# Patient Record
Sex: MALE | Race: WHITE | NOT HISPANIC OR LATINO | Employment: OTHER | URBAN - METROPOLITAN AREA
[De-identification: names, ages, dates, MRNs, and addresses within clinical notes are randomized per-mention and may not be internally consistent; named-entity substitution may affect disease eponyms.]

---

## 2017-02-10 ENCOUNTER — ALLSCRIPTS OFFICE VISIT (OUTPATIENT)
Dept: OTHER | Facility: OTHER | Age: 72
End: 2017-02-10

## 2017-02-10 PROCEDURE — G0416 PROSTATE BIOPSY, ANY MTHD: HCPCS | Performed by: UROLOGY

## 2017-02-10 PROCEDURE — 88344 IMHCHEM/IMCYTCHM EA MLT ANTB: CPT | Performed by: UROLOGY

## 2017-02-13 ENCOUNTER — LAB REQUISITION (OUTPATIENT)
Dept: LAB | Facility: HOSPITAL | Age: 72
End: 2017-02-13
Payer: MEDICARE

## 2017-02-13 DIAGNOSIS — C61 MALIGNANT NEOPLASM OF PROSTATE (HCC): ICD-10-CM

## 2017-02-28 ENCOUNTER — ALLSCRIPTS OFFICE VISIT (OUTPATIENT)
Dept: OTHER | Facility: OTHER | Age: 72
End: 2017-02-28

## 2017-05-28 DIAGNOSIS — C61 MALIGNANT NEOPLASM OF PROSTATE (HCC): ICD-10-CM

## 2017-05-31 ENCOUNTER — ALLSCRIPTS OFFICE VISIT (OUTPATIENT)
Dept: OTHER | Facility: OTHER | Age: 72
End: 2017-05-31

## 2017-08-31 DIAGNOSIS — C61 MALIGNANT NEOPLASM OF PROSTATE (HCC): ICD-10-CM

## 2017-09-05 ENCOUNTER — ALLSCRIPTS OFFICE VISIT (OUTPATIENT)
Dept: OTHER | Facility: OTHER | Age: 72
End: 2017-09-05

## 2017-11-27 DIAGNOSIS — C61 MALIGNANT NEOPLASM OF PROSTATE (HCC): ICD-10-CM

## 2018-01-03 ENCOUNTER — TRANSCRIBE ORDERS (OUTPATIENT)
Dept: ADMINISTRATIVE | Facility: HOSPITAL | Age: 73
End: 2018-01-03

## 2018-01-03 ENCOUNTER — ALLSCRIPTS OFFICE VISIT (OUTPATIENT)
Dept: OTHER | Facility: OTHER | Age: 73
End: 2018-01-03

## 2018-01-03 DIAGNOSIS — C61 MALIGNANT NEOPLASM OF PROSTATE (HCC): ICD-10-CM

## 2018-01-03 DIAGNOSIS — C61 MALIGNANT NEOPLASM OF PROSTATE (HCC): Primary | ICD-10-CM

## 2018-01-04 NOTE — PROGRESS NOTES
Assessment   1  Prostate cancer (185) (C61)    Plan   Prostate cancer    · (1) PSA, DIAGNOSTIC (FOLLOW-UP); Status:Active; Requested for:03Apr2018; Perform:[facility]; FVC:[id number]; For:Prostate cancer; Ordered     By:Nate Cobb   · MRI PROSTATE MULTIPARAMETRIC WO W CONTRAST; Status:Need Information -    Financial Authorization; Requested OYV:[id number]    Perform:[facility]; Order Comments: Lynetta Skiff  GBE:[id number]; Last Updated By:India Chinchilla; 1/3/2018 9:43:55 AM;1 Ordered; For:Prostate cancer; Ordered By:Nate Cobb   · XR ORBITS 4+ VIEW; Status:Active; Requested KFD:[id number]; 1   Perform:[facility]; ACE:[id number]; For:Prostate cancer; Ordered     By:Brant Cobb    · Follow-up visit in 3 months Evaluation and Treatment  Follow-up  Status: Complete     Done: [id number]  Ordered; For: Prostate cancer;  Ordered By: Barbra Chen  Performed:   Due: [id number]; Last Updated By: Lilian Rodríguez; 1/3/2018 9:26:48 AM     1 Amended By: Barbra Chen; Jan 03 2018 9:50 AM EST      Discussion/Summary   Discussion Summary:    [place] 6 prostate cancer on active surveillance  Reviewed with the patient that his PSA is elevated at 10 96  Had a discussion with the patient that since he had a negative prostate biopsy 6 months ago and has previously had a PSA of 11, we will continue to trend his PSAs  He will follow up in 3 months with a PSA prior to visit  Multiparametric MRI will be performed as well to rule out any areas of high suspicion  If further progression in PSA is noted, prostate biopsies will be repeated earlier than anticipated  These are otherwise to be performed in February 2019  Patient's Capacity to Self-Care: Patient is able to Self-Care        Chief Complaint   Chief Complaint Free Text Note Form: Patient presents for Prostate Cancer - AS , elevated PSA   10 55 (12/26/2017)            History of Present Illness   HPI: Bethany Knight is a 67 year old male patient with small volume [place] 6 prostate cancer on active surveillance presenting for 3 month follow up  His initial prostate biopsy was performed in 2011 by an outside urologist  He then had a repeat prostate biopsy in 2012, 2013, 2015 and most recently 2/2017 all of which were negative for malignancy  His PSA has been as high as 11 in the past  PSA more recently was 10 96 and again 10 55 today  He may have had an upper respiratory tract infection 3 weeks ago  most recent PSA is 10 96 on 8/30/17, previously 8 2 5/26/17, 6 19 2/6/17, 11 18 9/26/16  He denies any recent colds/sicknesses  Patient makes sure he does not ride his bicycle 2 weeks prior to blood work  feels like he has a fair stream, empties his bladder after urination, and has nocturia 0-1x night  He denies any dysuria, gross hematuria, bone pain, or weight loss  He does not take any medication for his prostate or bladder  Review of Systems   Complete-Male Urology:      Constitutional: No fever or chills, feels well, no tiredness, no recent weight gain or weight loss  Respiratory: No complaints of shortness of breath, no wheezing, no cough, no SOB on exertion, no orthopnea or PND  Cardiovascular: No complaints of slow heart rate, no fast heart rate, no chest pain, no palpitations, no leg claudication, no lower extremity  Gastrointestinal: No complaints of abdominal pain, no constipation, no nausea or vomiting, no diarrhea or bloody stools  Genitourinary: Empty sensation-- and-- stream quality fair, but-- as noted in HPI,-- no dysuria,-- no urinary hesitancy,-- no hematuria,-- no incontinence-- and-- no feelings of urinary urgency--       The patient presents with complaints of no nocturia (Occasional )  Musculoskeletal: No complaints of arthralgia, no myalgias, no joint swelling or stiffness, no limb pain or swelling        Integumentary: No complaints of skin rash or skin lesions, no itching, no skin wound, no dry skin  Hematologic/Lymphatic: No complaints of swollen glands, no swollen glands in the neck, does not bleed easily, no easy bruising  Neurological: No compliants of headache, no confusion, no convulsions, no numbness or tingling, no dizziness or fainting, no limb weakness, no difficulty walking  Active Problems   1  Organic impotence (607 84) (N52 9)  2  Prostate cancer (185) (C61)    Past Medical History   Active Problems And Past Medical History Reviewed: The active problems and past medical history were reviewed and updated today  Surgical History   1  History of Needle Biopsy Of Prostate  Surgical History Reviewed: The surgical history was reviewed and updated today  Family History   Mother   1  Family history of hypertension (V17 49) (Z82 49)  Brother   2  Family history of malignant neoplasm of prostate (V16 42) (Z80 45)  Family History Reviewed: The family history was reviewed and updated today  Social History    · Being A Social Drinker   · Never A Smoker  Social History Reviewed: The social history was reviewed and updated today  Current Meds   1  No Reported Medications  Requested for: [id number] Recorded  Medication List Reviewed: The medication list was reviewed and updated today  Allergies   1  No Known Drug Allergies    Vitals   Vital Signs    Recorded: [id number] 09:10AM   Heart Rate 70   Systolic 223   Diastolic 76   Height 5 ft 4 in   Weight 153 lb 6 oz   BMI Calculated 26 33   BSA Calculated 1 75     Physical Exam        Constitutional      General appearance: No acute distress, well appearing and well nourished  Pulmonary      Respiratory effort: No increased work of breathing or signs of respiratory distress  Auscultation of lungs: Clear to auscultation  Cardiovascular      Auscultation of heart: Normal rate and rhythm, normal S1 and S2, without murmurs         Examination of extremities for edema and/or varicosities: Normal        Genitourinary      Anus and perineum: Normal        Scrotum contents: Normal size, no masses  Epididymis: Normal, no masses  Testes: Normal testes, no masses  Urethral meatus: Normal, no lesions  Penis: Normal, no lesions  -- 50g, smooth, symmetric, no nodules  Anus, perineum, and rectum: Normal        Musculoskeletal      Gait and station: Normal        Skin      Skin and subcutaneous tissue: Normal without rashes or lesions  Neurologic      Cranial nerves: Cranial nerves 2-12 intact  Additional Exam:  no focal neurologic deficits  Mood and affect appropriate        Signatures    Electronically signed by : IKE Hernandez ; Donny  3 2018  9:29AM EST                       (Author)     Electronically signed by : IKE Hernandez ; Dnony  3 2018  9:52AM EST                       (Author)

## 2018-01-12 VITALS
WEIGHT: 140.5 LBS | BODY MASS INDEX: 23.99 KG/M2 | HEART RATE: 68 BPM | DIASTOLIC BLOOD PRESSURE: 68 MMHG | SYSTOLIC BLOOD PRESSURE: 140 MMHG | HEIGHT: 64 IN

## 2018-01-13 VITALS
DIASTOLIC BLOOD PRESSURE: 70 MMHG | BODY MASS INDEX: 25.34 KG/M2 | SYSTOLIC BLOOD PRESSURE: 120 MMHG | HEIGHT: 64 IN | WEIGHT: 148.44 LBS | HEART RATE: 72 BPM

## 2018-01-14 VITALS
HEIGHT: 64 IN | HEART RATE: 60 BPM | BODY MASS INDEX: 24.24 KG/M2 | SYSTOLIC BLOOD PRESSURE: 140 MMHG | WEIGHT: 142 LBS | DIASTOLIC BLOOD PRESSURE: 70 MMHG

## 2018-01-15 VITALS
SYSTOLIC BLOOD PRESSURE: 112 MMHG | DIASTOLIC BLOOD PRESSURE: 60 MMHG | WEIGHT: 144 LBS | HEART RATE: 60 BPM | BODY MASS INDEX: 24.59 KG/M2 | HEIGHT: 64 IN

## 2018-01-23 VITALS
HEIGHT: 64 IN | WEIGHT: 153.38 LBS | SYSTOLIC BLOOD PRESSURE: 124 MMHG | HEART RATE: 70 BPM | BODY MASS INDEX: 26.18 KG/M2 | DIASTOLIC BLOOD PRESSURE: 76 MMHG

## 2018-03-12 DIAGNOSIS — C61 PROSTATE CANCER (HCC): Primary | ICD-10-CM

## 2018-03-27 ENCOUNTER — APPOINTMENT (OUTPATIENT)
Dept: RADIOLOGY | Facility: MEDICAL CENTER | Age: 73
End: 2018-03-27
Payer: MEDICARE

## 2018-03-27 DIAGNOSIS — S05.40XA FOREIGN BODY BEHIND THE EYE, UNSPECIFIED LATERALITY: ICD-10-CM

## 2018-03-27 DIAGNOSIS — C61 PROSTATE CANCER (HCC): Primary | ICD-10-CM

## 2018-03-27 DIAGNOSIS — C61 MALIGNANT NEOPLASM OF PROSTATE (HCC): ICD-10-CM

## 2018-03-27 PROCEDURE — 70030 X-RAY EYE FOR FOREIGN BODY: CPT

## 2018-04-03 DIAGNOSIS — C61 MALIGNANT NEOPLASM OF PROSTATE (HCC): ICD-10-CM

## 2018-04-10 DIAGNOSIS — C61 PROSTATE CANCER (HCC): Primary | ICD-10-CM

## 2018-04-11 ENCOUNTER — HOSPITAL ENCOUNTER (OUTPATIENT)
Dept: MRI IMAGING | Facility: HOSPITAL | Age: 73
Discharge: HOME | End: 2018-04-11
Attending: UROLOGY
Payer: MEDICARE

## 2018-04-11 DIAGNOSIS — C61 MALIGNANT NEOPLASM OF PROSTATE (HCC): ICD-10-CM

## 2018-04-11 PROCEDURE — 72197 MRI PELVIS W/O & W/DYE: CPT

## 2018-04-11 PROCEDURE — 76377 3D RENDER W/INTRP POSTPROCES: CPT

## 2018-04-11 PROCEDURE — A9577 INJ MULTIHANCE: HCPCS | Performed by: UROLOGY

## 2018-04-11 RX ADMIN — GADOBENATE DIMEGLUMINE 12 ML: 529 INJECTION, SOLUTION INTRAVENOUS at 10:49

## 2018-04-12 ENCOUNTER — TELEPHONE (OUTPATIENT)
Dept: UROLOGY | Facility: HOSPITAL | Age: 73
End: 2018-04-12

## 2018-04-12 ENCOUNTER — TELEPHONE (OUTPATIENT)
Dept: UROLOGY | Facility: AMBULATORY SURGERY CENTER | Age: 73
End: 2018-04-12

## 2018-04-12 NOTE — TELEPHONE ENCOUNTER
[facility]'s Lucius left message stating Dr Julianna Hall is waiting for Dr Josiephine Kocher to call him back regarding an MRI on patient    [phone]

## 2018-04-13 NOTE — PROGRESS NOTES
4/18/2018    [address]  1945  [id number]    Discussion and Plan    MRI findings are reviewed with the patient at length which demonstrates a PE reds 5 lesion in the left anterior aspect of the prostate  Given his established diagnosis of prostate cancer and variably though rising PSA trend, I have recommended that repeat biopsies be performed at this time  Risks including bleeding and infection discussed  Script for Cipro sent to pharmacy  1  Organic impotence    2  Prostate cancer (HCC)  - ciprofloxacin (CIPRO) 500 mg tablet; Take 1 tablet (500 mg total) by mouth 2 (two) times a day for 3 days  Dispense: 6 tablet; Refill: 0  - Biopsy prostate; Future        Assessment      Patient Active Problem List   Diagnosis    Organic impotence    Prostate cancer ([address] )       History of Present Illness    Kami Pemberton is a 68 y o  male seen today in regards to a history of small volume [place] 6 prostate cancer on active surveillance presenting for 3 month follow up  His initial prostate biopsy was performed in 2011 by an outside urologist  He then had a repeat prostate biopsy in 2012, 2013, 2015 and most recently 2/2017 all of which were negative for malignancy  His PSA has been as high as 11 in the past  PSA more recently was 10 96 and again 10 55 today  He may have had an upper respiratory tract infection several weeks ago  Current PSA is 13 84  MRI results are reviewed with patient today          Urinary Symptom Assessment      Past Medical History  History reviewed  No pertinent past medical history  Past Social History  Past Surgical History:   Procedure Laterality Date    PROSTATE BIOPSY      1/26/2015, 2/10/2017       Past Family History  Family History   Problem Relation Age of Onset    Hypertension Mother     Prostate cancer Brother        Past Social history  Social History     Social History    Marital status:       Spouse name: N/A    Number of children: N/A    Years of education: N/A Occupational History    Not on file  Social History Main Topics    Smoking status: Never Smoker    Smokeless tobacco: Never Used    Alcohol use Yes      Comment: social    Drug use: Unknown    Sexual activity: Not on file     Other Topics Concern    Not on file     Social History Narrative    No narrative on file       Current Medications  Current Outpatient Prescriptions   Medication Sig Dispense Refill    ciprofloxacin (CIPRO) 500 mg tablet Take 1 tablet (500 mg total) by mouth 2 (two) times a day for 3 days 6 tablet 0     No current facility-administered medications for this visit  Allergies  No Known Allergies    Past Medical History, Social History, Family History, medications and allergies were reviewed  Review of Systems  Review of Systems   Constitutional: Negative  HENT: Negative  Eyes: Negative  Respiratory: Negative  Cardiovascular: Negative  Gastrointestinal: Negative  Endocrine: Negative  Genitourinary: Negative for decreased urine volume, difficulty urinating, frequency, hematuria and urgency  Musculoskeletal: Negative  Skin: Negative  Neurological: Negative  Hematological: Negative  Psychiatric/Behavioral: Negative  Vitals  Vitals:    04/18/18 1136   BP: 104/80   Pulse: (!) 44   Weight: 66 7 kg (147 lb)   Height: 5' 4" (1 626 m)         Physical Exam    Physical Exam   Constitutional: He is oriented to person, place, and time  He appears well-developed and well-nourished  HENT:   Head: Normocephalic and atraumatic  Eyes: Pupils are equal, round, and reactive to light  Neck: Normal range of motion  Cardiovascular: Normal rate, regular rhythm and normal heart sounds  Pulmonary/Chest: Effort normal and breath sounds normal  No accessory muscle usage  No respiratory distress  Abdominal: Soft  Normal appearance and bowel sounds are normal  There is no tenderness     Genitourinary: Rectum normal, prostate normal and penis normal  No penile tenderness  Genitourinary Comments: Prostate greater than 40 g  No distinct nodules  Musculoskeletal: Normal range of motion  Neurological: He is alert and oriented to person, place, and time  Skin: Skin is warm, dry and intact  Psychiatric: He has a normal mood and affect  His speech is normal  Cognition and memory are normal    Nursing note and vitals reviewed  Results    No results found for: PSA  No results found for: GLUCOSE, CALCIUM, NA, K, CO2, CL, BUN, CREATININE  No results found for: WBC, HGB, HCT, MCV, PLT    No results found for this or any previous visit (from the past 1 hour(s)) ]    MULTIPARAMETRIC MRI OF THE PROSTATE WITH AND WITHOUT CONTRAST      INDICATION: [place] 6 prostate cancer on active surveillance, elevated PSA      COMPARISON: None     PSA LEVEL: 10 55 ng/ml  ( 12/26/2017 )  PRIOR BIOPSY DATE: 2/10/2017  BIOPSY RESULTS: Benign      TECHNIQUE: The following pulse sequences were obtained on a 1 5 T scanner:  T1w axial; T2w axial, sagittal and coronal; DWI axial and ADC map; T1w water, fat, in-phase and opposed-phase axials of entire pelvis and dynamic 3D T1w axial before and during   IV contrast injection      CONTRAST: MultiHance) 12 ml      TECHNICAL LIMITATIONS: None      FINDINGS:     PROSTATE GLAND:  -VOLUME: 51 7 ml   -PERIPHERAL ZONE: There is a focal T2 hypointense lesion in the mid to basilar left anterior and lateral peripheral zone on series 9, images 14 through 16 measuring 1 5 x 0 4 x 0 8 cm  There is restricted diffusion and hyperenhancement  -TRANSITION ZONE:  Mild BPH  -CENTRAL ZONE: Normal   -ANTERIOR FIBROMUSCULAR STROMA:  Normal   -"CAPSULE": There is a small focus of T2 hypointensity extending from the lesion into the adjacent fat on series 9, image 14 suspicious for extracapsular extension      SEMINAL VESICLES: Normal      PELVIC CAVITY:  -LYMPH NODES: No lymphadenopathy  -BLADDER: Unremarkable    -ADDITIONAL FINDINGS: No other significant findings      OSSEOUS STRUCTURES:   Normal marrow signal  No evidence of bone metastases        IMPRESSION:     1   1 5 cm focus in the left mid to basilar anterior and lateral peripheral zone, highly suspicious for prostate carcinoma, with focus suspicious for extracapsular extension      2   Mild BPH      PI-RADS v2 Assessment Category: PI-RADS 5: Very high (clinically significant cancer is highly likely to be present)      I personally discussed this study with MYRTLE REED on 4/12/2018 at 1:35 PM

## 2018-04-18 ENCOUNTER — OFFICE VISIT (OUTPATIENT)
Dept: UROLOGY | Facility: CLINIC | Age: 73
End: 2018-04-18
Payer: MEDICARE

## 2018-04-18 VITALS
WEIGHT: 147 LBS | HEART RATE: 44 BPM | SYSTOLIC BLOOD PRESSURE: 104 MMHG | BODY MASS INDEX: 25.1 KG/M2 | HEIGHT: 64 IN | DIASTOLIC BLOOD PRESSURE: 80 MMHG

## 2018-04-18 DIAGNOSIS — N52.9 ORGANIC IMPOTENCE: Primary | ICD-10-CM

## 2018-04-18 DIAGNOSIS — C61 PROSTATE CANCER (HCC): ICD-10-CM

## 2018-04-18 PROCEDURE — 99214 OFFICE O/P EST MOD 30 MIN: CPT | Performed by: UROLOGY

## 2018-04-18 RX ORDER — CIPROFLOXACIN 500 MG/1
500 TABLET, FILM COATED ORAL 2 TIMES DAILY
Qty: 6 TABLET | Refills: 0 | Status: SHIPPED | OUTPATIENT
Start: 2018-04-18 | End: 2018-04-21

## 2018-04-23 NOTE — PROGRESS NOTES
Biopsy prostate     Date/Time 4/23/2018 7:22 AM     Performed by  Ian Guillermo by MYRTLE Kaplan       Consent: Verbal consent obtained  Written consent obtained  Risks and benefits: risks, benefits and alternatives were discussed  Consent given by: patient  Patient understanding: patient states understanding of the procedure being performed  Patient consent: the patient's understanding of the procedure matches consent given  Procedure consent: procedure consent matches procedure scheduled  Relevant documents: relevant documents present and verified  Patient identity confirmed: verbally with patient      Local anesthesia used: yes     Anesthesia   Local anesthesia used: yes  Local Anesthetic: lidocaine 1% without epinephrine     Sedation   Patient sedated: no      Specimen: yes         Ko Ballard is a 68 y o  male seen today in regards to a history of small volume [place] 6 prostate cancer on active surveillance presenting for 3 month follow up  His initial prostate biopsy was performed in 2011 by an outside urologist  He then had a repeat prostate biopsy in 2012, 2013, 2015 and most recently 2/2017 all of which were negative for malignancy  His PSA has been as high as 11 in the past  PSA more recently was 10 96 and again 10 55 today  He may have had an upper respiratory tract infection several weeks ago  Current PSA is 13 84  MRI results demonstrate a left anterior PRADs 5 lesion  PROCEDURE- US GUIDED PROSTATE BIOPSY    After identification and obtaining informed consent the patient was placed in the left lateral decubitus position in the ultrasound room  He was prepped in the usual fashion  10 cc of 1% viscous lidocaine was administered per rectum  The 7 5 MHz transrectal probe was then introduced  A periprosthetic nerve block was provided by instilling 10 cc of 1% lidocaine via spinal needle into the periprosthetic space bilaterally  Serial images of the prostate were then obtained   Once completed biopsies were retrieved 4 from each peripheral zone and 2 from each central zone for a total of 12 cores  Particular focus was placed on the left anterior peripheral zone in correlation with his recent MRI result  Patient tolerated this well and will follow up in one to 2 weeks for pathology results  PERTINENT FINDINGS AND PLAN        Prostate volume 57 76 cc  Patient will follow up 1-2 weeks for pathology results

## 2018-04-24 ENCOUNTER — PROCEDURE VISIT (OUTPATIENT)
Dept: UROLOGY | Facility: AMBULATORY SURGERY CENTER | Age: 73
End: 2018-04-24
Payer: MEDICARE

## 2018-04-24 VITALS
SYSTOLIC BLOOD PRESSURE: 146 MMHG | BODY MASS INDEX: 25.4 KG/M2 | HEIGHT: 64 IN | HEART RATE: 60 BPM | WEIGHT: 148.8 LBS | DIASTOLIC BLOOD PRESSURE: 60 MMHG

## 2018-04-24 DIAGNOSIS — C61 PROSTATE CANCER (HCC): Primary | ICD-10-CM

## 2018-04-24 PROCEDURE — 55700 PR BIOPSY OF PROSTATE,NEEDLE/PUNCH: CPT | Performed by: UROLOGY

## 2018-04-24 PROCEDURE — 88344 IMHCHEM/IMCYTCHM EA MLT ANTB: CPT | Performed by: PATHOLOGY

## 2018-04-24 PROCEDURE — G0416 PROSTATE BIOPSY, ANY MTHD: HCPCS | Performed by: PATHOLOGY

## 2018-04-24 PROCEDURE — 76942 ECHO GUIDE FOR BIOPSY: CPT | Performed by: UROLOGY

## 2018-04-24 PROCEDURE — 76872 US TRANSRECTAL: CPT | Performed by: UROLOGY

## 2018-05-21 NOTE — PROGRESS NOTES
5/22/2018    Shanique Trujillo  1945  [id number]    Discussion and Plan    Pathology results discussed at length  For the time being he will continue on present active surveillance protocol with PSAs at 3 month intervals  If PSA progression is further noted, he may require super saturation biopsies under general anesthesia and or in conjunction with fusion MRI overlay if possible  Impressions are reviewed  He will follow up in 3 months with PSA prior to visit  All questions answered at this time  1  Prostate CA (HCC)  - PSA Total, Diagnostic; Future  - sildenafil (VIAGRA) 50 MG tablet; Take 2 tablets (100 mg total) by mouth daily as needed for erectile dysfunction  Dispense: 6 tablet; Refill: 12      Assessment      Patient Active Problem List   Diagnosis    Organic impotence    Prostate cancer ([facility] [address] )       History of Present Illness    Little Lemus is a 68 y o  male seen today in regards to a history of small volume [place] 6 prostate cancer on active surveillance presenting for 3 month follow up  His initial prostate biopsy was performed in 2011 by an outside urologist  He then had a repeat prostate biopsy in 2012, 2013, 2015 and most recently 2/2017 all of which were negative for malignancy  His PSA has been as high as 11 in the past  PSA more recently was 10 96 and again 10 55 today  He may have had an upper respiratory tract infection several weeks ago   Current PSA is 13 84   MRI results demonstrate a left anterior PRADs 5 lesion  He recently underwent transrectal biopsies of the prostate in particular focusing in on this region  Pathology fortunately shows no evidence of adenocarcinoma    Findings are reviewed with patient        Urinary Symptom Assessment        Past Medical History  Past Medical History:   Diagnosis Date    ED (erectile dysfunction)     Prostate cancer [facility])        Past Social History  Past Surgical History:   Procedure Laterality Date    PROSTATE BIOPSY      1/26/2015, 2/10/2017    PROSTATE BIOPSY         Past Family History  Family History   Problem Relation Age of Onset    Hypertension Mother     Prostate cancer Brother        Past Social history  Social History     Social History    Marital status:      Spouse name: N/A    Number of children: N/A    Years of education: N/A     Occupational History    Not on file  Social History Main Topics    Smoking status: Never Smoker    Smokeless tobacco: Never Used    Alcohol use Yes      Comment: social    Drug use: Unknown    Sexual activity: Not on file     Other Topics Concern    Not on file     Social History Narrative    No narrative on file       Current Medications  Current Outpatient Prescriptions   Medication Sig Dispense Refill    sildenafil (VIAGRA) 50 MG tablet Take 2 tablets (100 mg total) by mouth daily as needed for erectile dysfunction 6 tablet 12     No current facility-administered medications for this visit  Allergies  No Known Allergies    Past Medical History, Social History, Family History, medications and allergies were reviewed  Review of Systems  Review of Systems   Constitutional: Negative  HENT: Negative  Eyes: Negative  Respiratory: Negative  Cardiovascular: Negative  Gastrointestinal: Negative  Endocrine: Negative  Genitourinary: Negative for decreased urine volume, difficulty urinating, hematuria and urgency  Musculoskeletal: Negative  Skin: Negative  Neurological: Negative  Hematological: Negative  Psychiatric/Behavioral: Negative  Vitals  Vitals:    05/22/18 1111   BP: 118/84   BP Location: Right arm   Patient Position: Sitting   Cuff Size: Adult   Pulse: 56   Weight: 65 8 kg (145 lb)   Height: 5' 4" (1 626 m)         Physical Exam    Physical Exam   Constitutional: He is oriented to person, place, and time  He appears well-developed and well-nourished  HENT:   Head: Normocephalic and atraumatic     Eyes: Pupils are equal, round, and reactive to light  Neck: Normal range of motion  Cardiovascular: Normal rate, regular rhythm and normal heart sounds  Pulmonary/Chest: Effort normal and breath sounds normal  No accessory muscle usage  No respiratory distress  Abdominal: Soft  Normal appearance and bowel sounds are normal  There is no tenderness  Musculoskeletal: Normal range of motion  Neurological: He is alert and oriented to person, place, and time  Skin: Skin is warm, dry and intact  Psychiatric: He has a normal mood and affect  His speech is normal  Cognition and memory are normal    Nursing note and vitals reviewed  Results    Below listed labs, pathology results, and radiology images were personally reviewed:    No results found for: PSA  No results found for: GLUCOSE, CALCIUM, NA, K, CO2, CL, BUN, CREATININE  No results found for: WBC, HGB, HCT, MCV, PLT    No results found for this or any previous visit (from the past 1 hour(s)) ]    Case Report   Surgical Pathology Report                         Case: [id number]                                    Authorizing Provider: Cesar England MD             Collected:           04/24/2018 1032               Ordering Location:     [facility]        Received:            04/24/2018 1032                                      [facility] [place]                                                             Pathologist:           Tri Martinez MD                                                         Specimens:   A) - Prostate, rpz                                                                                   B) - Prostate, rcz                                                                                   C) - Prostate, israel                                                                                   D) - Prostate, lcz                                                                         Final Diagnosis   A    Prostate, RPZ, needle biopsy:  -  Benign prostate tissue, negative for malignancy      B  Prostate, RCZ, needle biopsy:  -  Benign prostate tissue, negative for malignancy  -  Multiplex immunohistochemical stain performed with appropriate control highlights intact basal layer cells staining for p63 and high molecular weight keratin with no significant luminal racemase expression, supporting the diagnosis      C  Prostate, MADI, needle biopsy:  -  Benign prostate tissue, negative for malignancy  -  Multiplex immunohistochemical stain performed with appropriate control highlights intact basal layer cells staining for p63 and high molecular weight keratin with no significant luminal racemase expression, supporting the diagnosis      D    Prostate, LCZ, needle biopsy:  -  Benign prostate tissue, negative for malignancy       Electronically signed by Georgina Ventura MD on 4/27/2018 at  8:02 AM

## 2018-05-22 ENCOUNTER — OFFICE VISIT (OUTPATIENT)
Dept: UROLOGY | Facility: AMBULATORY SURGERY CENTER | Age: 73
End: 2018-05-22
Payer: MEDICARE

## 2018-05-22 VITALS
BODY MASS INDEX: 24.75 KG/M2 | DIASTOLIC BLOOD PRESSURE: 84 MMHG | HEIGHT: 64 IN | WEIGHT: 145 LBS | HEART RATE: 56 BPM | SYSTOLIC BLOOD PRESSURE: 118 MMHG

## 2018-05-22 DIAGNOSIS — C61 PROSTATE CA (HCC): Primary | ICD-10-CM

## 2018-05-22 PROCEDURE — 99215 OFFICE O/P EST HI 40 MIN: CPT | Performed by: UROLOGY

## 2018-05-22 RX ORDER — SILDENAFIL 50 MG/1
100 TABLET, FILM COATED ORAL DAILY PRN
Qty: 6 TABLET | Refills: 12 | Status: SHIPPED | OUTPATIENT
Start: 2018-05-22 | End: 2019-01-03 | Stop reason: SDUPTHER

## 2018-05-25 ENCOUNTER — TELEPHONE (OUTPATIENT)
Dept: UROLOGY | Facility: AMBULATORY SURGERY CENTER | Age: 73
End: 2018-05-25

## 2018-05-25 NOTE — TELEPHONE ENCOUNTER
This patients Poncho Bennett is covered by the insurance does not require and authorization called and spoke with the pharmacy and left the patient a voicemail informing him    Thanks  Florecita Pittman

## 2018-08-24 NOTE — PROGRESS NOTES
8/27/2018    Ailin Pages  1945  [id number]    Discussion and Plan    Patient continues to do well on active surveillance protocol for prostate cancer with relatively stable PSA trend  He will return in 3 months with PSA prior to visit  Continue Viagra as needed  All questions answered at this time  1  Prostate cancer ([facility] [address] )  - PSA Total, Diagnostic; Future    2  Organic impotence    Assessment      Patient Active Problem List   Diagnosis    Organic impotence    Prostate cancer ([facility] [address] )       History of Present Illness    Arias Wallace is a 68 y o  male seen today in regards to a history of small volume [place] 6 prostate cancer on active surveillance presenting for 3 month follow up  His initial prostate biopsy was performed in 2011 by an outside urologist  He then had a repeat prostate biopsy in 2012, 2013, 2015 and most recently 2/2017 all of which were negative for malignancy  His PSA has been as high as 11 in the past  PSA more recently was 10 96 and again 10 55 today  He may have had an upper respiratory tract infection several weeks ago   Current PSA is 13 84   MRI results demonstrate a left anterior PRADs 5 lesion  He recently underwent transrectal biopsies of the prostate in particular focusing in on this region  Pathology fortunately showed no evidence of adenocarcinoma  in follow-up today his PSA has since returned at 10 87  He denies any constitutional complaints  Recently remarried  Urinary Symptom Assessment        Past Medical History  Past Medical History:   Diagnosis Date    ED (erectile dysfunction)     Prostate cancer [facility])        Past Social History  Past Surgical History:   Procedure Laterality Date    PROSTATE BIOPSY      1/26/2015, 2/10/2017    PROSTATE BIOPSY         Past Family History  Family History   Problem Relation Age of Onset    Hypertension Mother     Prostate cancer Brother        Past Social history  Social History     Social History    Marital status:   Spouse name: N/A    Number of children: N/A    Years of education: N/A     Occupational History    Not on file  Social History Main Topics    Smoking status: Never Smoker    Smokeless tobacco: Never Used    Alcohol use Yes      Comment: social    Drug use: No    Sexual activity: Not on file     Other Topics Concern    Not on file     Social History Narrative    No narrative on file       Current Medications  Current Outpatient Prescriptions   Medication Sig Dispense Refill    ibuprofen (MOTRIN) 100 mg/5 mL suspension Take 200 mg by mouth as needed for mild pain      sildenafil (VIAGRA) 50 MG tablet Take 2 tablets (100 mg total) by mouth daily as needed for erectile dysfunction 6 tablet 12     No current facility-administered medications for this visit  Allergies  No Known Allergies    Past Medical History, Social History, Family History, medications and allergies were reviewed  Review of Systems  Review of Systems   Constitutional: Negative  HENT: Negative  Eyes: Negative  Respiratory: Negative  Cardiovascular: Negative  Gastrointestinal: Negative  Endocrine: Negative  Genitourinary: Negative for decreased urine volume, difficulty urinating, frequency, hematuria and urgency  Musculoskeletal: Negative  Skin: Negative  Neurological: Negative  Hematological: Negative  Psychiatric/Behavioral: Negative  Vitals  Vitals:    08/27/18 0928   BP: 122/70   Pulse: 58   Weight: 63 9 kg (140 lb 12 8 oz)   Height: 5' 4" (1 626 m)         Physical Exam    Physical Exam   Constitutional: He is oriented to person, place, and time  He appears well-developed and well-nourished  HENT:   Head: Normocephalic and atraumatic  Eyes: Pupils are equal, round, and reactive to light  Neck: Normal range of motion  Cardiovascular: Normal rate, regular rhythm and normal heart sounds  Pulmonary/Chest: Effort normal and breath sounds normal  No accessory muscle usage   No respiratory distress  Abdominal: Soft  Normal appearance and bowel sounds are normal  There is no tenderness  Genitourinary: Rectum normal, prostate normal and penis normal  No penile tenderness  Genitourinary Comments: Prostate greater than 40 g with no nodules  Musculoskeletal: Normal range of motion  Neurological: He is alert and oriented to person, place, and time  Skin: Skin is warm, dry and intact  Psychiatric: He has a normal mood and affect  His speech is normal  Cognition and memory are normal    Nursing note and vitals reviewed        Results    Below listed labs, pathology results, and radiology images were personally reviewed:    No results found for: PSA  No results found for: GLUCOSE, CALCIUM, NA, K, CO2, CL, BUN, CREATININE  No results found for: WBC, HGB, HCT, MCV, PLT    No results found for this or any previous visit (from the past 1 hour(s)) ]

## 2018-08-27 ENCOUNTER — OFFICE VISIT (OUTPATIENT)
Dept: UROLOGY | Facility: AMBULATORY SURGERY CENTER | Age: 73
End: 2018-08-27
Payer: MEDICARE

## 2018-08-27 VITALS
BODY MASS INDEX: 24.04 KG/M2 | HEART RATE: 58 BPM | HEIGHT: 64 IN | WEIGHT: 140.8 LBS | SYSTOLIC BLOOD PRESSURE: 122 MMHG | DIASTOLIC BLOOD PRESSURE: 70 MMHG

## 2018-08-27 DIAGNOSIS — C61 PROSTATE CANCER (HCC): Primary | ICD-10-CM

## 2018-08-27 DIAGNOSIS — N52.9 ORGANIC IMPOTENCE: ICD-10-CM

## 2018-08-27 PROCEDURE — 99214 OFFICE O/P EST MOD 30 MIN: CPT | Performed by: UROLOGY

## 2018-11-26 ENCOUNTER — TELEPHONE (OUTPATIENT)
Dept: UROLOGY | Facility: MEDICAL CENTER | Age: 73
End: 2018-11-26

## 2018-11-26 DIAGNOSIS — C61 PROSTATE CANCER (HCC): Primary | ICD-10-CM

## 2018-12-31 NOTE — PROGRESS NOTES
1/3/2019    Griselda Michelle  1945  [id number]    Discussion and Plan    Patient continues to do well with gradual improvement in PSA trend  He therefore will continue active surveillance protocol with repeat PSA in 3 months  All questions answered at this time  1  Pro[facility] ([facility] )  - PSA Total, Diagnostic; Future    2  Organic impotence    3  Prostate CA ([facility])  - sildenafil (VIAGRA) 50 MG tablet; Take 2 tablets (100 mg total) by mouth daily as needed for erectile dysfunction  Dispense: 6 tablet; Refill: 12    Assessment      Patient Active Problem List   Diagnosis    Organic impotence    Prostate cancer ([facility] [address] )       History of Present Illness    Sindi Gibbons is a 68 y o  male seen today in regards to a history of small volume [place] 6 prostate cancer on active surveillance presenting for 3 month follow up  His initial prostate biopsy was performed in 2011 by an outside urologist  He then had a repeat prostate biopsy in 2012, 2013, 2015 and most recently 2/2017 all of which were negative for malignancy  His PSA has been as high as 11 in the past  PSA more recently was 10 96 and again 10 55 today  He may have had an upper respiratory tract infection several weeks ago  Hilary Florez PSA is 13 84   MRI results demonstrate a left anterior PRADs 5 lesion   He recently underwent transrectal biopsies of the prostate in particular focusing in on this region   Pathology fortunately showed no evidence of adenocarcinoma  In follow-up today his PSA has since returned at 9 48  He denies any constitutional complaints  Recently remarried      Urinary Symptom Assessment        Past Medical History  Past Medical History:   Diagnosis Date    ED (erectile dysfunction)     Prostate cancer [facility])        Past Social History  Past Surgical History:   Procedure Laterality Date    PROSTATE BIOPSY      1/26/2015, 2/10/2017    PROSTATE BIOPSY         Past Family History  Family History   Problem Relation Age of Onset    Hypertension Mother     Prostate cancer Brother        Past Social history  Social History     Social History    Marital status:      Spouse name: N/A    Number of children: N/A    Years of education: N/A     Occupational History    Not on file  Social History Main Topics    Smoking status: Never Smoker    Smokeless tobacco: Never Used    Alcohol use Yes      Comment: social    Drug use: No    Sexual activity: Not on file     Other Topics Concern    Not on file     Social History Narrative    No narrative on file       Current Medications  Current Outpatient Prescriptions   Medication Sig Dispense Refill    ibuprofen (MOTRIN) 100 mg/5 mL suspension Take 200 mg by mouth as needed for mild pain      sildenafil (VIAGRA) 50 MG tablet Take 2 tablets (100 mg total) by mouth daily as needed for erectile dysfunction 6 tablet 12     No current facility-administered medications for this visit  Allergies  No Known Allergies    Past Medical History, Social History, Family History, medications and allergies were reviewed  Review of Systems  Review of Systems   Constitutional: Negative  HENT: Negative  Eyes: Negative  Respiratory: Negative  Cardiovascular: Negative  Gastrointestinal: Negative  Endocrine: Negative  Genitourinary: Negative for decreased urine volume, difficulty urinating, hematuria and urgency  Musculoskeletal: Negative  Skin: Negative  Neurological: Negative  Hematological: Negative  Psychiatric/Behavioral: Negative  Vitals  Vitals:    01/03/19 1326   BP: 118/76   BP Location: Left arm   Patient Position: Sitting   Cuff Size: Adult   Pulse: 68   Weight: 61 2 kg (135 lb)   Height: 5' 4" (1 626 m)         Physical Exam    Physical Exam   Constitutional: He is oriented to person, place, and time  He appears well-developed and well-nourished  HENT:   Head: Normocephalic and atraumatic  Eyes: Pupils are equal, round, and reactive to light     Neck: Normal range of motion  Cardiovascular: Normal rate, regular rhythm and normal heart sounds  Pulmonary/Chest: Effort normal and breath sounds normal  No accessory muscle usage  No respiratory distress  Abdominal: Soft  Normal appearance and bowel sounds are normal  There is no tenderness  Musculoskeletal: Normal range of motion  Neurological: He is alert and oriented to person, place, and time  Skin: Skin is warm, dry and intact  Psychiatric: He has a normal mood and affect  His speech is normal  Cognition and memory are normal    Nursing note and vitals reviewed        Results    Below listed labs, pathology results, and radiology images were personally reviewed:    No results found for: PSA  No results found for: GLUCOSE, CALCIUM, NA, K, CO2, CL, BUN, CREATININE  No results found for: WBC, HGB, HCT, MCV, PLT    No results found for this or any previous visit (from the past 1 hour(s)) ]

## 2019-01-03 ENCOUNTER — OFFICE VISIT (OUTPATIENT)
Dept: UROLOGY | Facility: AMBULATORY SURGERY CENTER | Age: 74
End: 2019-01-03
Payer: MEDICARE

## 2019-01-03 VITALS
SYSTOLIC BLOOD PRESSURE: 118 MMHG | WEIGHT: 135 LBS | DIASTOLIC BLOOD PRESSURE: 76 MMHG | HEART RATE: 68 BPM | BODY MASS INDEX: 23.05 KG/M2 | HEIGHT: 64 IN

## 2019-01-03 DIAGNOSIS — C61 PROSTATE CA (HCC): ICD-10-CM

## 2019-01-03 DIAGNOSIS — N52.9 ORGANIC IMPOTENCE: ICD-10-CM

## 2019-01-03 DIAGNOSIS — C61 PROSTATE CANCER (HCC): Primary | ICD-10-CM

## 2019-01-03 PROCEDURE — 99214 OFFICE O/P EST MOD 30 MIN: CPT | Performed by: UROLOGY

## 2019-01-03 RX ORDER — SILDENAFIL 50 MG/1
100 TABLET, FILM COATED ORAL DAILY PRN
Qty: 6 TABLET | Refills: 12 | Status: SHIPPED | OUTPATIENT
Start: 2019-01-03 | End: 2019-11-19 | Stop reason: SDUPTHER

## 2019-05-20 ENCOUNTER — OFFICE VISIT (OUTPATIENT)
Dept: UROLOGY | Facility: AMBULATORY SURGERY CENTER | Age: 74
End: 2019-05-20
Payer: MEDICARE

## 2019-05-20 VITALS
HEIGHT: 64 IN | BODY MASS INDEX: 24.75 KG/M2 | DIASTOLIC BLOOD PRESSURE: 68 MMHG | HEART RATE: 59 BPM | SYSTOLIC BLOOD PRESSURE: 116 MMHG | WEIGHT: 145 LBS

## 2019-05-20 DIAGNOSIS — C61 PROSTATE CANCER (HCC): Primary | ICD-10-CM

## 2019-05-20 DIAGNOSIS — N52.9 ORGANIC IMPOTENCE: ICD-10-CM

## 2019-05-20 PROCEDURE — 99214 OFFICE O/P EST MOD 30 MIN: CPT | Performed by: NURSE PRACTITIONER

## 2019-11-19 ENCOUNTER — OFFICE VISIT (OUTPATIENT)
Dept: UROLOGY | Facility: AMBULATORY SURGERY CENTER | Age: 74
End: 2019-11-19
Payer: MEDICARE

## 2019-11-19 VITALS
BODY MASS INDEX: 25.68 KG/M2 | WEIGHT: 150.4 LBS | SYSTOLIC BLOOD PRESSURE: 110 MMHG | DIASTOLIC BLOOD PRESSURE: 86 MMHG | HEART RATE: 55 BPM | HEIGHT: 64 IN

## 2019-11-19 DIAGNOSIS — C61 PROSTATE CANCER (HCC): Primary | ICD-10-CM

## 2019-11-19 DIAGNOSIS — C61 PROSTATE CA (HCC): ICD-10-CM

## 2019-11-19 PROCEDURE — 99213 OFFICE O/P EST LOW 20 MIN: CPT | Performed by: NURSE PRACTITIONER

## 2019-11-19 RX ORDER — SILDENAFIL 50 MG/1
100 TABLET, FILM COATED ORAL DAILY PRN
Qty: 6 TABLET | Refills: 12 | Status: SHIPPED | OUTPATIENT
Start: 2019-11-19 | End: 2020-05-20 | Stop reason: SDUPTHER

## 2019-11-19 NOTE — PROGRESS NOTES
11/19/2019      Chief Complaint   Patient presents with    Prostate Cancer     Assessment and Plan    76 y o  male managed by Dr Garima Lopez    1  Prostate cancer (on active surveillance)  · Status post negative biopsy 04/2018  · Most recent PSA performed 11/14/2019 is 10 30  · Repeat PSA in 3 months  · LEILA reveals prostate of approximately 45 g smooth, nontender  · Recommend MRI prostate if his next PSA remains elevated greater than 10 30  · Return to the office in 6 months    2  Erectile dysfunction  · Continue with sildenafil- prescription provided      History of Present Illness  Sidra Jackson is a 76 y o  male here for follow up evaluation of  [place] 6 prostate cancer on active surveillance and erectile dysfunction  His most recent PSA performed 11/14/2019 is 10 30  PSA trend as below  Patient is status post negative prostate biopsy 04/2018, 2017 2015, 2013 and 2012  His PSA max has been as high as 13 84  Patient with a history of erectile dysfunction and has been taking sildenafil for erectile dysfunction    PSA trend as follow:   05/14/2019 resulted 9 240   12/27/2018-resulted 9 480   08/22/2018-resulted 10 870    Review of Systems   Constitutional: Negative for chills and fever  Respiratory: Negative for cough and shortness of breath  Cardiovascular: Negative for chest pain  Gastrointestinal: Negative for abdominal distention, abdominal pain, blood in stool, nausea and vomiting  Genitourinary: Negative for difficulty urinating, dysuria, enuresis, flank pain, frequency, hematuria and urgency  Erectile dysfunction   Skin: Negative for rash       Past Medical History  Past Medical History:   Diagnosis Date    ED (erectile dysfunction)     Prostate cancer [facility])        Past Social History  Past Surgical History:   Procedure Laterality Date    PROSTATE BIOPSY      1/26/2015, 2/10/2017    PROSTATE BIOPSY       Social History     Tobacco Use   Smoking Status Former Smoker   Smokeless Tobacco Never Used   Tobacco Comment    tried it once        Past Family History  Family History   Problem Relation Age of Onset    Hypertension Mother     Prostate cancer Brother        Past Social history  Social History     Socioeconomic History    Marital status:       Spouse name: Not on file    Number of children: Not on file    Years of education: Not on file    Highest education level: Not on file   Occupational History    Not on file   Social Needs    Financial resource strain: Not on file    Food insecurity:     Worry: Not on file     Inability: Not on file    Transportation needs:     Medical: Not on file     Non-medical: Not on file   Tobacco Use    Smoking status: Former Smoker    Smokeless tobacco: Never Used    Tobacco comment: tried it once    Substance and Sexual Activity    Alcohol use: Yes     Comment: social    Drug use: No    Sexual activity: Not on file   Lifestyle    Physical activity:     Days per week: Not on file     Minutes per session: Not on file    Stress: Not on file   Relationships    Social connections:     Talks on phone: Not on file     Gets together: Not on file     Attends Alevism service: Not on file     Active member of club or organization: Not on file     Attends meetings of clubs or organizations: Not on file     Relationship status: Not on file    Intimate partner violence:     Fear of current or ex partner: Not on file     Emotionally abused: Not on file     Physically abused: Not on file     Forced sexual activity: Not on file   Other Topics Concern    Not on file   Social History Narrative    Not on file       Current Medications  Current Outpatient Medications   Medication Sig Dispense Refill    ibuprofen (MOTRIN) 100 mg/5 mL suspension Take 200 mg by mouth as needed for mild pain      sildenafil (VIAGRA) 50 MG tablet Take 2 tablets (100 mg total) by mouth daily as needed for erectile dysfunction 6 tablet 12     No current facility-administered medications for this visit  Allergies  No Known Allergies      The following portions of the patient's history were reviewed and updated as appropriate: allergies, current medications, past medical history, past social history, past surgical history and problem list       Vitals  Vitals:    11/19/19 1042   BP: 110/86   BP Location: Left arm   Patient Position: Sitting   Cuff Size: Adult   Pulse: 55   Weight: 68 2 kg (150 lb 6 4 oz)   Height: 5' 4" (1 626 m)           Physical Exam  Physical Exam   Constitutional: He appears well-developed and well-nourished  No distress  Cardiovascular: Normal rate  Pulmonary/Chest: Effort normal and breath sounds normal    Abdominal: Soft  Genitourinary:   Genitourinary Comments: Prostate approximately 45 g smooth, nontender  No nodules noted   Musculoskeletal: Normal range of motion  Neurological: He is alert  Skin: Skin is warm  Vitals reviewed  Results  No results found for this or any previous visit (from the past 1 hour(s))  ]  No results found for: PSA  No results found for: GLUCOSE, CALCIUM, NA, K, CO2, CL, BUN, CREATININE  No results found for: WBC, HGB, HCT, MCV, PLT    Orders  Orders Placed This Encounter   Procedures    PSA Total, Diagnostic     Standing Status:   Future     Standing Expiration Date:   11/19/2020       JAE Fam

## 2020-05-15 ENCOUNTER — APPOINTMENT (OUTPATIENT)
Dept: LAB | Facility: CLINIC | Age: 75
End: 2020-05-15
Payer: MEDICARE

## 2020-05-15 DIAGNOSIS — C61 PROSTATE CANCER (HCC): ICD-10-CM

## 2020-05-15 LAB — PSA SERPL-MCNC: 8.8 NG/ML (ref 0–4)

## 2020-05-15 PROCEDURE — 84153 ASSAY OF PSA TOTAL: CPT

## 2020-05-20 ENCOUNTER — TELEMEDICINE (OUTPATIENT)
Dept: UROLOGY | Facility: CLINIC | Age: 75
End: 2020-05-20
Payer: MEDICARE

## 2020-05-20 DIAGNOSIS — N52.9 ORGANIC IMPOTENCE: Primary | ICD-10-CM

## 2020-05-20 DIAGNOSIS — C61 PROSTATE CANCER (HCC): ICD-10-CM

## 2020-05-20 DIAGNOSIS — Z76.89 ENCOUNTER FOR SUPPORT AND COORDINATION OF TRANSITION OF CARE: ICD-10-CM

## 2020-05-20 DIAGNOSIS — N40.1 BENIGN LOCALIZED PROSTATIC HYPERPLASIA WITH LOWER URINARY TRACT SYMPTOMS (LUTS): ICD-10-CM

## 2020-05-20 DIAGNOSIS — C61 PROSTATE CA (HCC): ICD-10-CM

## 2020-05-20 PROCEDURE — 99214 OFFICE O/P EST MOD 30 MIN: CPT | Performed by: UROLOGY

## 2020-05-20 RX ORDER — SILDENAFIL 50 MG/1
100 TABLET, FILM COATED ORAL DAILY PRN
Qty: 6 TABLET | Refills: 12 | Status: SHIPPED | OUTPATIENT
Start: 2020-05-20 | End: 2021-09-22

## 2020-09-11 ENCOUNTER — APPOINTMENT (OUTPATIENT)
Dept: LAB | Facility: CLINIC | Age: 75
End: 2020-09-11
Payer: MEDICARE

## 2020-09-11 ENCOUNTER — TRANSCRIBE ORDERS (OUTPATIENT)
Dept: LAB | Facility: CLINIC | Age: 75
End: 2020-09-11

## 2020-09-11 DIAGNOSIS — Z00.00 ROUTINE GENERAL MEDICAL EXAMINATION AT A HEALTH CARE FACILITY: Primary | ICD-10-CM

## 2020-09-11 DIAGNOSIS — Z00.00 ROUTINE GENERAL MEDICAL EXAMINATION AT A HEALTH CARE FACILITY: ICD-10-CM

## 2020-09-11 LAB
ALBUMIN SERPL BCP-MCNC: 3.6 G/DL (ref 3.5–5)
ALP SERPL-CCNC: 88 U/L (ref 46–116)
ALT SERPL W P-5'-P-CCNC: 20 U/L (ref 12–78)
ANION GAP SERPL CALCULATED.3IONS-SCNC: 6 MMOL/L (ref 4–13)
AST SERPL W P-5'-P-CCNC: 17 U/L (ref 5–45)
BACTERIA UR QL AUTO: ABNORMAL /HPF
BASOPHILS # BLD AUTO: 0.13 THOUSANDS/ΜL (ref 0–0.1)
BASOPHILS NFR BLD AUTO: 1 % (ref 0–1)
BILIRUB SERPL-MCNC: 0.84 MG/DL (ref 0.2–1)
BILIRUB UR QL STRIP: NEGATIVE
BUN SERPL-MCNC: 34 MG/DL (ref 5–25)
CALCIUM SERPL-MCNC: 8.8 MG/DL (ref 8.3–10.1)
CHLORIDE SERPL-SCNC: 109 MMOL/L (ref 100–108)
CHOLEST SERPL-MCNC: 172 MG/DL (ref 50–200)
CLARITY UR: CLEAR
CO2 SERPL-SCNC: 27 MMOL/L (ref 21–32)
COLOR UR: YELLOW
CREAT SERPL-MCNC: 1.78 MG/DL (ref 0.6–1.3)
EOSINOPHIL # BLD AUTO: 0.46 THOUSAND/ΜL (ref 0–0.61)
EOSINOPHIL NFR BLD AUTO: 5 % (ref 0–6)
ERYTHROCYTE [DISTWIDTH] IN BLOOD BY AUTOMATED COUNT: 12.9 % (ref 11.6–15.1)
ERYTHROCYTE [SEDIMENTATION RATE] IN BLOOD: 26 MM/HOUR (ref 0–19)
GFR SERPL CREATININE-BSD FRML MDRD: 37 ML/MIN/1.73SQ M
GLUCOSE P FAST SERPL-MCNC: 82 MG/DL (ref 65–99)
GLUCOSE UR STRIP-MCNC: NEGATIVE MG/DL
HCT VFR BLD AUTO: 42.2 % (ref 36.5–49.3)
HDLC SERPL-MCNC: 62 MG/DL
HGB BLD-MCNC: 13.1 G/DL (ref 12–17)
HGB UR QL STRIP.AUTO: NEGATIVE
HYALINE CASTS #/AREA URNS LPF: ABNORMAL /LPF
IMM GRANULOCYTES # BLD AUTO: 0.04 THOUSAND/UL (ref 0–0.2)
IMM GRANULOCYTES NFR BLD AUTO: 0 % (ref 0–2)
KETONES UR STRIP-MCNC: NEGATIVE MG/DL
LDLC SERPL CALC-MCNC: 100 MG/DL (ref 0–100)
LEUKOCYTE ESTERASE UR QL STRIP: NEGATIVE
LYMPHOCYTES # BLD AUTO: 2.55 THOUSANDS/ΜL (ref 0.6–4.47)
LYMPHOCYTES NFR BLD AUTO: 28 % (ref 14–44)
MCH RBC QN AUTO: 29.3 PG (ref 26.8–34.3)
MCHC RBC AUTO-ENTMCNC: 31 G/DL (ref 31.4–37.4)
MCV RBC AUTO: 94 FL (ref 82–98)
MONOCYTES # BLD AUTO: 0.91 THOUSAND/ΜL (ref 0.17–1.22)
MONOCYTES NFR BLD AUTO: 10 % (ref 4–12)
NEUTROPHILS # BLD AUTO: 5.03 THOUSANDS/ΜL (ref 1.85–7.62)
NEUTS SEG NFR BLD AUTO: 56 % (ref 43–75)
NITRITE UR QL STRIP: NEGATIVE
NON-SQ EPI CELLS URNS QL MICRO: ABNORMAL /HPF
NONHDLC SERPL-MCNC: 110 MG/DL
NRBC BLD AUTO-RTO: 0 /100 WBCS
PH UR STRIP.AUTO: 6 [PH]
PLATELET # BLD AUTO: 218 THOUSANDS/UL (ref 149–390)
PMV BLD AUTO: 9.9 FL (ref 8.9–12.7)
POTASSIUM SERPL-SCNC: 4 MMOL/L (ref 3.5–5.3)
PROT SERPL-MCNC: 7.5 G/DL (ref 6.4–8.2)
PROT UR STRIP-MCNC: NEGATIVE MG/DL
RBC # BLD AUTO: 4.47 MILLION/UL (ref 3.88–5.62)
RBC #/AREA URNS AUTO: ABNORMAL /HPF
SODIUM SERPL-SCNC: 142 MMOL/L (ref 136–145)
SP GR UR STRIP.AUTO: 1.02 (ref 1–1.03)
TRIGL SERPL-MCNC: 49 MG/DL
TSH SERPL DL<=0.05 MIU/L-ACNC: 1.47 UIU/ML (ref 0.36–3.74)
UROBILINOGEN UR QL STRIP.AUTO: 0.2 E.U./DL
WBC # BLD AUTO: 9.12 THOUSAND/UL (ref 4.31–10.16)
WBC #/AREA URNS AUTO: ABNORMAL /HPF

## 2020-09-11 PROCEDURE — 80061 LIPID PANEL: CPT

## 2020-09-11 PROCEDURE — 80053 COMPREHEN METABOLIC PANEL: CPT

## 2020-09-11 PROCEDURE — 84443 ASSAY THYROID STIM HORMONE: CPT

## 2020-09-11 PROCEDURE — 85652 RBC SED RATE AUTOMATED: CPT

## 2020-09-11 PROCEDURE — 85025 COMPLETE CBC W/AUTO DIFF WBC: CPT

## 2020-09-11 PROCEDURE — 81001 URINALYSIS AUTO W/SCOPE: CPT | Performed by: INTERNAL MEDICINE

## 2020-09-11 PROCEDURE — 36415 COLL VENOUS BLD VENIPUNCTURE: CPT

## 2020-10-15 ENCOUNTER — LAB (OUTPATIENT)
Dept: LAB | Facility: CLINIC | Age: 75
End: 2020-10-15
Payer: MEDICARE

## 2020-10-15 ENCOUNTER — TRANSCRIBE ORDERS (OUTPATIENT)
Dept: LAB | Facility: CLINIC | Age: 75
End: 2020-10-15

## 2020-10-15 DIAGNOSIS — N18.9 CHRONIC RENAL FAILURE SYNDROME: Primary | ICD-10-CM

## 2020-10-15 DIAGNOSIS — N18.9 CHRONIC RENAL FAILURE SYNDROME: ICD-10-CM

## 2020-10-15 LAB
ANION GAP SERPL CALCULATED.3IONS-SCNC: 6 MMOL/L (ref 4–13)
BUN SERPL-MCNC: 29 MG/DL (ref 5–25)
CALCIUM SERPL-MCNC: 8.8 MG/DL (ref 8.3–10.1)
CHLORIDE SERPL-SCNC: 107 MMOL/L (ref 100–108)
CO2 SERPL-SCNC: 26 MMOL/L (ref 21–32)
CREAT SERPL-MCNC: 1.8 MG/DL (ref 0.6–1.3)
GFR SERPL CREATININE-BSD FRML MDRD: 36 ML/MIN/1.73SQ M
GLUCOSE P FAST SERPL-MCNC: 90 MG/DL (ref 65–99)
POTASSIUM SERPL-SCNC: 4.3 MMOL/L (ref 3.5–5.3)
SODIUM SERPL-SCNC: 139 MMOL/L (ref 136–145)

## 2020-10-15 PROCEDURE — 80048 BASIC METABOLIC PNL TOTAL CA: CPT

## 2020-10-15 PROCEDURE — 36415 COLL VENOUS BLD VENIPUNCTURE: CPT

## 2020-11-02 PROBLEM — N18.32 STAGE 3B CHRONIC KIDNEY DISEASE (HCC): Status: ACTIVE | Noted: 2020-11-02

## 2020-11-03 ENCOUNTER — CONSULT (OUTPATIENT)
Dept: NEPHROLOGY | Facility: CLINIC | Age: 75
End: 2020-11-03
Payer: MEDICARE

## 2020-11-03 VITALS — BODY MASS INDEX: 25.1 KG/M2 | TEMPERATURE: 96.8 F | HEIGHT: 64 IN | WEIGHT: 147 LBS

## 2020-11-03 DIAGNOSIS — E78.5 DYSLIPIDEMIA: ICD-10-CM

## 2020-11-03 DIAGNOSIS — N18.32 STAGE 3B CHRONIC KIDNEY DISEASE (HCC): Primary | ICD-10-CM

## 2020-11-03 LAB
SL AMB  POCT GLUCOSE, UA: NORMAL
SL AMB LEUKOCYTE ESTERASE,UA: NORMAL
SL AMB POCT BILIRUBIN,UA: NORMAL
SL AMB POCT BLOOD,UA: NORMAL
SL AMB POCT CLARITY,UA: CLEAR
SL AMB POCT COLOR,UA: YELLOW
SL AMB POCT KETONES,UA: NORMAL
SL AMB POCT NITRITE,UA: NORMAL
SL AMB POCT PH,UA: 6
SL AMB POCT SPECIFIC GRAVITY,UA: 1.01
SL AMB POCT URINE PROTEIN: NORMAL
SL AMB POCT UROBILINOGEN: NORMAL

## 2020-11-03 PROCEDURE — 99204 OFFICE O/P NEW MOD 45 MIN: CPT | Performed by: INTERNAL MEDICINE

## 2020-11-03 PROCEDURE — 81002 URINALYSIS NONAUTO W/O SCOPE: CPT | Performed by: INTERNAL MEDICINE

## 2020-11-04 ENCOUNTER — OFFICE VISIT (OUTPATIENT)
Dept: GASTROENTEROLOGY | Facility: CLINIC | Age: 75
End: 2020-11-04
Payer: MEDICARE

## 2020-11-04 VITALS
WEIGHT: 147.25 LBS | DIASTOLIC BLOOD PRESSURE: 72 MMHG | BODY MASS INDEX: 25.28 KG/M2 | TEMPERATURE: 97.6 F | HEART RATE: 66 BPM | SYSTOLIC BLOOD PRESSURE: 130 MMHG

## 2020-11-04 DIAGNOSIS — Z86.010 HISTORY OF COLON POLYPS: ICD-10-CM

## 2020-11-04 DIAGNOSIS — K21.00 GASTROESOPHAGEAL REFLUX DISEASE WITH ESOPHAGITIS WITHOUT HEMORRHAGE: Primary | ICD-10-CM

## 2020-11-04 PROCEDURE — 99203 OFFICE O/P NEW LOW 30 MIN: CPT | Performed by: PHYSICIAN ASSISTANT

## 2020-11-05 ENCOUNTER — TELEPHONE (OUTPATIENT)
Dept: NEPHROLOGY | Facility: CLINIC | Age: 75
End: 2020-11-05

## 2020-11-10 ENCOUNTER — DOCUMENTATION (OUTPATIENT)
Dept: NEPHROLOGY | Facility: CLINIC | Age: 75
End: 2020-11-10

## 2020-11-10 ENCOUNTER — TELEPHONE (OUTPATIENT)
Dept: UROLOGY | Facility: MEDICAL CENTER | Age: 75
End: 2020-11-10

## 2020-11-13 ENCOUNTER — HOSPITAL ENCOUNTER (OUTPATIENT)
Dept: RADIOLOGY | Facility: HOSPITAL | Age: 75
Discharge: HOME | End: 2020-11-13
Attending: INTERNAL MEDICINE
Payer: MEDICARE

## 2020-11-13 ENCOUNTER — TELEPHONE (OUTPATIENT)
Dept: NEPHROLOGY | Facility: CLINIC | Age: 75
End: 2020-11-13

## 2020-11-13 DIAGNOSIS — N18.32 STAGE 3B CHRONIC KIDNEY DISEASE (HCC): ICD-10-CM

## 2020-11-13 PROCEDURE — 93975 VASCULAR STUDY: CPT | Performed by: SURGERY

## 2020-11-13 PROCEDURE — 93975 VASCULAR STUDY: CPT

## 2020-11-23 ENCOUNTER — ANESTHESIA EVENT (OUTPATIENT)
Dept: GASTROENTEROLOGY | Facility: HOSPITAL | Age: 75
End: 2020-11-23

## 2020-11-23 RX ORDER — SODIUM CHLORIDE, SODIUM LACTATE, POTASSIUM CHLORIDE, CALCIUM CHLORIDE 600; 310; 30; 20 MG/100ML; MG/100ML; MG/100ML; MG/100ML
125 INJECTION, SOLUTION INTRAVENOUS CONTINUOUS
Status: CANCELLED | OUTPATIENT
Start: 2020-11-23

## 2020-11-24 ENCOUNTER — HOSPITAL ENCOUNTER (OUTPATIENT)
Dept: GASTROENTEROLOGY | Facility: HOSPITAL | Age: 75
Setting detail: OUTPATIENT SURGERY
Discharge: HOME | End: 2020-11-24
Attending: INTERNAL MEDICINE | Admitting: INTERNAL MEDICINE
Payer: MEDICARE

## 2020-11-24 ENCOUNTER — ANESTHESIA (OUTPATIENT)
Dept: GASTROENTEROLOGY | Facility: HOSPITAL | Age: 75
End: 2020-11-24

## 2020-11-24 VITALS — HEART RATE: 67 BPM

## 2020-11-24 VITALS
HEIGHT: 63 IN | RESPIRATION RATE: 16 BRPM | DIASTOLIC BLOOD PRESSURE: 77 MMHG | HEART RATE: 56 BPM | BODY MASS INDEX: 24.92 KG/M2 | WEIGHT: 140.65 LBS | OXYGEN SATURATION: 99 % | TEMPERATURE: 97.9 F | SYSTOLIC BLOOD PRESSURE: 138 MMHG

## 2020-11-24 DIAGNOSIS — K21.00 GASTROESOPHAGEAL REFLUX DISEASE WITH ESOPHAGITIS WITHOUT HEMORRHAGE: ICD-10-CM

## 2020-11-24 DIAGNOSIS — Z86.010 HISTORY OF COLON POLYPS: ICD-10-CM

## 2020-11-24 PROCEDURE — 88305 TISSUE EXAM BY PATHOLOGIST: CPT | Performed by: PATHOLOGY

## 2020-11-24 PROCEDURE — 43239 EGD BIOPSY SINGLE/MULTIPLE: CPT | Performed by: INTERNAL MEDICINE

## 2020-11-24 PROCEDURE — 45378 DIAGNOSTIC COLONOSCOPY: CPT | Performed by: INTERNAL MEDICINE

## 2020-11-24 RX ORDER — SODIUM CHLORIDE, SODIUM LACTATE, POTASSIUM CHLORIDE, CALCIUM CHLORIDE 600; 310; 30; 20 MG/100ML; MG/100ML; MG/100ML; MG/100ML
INJECTION, SOLUTION INTRAVENOUS CONTINUOUS PRN
Status: DISCONTINUED | OUTPATIENT
Start: 2020-11-24 | End: 2020-11-24

## 2020-11-24 RX ORDER — PANTOPRAZOLE SODIUM 40 MG/1
40 TABLET, DELAYED RELEASE ORAL 2 TIMES DAILY
Qty: 60 TABLET | Refills: 2 | Status: SHIPPED | OUTPATIENT
Start: 2020-11-24 | End: 2021-03-17

## 2020-11-24 RX ORDER — PROPOFOL 10 MG/ML
INJECTION, EMULSION INTRAVENOUS AS NEEDED
Status: DISCONTINUED | OUTPATIENT
Start: 2020-11-24 | End: 2020-11-24

## 2020-11-24 RX ORDER — LIDOCAINE HYDROCHLORIDE 10 MG/ML
INJECTION, SOLUTION EPIDURAL; INFILTRATION; INTRACAUDAL; PERINEURAL AS NEEDED
Status: DISCONTINUED | OUTPATIENT
Start: 2020-11-24 | End: 2020-11-24

## 2020-11-24 RX ADMIN — PROPOFOL 20 MG: 10 INJECTION, EMULSION INTRAVENOUS at 12:49

## 2020-11-24 RX ADMIN — PROPOFOL 120 MG: 10 INJECTION, EMULSION INTRAVENOUS at 12:46

## 2020-11-24 RX ADMIN — PROPOFOL 40 MG: 10 INJECTION, EMULSION INTRAVENOUS at 12:47

## 2020-11-24 RX ADMIN — PROPOFOL 50 MG: 10 INJECTION, EMULSION INTRAVENOUS at 12:52

## 2020-11-24 RX ADMIN — PROPOFOL 50 MG: 10 INJECTION, EMULSION INTRAVENOUS at 12:56

## 2020-11-24 RX ADMIN — SODIUM CHLORIDE, SODIUM LACTATE, POTASSIUM CHLORIDE, AND CALCIUM CHLORIDE: .6; .31; .03; .02 INJECTION, SOLUTION INTRAVENOUS at 12:30

## 2020-11-24 RX ADMIN — LIDOCAINE HYDROCHLORIDE 100 MG: 10 INJECTION, SOLUTION EPIDURAL; INFILTRATION; INTRACAUDAL; PERINEURAL at 12:47

## 2020-11-24 RX ADMIN — PROPOFOL 20 MG: 10 INJECTION, EMULSION INTRAVENOUS at 12:48

## 2020-12-01 ENCOUNTER — HOSPITAL ENCOUNTER (OUTPATIENT)
Dept: RADIOLOGY | Facility: HOSPITAL | Age: 75
Discharge: HOME | End: 2020-12-01
Attending: UROLOGY
Payer: MEDICARE

## 2020-12-01 DIAGNOSIS — C61 PROSTATE CANCER (HCC): ICD-10-CM

## 2020-12-01 PROCEDURE — A9585 GADOBUTROL INJECTION: HCPCS | Performed by: UROLOGY

## 2020-12-01 PROCEDURE — 76377 3D RENDER W/INTRP POSTPROCES: CPT

## 2020-12-01 PROCEDURE — 72197 MRI PELVIS W/O & W/DYE: CPT

## 2020-12-01 RX ADMIN — GADOBUTROL 6 ML: 604.72 INJECTION INTRAVENOUS at 09:11

## 2020-12-11 ENCOUNTER — LAB (OUTPATIENT)
Dept: LAB | Facility: HOSPITAL | Age: 75
End: 2020-12-11
Attending: UROLOGY
Payer: MEDICARE

## 2020-12-11 DIAGNOSIS — C61 PROSTATE CANCER (HCC): ICD-10-CM

## 2020-12-11 LAB
ANION GAP SERPL CALCULATED.3IONS-SCNC: 9 MMOL/L (ref 4–13)
BUN SERPL-MCNC: 31 MG/DL (ref 5–25)
CALCIUM SERPL-MCNC: 8.7 MG/DL (ref 8.3–10.1)
CHLORIDE SERPL-SCNC: 106 MMOL/L (ref 100–108)
CO2 SERPL-SCNC: 28 MMOL/L (ref 21–32)
CREAT SERPL-MCNC: 1.92 MG/DL (ref 0.6–1.3)
GFR SERPL CREATININE-BSD FRML MDRD: 33 ML/MIN/1.73SQ M
GLUCOSE P FAST SERPL-MCNC: 107 MG/DL (ref 65–99)
POTASSIUM SERPL-SCNC: 4.6 MMOL/L (ref 3.5–5.3)
PSA SERPL-MCNC: 10.5 NG/ML (ref 0–4)
SODIUM SERPL-SCNC: 143 MMOL/L (ref 136–145)

## 2020-12-11 PROCEDURE — 84153 ASSAY OF PSA TOTAL: CPT

## 2020-12-11 PROCEDURE — 80048 BASIC METABOLIC PNL TOTAL CA: CPT

## 2020-12-11 PROCEDURE — 36415 COLL VENOUS BLD VENIPUNCTURE: CPT

## 2020-12-17 ENCOUNTER — TELEPHONE (OUTPATIENT)
Dept: UROLOGY | Facility: CLINIC | Age: 75
End: 2020-12-17

## 2020-12-17 ENCOUNTER — OFFICE VISIT (OUTPATIENT)
Dept: UROLOGY | Facility: CLINIC | Age: 75
End: 2020-12-17
Payer: MEDICARE

## 2020-12-17 DIAGNOSIS — Z71.2 PERSON CONSULTING FOR EXPLANATION OF EXAMINATION OR TEST FINDING: ICD-10-CM

## 2020-12-17 DIAGNOSIS — C61 PROSTATE CANCER (HCC): Primary | ICD-10-CM

## 2020-12-17 PROCEDURE — 99214 OFFICE O/P EST MOD 30 MIN: CPT | Performed by: UROLOGY

## 2020-12-24 ENCOUNTER — TELEPHONE (OUTPATIENT)
Dept: RADIATION ONCOLOGY | Facility: CLINIC | Age: 75
End: 2020-12-24

## 2020-12-24 ENCOUNTER — RADIATION ONCOLOGY CONSULT (OUTPATIENT)
Dept: RADIATION ONCOLOGY | Facility: CLINIC | Age: 75
End: 2020-12-24
Attending: RADIOLOGY
Payer: MEDICARE

## 2020-12-24 VITALS
WEIGHT: 150 LBS | HEART RATE: 60 BPM | SYSTOLIC BLOOD PRESSURE: 120 MMHG | DIASTOLIC BLOOD PRESSURE: 68 MMHG | TEMPERATURE: 97.5 F | BODY MASS INDEX: 26.58 KG/M2 | RESPIRATION RATE: 16 BRPM | HEIGHT: 63 IN

## 2020-12-24 DIAGNOSIS — C61 PROSTATE CANCER (HCC): Primary | ICD-10-CM

## 2020-12-24 DIAGNOSIS — C61 PROSTATE CANCER (HCC): ICD-10-CM

## 2020-12-24 PROCEDURE — 99211 OFF/OP EST MAY X REQ PHY/QHP: CPT | Performed by: RADIOLOGY

## 2020-12-29 DIAGNOSIS — C61 PROSTATE CANCER (HCC): Primary | ICD-10-CM

## 2020-12-29 RX ORDER — CIPROFLOXACIN 500 MG/1
500 TABLET, FILM COATED ORAL 2 TIMES DAILY
Qty: 2 TABLET | Refills: 0 | Status: SHIPPED | OUTPATIENT
Start: 2020-12-29 | End: 2020-12-30

## 2020-12-30 ENCOUNTER — TELEPHONE (OUTPATIENT)
Dept: UROLOGY | Facility: CLINIC | Age: 75
End: 2020-12-30

## 2021-01-26 ENCOUNTER — IMMUNIZATIONS (OUTPATIENT)
Dept: FAMILY MEDICINE CLINIC | Facility: HOSPITAL | Age: 76
End: 2021-01-26

## 2021-01-26 DIAGNOSIS — Z23 ENCOUNTER FOR IMMUNIZATION: Primary | ICD-10-CM

## 2021-01-26 PROCEDURE — 0011A SARS-COV-2 / COVID-19 MRNA VACCINE (MODERNA) 100 MCG: CPT

## 2021-01-26 PROCEDURE — 91301 SARS-COV-2 / COVID-19 MRNA VACCINE (MODERNA) 100 MCG: CPT

## 2021-02-22 ENCOUNTER — IMMUNIZATIONS (OUTPATIENT)
Dept: FAMILY MEDICINE CLINIC | Facility: HOSPITAL | Age: 76
End: 2021-02-22

## 2021-02-22 DIAGNOSIS — Z23 ENCOUNTER FOR IMMUNIZATION: Primary | ICD-10-CM

## 2021-02-22 PROCEDURE — 91301 SARS-COV-2 / COVID-19 MRNA VACCINE (MODERNA) 100 MCG: CPT

## 2021-02-22 PROCEDURE — 0012A SARS-COV-2 / COVID-19 MRNA VACCINE (MODERNA) 100 MCG: CPT

## 2021-03-01 ENCOUNTER — TELEPHONE (OUTPATIENT)
Dept: NEPHROLOGY | Facility: CLINIC | Age: 76
End: 2021-03-01

## 2021-03-01 NOTE — TELEPHONE ENCOUNTER
----- Message from Abdi Rascon MD sent at 3/1/2021  4:10 PM EST -----   Patient needs lab work prior to his appointment which is this week  Please see prior orders

## 2021-03-01 NOTE — PROGRESS NOTES
RENAL FOLLOW UP NOTE: td    ASSESSMENT AND PLAN:  78-year-old male with a history of prostate CA who we are seeing for CKD stage 3:    1  CKD stage 3B  · Etiology: Arteriolar nephrosclerosis,? Hypertensive nephrosclerosis;  no evidence of primary glomerular process with a bland urinalysis without proteinuria or hematuria; no evidence of obstructive uropathy   · Baseline creatinine: 1 8  · Current creatinine:  1 60 at baseline  · Urine protein creatinine ratio:  0 09 g at goal  ·  UA:  No proteinuria and no hematuria or pyuria  ·  PVR with bladder scan:  ·  renal artery duplex: negative  ·  renal ultrasound demonstrated cysts which are benign and simple no further evaluation required per the [facility] radiologist Dr Lena Farah  Recommendations:  · Treat hypertension-please see below  · Treat dyslipidemia-please see below  · Maintain proteinuria less than 1 g or as low as possible  · Avoid nephrotoxic agents such as NSAIDs, and proton pump inhibitors if possible; patient counseled as such    2   Volume:  Euvolemic    3  Hypertension:       Current blood pressure averages:   Blood pressures 11/2020:  -a  m :  142/81, no orthostatic changes  -p  m :  140/76, no orthostatic changes  Heart rate:  50-70 range    · Goal blood pressure: less than 130/80 given CKD    Recommendations:  · Push nonmedical regimen including weight loss, isotonic exercise and a low sodium diet  Patient has been counseled the such  · MedicationChanges today:    · No changes today pending home readings  We will also evaluate his home blood pressure machine  · Should the blood pressure be elevated I would recommend amlodipine  4   Electrolytes:   All acceptable    5  Mineral bone disorder:  Of chronic kidney disease:  · Calcium/magnesium/phosphorus:  · All acceptable  · PTH intact:  52 6 which is normal  · Vitamin-D:  41 4 at goal    6    Dyslipidemia:  · Goal LDL: less than 100 given CKD  · Current lipid profile:  /HDL 57/triglycerides 58  Recommendations:    Low-cholesterol/low-fat diet / weight loss as appropriate and isotonic exercise    Medication changes today:  I would recommend a statin at this time to get to goal:  I discuss this with the patient:  The patient will really work hard in his diet and recheck in 4 months  If it still persistently above goal consideration for statin at that time  7   Anemia:   Current hemoglobin:  Normal at 13 4    8  Other problems:  · Prostate CA followed by Urology with negative biopsies of the last several years  Last biopsy was 03/02/2021  · Avascular necrosis of the left knee               PATIENT INSTRUCTIONS:    Patient Instructions     1  Medication changes today:   No medication changes today pending your home blood pressure readings  2  Please take 1 week a blood pressure readings  at this time     AS FOLLOWS  MORNING AND EVENING, SITTING AND STANDING as follows:  · TAKE THE MORNING READINGS BEFORE ANY MEDICATIONS AND WHEN YOU ARE RELAXED FOR SEVERAL MINUTES  · TAKE THE EVENING READINGS:  BETWEEN 7-10 P M ; PRIOR TO ANY MEDICATIONS; AT LEAST IN OUR  FROM DINNER; AND CERTAINLY AFTER RELAXING FOR A FEW MINUTES  · PLEASE INCLUDE HEART RATE WITH YOUR BLOOD PRESSURE READINGS  · When taking standing readings, keep your arm supported at heart level and not dangling  · Make sure you are sitting with your back supported and feet on the ground and do not cross your legs or feet  · Make sure you have not taken any coffee or caffeine products or exercised or smoke cigarettes at least 30 minutes before taking your blood pressure  Then please bring in both your blood pressure readings as well as your blood pressure machine to see 1 of my advanced practitioner's in the next few weeks  Depending upon the readings and how accurate your blood pressure machine is, we may start a blood pressure medication  HOME BLOOD PRESSURE GOAL ON AVERAGE LESS THAN 130/80    3  Follow-up in 4  months   Please bring in 1 week a blood pressure readings morning evening, sitting and standing is outlined above     Please go for fasting lab work 1-2 weeks prior to your appointment      5  General instructions:   AVOID SALT BUT NOT ADDING AN READING LABELS TO MAKE SURE THERE IS LOW-SALT IN THE FOOD THAT YOU ARE EATING  o Goal is less than 2 g of sodium intake or less than 5 g of sodium chloride intake per day  · PLEASE FOLLOW A LOW-CHOLESTEROL DIET PLEASE SEE BELOW     Avoid nonsteroidal anti-inflammatory drugs such as Naprosyn, ibuprofen, Aleve, Advil, Celebrex, Meloxicam (Mobic) etc   You can use Tylenol as needed if you do not have any liver condition to be concerned about     Avoid medications such as Sudafed or decongestants and antihistamines that contained the D component which is the decongestant  You can take antihistamines without the decongestant or D component   Try to avoid medications such as pantoprazole or  Protonix/Nexium or Esomeprazole)/Prilosec or omeprazole/Prevacid or lansoprazole/AcipHex or Rabeprazole  If you are able to, use Pepcid as this is safer for your kidneys   Try to exercise at least 30 minutes 3 days a week to begin with with an ultimate goal of 5 days a week for at least 30 minutes       Please do not drink more than 2 glasses of alcohol/wine on a daily basis as this may contribute to your high blood pressure  DASH Eating Plan   WHAT YOU NEED TO KNOW:   The DASH (Dietary Approaches to Stop Hypertension) Eating Plan is designed to help prevent or lower high blood pressure  It can also help to lower LDL (bad) cholesterol and decrease your risk of heart disease  The plan is low in sodium, sugar, unhealthy fats, and total fat  It is high in potassium, calcium, magnesium, and fiber  These nutrients are added when you eat more fruits, vegetables, and whole grains  DISCHARGE INSTRUCTIONS:   Your sodium limit each day:   Your dietitian will tell you how much sodium is safe for you to have each day  People with high blood pressure should have no more than 1,500 to 2,300 mg of sodium in a day  A teaspoon (tsp) of salt has 2,300 mg of sodium  This may seem like a difficult goal, but small changes to the foods you eat can make a big difference  Your healthcare provider or dietitian can help you create a meal plan that follows your sodium limit  How to limit sodium:   · Read food labels  Food labels can help you choose foods that are low in sodium  The amount of sodium is listed in milligrams (mg)  The % Daily Value (DV) column tells you how much of your daily needs are met by 1 serving of the food for each nutrient listed  Choose foods that have less than 5% of the DV of sodium  These foods are considered low in sodium  Foods that have 20% or more of the DV of sodium are considered high in sodium  Avoid foods that have more than 300 mg of sodium in each serving  Choose foods that say low-sodium, reduced-sodium, or no salt added on the food label  · Avoid salt  Do not salt food at the table, and add very little salt to foods during cooking  Use herbs and spices, such as onions, garlic, and salt-free seasonings to add flavor to foods  Try lemon or lime juice or vinegar to give foods a tart flavor  Use hot peppers or a small amount of hot pepper sauce to add a spicy flavor to foods  · Ask about salt substitutes  Ask your healthcare provider if you may use salt substitutes  Some salt substitutes have ingredients that can be harmful if you have certain health conditions  · Choose foods carefully at restaurants  Meals from restaurants, especially fast food restaurants, are often high in sodium  Some restaurants have nutrition information that tells you the amount of sodium in their foods  Ask to have your food prepared with less, or no salt  What you need to know about fats:   · Include healthy fats    Examples are unsaturated fats and omega-3 fatty acids  Unsaturated fats are found in soybean, canola, olive, or sunflower oil, and liquid and soft tub margarines  Omega-3 fatty acids are found in fatty fish, such as salmon, tuna, mackerel, and sardines  It is also found in flaxseed oil and ground flaxseed  · Avoid unhealthy fats  Do not eat unhealthy fats, such as saturated fats and trans fats  Saturated fats are found in foods that contain fat from animals  Examples are fatty meats, whole milk, butter, cream, and other dairy foods  It is also found in shortening, stick margarine, palm oil, and coconut oil  Trans fats are found in fried foods, crackers, chips, and baked goods made with margarine or shortening  Foods to include: With the DASH eating plan, you need to eat a certain number of servings from each food group  This will help you get enough of certain nutrients and limit others  The amount of servings you should eat depends on how many calories you need  Your dietitian can tell you how many calories you need  The number of servings listed next to the food groups below are for people who need about 2,000 calories each day  · Grains:  6 to 8 servings (3 of these servings should be whole-grain foods)    ? 1 slice of whole-grain bread     ? 1 ounce of dry cereal    ? ½ cup of cooked cereal, pasta, or brown rice    · Vegetables and fruits:  4 to 5 servings of fruits and 4 to 5 servings of vegetables    ? 1 medium fruit    ? ½ cup of frozen, canned (no added salt), or chopped fresh vegetables     ? ½ cup of fresh, frozen, dried, or canned fruit (canned in light syrup or fruit juice)    ? ½ cup of vegetable or fruit juice    · Dairy:  2 to 3 servings    ? 1 cup of nonfat (skim) or 1% milk    ? 1½ ounces of fat-free or low-fat cheese    ? 6 ounces of nonfat or low-fat yogurt    · Lean meat, poultry, and fish:  6 ounces or less    ? Poultry (chicken, turkey) with no skin    ?  Fish (especially fatty fish, such as salmon, fresh tuna, or mackerel)    ? Lean beef and pork (loin, round, extra lean hamburger)    ? Egg whites and egg substitutes    · Nuts, seeds, and legumes:  4 to 5 servings each week    ? ½ cup of cooked beans and peas    ? 1½ ounces of unsalted nuts    ? 2 tablespoons of peanut butter or seeds    · Sweets and added sugars:  5 or less each week    ? 1 tablespoon of sugar, jelly, or jam    ? ½ cup of sorbet or gelatin    ? 1 cup of lemonade    · Fats:  2 to 3 servings each week    ? 1 teaspoon of soft margarine or vegetable oil    ? 1 tablespoon of mayonnaise    ? 2 tablespoons of salad dressing    Foods to avoid:   · Grains:      ? Baked goods, such as doughnuts, pastries, cookies, and biscuits (high in fat and sugar)    ? Mixes for cornbread and biscuits, packaged foods, such as bread stuffing, rice and pasta mixes, macaroni and cheese, and instant cereals (high in sodium)    · Fruits and vegetables:      ? Regular, canned vegetables (high in sodium)    ? Sauerkraut, pickled vegetables, and other foods prepared in brine (high in sodium)    ? Fried vegetables or vegetables in butter or high-fat sauces    ? Fruit in cream or butter sauce (high in fat)    · Dairy:      ? Whole milk, 2% milk, and cream (high in fat)    ? Regular cheese and processed cheese (high in fat and sodium)    · Meats and protein foods:      ? Smoked or cured meat, such as corned beef, francis, ham, hot dogs, and sausage (high in fat and sodium)    ? Canned beans and canned meats or spreads, such as potted meats, sardines, anchovies, and imitation seafood (high in sodium)    ? Deli or lunch meats, such as bologna, ham, turkey, and roast beef (high in sodium)    ? High-fat meat (T-bone steak, regular hamburger, and ribs)    ? Whole eggs and egg yolks (high in fat)    · Other:      ? Seasonings made with salt, such as garlic salt, celery salt, onion salt, seasoned salt, meat tenderizers, and monosodium glutamate (MSG)    ?  Miso soup and canned or dried soup mixes (high in sodium)    ? Regular soy sauce, barbecue sauce, teriyaki sauce, steak sauce, Worcestershire sauce, and most flavored vinegars (high in sodium)    ? Regular condiments, such as mustard, ketchup, and salad dressings (high in sodium)    ? Gravy and sauces, such as Jc or cheese sauces (high in sodium and fat)    ? Drinks high in sugar, such as soda or fruit drinks    ? Snack foods, such as salted chips, popcorn, pretzels, pork rinds, salted crackers, and salted nuts    ? Frozen foods, such as dinners, entrees, vegetables with sauces, and breaded meats (high in sodium)    Other guidelines to follow:   · Maintain a healthy weight  Your risk for heart disease is higher if you are overweight  Your healthcare provider may suggest that you lose weight if you are overweight  You can lose weight by eating fewer calories and foods that have added sugars and fat  The DASH meal plan can help you do this  Decrease calories by eating smaller portions at each meal and fewer snacks  Ask your healthcare provider for more information about how to lose weight  · Exercise regularly  Regular exercise can help you reach or maintain a healthy weight  Regular exercise can also help decrease your blood pressure and improve your cholesterol levels  Get 30 minutes or more of moderate exercise each day of the week  To lose weight, get at least 60 minutes of exercise  Talk to your healthcare provider about the best exercise program for you  · Limit alcohol  Women should limit alcohol to 1 drink a day  Men should limit alcohol to 2 drinks a day  A drink of alcohol is 12 ounces of beer, 5 ounces of wine, or 1½ ounces of liquor  © Copyright [address] Information is for End User's use only and may not be sold, redistributed or otherwise used for commercial purposes   All illustrations and images included in CareNotes® are the copyrighted property of A D A M , Inc  or [employer]  The above information is an  only  It is not intended as medical advice for individual conditions or treatments  Talk to your doctor, nurse or pharmacist before following any medical regimen to see if it is safe and effective for you  Cholesterol and Your Health   AMBULATORY CARE:   Cholesterol  is a waxy, fat-like substance  Your body uses cholesterol to make hormones and new cells, and to protect nerves  Cholesterol is made by your body  It also comes from certain foods you eat, such as meat and dairy products  Your healthcare provider can help you set goals for your cholesterol levels  He or she can help you create a plan to meet your goals  Cholesterol level goals: Your cholesterol level goals depend on your risk for heart disease, your age, and your other health conditions  The following are general guidelines:  · Total cholesterol  includes low-density lipoprotein (LDL), high-density lipoprotein (HDL), and triglyceride levels  The total cholesterol level should be lower than 200 mg/dL and is best at about 150 mg/dL  · LDL cholesterol  is called bad cholesterol  because it forms plaque in your arteries  As plaque builds up, your arteries become narrow, and less blood flows through  When plaque decreases blood flow to your heart, you may have chest pain  If plaque completely blocks an artery that brings blood to your heart, you may have a heart attack  Plaque can break off and form blood clots  Blood clots may block arteries in your brain and cause a stroke  The level should be less than 130 mg/dL and is best at about 100 mg/dL  · HDL cholesterol  is called good cholesterol  because it helps remove LDL cholesterol from your arteries  It does this by attaching to LDL cholesterol and carrying it to your liver  Your liver breaks down LDL cholesterol so your body can get rid of it  High levels of HDL cholesterol can help prevent a heart attack and stroke   Low levels of HDL cholesterol can increase your risk for heart disease, heart attack, and stroke  The level should be 60 mg/dL or higher  · Triglycerides  are a type of fat that store energy from foods you eat  High levels of triglycerides also cause plaque buildup  This can increase your risk for a heart attack or stroke  If your triglyceride level is high, your LDL cholesterol level may also be high  The level should be less than 150 mg/dL  What increases your risk for high cholesterol:   · Smoking cigarettes    · Being overweight or obese, or not getting enough exercise    · Drinking large amounts of alcohol    · A medical condition such as hypertension (high blood pressure) or diabetes    · Certain genes passed from your parents to you    · Age older than 65 years    What you need to know about having your cholesterol levels checked: Adults 21to 39years of age should have their cholesterol levels checked every 4 to 6 years  Adults 45 years or older should have their cholesterol checked every 1 to 2 years  You may need your cholesterol checked more often, or at a younger age, if you have risk factors for heart disease  You may also need to have your cholesterol checked more often if you have other health conditions, such as diabetes  Blood tests are used to check cholesterol levels  Blood tests measure your levels of triglycerides, LDL cholesterol, and HDL cholesterol  How healthy fats affect your cholesterol levels:  Healthy fats, also called unsaturated fats, help lower LDL cholesterol and triglyceride levels  Healthy fats include the following:  · Monounsaturated fats  are found in foods such as olive oil, canola oil, avocado, nuts, and olives  · Polyunsaturated fats,  such as omega 3 fats, are found in fish, such as salmon, trout, and tuna  They can also be found in plant foods such as flaxseed, walnuts, and soybeans  How unhealthy fats affect your cholesterol levels:  Unhealthy fats increase LDL cholesterol and triglyceride levels   They are found in foods high in cholesterol, saturated fat, and trans fat:  · Cholesterol  is found in eggs, dairy, and meat  · Saturated fat  is found in butter, cheese, ice cream, whole milk, and coconut oil  Saturated fat is also found in meat, such as sausage, hot dogs, and bologna  · Trans fat  is found in liquid oils and is used in fried and baked foods  Foods that contain trans fats include chips, crackers, muffins, sweet rolls, microwave popcorn, and cookies  Treatment  for high cholesterol will also decrease your risk of heart disease, heart attack, and stroke  Treatment may include any of the following:  · Lifestyle changes  may include food, exercise, weight loss, and quitting smoking  You may also need to decrease the amount of alcohol you drink  Your healthcare provider will want you to start with lifestyle changes  Other treatment may be added if lifestyle changes are not enough  · Medicines  may be given to lower your LDL cholesterol, triglyceride levels, or total cholesterol level  You may need medicines to lower your cholesterol if any of the following is true:     ? You have a history of stroke, TIA, unstable angina, or a heart attack  ? Your LDL cholesterol level is 190 mg/dL or higher  ? You are age 36 to 76 years, have diabetes or heart disease risk factors, and your LDL cholesterol is 70 mg/dL or higher  · Supplements  include fish oil, red yeast rice, and garlic  Fish oil may help lower your triglyceride and LDL cholesterol levels  It may also increase your HDL cholesterol level  Red yeast rice may help decrease your total cholesterol level and LDL cholesterol level  Garlic may help lower your total cholesterol level  Do not take these supplements without talking to your healthcare provider  Food changes you can make to lower your cholesterol levels:  A dietitian can help you create a healthy eating plan   He or she can show you how to read food labels and choose foods low in saturated fat, trans fats, and cholesterol  · Decrease the total amount of fat you eat  Choose lean meats, fat-free or 1% fat milk, and low-fat dairy products, such as yogurt and cheese  Try to limit or avoid red meats  Limit or do not eat fried foods or baked goods, such as cookies  · Replace unhealthy fats with healthy fats  Cook foods in olive oil or canola oil  Choose soft margarines that are low in saturated fat and trans fat  Seeds, nuts, and avocados are other examples of healthy fats  · Eat foods with omega-3 fats  Examples include salmon, tuna, mackerel, walnuts, and flaxseed  Eat fish 2 times per week  Pregnant women should not eat fish that have high levels of mercury, such as shark, swordfish, and kemar mackerel  · Increase the amount of high-fiber foods you eat  High-fiber foods can help lower your LDL cholesterol  Aim to get between 20 and 30 grams of fiber each day  Fruits and vegetables are high in fiber  Eat at least 5 servings each day  Other high-fiber foods are whole-grain or whole-wheat breads, pastas, or cereals, and brown rice  Eat 3 ounces of whole-grain foods each day  Increase fiber slowly  You may have abdominal discomfort, bloating, and gas if you add fiber to your diet too quickly  · Eat healthy protein foods  Examples include low-fat dairy products, skinless chicken and turkey, fish, and nuts  · Limit foods and drinks that are high in sugar  Your dietitian or healthcare provider can help you create daily limits for high-sugar foods and drinks  The limit may be lower if you have diabetes or another health condition  Limits can also help you lose weight if needed  Lifestyle changes you can make to lower your cholesterol levels:   · Maintain a healthy weight  Ask your healthcare provider what a healthy weight is for you  Ask him or her to help you create a weight loss plan if needed   Weight loss can decrease your total cholesterol and triglyceride levels  Weight loss may also help keep your blood pressure at a healthy level  · Exercise regularly  Exercise can help lower your total cholesterol level and maintain a healthy weight  Exercise can also help increase your HDL cholesterol level  Work with your healthcare provider to create an exercise program that is right for you  Get at least 30 to 40 minutes of moderate exercise most days of the week  Examples of exercise include brisk walking, swimming, or biking  · Do not smoke  Nicotine and other chemicals in cigarettes and cigars can raise your cholesterol levels  Ask your healthcare provider for information if you currently smoke and need help to quit  E-cigarettes or smokeless tobacco still contain nicotine  Talk to your healthcare provider before you use these products  · Limit or do not drink alcohol  Alcohol can increase your triglyceride levels  Ask your healthcare provider before you drink alcohol  Ask how much is okay for you to drink in 1 day or 1 week  © Copyright [address] [place] [address] Drive Information is for End User's use only and may not be sold, redistributed or otherwise used for commercial purposes  All illustrations and images included in CareNotes® are the copyrighted property of A D A M , Inc  or [address] [place] [address]  The above information is an  only  It is not intended as medical advice for individual conditions or treatments  Talk to your doctor, nurse or pharmacist before following any medical regimen to see if it is safe and effective for you  Subjective: There has been no hospitalizations or acute illnesses since last visit  The patient overall is feeling well  No fevers, chills, or cough or colds  Good appetite and good energy  Some blood in the urine after his prostate biopsy, no burning and no foamy urine    No gastrointestinal symptoms  No cardiovascular symptoms including swelling of the legs  No headaches, dizziness or lightheadedness  Blood pressure medications:  · None    ROS:  See HPI, otherwise review of systems as completely reviewed with the patient are negative    Past Medical History:   Diagnosis Date    ED (erectile dysfunction)     Prostate cancer [facility])      Past Surgical History:   Procedure Laterality Date    COLONOSCOPY      HERNIA REPAIR Left     [place] BIOPSY OF PROSTATE,NEEDLE/PUNCH N/A 3/2/2021    Procedure: Transperineal MRI Fusion prostate biopsy and gold seed marker insertion;  Surgeon: Parmjit Ventura MD;  Location: [facility];  Service: Urology    PROSTATE BIOPSY      1/26/2015, 2/10/2017    PROSTATE BIOPSY       Family History   Problem Relation Age of Onset    Hypertension Mother     Prostate cancer Brother       reports that he has never smoked  He has never used smokeless tobacco  He reports current alcohol use of about 1 0 standard drinks of alcohol per week  He reports that he does not use drugs  I COMPLETELY REVIEWED THE PAST MEDICAL HISTORY/PAST SURGICAL HISTORY/SOCIAL HISTORY/FAMILY HISTORY/AND MEDICATIONS  AND UPDATED ALL    Objective:     Vitals:   BP sitting on left:  142/80 with a heart rate 60 and regular  BP standing on left:  152/76 with a heart rate of 60 and regular    Weight (last 2 days)     Date/Time   Weight    03/04/21 1540   68 9 (152)            Wt Readings from Last 3 Encounters:   03/04/21 68 9 kg (152 lb)   03/02/21 64 9 kg (143 lb)   12/24/20 68 kg (150 lb)       Body mass index is 26 93 kg/m²      Physical Exam: General:  No acute distress  Skin:  No acute rash  Eyes:  No scleral icterus, noninjected, no discharge from eyes  ENT:  Moist mucous membranes  Neck:  Supple, no jugular venous distention, trachea is midline, no lymphadenopathy and no thyromegaly  Back   No CVAT  Chest:  Clear to auscultation and percussion, good respiratory effort  CVS:  Regular rate and rhythm without a rub, or gallops or murmurs  Abdomen:  Soft and nontender with normal bowel sounds  Extremities:  No cyanosis and no edema, no arthritic changes, normal range of motion  Neuro:  Grossly intact  Psych:  Alert, oriented x3 and appropriate      Medications:    Current Outpatient Medications:     sildenafil (VIAGRA) 50 MG tablet, Take 2 tablets (100 mg total) by mouth daily as needed for erectile dysfunction, Disp: 6 tablet, Rfl: 12    pantoprazole (PROTONIX) 40 mg tablet, Take 1 tablet (40 mg total) by mouth 2 (two) times a day (Patient not taking: Reported on 3/4/2021), Disp: 60 tablet, Rfl: 2    Lab, Imaging and other studies: I have personally reviewed pertinent labs    Laboratory Results:  Results for orders placed or performed during the hospital encounter of 03/02/21   Tissue Exam   Result Value Ref Range    Case Report       Surgical Pathology Report                         Case: [id number]                                   Authorizing Provider:  Jori Alfonso MD            Collected:           03/02/2021 0858              Ordering Location:     [address] [place] [address]      Received:            03/02/2021 [place] [id number] [facility]                                                           Pathologist:           Vini Ramey MD                                                                  Specimens:   A) - Prostate, L lat base                                                                           B) - Prostate, L base                                                                               C) - Prostate, L lat med                                                                            D) - Prostate, L med                                                                                E) - Prostate, L lat apex                                                                            F) - Prostate, L apex                                                                               G) - Prostate, R lat base                                                                           H) - Prostate, R base                                                                               I) - Prostate, R lat med                                                                            J) - Prostate, R med                                                                                K) - Prostate, R lat apex                                                                           L) - Prostate, R apex                                                                               M) - Prostate, Target L lat med                                                            Final Diagnosis       A  Prostate, L lat base, Biopsy:  - Benign prostatic tissue  B  Prostate, L base, Biopsy:  - Benign prostatic tissue  C  Prostate, L lat med, Biopsy:  - Benign prostatic tissue  D  Prostate, L med, Biopsy:  - Benign prostatic tissue  E  Prostate, L lat apex, Biopsy:  - Benign prostatic tissue  F  Prostate, L apex, Biopsy:  - Benign prostatic tissue  G  Prostate, R lat base, Biopsy:  - Benign prostatic tissue  H  Prostate, R base, Biopsy:  - Prostatic adenocarcinoma, [place] score 3+3=6, Prognostic Grade Group 1, involving 1 of 1 core (5% involvement)  - Focal high-grade prostatic intraepithelial neoplasia (HGPIN)  - Perineural invasion is not identified  I  Prostate, R lat med, Biopsy:  - Benign prostatic tissue  J  Prostate, R med, Biopsy:  - Benign prostatic tissue  K  Prostate, R lat apex, Biopsy:  - No tissue present  L  Prostate, R apex, Biopsy:  - Benign prostatic tissue  M  Prostate, Target L lat med, Biopsy:  - Benign prostatic tissue  Microscopic Description       Multiplex immunohistochemical stain for , p63 and racemase (p504s) performed on multiple blocks (F1, H1, I1, J1, L1, M2, M3, M4) with appropriate controls supports the diagnosis        Note       Dr Kelly Ruby notified electronically ([employer]) by Dr Hillary Espinoza on 3/4/2021 at 11:30 am     Intradepartmental consultation in agreement  Additional Information       All reported additional testing was performed with appropriately reactive controls  These tests were developed and their performance characteristics determined by [facility] or appropriate performing facility, though some tests may be performed on tissues which have not been validated for performance characteristics (such as staining performed on alcohol exposed cell blocks and decalcified tissues)  Results should be interpreted with caution and in the context of the patients clinical condition  These tests may not be cleared or approved by the [place]  Food and Drug Administration, though the FDA has determined that such clearance or approval is not necessary  These tests are used for clinical purposes and they should not be regarded as investigational or for research  This laboratory has been approved by [place] [id number], designated as a high-complexity laboratory and is qualified to perform these tests  Interpretation performed at [facility], [address] [place] [address]      Gross Description          A  The specimen is received in formalin, labeled with the patient's name and hospital number, and is designated "Left lateral base"  The specimen consists of a single tan soft tissue core measuring 0 1 cm in diameter and 1 5 cm in length  The specimen is entirely submitted between sponges, 1 cassette  B  The specimen is received in formalin, labeled with the patient's name and hospital number, and is designated "Left base"  The specimen consists of a single tan soft tissue  fragment measuring 0 3 x 0 3 x 0 1 cm  The specimen is entirely submitted between sponges, 1 cassette  C  The specimen is received in formalin, labeled with the patient's name and hospital number, and is designated "Left lateral medial"    The specimen consists of a single tan soft tissue core measuring 0 1 cm in diameter and 1 6 cm in length  The specimen is entirely submitted between sponges, 1 cassette  D  The specimen is received in formalin, labeled with the patient's name and hospital number, and is designated "Left medial"  The specimen consists of a single tan soft tissue core measuring 0 1 cm in diameter and 1 1 cm in length  The specimen is entirely submitted between sponges, 1 cassette  E  The specimen is received in formalin, labeled with the patient's name and hospital number, and is designated "Left lateral apex"  The specimen consists of a single tan soft tissue core measuring 0 1 cm in diameter and 1 6 cm in length  The specimen is entirely submitted between sponges, 1 cassette  F  The specimen is received in formalin, labeled with the patient's name and hospital number, and is designated "Left apex"  The specimen consists of a single tan soft tissue core measuring 0 1 cm in diameter and 0 8 cm in length  The specimen is entirely submitted between sponges, 1 cassette  G  The specimen is received in formalin, labeled with the patient's name and hospital number, and is designated "Right lateral base"  The specimen consists of a single tan soft tissue core measuring 0 1 cm in diameter and 2 0 cm in length  The specimen is entirely submitted between sponges, 1 cassette  H  The specimen is received in formalin, labeled with the patient's name and hospital number, and is designated "Right base"  The specimen consists of a single tan soft tissue core measuring 0 1 cm in diameter and 1 5 cm in length  The specimen is entirely submitted between sponges, 1 cassette  I  The specimen is received in formalin, labeled with the patient's name and hospital number, and is designated "Right lateral medial"  The specimen consists of a single tan soft tissue core measuring 0 1 cm in diameter and 2 2 cm in length  The specimen is entirely submitted between sponges, 1 cassette  J  The specimen is received in formalin, labeled with the patient's name and hospital number, and is designated "Right medial"  The specimen consists of a single tan soft tissue core measuring 0 1 cm in diameter and 2 0 cm in length  The specimen is entirely submitted between sponges, 1 cassette  K  The specimen is received in formalin, labeled with the patient's name and hospital number, and is designated "Right lateral apex"  The specimen consists of no grossly identifiable soft tissue  The specimen is presented to a co-worker and is in mutual agreement that there is no tissue grossly identified  The formalin is drained into an embedding bag  Entirely submitted  One cassette  Pictures are taken  L  The specimen is received in formalin, labeled with the patient's name and hospital number, and is designated "Right apex"  The specimen consists of a single tan soft tissue core measuring 0 1 cm in diameter and 1 6 cm in length  The specimen is entirely submitted between sponges, 1 cassette  M  The specimen is received in formalin, labeled with the patient's name and hospital number, and is designated "Target left lateral medial"  The specimen consists of 4 tan soft tissue cores measuring 0 1 cm in diameter and ranging from 1 5- 3 3 cm in length  The specimen is entirely submitted between sponges, 4 cassettes  Note: The estimated total formalin fixation time based upon information provided by the submitting clinician and the standard processing schedule is 15 hours   Renee        Clinical Information PSA(12/11/2020) 10 5 ng/mL        Results from last 7 days   Lab Units 03/02/21  0754   WBC Thousand/uL 7 63   HEMOGLOBIN g/dL 13 4   HEMATOCRIT % 42 3   PLATELETS Thousands/uL 251   POTASSIUM mmol/L 4 1   CHLORIDE mmol/L 111*   CO2 mmol/L 29   BUN mg/dL 28*   CREATININE mg/dL 1 60*   CALCIUM mg/dL 9 3   MAGNESIUM mg/dL 2 3   PHOSPHORUS mg/dL 3 0         Radiology review:   chest X-ray    Ultrasound      Portions of the record may have been created with voice recognition software  Occasional wrong word or "sound a like" substitutions may have occurred due to the inherent limitations of voice recognition software  Read the chart carefully and recognize, using context, where substitutions have occurred

## 2021-03-02 ENCOUNTER — HOSPITAL ENCOUNTER (OUTPATIENT)
Facility: HOSPITAL | Age: 76
Setting detail: OUTPATIENT SURGERY
Discharge: HOME | End: 2021-03-02
Attending: UROLOGY | Admitting: UROLOGY
Payer: MEDICARE

## 2021-03-02 ENCOUNTER — LAB (OUTPATIENT)
Dept: LAB | Facility: HOSPITAL | Age: 76
End: 2021-03-02
Attending: INTERNAL MEDICINE
Payer: MEDICARE

## 2021-03-02 ENCOUNTER — ANESTHESIA (OUTPATIENT)
Dept: GASTROENTEROLOGY | Facility: HOSPITAL | Age: 76
End: 2021-03-02
Payer: MEDICARE

## 2021-03-02 ENCOUNTER — ANESTHESIA EVENT (OUTPATIENT)
Dept: GASTROENTEROLOGY | Facility: HOSPITAL | Age: 76
End: 2021-03-02
Payer: MEDICARE

## 2021-03-02 VITALS
WEIGHT: 143 LBS | RESPIRATION RATE: 18 BRPM | TEMPERATURE: 96.3 F | HEART RATE: 63 BPM | SYSTOLIC BLOOD PRESSURE: 122 MMHG | DIASTOLIC BLOOD PRESSURE: 73 MMHG | HEIGHT: 63 IN | BODY MASS INDEX: 25.34 KG/M2 | OXYGEN SATURATION: 95 %

## 2021-03-02 DIAGNOSIS — E78.5 DYSLIPIDEMIA: ICD-10-CM

## 2021-03-02 DIAGNOSIS — C61 MALIGNANT NEOPLASM OF PROSTATE (HCC): ICD-10-CM

## 2021-03-02 DIAGNOSIS — N18.32 STAGE 3B CHRONIC KIDNEY DISEASE (HCC): ICD-10-CM

## 2021-03-02 LAB
25(OH)D3 SERPL-MCNC: 41.4 NG/ML (ref 30–100)
ALBUMIN SERPL BCP-MCNC: 3.7 G/DL (ref 3.5–5)
ALP SERPL-CCNC: 98 U/L (ref 46–116)
ALT SERPL W P-5'-P-CCNC: 22 U/L (ref 12–78)
ANION GAP SERPL CALCULATED.3IONS-SCNC: 4 MMOL/L (ref 4–13)
AST SERPL W P-5'-P-CCNC: 16 U/L (ref 5–45)
BILIRUB SERPL-MCNC: 0.58 MG/DL (ref 0.2–1)
BUN SERPL-MCNC: 28 MG/DL (ref 5–25)
CALCIUM SERPL-MCNC: 9.3 MG/DL (ref 8.3–10.1)
CHLORIDE SERPL-SCNC: 111 MMOL/L (ref 100–108)
CHOLEST SERPL-MCNC: 183 MG/DL (ref 50–200)
CK SERPL-CCNC: 51 U/L (ref 39–308)
CO2 SERPL-SCNC: 29 MMOL/L (ref 21–32)
CREAT SERPL-MCNC: 1.6 MG/DL (ref 0.6–1.3)
CREAT UR-MCNC: 115 MG/DL
ERYTHROCYTE [DISTWIDTH] IN BLOOD BY AUTOMATED COUNT: 12.6 % (ref 11.6–15.1)
GFR SERPL CREATININE-BSD FRML MDRD: 41 ML/MIN/1.73SQ M
GLUCOSE P FAST SERPL-MCNC: 87 MG/DL (ref 65–99)
HCT VFR BLD AUTO: 42.3 % (ref 36.5–49.3)
HDLC SERPL-MCNC: 57 MG/DL
HGB BLD-MCNC: 13.4 G/DL (ref 12–17)
LDLC SERPL CALC-MCNC: 114 MG/DL (ref 0–100)
MAGNESIUM SERPL-MCNC: 2.3 MG/DL (ref 1.6–2.6)
MCH RBC QN AUTO: 29.3 PG (ref 26.8–34.3)
MCHC RBC AUTO-ENTMCNC: 31.7 G/DL (ref 31.4–37.4)
MCV RBC AUTO: 93 FL (ref 82–98)
PHOSPHATE SERPL-MCNC: 3 MG/DL (ref 2.3–4.1)
PLATELET # BLD AUTO: 251 THOUSANDS/UL (ref 149–390)
PMV BLD AUTO: 9.6 FL (ref 8.9–12.7)
POTASSIUM SERPL-SCNC: 4.1 MMOL/L (ref 3.5–5.3)
PROT SERPL-MCNC: 7.7 G/DL (ref 6.4–8.2)
PROT UR-MCNC: 10 MG/DL
PROT/CREAT UR: 0.09 MG/G{CREAT} (ref 0–0.1)
PTH-INTACT SERPL-MCNC: 52.6 PG/ML (ref 18.4–80.1)
RBC # BLD AUTO: 4.57 MILLION/UL (ref 3.88–5.62)
SODIUM SERPL-SCNC: 144 MMOL/L (ref 136–145)
TRIGL SERPL-MCNC: 58 MG/DL
WBC # BLD AUTO: 7.63 THOUSAND/UL (ref 4.31–10.16)

## 2021-03-02 PROCEDURE — G0416 PROSTATE BIOPSY, ANY MTHD: HCPCS | Performed by: PATHOLOGY

## 2021-03-02 PROCEDURE — 80053 COMPREHEN METABOLIC PANEL: CPT

## 2021-03-02 PROCEDURE — 55876 PLACE RT DEVICE/MARKER PROS: CPT | Performed by: UROLOGY

## 2021-03-02 PROCEDURE — 83735 ASSAY OF MAGNESIUM: CPT

## 2021-03-02 PROCEDURE — 85027 COMPLETE CBC AUTOMATED: CPT

## 2021-03-02 PROCEDURE — A4648 IMPLANTABLE TISSUE MARKER: HCPCS | Performed by: UROLOGY

## 2021-03-02 PROCEDURE — 82570 ASSAY OF URINE CREATININE: CPT

## 2021-03-02 PROCEDURE — 80061 LIPID PANEL: CPT

## 2021-03-02 PROCEDURE — 88344 IMHCHEM/IMCYTCHM EA MLT ANTB: CPT | Performed by: PATHOLOGY

## 2021-03-02 PROCEDURE — 82550 ASSAY OF CK (CPK): CPT

## 2021-03-02 PROCEDURE — 82306 VITAMIN D 25 HYDROXY: CPT

## 2021-03-02 PROCEDURE — 55700 PR BIOPSY OF PROSTATE,NEEDLE/PUNCH: CPT | Performed by: UROLOGY

## 2021-03-02 PROCEDURE — 84100 ASSAY OF PHOSPHORUS: CPT

## 2021-03-02 PROCEDURE — 36415 COLL VENOUS BLD VENIPUNCTURE: CPT

## 2021-03-02 PROCEDURE — 84156 ASSAY OF PROTEIN URINE: CPT

## 2021-03-02 PROCEDURE — 83970 ASSAY OF PARATHORMONE: CPT

## 2021-03-02 PROCEDURE — NC001 PR NO CHARGE: Performed by: UROLOGY

## 2021-03-02 DEVICE — STERILE PLACEMENT NEEDLES (17GA ETW X 20CM) WITH BONE WAX AND (1.2 X 3MM) SOFT TISSUE GOLD MARKER [3]
Type: IMPLANTABLE DEVICE | Status: FUNCTIONAL
Brand: FIDUCIAL MARKER KIT

## 2021-03-02 RX ORDER — PROPOFOL 10 MG/ML
INJECTION, EMULSION INTRAVENOUS CONTINUOUS PRN
Status: DISCONTINUED | OUTPATIENT
Start: 2021-03-02 | End: 2021-03-02

## 2021-03-02 RX ORDER — HYDROCODONE BITARTRATE AND ACETAMINOPHEN 5; 325 MG/1; MG/1
1 TABLET ORAL EVERY 6 HOURS PRN
Status: DISCONTINUED | OUTPATIENT
Start: 2021-03-02 | End: 2021-03-02 | Stop reason: HOSPADM

## 2021-03-02 RX ORDER — CEFAZOLIN SODIUM 2 G/50ML
2000 SOLUTION INTRAVENOUS ONCE
Status: DISCONTINUED | OUTPATIENT
Start: 2021-03-02 | End: 2021-03-02

## 2021-03-02 RX ORDER — SODIUM CHLORIDE 9 MG/ML
75 INJECTION, SOLUTION INTRAVENOUS CONTINUOUS
Status: CANCELLED | OUTPATIENT
Start: 2021-03-02

## 2021-03-02 RX ORDER — SODIUM CHLORIDE 9 MG/ML
INJECTION, SOLUTION INTRAVENOUS CONTINUOUS PRN
Status: DISCONTINUED | OUTPATIENT
Start: 2021-03-02 | End: 2021-03-02

## 2021-03-02 RX ORDER — CEFAZOLIN SODIUM 1 G/50ML
1000 SOLUTION INTRAVENOUS ONCE
Status: COMPLETED | OUTPATIENT
Start: 2021-03-02 | End: 2021-03-02

## 2021-03-02 RX ORDER — ONDANSETRON 2 MG/ML
INJECTION INTRAMUSCULAR; INTRAVENOUS AS NEEDED
Status: DISCONTINUED | OUTPATIENT
Start: 2021-03-02 | End: 2021-03-02

## 2021-03-02 RX ORDER — PROPOFOL 10 MG/ML
INJECTION, EMULSION INTRAVENOUS AS NEEDED
Status: DISCONTINUED | OUTPATIENT
Start: 2021-03-02 | End: 2021-03-02

## 2021-03-02 RX ORDER — FENTANYL CITRATE 50 UG/ML
INJECTION, SOLUTION INTRAMUSCULAR; INTRAVENOUS AS NEEDED
Status: DISCONTINUED | OUTPATIENT
Start: 2021-03-02 | End: 2021-03-02

## 2021-03-02 RX ADMIN — FENTANYL CITRATE 25 MCG: 50 INJECTION INTRAMUSCULAR; INTRAVENOUS at 08:56

## 2021-03-02 RX ADMIN — SODIUM CHLORIDE: 0.9 INJECTION, SOLUTION INTRAVENOUS at 08:55

## 2021-03-02 RX ADMIN — PROPOFOL 40 MG: 10 INJECTION, EMULSION INTRAVENOUS at 08:56

## 2021-03-02 RX ADMIN — PROPOFOL 40 MCG/KG/MIN: 10 INJECTION, EMULSION INTRAVENOUS at 08:56

## 2021-03-02 RX ADMIN — ONDANSETRON 4 MG: 2 INJECTION INTRAMUSCULAR; INTRAVENOUS at 09:20

## 2021-03-02 RX ADMIN — CEFAZOLIN SODIUM 1000 MG: 1 SOLUTION INTRAVENOUS at 08:45

## 2021-03-02 NOTE — ANESTHESIA POSTPROCEDURE EVALUATION
Post-Op Assessment Note    CV Status:  Stable  Pain Score: 0    Pain management: adequate     Mental Status:  Alert and awake   Hydration Status:  Euvolemic   PONV Controlled:  Controlled   Airway Patency:  Patent      Post Op Vitals Reviewed: Yes      Staff: CRNA, Anesthesiologist         No complications documented      /69 (03/02/21 0934)    Temp (!) 96 3 °F (35 7 °C) (03/02/21 0934)    Pulse 57 (03/02/21 0934)   Resp 18 (03/02/21 0934)    SpO2 95 % (03/02/21 0934)

## 2021-03-02 NOTE — DISCHARGE INSTRUCTIONS
Expect to see blood in the urine, semen and stool  Take it easy today, tomorrow you may resume all normal activities  Resume regular diet  You may shower  Call for fever greater than 101 5°, severe pain not relieved by over-the-counter pain medicines, or inability to urinate

## 2021-03-02 NOTE — OP NOTE
OPERATIVE REPORT  PATIENT NAME: Lynda Escobedo    :  1945  MRN: [id number]  Pt Location: [facility] ROOM 01    SURGERY DATE: 3/2/2021    Surgeon(s) and Role:     * Wolfgang Johnston MD - Primary    Preop Diagnosis:  Malignant neoplasm of prostate ([facility] [address] ) Claudeen Six    Post-Op Diagnosis Codes:     * Malignant neoplasm of prostate ([facility] [address] ) Claudeen Six    Procedure(s) (LRB):  Transperineal MRI Fusion prostate biopsy and gold seed marker insertion (N/A)    Specimen(s):  ID Type Source Tests Collected by Time Destination   1 : L lat base Tissue Prostate TISSUE EXAM Wolfgang Johnston MD 3/2/2021  8:58 AM    2 : L base Tissue Prostate TISSUE EXAM Wolfgang Johnston MD 3/2/2021  8:58 AM    3 : L lat med Tissue Prostate TISSUE EXAM Wolfgang Johnston MD 3/2/2021  8:58 AM    4 : L med Tissue Prostate TISSUE EXAM Wolfgang Johnston MD 3/2/2021  8:58 AM    5 : L lat apex Tissue Prostate TISSUE EXAM Wolfgang Johnston MD 3/2/2021  8:58 AM    6 : L apex Tissue Prostate TISSUE EXAM Wolfgang Johnston MD 3/2/2021  8:58 AM    7 : R lat base Tissue Prostate TISSUE EXAM Wolfgang Johnston MD 3/2/2021  8:58 AM    8 : R base Tissue Prostate TISSUE EXAM Wolfgang Johnston MD 3/2/2021  8:58 AM    9 : R lat med Tissue Prostate TISSUE Kimi Sanders MD 3/2/2021  8:58 AM    10 : R med Tissue Prostate TISSUE Kimi Sanders MD 3/2/2021  8:58 AM    11 : R lat apex Tissue Prostate TISSUE EXAM Wolfgang Johnston MD 3/2/2021  8:58 AM    12 : R apex Tissue Prostate TISSUE EXAM Wolfgang Johnston MD 3/2/2021  8:58 AM    13 : Target L lat med Tissue Prostate TISSUE EXAM Wolfgang Johnston MD 3/2/2021  8:58 AM        Estimated Blood Loss:   Minimal    Drains:  * No LDAs found *    Anesthesia Type:   Epidural w/ IV Sedation    Operative Indications:  Malignant neoplasm of prostate ([facility] [address] ) [C61]  Need for gold fiducial marker placement for radiation therapy    Operative Findings:  Pie rads 5 lesion with hypoechogenicity on the ultrasound image left peripheral zone extending from the base mid glans and possible extracapsular extension although this was not grossly seen  This lesion was biopsied extensively to include extracapsular biopsy  Standard sextant biopsy was carried out as well  Complications:   None    Procedure and Technique:  55-year-old man schedule for radiation therapy with need for gold fiducial marker placement along with pie rads 5 lesion left peripheral zone extending from the base the mid gland with extracapsular extension  This has grown since previous MRI  The procedure transrectal ultrasound, MR fusion biopsy and gold fiducial marker placement were explained he gives informed consent  Patient was brought to the operating room identified properly  He was placed in lateral decubitus position placed under IV sedation  A time-out was performed  The transrectal ultrasound probe was introduced and I performed volumetric sweep to merge the transrectal ultrasound view to the MR view  While this was being done, I anesthetized the prostate with 5 cc each side of the neurovascular bundles with 2% xylocaine  I then first performed target lesion biopsy with MR guidance  Biopsies were taken above and below, laterally and medially, and directly in the center along with a biopsy that went outside the capsule  Once these were collected, I perform sextant biopsy of the remainder of the prostate  Digital pressure was held on the prostate after I removed the probe     I was present for the entire procedure and A qualified resident physician was not available    Patient Disposition:  PACU  and hemodynamically stable    SIGNATURE: Emily Celestin MD  DATE: March 2, 2021  TIME: 4:32 PM

## 2021-03-02 NOTE — ANESTHESIA POSTPROCEDURE EVALUATION
Post-Op Assessment Note    CV Status:  Stable  Pain Score: 0    Pain management: adequate     Mental Status:  Alert   Hydration Status:  Stable   PONV Controlled:  None   Airway Patency:  Patent      Post Op Vitals Reviewed: Yes      Staff: Anesthesiologist         No complications documented      /69 (03/02/21 0934)    Temp (!) 96 3 °F (35 7 °C) (03/02/21 0934)    Pulse 57 (03/02/21 0934)   Resp 18 (03/02/21 0934)    SpO2 95 % (03/02/21 0934)

## 2021-03-02 NOTE — ANESTHESIA PREPROCEDURE EVALUATION
Procedure:  Transperineal MRI Fusion prostate biopsy and gold seed marker insertion (N/A Abdomen)    Relevant Problems   /RENAL   (+) Benign localized prostatic hyperplasia with lower urinary tract symptoms (LUTS)   (+) Prostate cancer (HCC)   (+) Stage 3b chronic kidney disease        Physical Exam    Airway    Mallampati score: II  TM Distance: >3 FB  Neck ROM: full     Dental   No notable dental hx     Cardiovascular  Cardiovascular exam normal    Pulmonary  Pulmonary exam normal     Other Findings        Anesthesia Plan  ASA Score- 2     Anesthesia Type- IV sedation with anesthesia with ASA Monitors  Additional Monitors:   Airway Plan:           Plan Factors-Exercise tolerance (METS): >4 METS  Patient is not a current smoker  Patient not instructed to abstain from smoking on day of procedure  Patient did not smoke on day of surgery  Induction- intravenous  Postoperative Plan-     Informed Consent- Anesthetic plan and risks discussed with patient  I personally reviewed this patient with the CRNA  Discussed and agreed on the Anesthesia Plan with the CRNA  Vee Mueller

## 2021-03-02 NOTE — H&P
H&P Exam - Urology   Sharla Park 1945 [id number]      Assessment/Plan     Assessment:  Prostate cancer, with progression seen on MRI, for radiation therapy-here for confirmatory biopsy to check for stage and grade migration as well as gold marker placement  No SpaceOAR  Plan:  As above  History of Present Illness   HPI:  The patient presents for MR fusion biopsy of the prostate for known prostate cancer to check for stage/ grade migration and a pie rads 5 lesion left peripheral zone extending from the base to mid gland with extracapsular extension  This has grown since previous MRI  Calculated prostate volume of 54 cc  He has been set up for radiation therapy but does not want SpaceOAR, just gold fiducial marker placement    The risks of bleeding, infection, urinary retention and need for additional procedures has been explained and informed consent given  PMH/PSH reviewed    Review of systems:    General: No fever  CVS: No chest pain or new SOB  Abdomen: No diarrhea or blood in stool  : No new voiding difficulties  Neuro: No syncope/weakness/loss of sensation/paresis  Ophthalmologic: No new visual changes  Ortho: No new back/joint pains  Social History- as per previous notes  Family History: non-contributory    Meds/Allergies   PTA meds:   Cannot display prior to admission medications because the patient has not been admitted in this contact  Objective   Vitals: There were no vitals taken for this visit  No intake/output data recorded  Physical Exam:    Awake, alert, in NAD  HEENT: Atraumatic, Normocephalic    Lungs: Normal Respiratory effort, no wheezes,stridor or rales  Abdomen: Nondistended  Extremiites: Normal ROM, no cyanosis/edema  Lab Results: I have personally reviewed pertinent reports      CBC: No results found for: WBC, HGB, HCT, MCV, PLT, ADJUSTEDWBC, MCH, MCHC, RDW, MPV, NRBC  CMP: No results found for: SODIUM, CL, CO2, ANIONGAP, BUN, CREATININE, GLUCOSE, CALCIUM, AST, ALT, ALKPHOS, PROT, BILITOT, EGFR  Urinalysis: No results found for: Kash Flack, SPECGRAV, PHUR, LEUKOCYTESUR, NITRITE, PROTEINUA, GLUCOSEU, KETONESU, BILIRUBINUR, BLOODU  Urine Culture: No results found for: URINECX  Imaging: I have personally reviewed pertinent reports

## 2021-03-04 ENCOUNTER — OFFICE VISIT (OUTPATIENT)
Dept: NEPHROLOGY | Facility: CLINIC | Age: 76
End: 2021-03-04
Payer: MEDICARE

## 2021-03-04 VITALS — HEIGHT: 63 IN | WEIGHT: 152 LBS | BODY MASS INDEX: 26.93 KG/M2

## 2021-03-04 DIAGNOSIS — N18.32 STAGE 3B CHRONIC KIDNEY DISEASE (HCC): Primary | ICD-10-CM

## 2021-03-04 DIAGNOSIS — E78.5 DYSLIPIDEMIA: ICD-10-CM

## 2021-03-04 DIAGNOSIS — I12.9 PARENCHYMAL RENAL HYPERTENSION, STAGE 1 THROUGH STAGE 4 OR UNSPECIFIED CHRONIC KIDNEY DISEASE: ICD-10-CM

## 2021-03-04 PROCEDURE — 99214 OFFICE O/P EST MOD 30 MIN: CPT | Performed by: INTERNAL MEDICINE

## 2021-03-04 NOTE — LETTER
March 4, 2021     Ayla Horner MD  [address] [facility]  [address]  [address] [place] [address] [facility]    Patient: Karon Dent   YOB: 1945   Date of Visit: 3/4/2021       Dear Dr Fabian Lewis: Thank you for referring Karon Dent to me for evaluation  Below are my notes for this consultation  If you have questions, please do not hesitate to call me  I look forward to following your patient along with you  Sincerely,        Te Gusman MD        CC: MD Herbert Mchugh MD Levie [place], MD  3/4/2021  4:27 PM  Sign when Signing Visit  RENAL FOLLOW UP NOTE: td    ASSESSMENT AND PLAN:  70-year-old male with a history of prostate CA who we are seeing for CKD stage 3:    1  CKD stage 3B  · Etiology: Arteriolar nephrosclerosis,? Hypertensive nephrosclerosis;  no evidence of primary glomerular process with a bland urinalysis without proteinuria or hematuria; no evidence of obstructive uropathy   · Baseline creatinine: 1 8  · Current creatinine:  1 60 at baseline  · Urine protein creatinine ratio:  0 09 g at goal  ·  UA:  No proteinuria and no hematuria or pyuria  ·  PVR with bladder scan:  ·  renal artery duplex: negative  ·  renal ultrasound demonstrated cysts which are benign and simple no further evaluation required per the [facility] radiologist Dr Dedra Dejesus  Recommendations:  · Treat hypertension-please see below  · Treat dyslipidemia-please see below  · Maintain proteinuria less than 1 g or as low as possible  · Avoid nephrotoxic agents such as NSAIDs, and proton pump inhibitors if possible; patient counseled as such    2   Volume:  Euvolemic    3  Hypertension:       Current blood pressure averages:   Blood pressures 11/2020:  -a  m :  142/81, no orthostatic changes  -p  m :  140/76, no orthostatic changes  Heart rate:  50-70 range    · Goal blood pressure: less than 130/80 given CKD    Recommendations:  · Push nonmedical regimen including weight loss, isotonic exercise and a low sodium diet  Patient has been counseled the such  · MedicationChanges today:    · No changes today pending home readings  We will also evaluate his home blood pressure machine  · Should the blood pressure be elevated I would recommend amlodipine  4   Electrolytes:   All acceptable    5  Mineral bone disorder:  Of chronic kidney disease:  · Calcium/magnesium/phosphorus:  · All acceptable  · PTH intact:  52 6 which is normal  · Vitamin-D:  41 4 at goal    6  Dyslipidemia:  · Goal LDL: less than 100 given CKD  · Current lipid profile:  /HDL 57/triglycerides 58  Recommendations:    Low-cholesterol/low-fat diet / weight loss as appropriate and isotonic exercise    Medication changes today:  I would recommend a statin at this time to get to goal:  I discuss this with the patient:  The patient will really work hard in his diet and recheck in 4 months  If it still persistently above goal consideration for statin at that time  7   Anemia:   Current hemoglobin:  Normal at 13 4    8  Other problems:  · Prostate CA followed by Urology with negative biopsies of the last several years  Last biopsy was 03/02/2021  · Avascular necrosis of the left knee               PATIENT INSTRUCTIONS:    Patient Instructions     1  Medication changes today:   No medication changes today pending your home blood pressure readings          2  Please take 1 week a blood pressure readings  at this time     AS FOLLOWS  MORNING AND EVENING, SITTING AND STANDING as follows:  · TAKE THE MORNING READINGS BEFORE ANY MEDICATIONS AND WHEN YOU ARE RELAXED FOR SEVERAL MINUTES  · TAKE THE EVENING READINGS:  BETWEEN 7-10 P M ; PRIOR TO ANY MEDICATIONS; AT LEAST IN OUR  FROM DINNER; AND CERTAINLY AFTER RELAXING FOR A FEW MINUTES  · PLEASE INCLUDE HEART RATE WITH YOUR BLOOD PRESSURE READINGS  · When taking standing readings, keep your arm supported at heart level and not dangling  · Make sure you are sitting with your back supported and feet on the ground and do not cross your legs or feet  · Make sure you have not taken any coffee or caffeine products or exercised or smoke cigarettes at least 30 minutes before taking your blood pressure  Then please bring in both your blood pressure readings as well as your blood pressure machine to see 1 of my advanced practitioner's in the next few weeks  Depending upon the readings and how accurate your blood pressure machine is, we may start a blood pressure medication  HOME BLOOD PRESSURE GOAL ON AVERAGE LESS THAN 130/80    3  Follow-up in 4  months   Please bring in 1 week a blood pressure readings morning evening, sitting and standing is outlined above     Please go for fasting lab work 1-2 weeks prior to your appointment      5  General instructions:   AVOID SALT BUT NOT ADDING AN READING LABELS TO MAKE SURE THERE IS LOW-SALT IN THE FOOD THAT YOU ARE EATING  o Goal is less than 2 g of sodium intake or less than 5 g of sodium chloride intake per day  · PLEASE FOLLOW A LOW-CHOLESTEROL DIET PLEASE SEE BELOW     Avoid nonsteroidal anti-inflammatory drugs such as Naprosyn, ibuprofen, Aleve, Advil, Celebrex, Meloxicam (Mobic) etc   You can use Tylenol as needed if you do not have any liver condition to be concerned about     Avoid medications such as Sudafed or decongestants and antihistamines that contained the D component which is the decongestant  You can take antihistamines without the decongestant or D component   Try to avoid medications such as pantoprazole or  Protonix/Nexium or Esomeprazole)/Prilosec or omeprazole/Prevacid or lansoprazole/AcipHex or Rabeprazole  If you are able to, use Pepcid as this is safer for your kidneys       Try to exercise at least 30 minutes 3 days a week to begin with with an ultimate goal of 5 days a week for at least 30 minutes       Please do not drink more than 2 glasses of alcohol/wine on a daily basis as this may contribute to your high blood pressure  DASH Eating Plan   WHAT YOU NEED TO KNOW:   The DASH (Dietary Approaches to Stop Hypertension) Eating Plan is designed to help prevent or lower high blood pressure  It can also help to lower LDL (bad) cholesterol and decrease your risk of heart disease  The plan is low in sodium, sugar, unhealthy fats, and total fat  It is high in potassium, calcium, magnesium, and fiber  These nutrients are added when you eat more fruits, vegetables, and whole grains  DISCHARGE INSTRUCTIONS:   Your sodium limit each day: Your dietitian will tell you how much sodium is safe for you to have each day  People with high blood pressure should have no more than 1,500 to 2,300 mg of sodium in a day  A teaspoon (tsp) of salt has 2,300 mg of sodium  This may seem like a difficult goal, but small changes to the foods you eat can make a big difference  Your healthcare provider or dietitian can help you create a meal plan that follows your sodium limit  How to limit sodium:   · Read food labels  Food labels can help you choose foods that are low in sodium  The amount of sodium is listed in milligrams (mg)  The % Daily Value (DV) column tells you how much of your daily needs are met by 1 serving of the food for each nutrient listed  Choose foods that have less than 5% of the DV of sodium  These foods are considered low in sodium  Foods that have 20% or more of the DV of sodium are considered high in sodium  Avoid foods that have more than 300 mg of sodium in each serving  Choose foods that say low-sodium, reduced-sodium, or no salt added on the food label  · Avoid salt  Do not salt food at the table, and add very little salt to foods during cooking  Use herbs and spices, such as onions, garlic, and salt-free seasonings to add flavor to foods  Try lemon or lime juice or vinegar to give foods a tart flavor   Use hot peppers or a small amount of hot pepper sauce to add a spicy flavor to foods      · Ask about salt substitutes  Ask your healthcare provider if you may use salt substitutes  Some salt substitutes have ingredients that can be harmful if you have certain health conditions  · Choose foods carefully at restaurants  Meals from restaurants, especially fast food restaurants, are often high in sodium  Some restaurants have nutrition information that tells you the amount of sodium in their foods  Ask to have your food prepared with less, or no salt  What you need to know about fats:   · Include healthy fats  Examples are unsaturated fats and omega-3 fatty acids  Unsaturated fats are found in soybean, canola, olive, or sunflower oil, and liquid and soft tub margarines  Omega-3 fatty acids are found in fatty fish, such as salmon, tuna, mackerel, and sardines  It is also found in flaxseed oil and ground flaxseed  · Avoid unhealthy fats  Do not eat unhealthy fats, such as saturated fats and trans fats  Saturated fats are found in foods that contain fat from animals  Examples are fatty meats, whole milk, butter, cream, and other dairy foods  It is also found in shortening, stick margarine, palm oil, and coconut oil  Trans fats are found in fried foods, crackers, chips, and baked goods made with margarine or shortening  Foods to include: With the DASH eating plan, you need to eat a certain number of servings from each food group  This will help you get enough of certain nutrients and limit others  The amount of servings you should eat depends on how many calories you need  Your dietitian can tell you how many calories you need  The number of servings listed next to the food groups below are for people who need about 2,000 calories each day  · Grains:  6 to 8 servings (3 of these servings should be whole-grain foods)    ?  1 slice of whole-grain bread     ? 1 ounce of dry cereal    ? ½ cup of cooked cereal, pasta, or brown rice    · Vegetables and fruits:  4 to 5 servings of fruits and 4 to 5 servings of vegetables    ? 1 medium fruit    ? ½ cup of frozen, canned (no added salt), or chopped fresh vegetables     ? ½ cup of fresh, frozen, dried, or canned fruit (canned in light syrup or fruit juice)    ? ½ cup of vegetable or fruit juice    · Dairy:  2 to 3 servings    ? 1 cup of nonfat (skim) or 1% milk    ? 1½ ounces of fat-free or low-fat cheese    ? 6 ounces of nonfat or low-fat yogurt    · Lean meat, poultry, and fish:  6 ounces or less    ? Poultry (chicken, turkey) with no skin    ? Fish (especially fatty fish, such as salmon, fresh tuna, or mackerel)    ? Lean beef and pork (loin, round, extra lean hamburger)    ? Egg whites and egg substitutes    · Nuts, seeds, and legumes:  4 to 5 servings each week    ? ½ cup of cooked beans and peas    ? 1½ ounces of unsalted nuts    ? 2 tablespoons of peanut butter or seeds    · Sweets and added sugars:  5 or less each week    ? 1 tablespoon of sugar, jelly, or jam    ? ½ cup of sorbet or gelatin    ? 1 cup of lemonade    · Fats:  2 to 3 servings each week    ? 1 teaspoon of soft margarine or vegetable oil    ? 1 tablespoon of mayonnaise    ? 2 tablespoons of salad dressing    Foods to avoid:   · Grains:      ? Baked goods, such as doughnuts, pastries, cookies, and biscuits (high in fat and sugar)    ? Mixes for cornbread and biscuits, packaged foods, such as bread stuffing, rice and pasta mixes, macaroni and cheese, and instant cereals (high in sodium)    · Fruits and vegetables:      ? Regular, canned vegetables (high in sodium)    ? Sauerkraut, pickled vegetables, and other foods prepared in brine (high in sodium)    ? Fried vegetables or vegetables in butter or high-fat sauces    ? Fruit in cream or butter sauce (high in fat)    · Dairy:      ? Whole milk, 2% milk, and cream (high in fat)    ?  Regular cheese and processed cheese (high in fat and sodium)    · Meats and protein foods:      ? Smoked or cured meat, such as corned beef, francis, ham, hot dogs, and sausage (high in fat and sodium)    ? Canned beans and canned meats or spreads, such as potted meats, sardines, anchovies, and imitation seafood (high in sodium)    ? Deli or lunch meats, such as bologna, ham, turkey, and roast beef (high in sodium)    ? High-fat meat (T-bone steak, regular hamburger, and ribs)    ? Whole eggs and egg yolks (high in fat)    · Other:      ? Seasonings made with salt, such as garlic salt, celery salt, onion salt, seasoned salt, meat tenderizers, and monosodium glutamate (MSG)    ? Miso soup and canned or dried soup mixes (high in sodium)    ? Regular soy sauce, barbecue sauce, teriyaki sauce, steak sauce, Worcestershire sauce, and most flavored vinegars (high in sodium)    ? Regular condiments, such as mustard, ketchup, and salad dressings (high in sodium)    ? Gravy and sauces, such as Jc or cheese sauces (high in sodium and fat)    ? Drinks high in sugar, such as soda or fruit drinks    ? Snack foods, such as salted chips, popcorn, pretzels, pork rinds, salted crackers, and salted nuts    ? Frozen foods, such as dinners, entrees, vegetables with sauces, and breaded meats (high in sodium)    Other guidelines to follow:   · Maintain a healthy weight  Your risk for heart disease is higher if you are overweight  Your healthcare provider may suggest that you lose weight if you are overweight  You can lose weight by eating fewer calories and foods that have added sugars and fat  The DASH meal plan can help you do this  Decrease calories by eating smaller portions at each meal and fewer snacks  Ask your healthcare provider for more information about how to lose weight  · Exercise regularly  Regular exercise can help you reach or maintain a healthy weight  Regular exercise can also help decrease your blood pressure and improve your cholesterol levels  Get 30 minutes or more of moderate exercise each day of the week   To lose weight, get at least 60 minutes of exercise  Talk to your healthcare provider about the best exercise program for you  · Limit alcohol  Women should limit alcohol to 1 drink a day  Men should limit alcohol to 2 drinks a day  A drink of alcohol is 12 ounces of beer, 5 ounces of wine, or 1½ ounces of liquor  © Copyright [address] Information is for End User's use only and may not be sold, redistributed or otherwise used for commercial purposes  All illustrations and images included in CareNotes® are the copyrighted property of A D A M , Inc  or [facility]   The above information is an  only  It is not intended as medical advice for individual conditions or treatments  Talk to your doctor, nurse or pharmacist before following any medical regimen to see if it is safe and effective for you  Cholesterol and Your Health   AMBULATORY CARE:   Cholesterol  is a waxy, fat-like substance  Your body uses cholesterol to make hormones and new cells, and to protect nerves  Cholesterol is made by your body  It also comes from certain foods you eat, such as meat and dairy products  Your healthcare provider can help you set goals for your cholesterol levels  He or she can help you create a plan to meet your goals  Cholesterol level goals: Your cholesterol level goals depend on your risk for heart disease, your age, and your other health conditions  The following are general guidelines:  · Total cholesterol  includes low-density lipoprotein (LDL), high-density lipoprotein (HDL), and triglyceride levels  The total cholesterol level should be lower than 200 mg/dL and is best at about 150 mg/dL  · LDL cholesterol  is called bad cholesterol  because it forms plaque in your arteries  As plaque builds up, your arteries become narrow, and less blood flows through  When plaque decreases blood flow to your heart, you may have chest pain   If plaque completely blocks an artery that brings blood to your heart, you may have a heart attack  Plaque can break off and form blood clots  Blood clots may block arteries in your brain and cause a stroke  The level should be less than 130 mg/dL and is best at about 100 mg/dL  · HDL cholesterol  is called good cholesterol  because it helps remove LDL cholesterol from your arteries  It does this by attaching to LDL cholesterol and carrying it to your liver  Your liver breaks down LDL cholesterol so your body can get rid of it  High levels of HDL cholesterol can help prevent a heart attack and stroke  Low levels of HDL cholesterol can increase your risk for heart disease, heart attack, and stroke  The level should be 60 mg/dL or higher  · Triglycerides  are a type of fat that store energy from foods you eat  High levels of triglycerides also cause plaque buildup  This can increase your risk for a heart attack or stroke  If your triglyceride level is high, your LDL cholesterol level may also be high  The level should be less than 150 mg/dL  What increases your risk for high cholesterol:   · Smoking cigarettes    · Being overweight or obese, or not getting enough exercise    · Drinking large amounts of alcohol    · A medical condition such as hypertension (high blood pressure) or diabetes    · Certain genes passed from your parents to you    · Age older than 65 years    What you need to know about having your cholesterol levels checked: Adults 21to 39years of age should have their cholesterol levels checked every 4 to 6 years  Adults 45 years or older should have their cholesterol checked every 1 to 2 years  You may need your cholesterol checked more often, or at a younger age, if you have risk factors for heart disease  You may also need to have your cholesterol checked more often if you have other health conditions, such as diabetes  Blood tests are used to check cholesterol levels  Blood tests measure your levels of triglycerides, LDL cholesterol, and HDL cholesterol    How healthy fats affect your cholesterol levels:  Healthy fats, also called unsaturated fats, help lower LDL cholesterol and triglyceride levels  Healthy fats include the following:  · Monounsaturated fats  are found in foods such as olive oil, canola oil, avocado, nuts, and olives  · Polyunsaturated fats,  such as omega 3 fats, are found in fish, such as salmon, trout, and tuna  They can also be found in plant foods such as flaxseed, walnuts, and soybeans  How unhealthy fats affect your cholesterol levels:  Unhealthy fats increase LDL cholesterol and triglyceride levels  They are found in foods high in cholesterol, saturated fat, and trans fat:  · Cholesterol  is found in eggs, dairy, and meat  · Saturated fat  is found in butter, cheese, ice cream, whole milk, and coconut oil  Saturated fat is also found in meat, such as sausage, hot dogs, and bologna  · Trans fat  is found in liquid oils and is used in fried and baked foods  Foods that contain trans fats include chips, crackers, muffins, sweet rolls, microwave popcorn, and cookies  Treatment  for high cholesterol will also decrease your risk of heart disease, heart attack, and stroke  Treatment may include any of the following:  · Lifestyle changes  may include food, exercise, weight loss, and quitting smoking  You may also need to decrease the amount of alcohol you drink  Your healthcare provider will want you to start with lifestyle changes  Other treatment may be added if lifestyle changes are not enough  · Medicines  may be given to lower your LDL cholesterol, triglyceride levels, or total cholesterol level  You may need medicines to lower your cholesterol if any of the following is true:     ? You have a history of stroke, TIA, unstable angina, or a heart attack  ? Your LDL cholesterol level is 190 mg/dL or higher      ? You are age 36 to 76 years, have diabetes or heart disease risk factors, and your LDL cholesterol is 70 mg/dL or higher  · Supplements  include fish oil, red yeast rice, and garlic  Fish oil may help lower your triglyceride and LDL cholesterol levels  It may also increase your HDL cholesterol level  Red yeast rice may help decrease your total cholesterol level and LDL cholesterol level  Garlic may help lower your total cholesterol level  Do not take these supplements without talking to your healthcare provider  Food changes you can make to lower your cholesterol levels:  A dietitian can help you create a healthy eating plan  He or she can show you how to read food labels and choose foods low in saturated fat, trans fats, and cholesterol  · Decrease the total amount of fat you eat  Choose lean meats, fat-free or 1% fat milk, and low-fat dairy products, such as yogurt and cheese  Try to limit or avoid red meats  Limit or do not eat fried foods or baked goods, such as cookies  · Replace unhealthy fats with healthy fats  Cook foods in olive oil or canola oil  Choose soft margarines that are low in saturated fat and trans fat  Seeds, nuts, and avocados are other examples of healthy fats  · Eat foods with omega-3 fats  Examples include salmon, tuna, mackerel, walnuts, and flaxseed  Eat fish 2 times per week  Pregnant women should not eat fish that have high levels of mercury, such as shark, swordfish, and kemar mackerel  · Increase the amount of high-fiber foods you eat  High-fiber foods can help lower your LDL cholesterol  Aim to get between 20 and 30 grams of fiber each day  Fruits and vegetables are high in fiber  Eat at least 5 servings each day  Other high-fiber foods are whole-grain or whole-wheat breads, pastas, or cereals, and brown rice  Eat 3 ounces of whole-grain foods each day  Increase fiber slowly  You may have abdominal discomfort, bloating, and gas if you add fiber to your diet too quickly  · Eat healthy protein foods    Examples include low-fat dairy products, skinless chicken and turkey, fish, and nuts  · Limit foods and drinks that are high in sugar  Your dietitian or healthcare provider can help you create daily limits for high-sugar foods and drinks  The limit may be lower if you have diabetes or another health condition  Limits can also help you lose weight if needed  Lifestyle changes you can make to lower your cholesterol levels:   · Maintain a healthy weight  Ask your healthcare provider what a healthy weight is for you  Ask him or her to help you create a weight loss plan if needed  Weight loss can decrease your total cholesterol and triglyceride levels  Weight loss may also help keep your blood pressure at a healthy level  · Exercise regularly  Exercise can help lower your total cholesterol level and maintain a healthy weight  Exercise can also help increase your HDL cholesterol level  Work with your healthcare provider to create an exercise program that is right for you  Get at least 30 to 40 minutes of moderate exercise most days of the week  Examples of exercise include brisk walking, swimming, or biking  · Do not smoke  Nicotine and other chemicals in cigarettes and cigars can raise your cholesterol levels  Ask your healthcare provider for information if you currently smoke and need help to quit  E-cigarettes or smokeless tobacco still contain nicotine  Talk to your healthcare provider before you use these products  · Limit or do not drink alcohol  Alcohol can increase your triglyceride levels  Ask your healthcare provider before you drink alcohol  Ask how much is okay for you to drink in 1 day or 1 week  © Copyright [address] Information is for End User's use only and may not be sold, redistributed or otherwise used for commercial purposes  All illustrations and images included in CareNotes® are the copyrighted property of [employer]  or [facility] [employer]   The above information is an  only   It is not intended as medical advice for individual conditions or treatments  Talk to your doctor, nurse or pharmacist before following any medical regimen to see if it is safe and effective for you  Subjective: There has been no hospitalizations or acute illnesses since last visit  The patient overall is feeling well  No fevers, chills, or cough or colds  Good appetite and good energy  Some blood in the urine after his prostate biopsy, no burning and no foamy urine  No gastrointestinal symptoms  No cardiovascular symptoms including swelling of the legs  No headaches, dizziness or lightheadedness  Blood pressure medications:  · None    ROS:  See HPI, otherwise review of systems as completely reviewed with the patient are negative    Past Medical History:   Diagnosis Date    ED (erectile dysfunction)     Prostate cancer [facility])      Past Surgical History:   Procedure Laterality Date    COLONOSCOPY      HERNIA REPAIR Left     FL BIOPSY OF PROSTATE,NEEDLE/PUNCH N/A 3/2/2021    Procedure: Transperineal MRI Fusion prostate biopsy and gold seed marker insertion;  Surgeon: Poli Thomson MD;  Location: [facility];  Service: Urology    PROSTATE BIOPSY      1/26/2015, 2/10/2017    PROSTATE BIOPSY       Family History   Problem Relation Age of Onset    Hypertension Mother     Prostate cancer Brother       reports that he has never smoked  He has never used smokeless tobacco  He reports current alcohol use of about 1 0 standard drinks of alcohol per week  He reports that he does not use drugs      I COMPLETELY REVIEWED THE PAST MEDICAL HISTORY/PAST SURGICAL HISTORY/SOCIAL HISTORY/FAMILY HISTORY/AND MEDICATIONS  AND UPDATED ALL    Objective:     Vitals:   BP sitting on left:  142/80 with a heart rate 60 and regular  BP standing on left:  152/76 with a heart rate of 60 and regular    Weight (last 2 days)     Date/Time   Weight    03/04/21 1540   68 9 (152)            Wt Readings from Last 3 Encounters:   03/04/21 68 9 kg (152 lb)   03/02/21 64 9 kg (143 lb)   12/24/20 68 kg (150 lb)       Body mass index is 26 93 kg/m²  Physical Exam: General:  No acute distress  Skin:  No acute rash  Eyes:  No scleral icterus, noninjected, no discharge from eyes  ENT:  Moist mucous membranes  Neck:  Supple, no jugular venous distention, trachea is midline, no lymphadenopathy and no thyromegaly  Back   No CVAT  Chest:  Clear to auscultation and percussion, good respiratory effort  CVS:  Regular rate and rhythm without a rub, or gallops or murmurs  Abdomen:  Soft and nontender with normal bowel sounds  Extremities:  No cyanosis and no edema, no arthritic changes, normal range of motion  Neuro:  Grossly intact  Psych:  Alert, oriented x3 and appropriate      Medications:    Current Outpatient Medications:     sildenafil (VIAGRA) 50 MG tablet, Take 2 tablets (100 mg total) by mouth daily as needed for erectile dysfunction, Disp: 6 tablet, Rfl: 12    pantoprazole (PROTONIX) 40 mg tablet, Take 1 tablet (40 mg total) by mouth 2 (two) times a day (Patient not taking: Reported on 3/4/2021), Disp: 60 tablet, Rfl: 2    Lab, Imaging and other studies: I have personally reviewed pertinent labs    Laboratory Results:  Results for orders placed or performed during the hospital encounter of 03/02/21   Tissue Exam   Result Value Ref Range    Case Report       Surgical Pathology Report                         Case: [id number]                                   Authorizing Provider:  Jyoti Baron MD            Collected:           03/02/2021 0858              Ordering Location:     [address] [place] [address]      Received:            03/02/2021 [address] [facility]                                                           Pathologist:           Annette Garcia MD                                                                  Specimens:   A) - Prostate, L lat base                                                                           B) - Prostate, L base                                                                               C) - Prostate, L lat med                                                                            D) - Prostate, L med                                                                                E) - Prostate, L lat apex                                                                            F) - Prostate, L apex                                                                               G) - Prostate, R lat base                                                                           H) - Prostate, R base                                                                               I) - Prostate, R lat med                                                                            J) - Prostate, R med                                                                                K) - Prostate, R lat apex                                                                           L) - Prostate, R apex                                                                               M) - Prostate, Target L lat med                                                            Final Diagnosis       A  Prostate, L lat base, Biopsy:  - Benign prostatic tissue  B  Prostate, L base, Biopsy:  - Benign prostatic tissue  C  Prostate, L lat med, Biopsy:  - Benign prostatic tissue  D  Prostate, L med, Biopsy:  - Benign prostatic tissue  E  Prostate, L lat apex, Biopsy:  - Benign prostatic tissue  F  Prostate, L apex, Biopsy:  - Benign prostatic tissue  G  Prostate, R lat base, Biopsy:  - Benign prostatic tissue  H  Prostate, R base, Biopsy:  - Prostatic adenocarcinoma, [place] score 3+3=6, Prognostic Grade Group 1, involving 1 of 1 core (5% involvement)  - Focal high-grade prostatic intraepithelial neoplasia (HGPIN)  - Perineural invasion is not identified      I  Prostate, R lat med, Biopsy:  - Benign prostatic tissue  J  Prostate, R med, Biopsy:  - Benign prostatic tissue  K  Prostate, R lat apex, Biopsy:  - No tissue present  L  Prostate, R apex, Biopsy:  - Benign prostatic tissue  M  Prostate, Target L lat med, Biopsy:  - Benign prostatic tissue  Microscopic Description       Multiplex immunohistochemical stain for , p63 and racemase (p504s) performed on multiple blocks (F1, H1, I1, J1, L1, M2, M3, M4) with appropriate controls supports the diagnosis  Note       Dr Lida Carroll notified electronically ([facility]) by Dr Lluvia Garnett on 3/4/2021 at 11:30 am     Intradepartmental consultation in agreement  Additional Information       All reported additional testing was performed with appropriately reactive controls  These tests were developed and their performance characteristics determined by [facility] or appropriate performing facility, though some tests may be performed on tissues which have not been validated for performance characteristics (such as staining performed on alcohol exposed cell blocks and decalcified tissues)  Results should be interpreted with caution and in the context of the patients clinical condition  These tests may not be cleared or approved by the [place]  Food and Drug Administration, though the FDA has determined that such clearance or approval is not necessary  These tests are used for clinical purposes and they should not be regarded as investigational or for research  This laboratory has been approved by [place] 88, designated as a high-complexity laboratory and is qualified to perform these tests  Interpretation performed at [facility], [address] [place] [address]      Gross Description          A  The specimen is received in formalin, labeled with the patient's name and hospital number, and is designated "Left lateral base"    The specimen consists of a single tan soft tissue core measuring 0 1 cm in diameter and 1 5 cm in length  The specimen is entirely submitted between sponges, 1 cassette  B  The specimen is received in formalin, labeled with the patient's name and hospital number, and is designated "Left base"  The specimen consists of a single tan soft tissue  fragment measuring 0 3 x 0 3 x 0 1 cm  The specimen is entirely submitted between sponges, 1 cassette  C  The specimen is received in formalin, labeled with the patient's name and hospital number, and is designated "Left lateral medial"  The specimen consists of a single tan soft tissue core measuring 0 1 cm in diameter and 1 6 cm in length  The specimen is entirely submitted between sponges, 1 cassette  D  The specimen is received in formalin, labeled with the patient's name and hospital number, and is designated "Left medial"  The specimen consists of a single tan soft tissue core measuring 0 1 cm in diameter and 1 1 cm in length  The specimen is entirely submitted between sponges, 1 cassette  E  The specimen is received in formalin, labeled with the patient's name and hospital number, and is designated "Left lateral apex"  The specimen consists of a single tan soft tissue core measuring 0 1 cm in diameter and 1 6 cm in length  The specimen is entirely submitted between sponges, 1 cassette  F  The specimen is received in formalin, labeled with the patient's name and hospital number, and is designated "Left apex"  The specimen consists of a single tan soft tissue core measuring 0 1 cm in diameter and 0 8 cm in length  The specimen is entirely submitted between sponges, 1 cassette  G  The specimen is received in formalin, labeled with the patient's name and hospital number, and is designated "Right lateral base"  The specimen consists of a single tan soft tissue core measuring 0 1 cm in diameter and 2 0 cm in length  The specimen is entirely submitted between sponges, 1 cassette  H   The specimen is received in formalin, labeled with the patient's name and hospital number, and is designated "Right base"  The specimen consists of a single tan soft tissue core measuring 0 1 cm in diameter and 1 5 cm in length  The specimen is entirely submitted between sponges, 1 cassette  I  The specimen is received in formalin, labeled with the patient's name and hospital number, and is designated "Right lateral medial"  The specimen consists of a single tan soft tissue core measuring 0 1 cm in diameter and 2 2 cm in length  The specimen is entirely submitted between sponges, 1 cassette  J  The specimen is received in formalin, labeled with the patient's name and hospital number, and is designated "Right medial"  The specimen consists of a single tan soft tissue core measuring 0 1 cm in diameter and 2 0 cm in length  The specimen is entirely submitted between sponges, 1 cassette  K  The specimen is received in formalin, labeled with the patient's name and hospital number, and is designated "Right lateral apex"  The specimen consists of no grossly identifiable soft tissue  The specimen is presented to a co-worker and is in mutual agreement that there is no tissue grossly identified  The formalin is drained into an embedding bag  Entirely submitted  One cassette  Pictures are taken  L  The specimen is received in formalin, labeled with the patient's name and hospital number, and is designated "Right apex"  The specimen consists of a single tan soft tissue core measuring 0 1 cm in diameter and 1 6 cm in length  The specimen is entirely submitted between sponges, 1 cassette  M  The specimen is received in formalin, labeled with the patient's name and hospital number, and is designated "Target left lateral medial"  The specimen consists of 4 tan soft tissue cores measuring 0 1 cm in diameter and ranging from 1 5- 3 3 cm in length    The specimen is entirely submitted between sponges, 4 cassettes  Note: The estimated total formalin fixation time based upon information provided by the submitting clinician and the standard processing schedule is 15 hours  Renee        Clinical Information PSA(12/11/2020) 10 5 ng/mL        Results from last 7 days   Lab Units 03/02/21  0754   WBC Thousand/uL 7 63   HEMOGLOBIN g/dL 13 4   HEMATOCRIT % 42 3   PLATELETS Thousands/uL 251   POTASSIUM mmol/L 4 1   CHLORIDE mmol/L 111*   CO2 mmol/L 29   BUN mg/dL 28*   CREATININE mg/dL 1 60*   CALCIUM mg/dL 9 3   MAGNESIUM mg/dL 2 3   PHOSPHORUS mg/dL 3 0         Radiology review:   chest X-ray    Ultrasound      Portions of the record may have been created with voice recognition software  Occasional wrong word or "sound a like" substitutions may have occurred due to the inherent limitations of voice recognition software  Read the chart carefully and recognize, using context, where substitutions have occurred

## 2021-03-04 NOTE — PATIENT INSTRUCTIONS
1  Medication changes today:   No medication changes today pending your home blood pressure readings  2  Please take 1 week a blood pressure readings  at this time     AS FOLLOWS  MORNING AND EVENING, SITTING AND STANDING as follows:  · TAKE THE MORNING READINGS BEFORE ANY MEDICATIONS AND WHEN YOU ARE RELAXED FOR SEVERAL MINUTES  · TAKE THE EVENING READINGS:  BETWEEN 7-10 P M ; PRIOR TO ANY MEDICATIONS; AT LEAST IN OUR  FROM DINNER; AND CERTAINLY AFTER RELAXING FOR A FEW MINUTES  · PLEASE INCLUDE HEART RATE WITH YOUR BLOOD PRESSURE READINGS  · When taking standing readings, keep your arm supported at heart level and not dangling  · Make sure you are sitting with your back supported and feet on the ground and do not cross your legs or feet  · Make sure you have not taken any coffee or caffeine products or exercised or smoke cigarettes at least 30 minutes before taking your blood pressure  Then please bring in both your blood pressure readings as well as your blood pressure machine to see 1 of my advanced practitioner's in the next few weeks  Depending upon the readings and how accurate your blood pressure machine is, we may start a blood pressure medication  HOME BLOOD PRESSURE GOAL ON AVERAGE LESS THAN 130/80    3  Follow-up in 4  months   Please bring in 1 week a blood pressure readings morning evening, sitting and standing is outlined above     Please go for fasting lab work 1-2 weeks prior to your appointment      5   General instructions:   AVOID SALT BUT NOT ADDING AN READING LABELS TO MAKE SURE THERE IS LOW-SALT IN THE FOOD THAT YOU ARE EATING  o Goal is less than 2 g of sodium intake or less than 5 g of sodium chloride intake per day  · PLEASE FOLLOW A LOW-CHOLESTEROL DIET PLEASE SEE BELOW     Avoid nonsteroidal anti-inflammatory drugs such as Naprosyn, ibuprofen, Aleve, Advil, Celebrex, Meloxicam (Mobic) etc   You can use Tylenol as needed if you do not have any liver condition to be concerned about     Avoid medications such as Sudafed or decongestants and antihistamines that contained the D component which is the decongestant  You can take antihistamines without the decongestant or D component   Try to avoid medications such as pantoprazole or  Protonix/Nexium or Esomeprazole)/Prilosec or omeprazole/Prevacid or lansoprazole/AcipHex or Rabeprazole  If you are able to, use Pepcid as this is safer for your kidneys   Try to exercise at least 30 minutes 3 days a week to begin with with an ultimate goal of 5 days a week for at least 30 minutes       Please do not drink more than 2 glasses of alcohol/wine on a daily basis as this may contribute to your high blood pressure  DASH Eating Plan   WHAT YOU NEED TO KNOW:   The DASH (Dietary Approaches to Stop Hypertension) Eating Plan is designed to help prevent or lower high blood pressure  It can also help to lower LDL (bad) cholesterol and decrease your risk of heart disease  The plan is low in sodium, sugar, unhealthy fats, and total fat  It is high in potassium, calcium, magnesium, and fiber  These nutrients are added when you eat more fruits, vegetables, and whole grains  DISCHARGE INSTRUCTIONS:   Your sodium limit each day: Your dietitian will tell you how much sodium is safe for you to have each day  People with high blood pressure should have no more than 1,500 to 2,300 mg of sodium in a day  A teaspoon (tsp) of salt has 2,300 mg of sodium  This may seem like a difficult goal, but small changes to the foods you eat can make a big difference  Your healthcare provider or dietitian can help you create a meal plan that follows your sodium limit  How to limit sodium:   · Read food labels  Food labels can help you choose foods that are low in sodium  The amount of sodium is listed in milligrams (mg)  The % Daily Value (DV) column tells you how much of your daily needs are met by 1 serving of the food for each nutrient listed  Choose foods that have less than 5% of the DV of sodium  These foods are considered low in sodium  Foods that have 20% or more of the DV of sodium are considered high in sodium  Avoid foods that have more than 300 mg of sodium in each serving  Choose foods that say low-sodium, reduced-sodium, or no salt added on the food label  · Avoid salt  Do not salt food at the table, and add very little salt to foods during cooking  Use herbs and spices, such as onions, garlic, and salt-free seasonings to add flavor to foods  Try lemon or lime juice or vinegar to give foods a tart flavor  Use hot peppers or a small amount of hot pepper sauce to add a spicy flavor to foods  · Ask about salt substitutes  Ask your healthcare provider if you may use salt substitutes  Some salt substitutes have ingredients that can be harmful if you have certain health conditions  · Choose foods carefully at restaurants  Meals from restaurants, especially fast food restaurants, are often high in sodium  Some restaurants have nutrition information that tells you the amount of sodium in their foods  Ask to have your food prepared with less, or no salt  What you need to know about fats:   · Include healthy fats  Examples are unsaturated fats and omega-3 fatty acids  Unsaturated fats are found in soybean, canola, olive, or sunflower oil, and liquid and soft tub margarines  Omega-3 fatty acids are found in fatty fish, such as salmon, tuna, mackerel, and sardines  It is also found in flaxseed oil and ground flaxseed  · Avoid unhealthy fats  Do not eat unhealthy fats, such as saturated fats and trans fats  Saturated fats are found in foods that contain fat from animals  Examples are fatty meats, whole milk, butter, cream, and other dairy foods  It is also found in shortening, stick margarine, palm oil, and coconut oil   Trans fats are found in fried foods, crackers, chips, and baked goods made with margarine or shortening  Foods to include: With the DASH eating plan, you need to eat a certain number of servings from each food group  This will help you get enough of certain nutrients and limit others  The amount of servings you should eat depends on how many calories you need  Your dietitian can tell you how many calories you need  The number of servings listed next to the food groups below are for people who need about 2,000 calories each day  · Grains:  6 to 8 servings (3 of these servings should be whole-grain foods)    ? 1 slice of whole-grain bread     ? 1 ounce of dry cereal    ? ½ cup of cooked cereal, pasta, or brown rice    · Vegetables and fruits:  4 to 5 servings of fruits and 4 to 5 servings of vegetables    ? 1 medium fruit    ? ½ cup of frozen, canned (no added salt), or chopped fresh vegetables     ? ½ cup of fresh, frozen, dried, or canned fruit (canned in light syrup or fruit juice)    ? ½ cup of vegetable or fruit juice    · Dairy:  2 to 3 servings    ? 1 cup of nonfat (skim) or 1% milk    ? 1½ ounces of fat-free or low-fat cheese    ? 6 ounces of nonfat or low-fat yogurt    · Lean meat, poultry, and fish:  6 ounces or less    ? Poultry (chicken, turkey) with no skin    ? Fish (especially fatty fish, such as salmon, fresh tuna, or mackerel)    ? Lean beef and pork (loin, round, extra lean hamburger)    ? Egg whites and egg substitutes    · Nuts, seeds, and legumes:  4 to 5 servings each week    ? ½ cup of cooked beans and peas    ? 1½ ounces of unsalted nuts    ? 2 tablespoons of peanut butter or seeds    · Sweets and added sugars:  5 or less each week    ? 1 tablespoon of sugar, jelly, or jam    ? ½ cup of sorbet or gelatin    ? 1 cup of lemonade    · Fats:  2 to 3 servings each week    ? 1 teaspoon of soft margarine or vegetable oil    ? 1 tablespoon of mayonnaise    ?  2 tablespoons of salad dressing    Foods to avoid:   · Grains:      ? Baked goods, such as doughnuts, pastries, cookies, and biscuits (high in fat and sugar)    ? Mixes for cornbread and biscuits, packaged foods, such as bread stuffing, rice and pasta mixes, macaroni and cheese, and instant cereals (high in sodium)    · Fruits and vegetables:      ? Regular, canned vegetables (high in sodium)    ? Sauerkraut, pickled vegetables, and other foods prepared in brine (high in sodium)    ? Fried vegetables or vegetables in butter or high-fat sauces    ? Fruit in cream or butter sauce (high in fat)    · Dairy:      ? Whole milk, 2% milk, and cream (high in fat)    ? Regular cheese and processed cheese (high in fat and sodium)    · Meats and protein foods:      ? Smoked or cured meat, such as corned beef, francis, ham, hot dogs, and sausage (high in fat and sodium)    ? Canned beans and canned meats or spreads, such as potted meats, sardines, anchovies, and imitation seafood (high in sodium)    ? Deli or lunch meats, such as bologna, ham, turkey, and roast beef (high in sodium)    ? High-fat meat (T-bone steak, regular hamburger, and ribs)    ? Whole eggs and egg yolks (high in fat)    · Other:      ? Seasonings made with salt, such as garlic salt, celery salt, onion salt, seasoned salt, meat tenderizers, and monosodium glutamate (MSG)    ? Miso soup and canned or dried soup mixes (high in sodium)    ? Regular soy sauce, barbecue sauce, teriyaki sauce, steak sauce, Worcestershire sauce, and most flavored vinegars (high in sodium)    ? Regular condiments, such as mustard, ketchup, and salad dressings (high in sodium)    ? Gravy and sauces, such as Jc or cheese sauces (high in sodium and fat)    ? Drinks high in sugar, such as soda or fruit drinks    ? Snack foods, such as salted chips, popcorn, pretzels, pork rinds, salted crackers, and salted nuts    ? Frozen foods, such as dinners, entrees, vegetables with sauces, and breaded meats (high in sodium)    Other guidelines to follow:   · Maintain a healthy weight    Your risk for heart disease is higher if you are overweight  Your healthcare provider may suggest that you lose weight if you are overweight  You can lose weight by eating fewer calories and foods that have added sugars and fat  The DASH meal plan can help you do this  Decrease calories by eating smaller portions at each meal and fewer snacks  Ask your healthcare provider for more information about how to lose weight  · Exercise regularly  Regular exercise can help you reach or maintain a healthy weight  Regular exercise can also help decrease your blood pressure and improve your cholesterol levels  Get 30 minutes or more of moderate exercise each day of the week  To lose weight, get at least 60 minutes of exercise  Talk to your healthcare provider about the best exercise program for you  · Limit alcohol  Women should limit alcohol to 1 drink a day  Men should limit alcohol to 2 drinks a day  A drink of alcohol is 12 ounces of beer, 5 ounces of wine, or 1½ ounces of liquor  © Copyright [address] [place] [address] Drive Information is for End User's use only and may not be sold, redistributed or otherwise used for commercial purposes  All illustrations and images included in CareNotes® are the copyrighted property of [employer]  or [address] [place] [address]  The above information is an  only  It is not intended as medical advice for individual conditions or treatments  Talk to your doctor, nurse or pharmacist before following any medical regimen to see if it is safe and effective for you  Cholesterol and Your Health   AMBULATORY CARE:   Cholesterol  is a waxy, fat-like substance  Your body uses cholesterol to make hormones and new cells, and to protect nerves  Cholesterol is made by your body  It also comes from certain foods you eat, such as meat and dairy products  Your healthcare provider can help you set goals for your cholesterol levels  He or she can help you create a plan to meet your goals    Cholesterol level goals: Your cholesterol level goals depend on your risk for heart disease, your age, and your other health conditions  The following are general guidelines:  · Total cholesterol  includes low-density lipoprotein (LDL), high-density lipoprotein (HDL), and triglyceride levels  The total cholesterol level should be lower than 200 mg/dL and is best at about 150 mg/dL  · LDL cholesterol  is called bad cholesterol  because it forms plaque in your arteries  As plaque builds up, your arteries become narrow, and less blood flows through  When plaque decreases blood flow to your heart, you may have chest pain  If plaque completely blocks an artery that brings blood to your heart, you may have a heart attack  Plaque can break off and form blood clots  Blood clots may block arteries in your brain and cause a stroke  The level should be less than 130 mg/dL and is best at about 100 mg/dL  · HDL cholesterol  is called good cholesterol  because it helps remove LDL cholesterol from your arteries  It does this by attaching to LDL cholesterol and carrying it to your liver  Your liver breaks down LDL cholesterol so your body can get rid of it  High levels of HDL cholesterol can help prevent a heart attack and stroke  Low levels of HDL cholesterol can increase your risk for heart disease, heart attack, and stroke  The level should be 60 mg/dL or higher  · Triglycerides  are a type of fat that store energy from foods you eat  High levels of triglycerides also cause plaque buildup  This can increase your risk for a heart attack or stroke  If your triglyceride level is high, your LDL cholesterol level may also be high  The level should be less than 150 mg/dL      What increases your risk for high cholesterol:   · Smoking cigarettes    · Being overweight or obese, or not getting enough exercise    · Drinking large amounts of alcohol    · A medical condition such as hypertension (high blood pressure) or diabetes    · Certain genes passed from your parents to you    · Age older than 72 years    What you need to know about having your cholesterol levels checked: Adults 21to 39years of age should have their cholesterol levels checked every 4 to 6 years  Adults 45 years or older should have their cholesterol checked every 1 to 2 years  You may need your cholesterol checked more often, or at a younger age, if you have risk factors for heart disease  You may also need to have your cholesterol checked more often if you have other health conditions, such as diabetes  Blood tests are used to check cholesterol levels  Blood tests measure your levels of triglycerides, LDL cholesterol, and HDL cholesterol  How healthy fats affect your cholesterol levels:  Healthy fats, also called unsaturated fats, help lower LDL cholesterol and triglyceride levels  Healthy fats include the following:  · Monounsaturated fats  are found in foods such as olive oil, canola oil, avocado, nuts, and olives  · Polyunsaturated fats,  such as omega 3 fats, are found in fish, such as salmon, trout, and tuna  They can also be found in plant foods such as flaxseed, walnuts, and soybeans  How unhealthy fats affect your cholesterol levels:  Unhealthy fats increase LDL cholesterol and triglyceride levels  They are found in foods high in cholesterol, saturated fat, and trans fat:  · Cholesterol  is found in eggs, dairy, and meat  · Saturated fat  is found in butter, cheese, ice cream, whole milk, and coconut oil  Saturated fat is also found in meat, such as sausage, hot dogs, and bologna  · Trans fat  is found in liquid oils and is used in fried and baked foods  Foods that contain trans fats include chips, crackers, muffins, sweet rolls, microwave popcorn, and cookies  Treatment  for high cholesterol will also decrease your risk of heart disease, heart attack, and stroke   Treatment may include any of the following:  · Lifestyle changes  may include food, exercise, weight loss, and quitting smoking  You may also need to decrease the amount of alcohol you drink  Your healthcare provider will want you to start with lifestyle changes  Other treatment may be added if lifestyle changes are not enough  · Medicines  may be given to lower your LDL cholesterol, triglyceride levels, or total cholesterol level  You may need medicines to lower your cholesterol if any of the following is true:     ? You have a history of stroke, TIA, unstable angina, or a heart attack  ? Your LDL cholesterol level is 190 mg/dL or higher  ? You are age 36 to 76 years, have diabetes or heart disease risk factors, and your LDL cholesterol is 70 mg/dL or higher  · Supplements  include fish oil, red yeast rice, and garlic  Fish oil may help lower your triglyceride and LDL cholesterol levels  It may also increase your HDL cholesterol level  Red yeast rice may help decrease your total cholesterol level and LDL cholesterol level  Garlic may help lower your total cholesterol level  Do not take these supplements without talking to your healthcare provider  Food changes you can make to lower your cholesterol levels:  A dietitian can help you create a healthy eating plan  He or she can show you how to read food labels and choose foods low in saturated fat, trans fats, and cholesterol  · Decrease the total amount of fat you eat  Choose lean meats, fat-free or 1% fat milk, and low-fat dairy products, such as yogurt and cheese  Try to limit or avoid red meats  Limit or do not eat fried foods or baked goods, such as cookies  · Replace unhealthy fats with healthy fats  Cook foods in olive oil or canola oil  Choose soft margarines that are low in saturated fat and trans fat  Seeds, nuts, and avocados are other examples of healthy fats  · Eat foods with omega-3 fats  Examples include salmon, tuna, mackerel, walnuts, and flaxseed  Eat fish 2 times per week   Pregnant women should not eat fish that have high levels of mercury, such as shark, swordfish, and kemar mackerel  · Increase the amount of high-fiber foods you eat  High-fiber foods can help lower your LDL cholesterol  Aim to get between 20 and 30 grams of fiber each day  Fruits and vegetables are high in fiber  Eat at least 5 servings each day  Other high-fiber foods are whole-grain or whole-wheat breads, pastas, or cereals, and brown rice  Eat 3 ounces of whole-grain foods each day  Increase fiber slowly  You may have abdominal discomfort, bloating, and gas if you add fiber to your diet too quickly  · Eat healthy protein foods  Examples include low-fat dairy products, skinless chicken and turkey, fish, and nuts  · Limit foods and drinks that are high in sugar  Your dietitian or healthcare provider can help you create daily limits for high-sugar foods and drinks  The limit may be lower if you have diabetes or another health condition  Limits can also help you lose weight if needed  Lifestyle changes you can make to lower your cholesterol levels:   · Maintain a healthy weight  Ask your healthcare provider what a healthy weight is for you  Ask him or her to help you create a weight loss plan if needed  Weight loss can decrease your total cholesterol and triglyceride levels  Weight loss may also help keep your blood pressure at a healthy level  · Exercise regularly  Exercise can help lower your total cholesterol level and maintain a healthy weight  Exercise can also help increase your HDL cholesterol level  Work with your healthcare provider to create an exercise program that is right for you  Get at least 30 to 40 minutes of moderate exercise most days of the week  Examples of exercise include brisk walking, swimming, or biking  · Do not smoke  Nicotine and other chemicals in cigarettes and cigars can raise your cholesterol levels   Ask your healthcare provider for information if you currently smoke and need help to quit  E-cigarettes or smokeless tobacco still contain nicotine  Talk to your healthcare provider before you use these products  · Limit or do not drink alcohol  Alcohol can increase your triglyceride levels  Ask your healthcare provider before you drink alcohol  Ask how much is okay for you to drink in 1 day or 1 week  © Copyright [address] Information is for End User's use only and may not be sold, redistributed or otherwise used for commercial purposes  All illustrations and images included in CareNotes® are the copyrighted property of [employer]  or [address] [place], [place] [address]ga[place]   The above information is an  only  It is not intended as medical advice for individual conditions or treatments  Talk to your doctor, nurse or pharmacist before following any medical regimen to see if it is safe and effective for you

## 2021-03-17 ENCOUNTER — OFFICE VISIT (OUTPATIENT)
Dept: UROLOGY | Facility: CLINIC | Age: 76
End: 2021-03-17
Payer: MEDICARE

## 2021-03-17 VITALS
WEIGHT: 147 LBS | HEIGHT: 63 IN | BODY MASS INDEX: 26.05 KG/M2 | SYSTOLIC BLOOD PRESSURE: 120 MMHG | DIASTOLIC BLOOD PRESSURE: 60 MMHG | HEART RATE: 65 BPM

## 2021-03-17 DIAGNOSIS — R35.1 NOCTURIA ASSOCIATED WITH BENIGN PROSTATIC HYPERPLASIA: ICD-10-CM

## 2021-03-17 DIAGNOSIS — C61 PROSTATE CANCER (HCC): Primary | ICD-10-CM

## 2021-03-17 DIAGNOSIS — N40.1 BENIGN LOCALIZED PROSTATIC HYPERPLASIA WITH LOWER URINARY TRACT SYMPTOMS (LUTS): ICD-10-CM

## 2021-03-17 DIAGNOSIS — N40.1 NOCTURIA ASSOCIATED WITH BENIGN PROSTATIC HYPERPLASIA: ICD-10-CM

## 2021-03-17 PROCEDURE — 99214 OFFICE O/P EST MOD 30 MIN: CPT | Performed by: UROLOGY

## 2021-03-17 RX ORDER — FINASTERIDE 5 MG/1
5 TABLET, FILM COATED ORAL DAILY
Qty: 90 TABLET | Refills: 3 | Status: SHIPPED | OUTPATIENT
Start: 2021-03-17 | End: 2022-03-29 | Stop reason: SDUPTHER

## 2021-03-17 NOTE — PATIENT INSTRUCTIONS
The concept of active surveillance, has increasingly emerged since 2010 as a viable option for men who decide not to undergo immediate radical treatment (e g  surgery or radiation therapy)  Similarly to the concept of observation, a lot of prostate cancer is unlikely to harm you or decrease your life expectancy  The concept of active surveillance is not no treatment, but rather to treat you when your cancer warrants treatment (some think of it as deferred treatment)  We now know that men with low risk prostate cancer 10-15 years after their diagnosis who go on active surveillance have remarkably low rates of their disease spreading or dying of prostate cancer  In fact, one study from [facility] in very favorable men followed on active surveillance found that <1% of men had meaningful progression of their disease 15 years later  This demonstrates that for these men there would be no difference in outcomes if they had immediate treatment or underwent active surveillance  The key to these successful numbers though is making sure you are monitored regularly, and that you undergo treatment if your disease becomes more aggressive  During active surveillance, prostate cancer is carefully monitored for signs of progression  A PSA blood test and digital rectal exam (LEILA) are usually administered once or twice a year along with a repeat biopsy of the prostate usually every 1-3 years  If there is evidence that the cancer is progressing treatment might be warranted  It is important to note that 50-66% of men during the 10 years after their diagnosis stay on active surveillance and dont warrant treatment  Therefore, the benefit of active surveillance in most men is the avoidance of treatment, and for the rest of the men a delay in having to experience the side effects of treatment      When to Choose Active Surveillance  Current estimates indicate that many more men are aggressively treated for prostate cancer than is necessary to save a life from the disease  Today, the man who is ideal for active surveillance has a low risk prostate cancer (low grade (grade 1 or Melany 6), PSA <10, and the cancer is confined to the prostate)  Additional factors that are often considered favorable are a low volume of cancer (small amount of cancer found on biopsy, for example)  Additionally, in some men with a low volume of [place] 3 + 4 equal 7 prostate cancer active surveillance can be a good management option as well  Active surveillance might also be a good choice for older men who are not good candidates for observation, but have a limited life expectancy  In addition, if a man is currently battling other serious disorders or diseases, such as heart disease, long-standing high blood pressure, or poorly controlled diabetes, his doctors might feel it is in his best interest to hold off on immediate therapy and avoid its potential complications  Individual Decisions  The right age for active surveillance is a difficult question, as clearly younger men will live longer with their cancers, and thus have a higher likelihood that their cancer could progress  However, in general, younger men appear to have less biologically aggressive cancers and may be able to stay on active surveillance longer  Younger men also have more to lose when it comes to quality of life as they often have better erectile and urinary function  Current national recommendations recommend active surveillance as the treatment of choice for most low risk men  The chance that the man will progress on active surveillance is low, around 30%  In that percentage of men that does progress while on active surveillance they do as well clinically as men who immediately went to treatment    This means that active surveillance is able to safe the majority of men from active treatments which have their own set of side effects including potential urinary incontinence, erectile dysfunction, and the other risks of major surgery including infection, bleeding, pain, damage to surrounding structures, need for additional procedures, and risk of anesthesia and positioning complications  Research is ongoing on how to best use active surveillance for intermediate risk prostate cancer

## 2021-03-17 NOTE — LETTER
March 17, 2021     Kaden Odell MD  [address] [facility]  [address]  [address] [facility]    Patient: Micah Covarrubias   YOB: 1945   Date of Visit: 3/17/2021       Dear Dr Allison Eason: Thank you for referring Micah Covarrubias to me for evaluation  Below are my notes for this consultation  If you have questions, please do not hesitate to call me  I look forward to following your patient along with you  Sincerely,        Pelon Lozano MD        CC: MD Fabiola Curry, NAVEED Lozano MD  3/17/2021  3:42 PM  Sign when Signing Visit       Problem List Items Addressed This Visit        Genitourinary    Prostate cancer [facility]) - Primary    Relevant Orders    PSA Total, Diagnostic    Benign localized prostatic hyperplasia with lower urinary tract symptoms (LUTS)    Relevant Medications    finasteride (PROSCAR) 5 mg tablet       Other    Nocturia associated with benign prostatic hyperplasia    Relevant Medications    finasteride (PROSCAR) 5 mg tablet            Discussion:    Wayne Landaverde is doing well, some hematuria post-op, MRI targeted biopsy shows only 1 core of [place] 3+3=6, less than 5% of the core  He remains a candidate for active surveillance, he wishes to forego radiation therapy at this time which is reasonable  He will see me in 6 months with a PSA prior    With regard  To his LUTS he is seen to have nocturia, he would like to get up less at night, I did speak with him about Uro lift, TURP, and a trial of a 5 alpha reductase inhibitor  We talked about the potential side effect of sexual dysfunction, this is only seen in roughly 9-10% of men, if he does not have a benefit with this medicine we can stop it, should his bother worsen in the future we can talk about potential workup for bladder outlet reduction surgery if necessary        I have carbon copied his radiation oncology physician on this note, again, at this time he does not wish to proceed to definitive therapy which is certainly reasonable given his parameters  Assessment and plan:       Please see problem oriented charting for the assessment plan of today's urological complaints      Mary Durham MD      Chief Complaint     Chief Complaint   Patient presents with    Prostate Cancer         History of Present Illness     Yolis Bahena is a 68 y o  Gentleman with a history of Melany 6 prostate cancer, his MRI is a category 5, this being true, however, his follow-up biopsy shows only 1 core of [place] 6, which represents low risk disease  I do feel comfortable with this information keeping him on active surveillance, should his PSA progress, we can refer him back to Radiation therapy for definitive management  I have also carbon copied our oncology nurse navigator as well as the radiation oncology team on today's note to keep them abreast of our plan  I have also started finasteride for his lower urinary tract symptoms  The following portions of the patient's history were reviewed and updated as appropriate: allergies, current medications, past family history, past medical history, past social history, past surgical history and problem list         Detailed Urologic History     - please refer to HPI    Review of Systems     Review of Systems   Constitutional: Negative  HENT: Negative  Eyes: Negative  Respiratory: Negative  Cardiovascular: Negative  Gastrointestinal: Negative  Endocrine: Negative  Genitourinary:        As per HPI   Musculoskeletal: Negative  Skin: Negative  Allergic/Immunologic: Negative  Neurological: Negative  Hematological: Negative  Psychiatric/Behavioral: Negative  Allergies     No Known Allergies    Physical Exam     Physical Exam  Vitals signs reviewed  Constitutional:       General: He is not in acute distress  Appearance: Normal appearance  He is not ill-appearing, toxic-appearing or diaphoretic     HENT: Head: Normocephalic and atraumatic  Eyes:      General: No scleral icterus  Right eye: No discharge  Left eye: No discharge  Cardiovascular:      Pulses: Normal pulses  Pulmonary:      Effort: Pulmonary effort is normal    Abdominal:      General: There is no distension  Palpations: There is no mass  Tenderness: There is no abdominal tenderness  Hernia: No hernia is present  Musculoskeletal:         General: No swelling or tenderness  Skin:     General: Skin is warm  Neurological:      General: No focal deficit present  Mental Status: He is alert and oriented to person, place, and time  Cranial Nerves: No cranial nerve deficit  Sensory: No sensory deficit  Motor: No weakness  Coordination: Coordination normal    Psychiatric:         Mood and Affect: Mood normal          Behavior: Behavior normal          Thought Content:  Thought content normal          Judgment: Judgment normal              Vital Signs  Vitals:    03/17/21 1518   BP: 120/60   Pulse: 65   Weight: 66 7 kg (147 lb)   Height: 5' 3" (1 6 m)         Current Medications       Current Outpatient Medications:     finasteride (PROSCAR) 5 mg tablet, Take 1 tablet (5 mg total) by mouth daily, Disp: 90 tablet, Rfl: 3    sildenafil (VIAGRA) 50 MG tablet, Take 2 tablets (100 mg total) by mouth daily as needed for erectile dysfunction, Disp: 6 tablet, Rfl: 12      Active Problems     Patient Active Problem List   Diagnosis    Organic impotence    Prostate [facility] ([facility] [address] )    Benign localized prostatic hyperplasia with lower urinary tract symptoms (LUTS)    Stage 3b chronic kidney disease    Dyslipidemia    Parenchymal renal hypertension    Nocturia associated with benign prostatic hyperplasia         Past Medical History     Past Medical History:   Diagnosis Date    ED (erectile dysfunction)     Prostate cancer [facility])          Surgical History     Past Surgical History:   Procedure Laterality Date    COLONOSCOPY      HERNIA REPAIR Left     [place] BIOPSY OF PROSTATE,NEEDLE/PUNCH N/A 3/2/2021    Procedure: Transperineal MRI Fusion prostate biopsy and gold seed marker insertion;  Surgeon: Antoine Cummings MD;  [facility];  Service: Urology    PROSTATE BIOPSY      1/26/2015, 2/10/2017    PROSTATE BIOPSY           Family History     Family History   Problem Relation Age of Onset    Hypertension Mother     Prostate cancer Brother          Social History     Social History     Social History     Tobacco Use   Smoking Status Never Smoker   Smokeless Tobacco Never Used   Tobacco Comment    tried it once          Pertinent Lab Values     Lab Results   Component Value Date    CREATININE 1 60 (H) 03/02/2021       Lab Results   Component Value Date    PSA 10 5 (H) 12/11/2020    PSA 8 8 (H) 05/15/2020             Pertinent Imaging      no new imaging for my review

## 2021-03-17 NOTE — PROGRESS NOTES
Problem List Items Addressed This Visit        Genitourinary    Prostate cancer ([facility] [address] ) - Primary    Relevant Orders    PSA Total, Diagnostic    Benign localized prostatic hyperplasia with lower urinary tract symptoms (LUTS)    Relevant Medications    finasteride (PROSCAR) 5 mg tablet       Other    Nocturia associated with benign prostatic hyperplasia    Relevant Medications    finasteride (PROSCAR) 5 mg tablet            Discussion:    Russ Adams is doing well, some hematuria post-op, MRI targeted biopsy shows only 1 core of [place] 3+3=6, less than 5% of the core  He remains a candidate for active surveillance, he wishes to forego radiation therapy at this time which is reasonable  He will see me in 6 months with a PSA prior    With regard  To his LUTS he is seen to have nocturia, he would like to get up less at night, I did speak with him about Uro lift, TURP, and a trial of a 5 alpha reductase inhibitor  We talked about the potential side effect of sexual dysfunction, this is only seen in roughly 9-10% of men, if he does not have a benefit with this medicine we can stop it, should his bother worsen in the future we can talk about potential workup for bladder outlet reduction surgery if necessary  I have carbon copied his radiation oncology physician on this note, again, at this time he does not wish to proceed to definitive therapy which is certainly reasonable given his parameters  Assessment and plan:       Please see problem oriented charting for the assessment plan of today's urological complaints      Juan Smart MD      Chief Complaint     Chief Complaint   Patient presents with    Prostate Cancer         History of Present Illness     Kayleigh Muniz is a 68 y o  Gentleman with a history of [place] 6 prostate cancer, his MRI is a category 5, this being true, however, his follow-up biopsy shows only 1 core of [place] 6, which represents low risk disease        I do feel comfortable with this information keeping him on active surveillance, should his PSA progress, we can refer him back to Radiation therapy for definitive management  I have also carbon copied our oncology nurse navigator as well as the radiation oncology team on today's note to keep them abreast of our plan  I have also started finasteride for his lower urinary tract symptoms  The following portions of the patient's history were reviewed and updated as appropriate: allergies, current medications, past family history, past medical history, past social history, past surgical history and problem list         Detailed Urologic History     - please refer to HPI    Review of Systems     Review of Systems   Constitutional: Negative  HENT: Negative  Eyes: Negative  Respiratory: Negative  Cardiovascular: Negative  Gastrointestinal: Negative  Endocrine: Negative  Genitourinary:        As per HPI   Musculoskeletal: Negative  Skin: Negative  Allergic/Immunologic: Negative  Neurological: Negative  Hematological: Negative  Psychiatric/Behavioral: Negative  Allergies     No Known Allergies    Physical Exam     Physical Exam  Vitals signs reviewed  Constitutional:       General: He is not in acute distress  Appearance: Normal appearance  He is not ill-appearing, toxic-appearing or diaphoretic  HENT:      Head: Normocephalic and atraumatic  Eyes:      General: No scleral icterus  Right eye: No discharge  Left eye: No discharge  Cardiovascular:      Pulses: Normal pulses  Pulmonary:      Effort: Pulmonary effort is normal    Abdominal:      General: There is no distension  Palpations: There is no mass  Tenderness: There is no abdominal tenderness  Hernia: No hernia is present  Musculoskeletal:         General: No swelling or tenderness  Skin:     General: Skin is warm  Neurological:      General: No focal deficit present        Mental Status: He is alert and oriented to person, place, and time  Cranial Nerves: No cranial nerve deficit  Sensory: No sensory deficit  Motor: No weakness  Coordination: Coordination normal    Psychiatric:         Mood and Affect: Mood normal          Behavior: Behavior normal          Thought Content:  Thought content normal          Judgment: Judgment normal              Vital Signs  Vitals:    03/17/21 1518   BP: 120/60   Pulse: 65   Weight: 66 7 kg (147 lb)   Height: 5' 3" (1 6 m)         Current Medications       Current Outpatient Medications:     finasteride (PROSCAR) 5 mg tablet, Take 1 tablet (5 mg total) by mouth daily, Disp: 90 tablet, Rfl: 3    sildenafil (VIAGRA) 50 MG tablet, Take 2 tablets (100 mg total) by mouth daily as needed for erectile dysfunction, Disp: 6 tablet, Rfl: 12      Active Problems     Patient Active Problem List   Diagnosis    Organic impotence    Prostate cancer ([facility] [address] )    Benign localized prostatic hyperplasia with lower urinary tract symptoms (LUTS)    Stage 3b chronic kidney disease    Dyslipidemia    Parenchymal renal hypertension    Nocturia associated with benign prostatic hyperplasia         Past Medical History     Past Medical History:   Diagnosis Date    ED (erectile dysfunction)     Prostate cancer [facility])          Surgical History     Past Surgical History:   Procedure Laterality Date    COLONOSCOPY      HERNIA REPAIR Left     [place] BIOPSY OF PROSTATE,NEEDLE/PUNCH N/A 3/2/2021    Procedure: Transperineal MRI Fusion prostate biopsy and gold seed marker insertion;  Surgeon: Dipika Ross MD;  Location: [facility];  Service: Urology    PROSTATE BIOPSY      1/26/2015, 2/10/2017    PROSTATE BIOPSY           Family History     Family History   Problem Relation Age of Onset    Hypertension Mother     Prostate cancer Brother          Social History     Social History     Social History     Tobacco Use   Smoking Status Never Smoker   Smokeless Tobacco Never Used Tobacco Comment    tried it once          Pertinent Lab Values     Lab Results   Component Value Date    CREATININE 1 60 (H) 03/02/2021       Lab Results   Component Value Date    PSA 10 5 (H) 12/11/2020    PSA 8 8 (H) 05/15/2020             Pertinent Imaging      no new imaging for my review

## 2021-04-06 ENCOUNTER — DOCUMENTATION (OUTPATIENT)
Dept: UROLOGY | Facility: AMBULATORY SURGERY CENTER | Age: 76
End: 2021-04-06

## 2021-04-06 NOTE — PROGRESS NOTES
Oncology Navigator Note: Multidisciplinary Urology Case Review 4/6/21  Physician Recommended Plan    Keon Diallo is a 68 y o  male    Diagnosis: Prostate Cancer, [place] 6 on active surveillance since 2009  Recent prostate biopsy showed 1 core Melany 6  PSA 10 5    Patient was discussed at the Multidisciplinary Urology Case review on 4/6/21  The group agreed with proceeding with active surveillance with follow up PSAs and prostate exams every 3-6 months      NCCN guidelines were available for review

## 2021-04-26 NOTE — PROGRESS NOTES
RENAL FOLLOW UP NOTE: td    ASSESSMENT AND PLAN:    66-year-old male with a history of prostate CA who we are seeing for CKD stage 3:     1  CKD stage 3B  · Etiology: Arteriolar nephrosclerosis,?  Hypertensive nephrosclerosis;  no evidence of primary glomerular process with a bland urinalysis without proteinuria or hematuria; no evidence of obstructive uropathy   · Baseline creatinine: 1 8  · Current creatinine:  1 60 at baseline  · Urine protein creatinine ratio:  0 09 g at goal  ·  UA:  No proteinuria and no hematuria or pyuria  ·  PVR with bladder scan:  ·  renal artery duplex: negative  ·  renal ultrasound demonstrated cysts which are benign and simple no further evaluation required per the [facility] radiologist Dr Lai Mercado  Recommendations:  · Treat hypertension-please see below  · Treat dyslipidemia-please see below  · Maintain proteinuria less than 1 g or as low as possible  · Avoid nephrotoxic agents such as NSAIDs, and proton pump inhibitors if possible; patient counseled as such     2   Volume:  Euvolemic     3  Hypertension:       Current blood pressure averages:   Blood pressures from mid March  -a  m :  149/75  -p m  T51/75  Heart rate:  50-60     · Goal blood pressure: less than 130/80 given CKD     Recommendations:  · Push nonmedical regimen including weight loss, isotonic exercise and a low sodium diet  Patient has been counseled the such  ? 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:  · Calcium/magnesium/phosphorus:  · All acceptable  · PTH intact:  52 6 which is normal  · Vitamin-D:  41 4 at goal     6  Dyslipidemia:  · Goal LDL: less than 100 given CKD  · Current lipid profile:  /HDL 57/triglycerides 58  Recommendations:  ·  Low-cholesterol/low-fat diet / weight loss as appropriate and isotonic exercise  ·  Medication changes today:  I would recommend a statin at this time to get to goal:  I discuss this with the patient:  The patient will really work hard in his diet and recheck in 3-4 months  If it still persistently above goal consideration for statin at that time      7  Anemia:  · Current hemoglobin:  Normal at 13 4     8  Other problems:  · Prostate CA followed by Urology with negative biopsies of the last several years  Last biopsy was 03/02/2021  · Avascular necrosis of the left knee            PATIENT INSTRUCTIONS:    Patient Instructions     1  Medication changes today:   No medication changes today pending your home blood pressure readings      2  Please take 1 week a blood pressure readings  at this time     AS FOLLOWS  MORNING AND EVENING, SITTING  as follows:  · TAKE THE MORNING READINGS BEFORE ANY MEDICATIONS AND WHEN YOU ARE RELAXED FOR SEVERAL MINUTES  · TAKE THE EVENING READINGS:  BETWEEN 7-10 P M ; PRIOR TO ANY MEDICATIONS; AT LEAST IN OUR  FROM DINNER; AND CERTAINLY AFTER RELAXING FOR A FEW MINUTES  · PLEASE INCLUDE HEART RATE WITH YOUR BLOOD PRESSURE READINGS  · When taking standing readings, keep your arm supported at heart level and not dangling  · Make sure you are sitting with your back supported and feet on the ground and do not cross your legs or feet  · Make sure you have not taken any coffee or caffeine products or exercised or smoke cigarettes at least 30 minutes before taking your blood pressure  Then please mail these readings into the office    3  Follow-up in 3-4 months   Please bring in 1 week a blood pressure readings morning evening, sitting and standing is outlined above   Please go for fasting lab work 1 month  prior to your appointment      4  General instructions:   AVOID SALT BUT NOT ADDING AN READING LABELS TO MAKE SURE THERE IS LOW-SALT IN THE FOOD THAT YOU ARE EATING  o Goal is less than 2 g of sodium intake or less than 5 g of sodium chloride intake per day     Avoid nonsteroidal anti-inflammatory drugs such as Naprosyn, ibuprofen, Aleve, Advil, Celebrex, Meloxicam (Mobic) etc   You can use Tylenol as needed if you do not have any liver condition to be concerned about     Avoid medications such as Sudafed or decongestants and antihistamines that contained the D component which is the decongestant  You can take antihistamines without the decongestant or D component   Try to avoid medications such as pantoprazole or  Protonix/Nexium or Esomeprazole)/Prilosec or omeprazole/Prevacid or lansoprazole/AcipHex or Rabeprazole  If you are able to, use Pepcid as this is safer for your kidneys   Try to exercise at least 30 minutes 3 days a week to begin with with an ultimate goal of 5 days a week for at least 30 minutes     Please do not drink more than 2 glasses of alcohol/wine on a daily basis as this may contribute to your high blood pressure  Cholesterol and Your Health   AMBULATORY CARE:   Cholesterol  is a waxy, fat-like substance  Your body uses cholesterol to make hormones and new cells, and to protect nerves  Cholesterol is made by your body  It also comes from certain foods you eat, such as meat and dairy products  Your healthcare provider can help you set goals for your cholesterol levels  He or she can help you create a plan to meet your goals  Cholesterol level goals: Your cholesterol level goals depend on your risk for heart disease, your age, and your other health conditions  The following are general guidelines:  · Total cholesterol  includes low-density lipoprotein (LDL), high-density lipoprotein (HDL), and triglyceride levels   The total cholesterol level should be lower than 200 mg/dL and is best at about 150 mg/dL     · LDL cholesterol  is called bad cholesterol  because it forms plaque in your arteries  As plaque builds up, your arteries become narrow, and less blood flows through  When plaque decreases blood flow to your heart, you may have chest pain  If plaque completely blocks an artery that brings blood to your heart, you may have a heart attack  Plaque can break off and form blood clots  Blood clots may block arteries in your brain and cause a stroke  The level should be less than 130 mg/dL and is best at about 100 mg/dL  · HDL cholesterol  is called good cholesterol  because it helps remove LDL cholesterol from your arteries  It does this by attaching to LDL cholesterol and carrying it to your liver  Your liver breaks down LDL cholesterol so your body can get rid of it  High levels of HDL cholesterol can help prevent a heart attack and stroke  Low levels of HDL cholesterol can increase your risk for heart disease, heart attack, and stroke  The level should be 60 mg/dL or higher  · Triglycerides  are a type of fat that store energy from foods you eat  High levels of triglycerides also cause plaque buildup  This can increase your risk for a heart attack or stroke  If your triglyceride level is high, your LDL cholesterol level may also be high  The level should be less than 150 mg/dL  What increases your risk for high cholesterol:   · Smoking cigarettes    · Being overweight or obese, or not getting enough exercise    · Drinking large amounts of alcohol    · A medical condition such as hypertension (high blood pressure) or diabetes    · Certain genes passed from your parents to you    · Age older than 65 years    What you need to know about having your cholesterol levels checked: Adults 21to 39years of age should have their cholesterol levels checked every 4 to 6 years  Adults 45 years or older should have their cholesterol checked every 1 to 2 years   You may need your cholesterol checked more often, or at a younger age, if you have risk factors for heart disease  You may also need to have your cholesterol checked more often if you have other health conditions, such as diabetes  Blood tests are used to check cholesterol levels  Blood tests measure your levels of triglycerides, LDL cholesterol, and HDL cholesterol  How healthy fats affect your cholesterol levels:  Healthy fats, also called unsaturated fats, help lower LDL cholesterol and triglyceride levels  Healthy fats include the following:  · Monounsaturated fats  are found in foods such as olive oil, canola oil, avocado, nuts, and olives  · Polyunsaturated fats,  such as omega 3 fats, are found in fish, such as salmon, trout, and tuna  They can also be found in plant foods such as flaxseed, walnuts, and soybeans  How unhealthy fats affect your cholesterol levels:  Unhealthy fats increase LDL cholesterol and triglyceride levels  They are found in foods high in cholesterol, saturated fat, and trans fat:  · Cholesterol  is found in eggs, dairy, and meat  · Saturated fat  is found in butter, cheese, ice cream, whole milk, and coconut oil  Saturated fat is also found in meat, such as sausage, hot dogs, and bologna  · Trans fat  is found in liquid oils and is used in fried and baked foods  Foods that contain trans fats include chips, crackers, muffins, sweet rolls, microwave popcorn, and cookies  Treatment  for high cholesterol will also decrease your risk of heart disease, heart attack, and stroke  Treatment may include any of the following:  · Lifestyle changes  may include food, exercise, weight loss, and quitting smoking  You may also need to decrease the amount of alcohol you drink  Your healthcare provider will want you to start with lifestyle changes  Other treatment may be added if lifestyle changes are not enough  · Medicines  may be given to lower your LDL cholesterol, triglyceride levels, or total cholesterol level   You may need medicines to lower your cholesterol if any of the following is true:     ? You have a history of stroke, TIA, unstable angina, or a heart attack  ? Your LDL cholesterol level is 190 mg/dL or higher  ? You are age 36 to 76 years, have diabetes or heart disease risk factors, and your LDL cholesterol is 70 mg/dL or higher  · Supplements  include fish oil, red yeast rice, and garlic  Fish oil may help lower your triglyceride and LDL cholesterol levels  It may also increase your HDL cholesterol level  Red yeast rice may help decrease your total cholesterol level and LDL cholesterol level  Garlic may help lower your total cholesterol level  Do not take these supplements without talking to your healthcare provider  Food changes you can make to lower your cholesterol levels:  A dietitian can help you create a healthy eating plan  He or she can show you how to read food labels and choose foods low in saturated fat, trans fats, and cholesterol  · Decrease the total amount of fat you eat  Choose lean meats, fat-free or 1% fat milk, and low-fat dairy products, such as yogurt and cheese  Try to limit or avoid red meats  Limit or do not eat fried foods or baked goods, such as cookies  · Replace unhealthy fats with healthy fats  Cook foods in olive oil or canola oil  Choose soft margarines that are low in saturated fat and trans fat  Seeds, nuts, and avocados are other examples of healthy fats  · Eat foods with omega-3 fats  Examples include salmon, tuna, mackerel, walnuts, and flaxseed  Eat fish 2 times per week  Pregnant women should not eat fish that have high levels of mercury, such as shark, swordfish, and kemar mackerel  · Increase the amount of high-fiber foods you eat  High-fiber foods can help lower your LDL cholesterol  Aim to get between 20 and 30 grams of fiber each day  Fruits and vegetables are high in fiber  Eat at least 5 servings each day   Other high-fiber foods are whole-grain or whole-wheat breads, pastas, or cereals, and brown rice  Eat 3 ounces of whole-grain foods each day  Increase fiber slowly  You may have abdominal discomfort, bloating, and gas if you add fiber to your diet too quickly  · Eat healthy protein foods  Examples include low-fat dairy products, skinless chicken and turkey, fish, and nuts  · Limit foods and drinks that are high in sugar  Your dietitian or healthcare provider can help you create daily limits for high-sugar foods and drinks  The limit may be lower if you have diabetes or another health condition  Limits can also help you lose weight if needed  Lifestyle changes you can make to lower your cholesterol levels:   · Maintain a healthy weight  Ask your healthcare provider what a healthy weight is for you  Ask him or her to help you create a weight loss plan if needed  Weight loss can decrease your total cholesterol and triglyceride levels  Weight loss may also help keep your blood pressure at a healthy level  · Exercise regularly  Exercise can help lower your total cholesterol level and maintain a healthy weight  Exercise can also help increase your HDL cholesterol level  Work with your healthcare provider to create an exercise program that is right for you  Get at least 30 to 40 minutes of moderate exercise most days of the week  Examples of exercise include brisk walking, swimming, or biking  · Do not smoke  Nicotine and other chemicals in cigarettes and cigars can raise your cholesterol levels  Ask your healthcare provider for information if you currently smoke and need help to quit  E-cigarettes or smokeless tobacco still contain nicotine  Talk to your healthcare provider before you use these products  · Limit or do not drink alcohol  Alcohol can increase your triglyceride levels  Ask your healthcare provider before you drink alcohol  Ask how much is okay for you to drink in 1 day or 1 week      © Copyright [employer] [facility] Information is for [employer] use only and may not be sold, redistributed or otherwise used for commercial purposes  All illustrations and images included in CareNotes® are the copyrighted property of [employer]  or [facility]   The above information is an  only  It is not intended as medical advice for individual conditions or treatments  Talk to your doctor, nurse or pharmacist before following any medical regimen to see if it is safe and effective for you  Subjective: There has been no hospitalizations or acute illnesses since last visit  The patient overall is feeling well  No fevers, chills, or cough or colds  Good appetite and good energy  No hematuria, dysuria, voiding symptoms or foamy urine  No gastrointestinal symptoms  No cardiovascular symptoms including swelling of the legs  No headaches, dizziness or lightheadedness  Blood pressure medications:  NONE AT THIS TIME      ROS:  See HPI, otherwise review of systems as completely reviewed with the patient are negative    Past Medical History:   Diagnosis Date    ED (erectile dysfunction)     Prostate cancer [facility]      Past Surgical History:   Procedure Laterality Date    COLONOSCOPY      HERNIA REPAIR Left     MS BIOPSY OF PROSTATE,NEEDLE/PUNCH N/A 3/2/2021    Procedure: Transperineal MRI Fusion prostate biopsy and gold seed marker insertion;  Surgeon: Carolina Rivera MD;  [facility]: [facility];  Service: Urology    PROSTATE BIOPSY      1/26/2015, 2/10/2017    PROSTATE BIOPSY       Family History   Problem Relation Age of Onset    Hypertension Mother     Prostate cancer Brother       reports that he has never smoked  He has never used smokeless tobacco  He reports current alcohol use of about 1 0 standard drinks of alcohol per week  He reports that he does not use drugs      I COMPLETELY REVIEWED THE PAST MEDICAL HISTORY/PAST SURGICAL HISTORY/SOCIAL HISTORY/FAMILY HISTORY/AND MEDICATIONS  AND UPDATED ALL    Objective:     Vitals:   BP sitting on left:  120/70 with a heart rate 56 and regular, same on right  BP standing on left:  26/80 with a heart rate of 60    Weight (last 2 days)     None        Wt Readings from Last 3 Encounters:   03/17/21 66 7 kg (147 lb)   03/04/21 68 9 kg (152 lb)   03/02/21 64 9 kg (143 lb)       Body mass index is 26 04 kg/m²  Physical Exam: General:  No acute distress  Skin:  No acute rash  Eyes:  No scleral icterus, noninjected, no discharge from eyes  ENT:  Moist mucous membranes  Neck:  Supple, no jugular venous distention, trachea is midline, no lymphadenopathy and no thyromegaly  Back   No CVAT  Chest:  Clear to auscultation and percussion, good respiratory effort  CVS:  Regular rate and rhythm without a rub, or gallops or murmurs  Abdomen:  Soft and nontender with normal bowel sounds  Extremities:  No cyanosis and no edema, no arthritic changes, normal range of motion  Neuro:  Grossly intact  Psych:  Alert, oriented x3 and appropriate      Medications:    Current Outpatient Medications:     finasteride (PROSCAR) 5 mg tablet, Take 1 tablet (5 mg total) by mouth daily, Disp: 90 tablet, Rfl: 3    pregabalin (LYRICA) 200 MG capsule, Take 200 mg by mouth daily, Disp: , Rfl:     sildenafil (VIAGRA) 50 MG tablet, Take 2 tablets (100 mg total) by mouth daily as needed for erectile dysfunction, Disp: 6 tablet, Rfl: 12    Lab, Imaging and other studies: I have personally reviewed pertinent labs    Laboratory Results:  Results for orders placed or performed during the hospital encounter of 03/02/21   Tissue Exam   Result Value Ref Range    Case Report       Surgical Pathology Report                         Case: [id number]                                   Authorizing Provider:  Lindsey Thomas MD            Collected:           03/02/2021 [id number]              Ordering Location:     [address] [place] [address]      Received:            03/02/2021 [id number] [facility]                                                           Pathologist:           Jacquelin Dias MD                                                                  Specimens:   A) - Prostate, L lat base                                                                           B) - Prostate, L base                                                                               C) - Prostate, L lat med                                                                            D) - Prostate, L med                                                                                E) - Prostate, L lat apex                                                                            F) - Prostate, L apex                                                                               G) - Prostate, R lat base                                                                           H) - Prostate, R base                                                                               I) - Prostate, R lat med                                                                            J) - Prostate, R med                                                                                K) - Prostate, R lat apex                                                                           L) - Prostate, R apex                                                                               M) - Prostate, Target L lat med                                                            Final Diagnosis       A  Prostate, L lat base, Biopsy:  - Benign prostatic tissue  B  Prostate, L base, Biopsy:  - Benign prostatic tissue  C  Prostate, L lat med, Biopsy:  - Benign prostatic tissue  D  Prostate, L med, Biopsy:  - Benign prostatic tissue  E  Prostate, L lat apex, Biopsy:  - Benign prostatic tissue  F  Prostate, L apex, Biopsy:  - Benign prostatic tissue  G  Prostate, R lat base, Biopsy:  - Benign prostatic tissue        H  Prostate, R base, Biopsy:  - Prostatic adenocarcinoma, Melany score 3+3=6, Prognostic Grade Group 1, involving 1 of 1 core (5% involvement)  - Focal high-grade prostatic intraepithelial neoplasia (HGPIN)  - Perineural invasion is not identified  I  Prostate, R lat med, Biopsy:  - Benign prostatic tissue  J  Prostate, R med, Biopsy:  - Benign prostatic tissue  K  Prostate, R lat apex, Biopsy:  - No tissue present  L  Prostate, R apex, Biopsy:  - Benign prostatic tissue  M  Prostate, Target L lat med, Biopsy:  - Benign prostatic tissue  Microscopic Description       Multiplex immunohistochemical stain for , p63 and racemase (p504s) performed on multiple blocks (F1, H1, I1, J1, L1, M2, M3, M4) with appropriate controls supports the diagnosis  Note       Dr Gian Landrum notified electronically ([employer]) by Dr Kendall Reilly on 3/4/2021 at 11:30 am     Intradepartmental consultation in agreement  Additional Information       All reported additional testing was performed with appropriately reactive controls  These tests were developed and their performance characteristics determined by [facility] or appropriate performing facility, though some tests may be performed on tissues which have not been validated for performance characteristics (such as staining performed on alcohol exposed cell blocks and decalcified tissues)  Results should be interpreted with caution and in the context of the patients clinical condition  These tests may not be cleared or approved by the [place]  Food and Drug Administration, though the FDA has determined that such clearance or approval is not necessary  These tests are used for clinical purposes and they should not be regarded as investigational or for research  This laboratory has been approved by CLIA 88, designated as a high-complexity laboratory and is qualified to perform these tests      Interpretation performed at [facility], [address] [facility] [address]      Gross Description          A  The specimen is received in formalin, labeled with the patient's name and hospital number, and is designated "Left lateral base"  The specimen consists of a single tan soft tissue core measuring 0 1 cm in diameter and 1 5 cm in length  The specimen is entirely submitted between sponges, 1 cassette  B  The specimen is received in formalin, labeled with the patient's name and hospital number, and is designated "Left base"  The specimen consists of a single tan soft tissue  fragment measuring 0 3 x 0 3 x 0 1 cm  The specimen is entirely submitted between sponges, 1 cassette  C  The specimen is received in formalin, labeled with the patient's name and hospital number, and is designated "Left lateral medial"  The specimen consists of a single tan soft tissue core measuring 0 1 cm in diameter and 1 6 cm in length  The specimen is entirely submitted between sponges, 1 cassette  D  The specimen is received in formalin, labeled with the patient's name and hospital number, and is designated "Left medial"  The specimen consists of a single tan soft tissue core measuring 0 1 cm in diameter and 1 1 cm in length  The specimen is entirely submitted between sponges, 1 cassette  E  The specimen is received in formalin, labeled with the patient's name and hospital number, and is designated "Left lateral apex"  The specimen consists of a single tan soft tissue core measuring 0 1 cm in diameter and 1 6 cm in length  The specimen is entirely submitted between sponges, 1 cassette  F  The specimen is received in formalin, labeled with the patient's name and hospital number, and is designated "Left apex"  The specimen consists of a single tan soft tissue core measuring 0 1 cm in diameter and 0 8 cm in length  The specimen is entirely submitted between sponges, 1 cassette         G  The specimen is received in formalin, labeled with the patient's name and hospital number, and is designated "Right lateral base"  The specimen consists of a single tan soft tissue core measuring 0 1 cm in diameter and 2 0 cm in length  The specimen is entirely submitted between sponges, 1 cassette  H  The specimen is received in formalin, labeled with the patient's name and hospital number, and is designated "Right base"  The specimen consists of a single tan soft tissue core measuring 0 1 cm in diameter and 1 5 cm in length  The specimen is entirely submitted between sponges, 1 cassette  I  The specimen is received in formalin, labeled with the patient's name and hospital number, and is designated "Right lateral medial"  The specimen consists of a single tan soft tissue core measuring 0 1 cm in diameter and 2 2 cm in length  The specimen is entirely submitted between sponges, 1 cassette  J  The specimen is received in formalin, labeled with the patient's name and hospital number, and is designated "Right medial"  The specimen consists of a single tan soft tissue core measuring 0 1 cm in diameter and 2 0 cm in length  The specimen is entirely submitted between sponges, 1 cassette  K  The specimen is received in formalin, labeled with the patient's name and hospital number, and is designated "Right lateral apex"  The specimen consists of no grossly identifiable soft tissue  The specimen is presented to a co-worker and is in mutual agreement that there is no tissue grossly identified  The formalin is drained into an embedding bag  Entirely submitted  One cassette  Pictures are taken  L  The specimen is received in formalin, labeled with the patient's name and hospital number, and is designated "Right apex"  The specimen consists of a single tan soft tissue core measuring 0 1 cm in diameter and 1 6 cm in length  The specimen is entirely submitted between sponges, 1 cassette  M   The specimen is received in formalin, labeled with the patient's name and hospital number, and is designated "Target left lateral medial"  The specimen consists of 4 tan soft tissue cores measuring 0 1 cm in diameter and ranging from 1 5- 3 3 cm in length  The specimen is entirely submitted between sponges, 4 cassettes  Note: The estimated total formalin fixation time based upon information provided by the submitting clinician and the standard processing schedule is 15 hours  Renee        Clinical Information PSA(12/11/2020) 10 5 ng/mL              Invalid input(s): ALBUMIN      Radiology review:   chest X-ray    Ultrasound      Portions of the record may have been created with voice recognition software  Occasional wrong word or "sound a like" substitutions may have occurred due to the inherent limitations of voice recognition software  Read the chart carefully and recognize, using context, where substitutions have occurred

## 2021-05-05 ENCOUNTER — OFFICE VISIT (OUTPATIENT)
Dept: NEPHROLOGY | Facility: CLINIC | Age: 76
End: 2021-05-05
Payer: MEDICARE

## 2021-05-05 VITALS — BODY MASS INDEX: 26.04 KG/M2 | HEIGHT: 63 IN

## 2021-05-05 DIAGNOSIS — I12.9 PARENCHYMAL RENAL HYPERTENSION, STAGE 1 THROUGH STAGE 4 OR UNSPECIFIED CHRONIC KIDNEY DISEASE: Primary | ICD-10-CM

## 2021-05-05 DIAGNOSIS — E78.5 DYSLIPIDEMIA: ICD-10-CM

## 2021-05-05 DIAGNOSIS — N18.32 STAGE 3B CHRONIC KIDNEY DISEASE (HCC): ICD-10-CM

## 2021-05-05 PROCEDURE — 99214 OFFICE O/P EST MOD 30 MIN: CPT | Performed by: INTERNAL MEDICINE

## 2021-05-05 RX ORDER — PREGABALIN 200 MG/1
200 CAPSULE ORAL DAILY
COMMUNITY
End: 2021-09-07

## 2021-05-05 NOTE — PATIENT INSTRUCTIONS
1  Medication changes today:   No medication changes today pending your home blood pressure readings      2  Please take 1 week a blood pressure readings  at this time     AS FOLLOWS  MORNING AND EVENING, SITTING  as follows:  · TAKE THE MORNING READINGS BEFORE ANY MEDICATIONS AND WHEN YOU ARE RELAXED FOR SEVERAL MINUTES  · TAKE THE EVENING READINGS:  BETWEEN 7-10 P M ; PRIOR TO ANY MEDICATIONS; AT LEAST IN OUR  FROM DINNER; AND CERTAINLY AFTER RELAXING FOR A FEW MINUTES  · PLEASE INCLUDE HEART RATE WITH YOUR BLOOD PRESSURE READINGS  · When taking standing readings, keep your arm supported at heart level and not dangling  · Make sure you are sitting with your back supported and feet on the ground and do not cross your legs or feet  · Make sure you have not taken any coffee or caffeine products or exercised or smoke cigarettes at least 30 minutes before taking your blood pressure  Then please mail these readings into the office    3  Follow-up in 3-4 months   Please bring in 1 week a blood pressure readings morning evening, sitting and standing is outlined above   Please go for fasting lab work 1 month  prior to your appointment      4  General instructions:   AVOID SALT BUT NOT ADDING AN READING LABELS TO MAKE SURE THERE IS LOW-SALT IN THE FOOD THAT YOU ARE EATING  o Goal is less than 2 g of sodium intake or less than 5 g of sodium chloride intake per day     Avoid nonsteroidal anti-inflammatory drugs such as Naprosyn, ibuprofen, Aleve, Advil, Celebrex, Meloxicam (Mobic) etc   You can use Tylenol as needed if you do not have any liver condition to be concerned about     Avoid medications such as Sudafed or decongestants and antihistamines that contained the D component which is the decongestant  You can take antihistamines without the decongestant or D component       Try to avoid medications such as pantoprazole or  Protonix/Nexium or Esomeprazole)/Prilosec or omeprazole/Prevacid or lansoprazole/AcipHex or Rabeprazole  If you are able to, use Pepcid as this is safer for your kidneys   Try to exercise at least 30 minutes 3 days a week to begin with with an ultimate goal of 5 days a week for at least 30 minutes     Please do not drink more than 2 glasses of alcohol/wine on a daily basis as this may contribute to your high blood pressure  Cholesterol and Your Health   AMBULATORY CARE:   Cholesterol  is a waxy, fat-like substance  Your body uses cholesterol to make hormones and new cells, and to protect nerves  Cholesterol is made by your body  It also comes from certain foods you eat, such as meat and dairy products  Your healthcare provider can help you set goals for your cholesterol levels  He or she can help you create a plan to meet your goals  Cholesterol level goals: Your cholesterol level goals depend on your risk for heart disease, your age, and your other health conditions  The following are general guidelines:  · Total cholesterol  includes low-density lipoprotein (LDL), high-density lipoprotein (HDL), and triglyceride levels  The total cholesterol level should be lower than 200 mg/dL and is best at about 150 mg/dL  · LDL cholesterol  is called bad cholesterol  because it forms plaque in your arteries  As plaque builds up, your arteries become narrow, and less blood flows through  When plaque decreases blood flow to your heart, you may have chest pain  If plaque completely blocks an artery that brings blood to your heart, you may have a heart attack  Plaque can break off and form blood clots  Blood clots may block arteries in your brain and cause a stroke  The level should be less than 130 mg/dL and is best at about 100 mg/dL  · HDL cholesterol  is called good cholesterol  because it helps remove LDL cholesterol from your arteries  It does this by attaching to LDL cholesterol and carrying it to your liver   Your liver breaks down LDL cholesterol so your body can get rid of it  High levels of HDL cholesterol can help prevent a heart attack and stroke  Low levels of HDL cholesterol can increase your risk for heart disease, heart attack, and stroke  The level should be 60 mg/dL or higher  · Triglycerides  are a type of fat that store energy from foods you eat  High levels of triglycerides also cause plaque buildup  This can increase your risk for a heart attack or stroke  If your triglyceride level is high, your LDL cholesterol level may also be high  The level should be less than 150 mg/dL  What increases your risk for high cholesterol:   · Smoking cigarettes    · Being overweight or obese, or not getting enough exercise    · Drinking large amounts of alcohol    · A medical condition such as hypertension (high blood pressure) or diabetes    · Certain genes passed from your parents to you    · Age older than 65 years    What you need to know about having your cholesterol levels checked: Adults 21to 39years of age should have their cholesterol levels checked every 4 to 6 years  Adults 45 years or older should have their cholesterol checked every 1 to 2 years  You may need your cholesterol checked more often, or at a younger age, if you have risk factors for heart disease  You may also need to have your cholesterol checked more often if you have other health conditions, such as diabetes  Blood tests are used to check cholesterol levels  Blood tests measure your levels of triglycerides, LDL cholesterol, and HDL cholesterol  How healthy fats affect your cholesterol levels:  Healthy fats, also called unsaturated fats, help lower LDL cholesterol and triglyceride levels  Healthy fats include the following:  · Monounsaturated fats  are found in foods such as olive oil, canola oil, avocado, nuts, and olives  · Polyunsaturated fats,  such as omega 3 fats, are found in fish, such as salmon, trout, and tuna   They can also be found in plant foods such as flaxseed, walnuts, and soybeans  How unhealthy fats affect your cholesterol levels:  Unhealthy fats increase LDL cholesterol and triglyceride levels  They are found in foods high in cholesterol, saturated fat, and trans fat:  · Cholesterol  is found in eggs, dairy, and meat  · Saturated fat  is found in butter, cheese, ice cream, whole milk, and coconut oil  Saturated fat is also found in meat, such as sausage, hot dogs, and bologna  · Trans fat  is found in liquid oils and is used in fried and baked foods  Foods that contain trans fats include chips, crackers, muffins, sweet rolls, microwave popcorn, and cookies  Treatment  for high cholesterol will also decrease your risk of heart disease, heart attack, and stroke  Treatment may include any of the following:  · Lifestyle changes  may include food, exercise, weight loss, and quitting smoking  You may also need to decrease the amount of alcohol you drink  Your healthcare provider will want you to start with lifestyle changes  Other treatment may be added if lifestyle changes are not enough  · Medicines  may be given to lower your LDL cholesterol, triglyceride levels, or total cholesterol level  You may need medicines to lower your cholesterol if any of the following is true:     ? You have a history of stroke, TIA, unstable angina, or a heart attack  ? Your LDL cholesterol level is 190 mg/dL or higher  ? You are age 36 to 76 years, have diabetes or heart disease risk factors, and your LDL cholesterol is 70 mg/dL or higher  · Supplements  include fish oil, red yeast rice, and garlic  Fish oil may help lower your triglyceride and LDL cholesterol levels  It may also increase your HDL cholesterol level  Red yeast rice may help decrease your total cholesterol level and LDL cholesterol level  Garlic may help lower your total cholesterol level  Do not take these supplements without talking to your healthcare provider      Food changes you can make to lower your cholesterol levels:  A dietitian can help you create a healthy eating plan  He or she can show you how to read food labels and choose foods low in saturated fat, trans fats, and cholesterol  · Decrease the total amount of fat you eat  Choose lean meats, fat-free or 1% fat milk, and low-fat dairy products, such as yogurt and cheese  Try to limit or avoid red meats  Limit or do not eat fried foods or baked goods, such as cookies  · Replace unhealthy fats with healthy fats  Cook foods in olive oil or canola oil  Choose soft margarines that are low in saturated fat and trans fat  Seeds, nuts, and avocados are other examples of healthy fats  · Eat foods with omega-3 fats  Examples include salmon, tuna, mackerel, walnuts, and flaxseed  Eat fish 2 times per week  Pregnant women should not eat fish that have high levels of mercury, such as shark, swordfish, and kemar mackerel  · Increase the amount of high-fiber foods you eat  High-fiber foods can help lower your LDL cholesterol  Aim to get between 20 and 30 grams of fiber each day  Fruits and vegetables are high in fiber  Eat at least 5 servings each day  Other high-fiber foods are whole-grain or whole-wheat breads, pastas, or cereals, and brown rice  Eat 3 ounces of whole-grain foods each day  Increase fiber slowly  You may have abdominal discomfort, bloating, and gas if you add fiber to your diet too quickly  · Eat healthy protein foods  Examples include low-fat dairy products, skinless chicken and turkey, fish, and nuts  · Limit foods and drinks that are high in sugar  Your dietitian or healthcare provider can help you create daily limits for high-sugar foods and drinks  The limit may be lower if you have diabetes or another health condition  Limits can also help you lose weight if needed  Lifestyle changes you can make to lower your cholesterol levels:   · Maintain a healthy weight    Ask your healthcare provider what a healthy weight is for you  Ask him or her to help you create a weight loss plan if needed  Weight loss can decrease your total cholesterol and triglyceride levels  Weight loss may also help keep your blood pressure at a healthy level  · Exercise regularly  Exercise can help lower your total cholesterol level and maintain a healthy weight  Exercise can also help increase your HDL cholesterol level  Work with your healthcare provider to create an exercise program that is right for you  Get at least 30 to 40 minutes of moderate exercise most days of the week  Examples of exercise include brisk walking, swimming, or biking  · Do not smoke  Nicotine and other chemicals in cigarettes and cigars can raise your cholesterol levels  Ask your healthcare provider for information if you currently smoke and need help to quit  E-cigarettes or smokeless tobacco still contain nicotine  Talk to your healthcare provider before you use these products  · Limit or do not drink alcohol  Alcohol can increase your triglyceride levels  Ask your healthcare provider before you drink alcohol  Ask how much is okay for you to drink in 1 day or 1 week  © Copyright [address] Information is for End User's use only and may not be sold, redistributed or otherwise used for commercial purposes  All illustrations and images included in CareNotes® are the copyrighted property of [employer]  or [address] [place] [address] Jory   The above information is an  only  It is not intended as medical advice for individual conditions or treatments  Talk to your doctor, nurse or pharmacist before following any medical regimen to see if it is safe and effective for you

## 2021-05-05 NOTE — LETTER
May 5, 2021     Mylene Banerjee MD  [address] [facility]  [address]  [address] [place]  [place]  [place] [address] [facility]    Patient: Dorina Topete   YOB: 1945   Date of Visit: 5/5/2021       Dear Dr Shakeel Rios: Thank you for referring Dorina Topete to me for evaluation  Below are my notes for this consultation  If you have questions, please do not hesitate to call me  I look forward to following your patient along with you  Sincerely,        Magdy Peguero MD        CC: MD Magdy Schaffer MD  5/5/2021 12:14 PM  Sign when Signing Visit  RENAL FOLLOW UP NOTE: td    ASSESSMENT AND PLAN:    77-year-old male with a history of prostate CA who we are seeing for CKD stage 3:     1  CKD stage 3B  · Etiology: Arteriolar nephrosclerosis,?  Hypertensive nephrosclerosis;  no evidence of primary glomerular process with a bland urinalysis without proteinuria or hematuria; no evidence of obstructive uropathy   · Baseline creatinine: 1 8  · Current creatinine:  1 60 at baseline  · Urine protein creatinine ratio:  0 09 g at goal  ·  UA:  No proteinuria and no hematuria or pyuria  ·  PVR with bladder scan:  ·  renal artery duplex: negative  ·  renal ultrasound demonstrated cysts which are benign and simple no further evaluation required per the [facility] radiologist Dr Carline Drew  Recommendations:  · Treat hypertension-please see below  · Treat dyslipidemia-please see below  · Maintain proteinuria less than 1 g or as low as possible  · Avoid nephrotoxic agents such as NSAIDs, and proton pump inhibitors if possible; patient counseled as such     2   Volume:  Euvolemic     3  Hypertension:       Current blood pressure averages:   Blood pressures from mid March  -a  m :  149/75  -p m  T51/75  Heart rate:  50-60     · Goal blood pressure: less than 130/80 given CKD     Recommendations:  · Push nonmedical regimen including weight loss, isotonic exercise and a low sodium diet    Patient has been counseled the such  ? 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:  · Calcium/magnesium/phosphorus:  · All acceptable  · PTH intact:  52 6 which is normal  · Vitamin-D:  41 4 at goal     6  Dyslipidemia:  · Goal LDL: less than 100 given CKD  · Current lipid profile:  /HDL 57/triglycerides 58  Recommendations:  ·  Low-cholesterol/low-fat diet / weight loss as appropriate and isotonic exercise  ·  Medication changes today:  I would recommend a statin at this time to get to goal:  I discuss this with the patient:  The patient will really work hard in his diet and recheck in 3-4 months  If it still persistently above goal consideration for statin at that time      7  Anemia:  · Current hemoglobin:  Normal at 13 4     8  Other problems:  · Prostate CA followed by Urology with negative biopsies of the last several years  Last biopsy was 03/02/2021  · Avascular necrosis of the left knee            PATIENT INSTRUCTIONS:    Patient Instructions   1  Medication changes today:   No medication changes today pending your home blood pressure readings      2   Please take 1 week a blood pressure readings  at this time     AS FOLLOWS  MORNING AND EVENING, SITTING  as follows:  · TAKE THE MORNING READINGS BEFORE ANY MEDICATIONS AND WHEN YOU ARE RELAXED FOR SEVERAL MINUTES  · TAKE THE EVENING READINGS:  BETWEEN 7-10 P M ; PRIOR TO ANY MEDICATIONS; AT LEAST IN OUR  FROM DINNER; AND CERTAINLY AFTER RELAXING FOR A FEW MINUTES  · PLEASE INCLUDE HEART RATE WITH YOUR BLOOD PRESSURE READINGS  · When taking standing readings, keep your arm supported at heart level and not dangling  · Make sure you are sitting with your back supported and feet on the ground and do not cross your legs or feet  · Make sure you have not taken any coffee or caffeine products or exercised or smoke cigarettes at least 30 minutes before taking your blood pressure  Then please mail these readings into the office    3  Follow-up in 3-4 months   Please bring in 1 week a blood pressure readings morning evening, sitting and standing is outlined above   Please go for fasting lab work 1 month  prior to your appointment      4  General instructions:   AVOID SALT BUT NOT ADDING AN READING LABELS TO MAKE SURE THERE IS LOW-SALT IN THE FOOD THAT YOU ARE EATING  o Goal is less than 2 g of sodium intake or less than 5 g of sodium chloride intake per day     Avoid nonsteroidal anti-inflammatory drugs such as Naprosyn, ibuprofen, Aleve, Advil, Celebrex, Meloxicam (Mobic) etc   You can use Tylenol as needed if you do not have any liver condition to be concerned about     Avoid medications such as Sudafed or decongestants and antihistamines that contained the D component which is the decongestant  You can take antihistamines without the decongestant or D component   Try to avoid medications such as pantoprazole or  Protonix/Nexium or Esomeprazole)/Prilosec or omeprazole/Prevacid or lansoprazole/AcipHex or Rabeprazole  If you are able to, use Pepcid as this is safer for your kidneys   Try to exercise at least 30 minutes 3 days a week to begin with with an ultimate goal of 5 days a week for at least 30 minutes     Please do not drink more than 2 glasses of alcohol/wine on a daily basis as this may contribute to your high blood pressure  Subjective: There has been no hospitalizations or acute illnesses since last visit  The patient overall is feeling well  No fevers, chills, or cough or colds    Good appetite and good energy  No hematuria, dysuria, voiding symptoms or foamy urine  No gastrointestinal symptoms  No cardiovascular symptoms including swelling of the legs  No headaches, dizziness or lightheadedness  Blood pressure medications:  NONE AT THIS TIME      ROS:  See HPI, otherwise review of systems as completely reviewed with the patient are negative    Past Medical History:   Diagnosis Date    ED (erectile dysfunction)     Prostate cancer [facility]      Past Surgical History:   Procedure Laterality Date    COLONOSCOPY      HERNIA REPAIR Left     KS BIOPSY OF PROSTATE,NEEDLE/PUNCH N/A 3/2/2021    Procedure: Transperineal MRI Fusion prostate biopsy and gold seed marker insertion;  Surgeon: Keshia Figueroa MD;  Location: [facility];  Service: Urology    PROSTATE BIOPSY      1/26/2015, 2/10/2017    PROSTATE BIOPSY       Family History   Problem Relation Age of Onset    Hypertension Mother     Prostate cancer Brother       reports that he has never smoked  He has never used smokeless tobacco  He reports current alcohol use of about 1 0 standard drinks of alcohol per week  He reports that he does not use drugs  I COMPLETELY REVIEWED THE PAST MEDICAL HISTORY/PAST SURGICAL HISTORY/SOCIAL HISTORY/FAMILY HISTORY/AND MEDICATIONS  AND UPDATED ALL    Objective:     Vitals:   BP sitting on left:  120/70 with a heart rate 56 and regular, same on right  BP standing on left:  26/80 with a heart rate of 60    Weight (last 2 days)     None        Wt Readings from Last 3 Encounters:   03/17/21 66 7 kg (147 lb)   03/04/21 68 9 kg (152 lb)   03/02/21 64 9 kg (143 lb)       Body mass index is 26 04 kg/m²      Physical Exam: General:  No acute distress  Skin:  No acute rash  Eyes:  No scleral icterus, noninjected, no discharge from eyes  ENT:  Moist mucous membranes  Neck:  Supple, no jugular venous distention, trachea is midline, no lymphadenopathy and no thyromegaly  Back   No CVAT  Chest:  Clear to auscultation and percussion, good respiratory effort  CVS:  Regular rate and rhythm without a rub, or gallops or murmurs  Abdomen:  Soft and nontender with normal bowel sounds  Extremities:  No cyanosis and no edema, no arthritic changes, normal range of motion  Neuro:  Grossly intact  Psych:  Alert, oriented x3 and appropriate      Medications:    Current Outpatient Medications:     finasteride (PROSCAR) 5 mg tablet, Take 1 tablet (5 mg total) by mouth daily, Disp: 90 tablet, Rfl: 3    pregabalin (LYRICA) 200 MG capsule, Take 200 mg by mouth daily, Disp: , Rfl:     sildenafil (VIAGRA) 50 MG tablet, Take 2 tablets (100 mg total) by mouth daily as needed for erectile dysfunction, Disp: 6 tablet, Rfl: 12    Lab, Imaging and other studies: I have personally reviewed pertinent labs    Laboratory Results:  Results for orders placed or performed during the hospital encounter of 03/02/21   Tissue Exam   Result Value Ref Range    Case Report       Surgical Pathology Report                         Case: [id number]                                   Authorizing Provider:  Arpan Donald MD            Collected:           03/02/2021 [id number]              Ordering Location:     [facility]      Received:            03/02/2021 [place] [address] Endoscopy                                                           Pathologist:           Andrez Chen MD                                                                  Specimens:   A) - Prostate, L lat base                                                                           B) - Prostate, L base                                                                               C) - Prostate, L lat med                                                                            D) - Prostate, L med                                                                                E) - Prostate, L lat apex                                                                            F) - Prostate, L apex G) - Prostate, R lat base                                                                           H) - Prostate, R base                                                                               I) - Prostate, R lat med                                                                            J) - Prostate, R med                                                                                K) - Prostate, R lat apex                                                                           L) - Prostate, R apex                                                                               M) - Prostate, Target L lat med                                                            Final Diagnosis       A  Prostate, L lat base, Biopsy:  - Benign prostatic tissue  B  Prostate, L base, Biopsy:  - Benign prostatic tissue  C  Prostate, L lat med, Biopsy:  - Benign prostatic tissue  D  Prostate, L med, Biopsy:  - Benign prostatic tissue  E  Prostate, L lat apex, Biopsy:  - Benign prostatic tissue  F  Prostate, L apex, Biopsy:  - Benign prostatic tissue  G  Prostate, R lat base, Biopsy:  - Benign prostatic tissue  H  Prostate, R base, Biopsy:  - Prostatic adenocarcinoma, Melany score 3+3=6, Prognostic Grade Group 1, involving 1 of 1 core (5% involvement)  - Focal high-grade prostatic intraepithelial neoplasia (HGPIN)  - Perineural invasion is not identified  I  Prostate, R lat med, Biopsy:  - Benign prostatic tissue  J  Prostate, R med, Biopsy:  - Benign prostatic tissue  K  Prostate, R lat apex, Biopsy:  - No tissue present  L  Prostate, R apex, Biopsy:  - Benign prostatic tissue  M  Prostate, Target L lat med, Biopsy:  - Benign prostatic tissue          Microscopic Description       Multiplex immunohistochemical stain for , p63 and racemase (p504s) performed on multiple blocks (F1, H1, I1, J1, L1, M2, M3, M4) with appropriate controls supports the diagnosis  Note       Dr Kim Allen notified electronically (Naomi Lob) by Dr Reji Baxter on 3/4/2021 at 11:30 am     Intradepartmental consultation in agreement  Additional Information       All reported additional testing was performed with appropriately reactive controls  These tests were developed and their performance characteristics determined by [facility] or appropriate performing facility, though some tests may be performed on tissues which have not been validated for performance characteristics (such as staining performed on alcohol exposed cell blocks and decalcified tissues)  Results should be interpreted with caution and in the context of the patients clinical condition  These tests may not be cleared or approved by the [place]  Food and Drug Administration, though the FDA has determined that such clearance or approval is not necessary  These tests are used for clinical purposes and they should not be regarded as investigational or for research  This laboratory has been approved by [place] 88, designated as a high-complexity laboratory and is qualified to perform these tests  Interpretation performed at [facility] [place], [address] [place] [address]      Gross Description          A  The specimen is received in formalin, labeled with the patient's name and hospital number, and is designated "Left lateral base"  The specimen consists of a single tan soft tissue core measuring 0 1 cm in diameter and 1 5 cm in length  The specimen is entirely submitted between sponges, 1 cassette  B  The specimen is received in formalin, labeled with the patient's name and hospital number, and is designated "Left base"  The specimen consists of a single tan soft tissue  fragment measuring 0 3 x 0 3 x 0 1 cm  The specimen is entirely submitted between sponges, 1 cassette  C   The specimen is received in formalin, labeled with the patient's name and hospital number, and is designated "Left lateral medial"  The specimen consists of a single tan soft tissue core measuring 0 1 cm in diameter and 1 6 cm in length  The specimen is entirely submitted between sponges, 1 cassette  D  The specimen is received in formalin, labeled with the patient's name and hospital number, and is designated "Left medial"  The specimen consists of a single tan soft tissue core measuring 0 1 cm in diameter and 1 1 cm in length  The specimen is entirely submitted between sponges, 1 cassette  E  The specimen is received in formalin, labeled with the patient's name and hospital number, and is designated "Left lateral apex"  The specimen consists of a single tan soft tissue core measuring 0 1 cm in diameter and 1 6 cm in length  The specimen is entirely submitted between sponges, 1 cassette  F  The specimen is received in formalin, labeled with the patient's name and hospital number, and is designated "Left apex"  The specimen consists of a single tan soft tissue core measuring 0 1 cm in diameter and 0 8 cm in length  The specimen is entirely submitted between sponges, 1 cassette  G  The specimen is received in formalin, labeled with the patient's name and hospital number, and is designated "Right lateral base"  The specimen consists of a single tan soft tissue core measuring 0 1 cm in diameter and 2 0 cm in length  The specimen is entirely submitted between sponges, 1 cassette  H  The specimen is received in formalin, labeled with the patient's name and hospital number, and is designated "Right base"  The specimen consists of a single tan soft tissue core measuring 0 1 cm in diameter and 1 5 cm in length  The specimen is entirely submitted between sponges, 1 cassette  I  The specimen is received in formalin, labeled with the patient's name and hospital number, and is designated "Right lateral medial"    The specimen consists of a single tan soft tissue core measuring 0 1 cm in diameter and 2 2 cm in length  The specimen is entirely submitted between sponges, 1 cassette  J  The specimen is received in formalin, labeled with the patient's name and hospital number, and is designated "Right medial"  The specimen consists of a single tan soft tissue core measuring 0 1 cm in diameter and 2 0 cm in length  The specimen is entirely submitted between sponges, 1 cassette  K  The specimen is received in formalin, labeled with the patient's name and hospital number, and is designated "Right lateral apex"  The specimen consists of no grossly identifiable soft tissue  The specimen is presented to a co-worker and is in mutual agreement that there is no tissue grossly identified  The formalin is drained into an embedding bag  Entirely submitted  One cassette  Pictures are taken  L  The specimen is received in formalin, labeled with the patient's name and hospital number, and is designated "Right apex"  The specimen consists of a single tan soft tissue core measuring 0 1 cm in diameter and 1 6 cm in length  The specimen is entirely submitted between sponges, 1 cassette  M  The specimen is received in formalin, labeled with the patient's name and hospital number, and is designated "Target left lateral medial"  The specimen consists of 4 tan soft tissue cores measuring 0 1 cm in diameter and ranging from 1 5- 3 3 cm in length  The specimen is entirely submitted between sponges, 4 cassettes  Note: The estimated total formalin fixation time based upon information provided by the submitting clinician and the standard processing schedule is 15 hours  Renee        Clinical Information PSA(12/11/2020) 10 5 ng/mL              Invalid input(s): ALBUMIN      Radiology review:   chest X-ray    Ultrasound      Portions of the record may have been created with voice recognition software    Occasional wrong word or "sound a like" substitutions may have occurred due to the inherent limitations of voice recognition software  Read the chart carefully and recognize, using context, where substitutions have occurred

## 2021-06-28 ENCOUNTER — TELEPHONE (OUTPATIENT)
Dept: UROLOGY | Facility: CLINIC | Age: 76
End: 2021-06-28

## 2021-07-20 ENCOUNTER — CONSULT (OUTPATIENT)
Dept: INFECTIOUS DISEASES | Facility: CLINIC | Age: 76
End: 2021-07-20

## 2021-07-20 VITALS
TEMPERATURE: 97.3 F | SYSTOLIC BLOOD PRESSURE: 135 MMHG | BODY MASS INDEX: 26.05 KG/M2 | DIASTOLIC BLOOD PRESSURE: 75 MMHG | HEIGHT: 63 IN | WEIGHT: 147.05 LBS

## 2021-07-20 DIAGNOSIS — Z71.84 TRAVEL ADVICE ENCOUNTER: Primary | ICD-10-CM

## 2021-07-20 PROCEDURE — 99411 PREVENTIVE COUNSELING GROUP: CPT | Performed by: INTERNAL MEDICINE

## 2021-07-20 RX ORDER — ATOVAQUONE AND PROGUANIL HYDROCHLORIDE 250; 100 MG/1; MG/1
1 TABLET, FILM COATED ORAL
Qty: 20 TABLET | Refills: 1 | Status: SHIPPED | OUTPATIENT
Start: 2021-09-23 | End: 2022-03-16

## 2021-07-20 NOTE — PROGRESS NOTES
Travel Clinic    Patient is traveling to countries or areas within countries where immunizations are required or recommended:   [place]    Patient states they will visit underdeveloped areas with poor sanitation Yes/No: Yes   Patient requires malaria prophylaxis Yes/No: Yes    No orders of the defined types were placed in this encounter    Had hep A x2, Typhoid oral within 7 years and Tdap 2021    Patient instructed how to take medications Yes/No: Yes  Patient warned about side effects Yes/No: Yes  Patient declined Yes/No: No

## 2021-08-26 ENCOUNTER — APPOINTMENT (OUTPATIENT)
Dept: LAB | Facility: CLINIC | Age: 76
End: 2021-08-26
Payer: MEDICARE

## 2021-08-26 DIAGNOSIS — N18.32 STAGE 3B CHRONIC KIDNEY DISEASE (HCC): ICD-10-CM

## 2021-08-26 DIAGNOSIS — I12.9 PARENCHYMAL RENAL HYPERTENSION, STAGE 1 THROUGH STAGE 4 OR UNSPECIFIED CHRONIC KIDNEY DISEASE: ICD-10-CM

## 2021-08-26 DIAGNOSIS — E78.5 DYSLIPIDEMIA: ICD-10-CM

## 2021-08-26 LAB
ALBUMIN SERPL BCP-MCNC: 3.2 G/DL (ref 3.5–5)
ALP SERPL-CCNC: 102 U/L (ref 46–116)
ALT SERPL W P-5'-P-CCNC: 20 U/L (ref 12–78)
ANION GAP SERPL CALCULATED.3IONS-SCNC: 3 MMOL/L (ref 4–13)
AST SERPL W P-5'-P-CCNC: 17 U/L (ref 5–45)
BILIRUB SERPL-MCNC: 0.32 MG/DL (ref 0.2–1)
BUN SERPL-MCNC: 31 MG/DL (ref 5–25)
CALCIUM ALBUM COR SERPL-MCNC: 9.3 MG/DL (ref 8.3–10.1)
CALCIUM SERPL-MCNC: 8.7 MG/DL (ref 8.3–10.1)
CHLORIDE SERPL-SCNC: 109 MMOL/L (ref 100–108)
CHOLEST SERPL-MCNC: 166 MG/DL (ref 50–200)
CO2 SERPL-SCNC: 29 MMOL/L (ref 21–32)
CREAT SERPL-MCNC: 1.72 MG/DL (ref 0.6–1.3)
CREAT UR-MCNC: 110 MG/DL
ERYTHROCYTE [DISTWIDTH] IN BLOOD BY AUTOMATED COUNT: 12.8 % (ref 11.6–15.1)
GFR SERPL CREATININE-BSD FRML MDRD: 38 ML/MIN/1.73SQ M
GLUCOSE P FAST SERPL-MCNC: 87 MG/DL (ref 65–99)
HCT VFR BLD AUTO: 39.6 % (ref 36.5–49.3)
HDLC SERPL-MCNC: 52 MG/DL
HGB BLD-MCNC: 12.7 G/DL (ref 12–17)
LDLC SERPL CALC-MCNC: 94 MG/DL (ref 0–100)
MAGNESIUM SERPL-MCNC: 2.5 MG/DL (ref 1.6–2.6)
MCH RBC QN AUTO: 30.2 PG (ref 26.8–34.3)
MCHC RBC AUTO-ENTMCNC: 32.1 G/DL (ref 31.4–37.4)
MCV RBC AUTO: 94 FL (ref 82–98)
PHOSPHATE SERPL-MCNC: 3.6 MG/DL (ref 2.3–4.1)
PLATELET # BLD AUTO: 239 THOUSANDS/UL (ref 149–390)
PMV BLD AUTO: 10.3 FL (ref 8.9–12.7)
POTASSIUM SERPL-SCNC: 4.5 MMOL/L (ref 3.5–5.3)
PROT SERPL-MCNC: 7.3 G/DL (ref 6.4–8.2)
PROT UR-MCNC: 12 MG/DL
PROT/CREAT UR: 0.11 MG/G{CREAT} (ref 0–0.1)
PTH-INTACT SERPL-MCNC: 62.2 PG/ML (ref 18.4–80.1)
RBC # BLD AUTO: 4.21 MILLION/UL (ref 3.88–5.62)
SODIUM SERPL-SCNC: 141 MMOL/L (ref 136–145)
TRIGL SERPL-MCNC: 102 MG/DL
WBC # BLD AUTO: 9 THOUSAND/UL (ref 4.31–10.16)

## 2021-08-26 PROCEDURE — 80061 LIPID PANEL: CPT

## 2021-08-26 PROCEDURE — 84156 ASSAY OF PROTEIN URINE: CPT

## 2021-08-26 PROCEDURE — 84100 ASSAY OF PHOSPHORUS: CPT

## 2021-08-26 PROCEDURE — 85027 COMPLETE CBC AUTOMATED: CPT

## 2021-08-26 PROCEDURE — 83970 ASSAY OF PARATHORMONE: CPT

## 2021-08-26 PROCEDURE — 36415 COLL VENOUS BLD VENIPUNCTURE: CPT

## 2021-08-26 PROCEDURE — 83735 ASSAY OF MAGNESIUM: CPT

## 2021-08-26 PROCEDURE — 80053 COMPREHEN METABOLIC PANEL: CPT

## 2021-08-26 PROCEDURE — 82570 ASSAY OF URINE CREATININE: CPT

## 2021-08-31 NOTE — PROGRESS NOTES
RENAL FOLLOW UP NOTE: td     ASSESSMENT AND PLAN:  27-year-old male with a history of prostate CA who we are seeing for CKD stage 3:     1   CKD stage 3B  · Etiology: Arteriolar nephrosclerosis,?  Hypertensive nephrosclerosis;  no evidence of primary glomerular process with a bland urinalysis without proteinuria or hematuria; no evidence of obstructive uropathy   · Baseline creatinine: 1 8  · Current creatinine:  1 72 at baseline  · Urine protein creatinine ratio:  0 11 g at goal  ·  UA:  No proteinuria and no hematuria or pyuria  ·  PVR with bladder scan:  ·  renal artery duplex: negative  ·  renal ultrasound demonstrated cysts which are benign and simple no further evaluation required per the [facility] radiologist Dr Magnolia Bowden  Recommendations:  · Treat hypertension-please see below  · Treat dyslipidemia-please see below  · Maintain proteinuria less than 1 g or as low as possible  · Avoid nephrotoxic agents such as NSAIDs, and proton pump inhibitors if possible; patient counseled as such     2   Volume:  Euvolemic     3   Hypertension:       Current blood pressure averages:   Blood pressures   -a  m :  128/74  -p m   123/70  Heart rate:  50-60 range     · Goal blood pressure: less than 130/80 given CKD     Recommendations:  · Push nonmedical regimen including weight loss, isotonic exercise and a low sodium diet   Patient has been counseled the such    ? 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:  · Calcium/magnesium/phosphorus:  · All acceptable  · PTH intact:  62 2 which is normal  · Vitamin-D:  41 4 at goal     6   Dyslipidemia:  · Goal LDL: less than 100 given CKD  · Current lipid profile:  LDL 94/HDL 52/triglycerides 166  Recommendations:  ·  Low-cholesterol/low-fat diet / weight loss as appropriate and isotonic exercise  ·  Medication changes today:  Doing well off statin therapy just monitor for now on a low-cholesterol low-fat diet     7   Anemia:  · Current hemoglobin:  Normal at 12 7     8   Other problems:  · Prostate CA followed by Urology with negative biopsies of the last several years   Last biopsy was 03/02/2021  · Avascular necrosis of the left knee        GI health maintenance:  11/04/2020:  By Dr Matthieu Munoz no polyps normal colonoscopy no further procedure require    PATIENT INSTRUCTIONS:    Patient Instructions   1  Medication changes today:   No medication changes today  2  Please go for non fasting  lab work AT [address] [place] [address]    3  In 3 months:   Please go for nonfasting lab work but in the morning   Please take 1 week a blood pressure readings as outlined below and mail those into the office       Please take 1 week a blood pressure readings  in 3 months     AS FOLLOWS  MORNING AND EVENING, SITTING as follows:  · TAKE THE MORNING READINGS BEFORE ANY MEDICATIONS AND WHEN YOU ARE RELAXED FOR SEVERAL MINUTES  · TAKE THE EVENING READINGS:  BETWEEN 7-10 P M ; PRIOR TO ANY MEDICATIONS; AT LEAST IN OUR  FROM DINNER; AND CERTAINLY AFTER RELAXING FOR A FEW MINUTES  · PLEASE INCLUDE HEART RATE WITH YOUR BLOOD PRESSURE READINGS  · When taking standing readings, keep your arm supported at heart level and not dangling  · Make sure you are sitting with your back supported and feet on the ground and do not cross your legs or feet  · Make sure you have not taken any coffee or caffeine products or exercised or smoke cigarettes at least 30 minutes before taking your blood pressure  Then please mail these readings into the office    4  Follow-up in 6  months   Please bring in 1 week a blood pressure readings morning evening, sitting and standing is outlined above   Please go for fasting lab work 1-2 weeks prior to your appointment      5   General instructions:   AVOID SALT BUT NOT ADDING AN READING LABELS TO MAKE SURE THERE IS LOW-SALT IN THE FOOD THAT YOU ARE EATING  o Goal is less than 2 g of sodium intake or less than 5 g of sodium chloride intake per day     Avoid nonsteroidal anti-inflammatory drugs such as Naprosyn, ibuprofen, Aleve, Advil, Celebrex, Meloxicam (Mobic) etc   You can use Tylenol as needed if you do not have any liver condition to be concerned about     Avoid medications such as Sudafed or decongestants and antihistamines that contained the D component which is the decongestant  You can take antihistamines without the decongestant or D component   Try to avoid medications such as pantoprazole or  Protonix/Nexium or Esomeprazole)/Prilosec or omeprazole/Prevacid or lansoprazole/AcipHex or Rabeprazole  If you are able to, use Pepcid as this is safer for your kidneys   Try to exercise at least 30 minutes 3 days a week to begin with with an ultimate goal of 5 days a week for at least 30 minutes     Please do not drink more than 2 glasses of alcohol/wine on a daily basis as this may contribute to your high blood pressure  Subjective: There has been no hospitalizations or acute illnesses since last visit  The patient overall is feeling well  No fevers, chills, or cough or colds    Good appetite and good energy  No hematuria, dysuria, voiding symptoms or foamy urine  No gastrointestinal symptoms  No cardiovascular symptoms including swelling of the legs  No headaches, dizziness or lightheadedness  Blood pressure medications:   None      ROS:  See HPI, otherwise review of systems as completely reviewed with the patient are negative    Past Medical History:   Diagnosis Date    ED (erectile dysfunction)     [facility]state [facility])      Past Surgical History:   Procedure Laterality Date    COLONOSCOPY      HERNIA REPAIR Left     UT BIOPSY OF PROSTATE,NEEDLE/PUNCH N/A 3/2/2021    Procedure: Transperineal MRI Fusion prostate biopsy and gold seed marker insertion;  Surgeon: Kory Nielson MD;  [facility];  Service: Urology    PROSTATE BIOPSY      1/26/2015, 2/10/2017    PROSTATE BIOPSY       Family History   Problem Relation Age of Onset    Hypertension Mother     Prostate cancer Brother       reports that he has never smoked  He has never used smokeless tobacco  He reports current alcohol use of about 1 0 standard drinks of alcohol per week  He reports that he does not use drugs  I COMPLETELY REVIEWED THE PAST MEDICAL HISTORY/PAST SURGICAL HISTORY/SOCIAL HISTORY/FAMILY HISTORY/AND MEDICATIONS  AND UPDATED ALL    Objective:     Vitals:   BP sitting on left:  120/80 with a heart rate 68 and slightly irregular  BP standing on left:  132/82 with a heart rate of 72 and regular    Weight (last 2 days)     Date/Time   Weight    09/07/21 1256   64 9 (143)            Wt Readings from Last 3 Encounters:   09/07/21 64 9 kg (143 lb)   07/20/21 66 7 kg (147 lb 0 8 oz)   03/17/21 66 7 kg (147 lb)       Body mass index is 25 33 kg/m²      Physical Exam: General:  No acute distress  Skin:  No acute rash  Eyes:  No scleral icterus, noninjected, no discharge from eyes  ENT:  Moist mucous membranes  Neck:  Supple, no jugular venous distention, trachea is midline, no lymphadenopathy and no thyromegaly  Back   No CVAT  Chest:  Clear to auscultation and percussion, good respiratory effort  CVS:  Regular rate and rhythm without a rub, or gallops or murmurs  Abdomen:  Soft and nontender with normal bowel sounds  Extremities:  No cyanosis and no edema, no arthritic changes, normal range of motion  Neuro:  Grossly intact  Psych:  Alert, oriented x3 and appropriate      Medications:    Current Outpatient Medications:     finasteride (PROSCAR) 5 mg tablet, Take 1 tablet (5 mg total) by mouth daily, Disp: 90 tablet, Rfl: 3    ibuprofen (MOTRIN) 200 mg tablet, Take by mouth as needed for mild pain, Disp: , Rfl:     sildenafil (VIAGRA) 50 MG tablet, Take 2 tablets (100 mg total) by mouth daily as needed for erectile dysfunction, Disp: 6 tablet, Rfl: 12    [START ON 9/23/2021] atovaquone-proguanil (MALARONE) 250-100 mg, Take 1 tablet by mouth daily with breakfast for 20 days Begin 1 day before entering malaria area and continue daily while in malaria area and for 1 week after leaving area (Patient not taking: Reported on 9/7/2021), Disp: 20 tablet, Rfl: 1    Lab, Imaging and other studies: I have personally reviewed pertinent labs    Laboratory Results:  Results for orders placed or performed in visit on 08/26/21   Comprehensive metabolic panel   Result Value Ref Range    Sodium 141 136 - 145 mmol/L    Potassium 4 5 3 5 - 5 3 mmol/L    Chloride 109 (H) 100 - 108 mmol/L    CO2 29 21 - 32 mmol/L    ANION GAP 3 (L) 4 - 13 mmol/L    BUN 31 (H) 5 - 25 mg/dL    Creatinine 1 72 (H) 0 60 - 1 30 mg/dL    Glucose, Fasting 87 65 - 99 mg/dL    Calcium 8 7 8 3 - 10 1 mg/dL    Corrected Calcium 9 3 8 3 - 10 1 mg/dL    AST 17 5 - 45 U/L    ALT 20 12 - 78 U/L    Alkaline Phosphatase 102 46 - 116 U/L    Total Protein 7 3 6 4 - 8 2 g/dL    Albumin 3 2 (L) 3 5 - 5 0 g/dL    Total Bilirubin 0 32 0 20 - 1 00 mg/dL    eGFR 38 ml/min/1 73sq m   CBC   Result Value Ref Range    WBC 9 00 4 31 - 10 16 Thousand/uL    RBC 4 21 3 88 - 5 62 Million/uL    Hemoglobin 12 7 12 0 - 17 0 g/dL    Hematocrit 39 6 36 5 - 49 3 %    MCV 94 82 - 98 fL    MCH 30 2 26 8 - 34 3 pg    MCHC 32 1 31 4 - 37 4 g/dL    RDW 12 8 11 6 - 15 1 %    Platelets 905 492 - 407 Thousands/uL    MPV 10 3 8 9 - 12 7 fL   Lipid Panel with Direct LDL reflex   Result Value Ref Range    Cholesterol 166 50 - 200 mg/dL    Triglycerides 102 <=150 mg/dL    HDL, Direct 52 >=40 mg/dL    LDL Calculated 94 0 - 100 mg/dL   Magnesium   Result Value Ref Range    Magnesium 2 5 1 6 - 2 6 mg/dL   Phosphorus   Result Value Ref Range    Phosphorus 3 6 2 3 - 4 1 mg/dL   Protein / creatinine ratio, urine   Result Value Ref Range    Creatinine, Ur 110 0 mg/dL    Protein Urine Random 12 mg/dL    Prot/Creat Ratio, Ur 0 11 (H) 0 00 - 0 10   PTH, intact   Result Value Ref Range    PTH 62 2 18 4 - 80 1 pg/mL             Invalid input(s): ALBUMIN      Radiology review:   chest X-ray    Ultrasound      Portions of the record may have been created with voice recognition software  Occasional wrong word or "sound a like" substitutions may have occurred due to the inherent limitations of voice recognition software  Read the chart carefully and recognize, using context, where substitutions have occurred

## 2021-09-07 ENCOUNTER — OFFICE VISIT (OUTPATIENT)
Dept: NEPHROLOGY | Facility: CLINIC | Age: 76
End: 2021-09-07
Payer: MEDICARE

## 2021-09-07 VITALS — WEIGHT: 143 LBS | BODY MASS INDEX: 25.34 KG/M2 | HEIGHT: 63 IN

## 2021-09-07 DIAGNOSIS — E78.5 DYSLIPIDEMIA: ICD-10-CM

## 2021-09-07 DIAGNOSIS — I12.9 PARENCHYMAL RENAL HYPERTENSION, STAGE 1 THROUGH STAGE 4 OR UNSPECIFIED CHRONIC KIDNEY DISEASE: Primary | ICD-10-CM

## 2021-09-07 DIAGNOSIS — N18.32 STAGE 3B CHRONIC KIDNEY DISEASE (HCC): ICD-10-CM

## 2021-09-07 PROCEDURE — 99214 OFFICE O/P EST MOD 30 MIN: CPT | Performed by: INTERNAL MEDICINE

## 2021-09-07 RX ORDER — IBUPROFEN 200 MG
TABLET ORAL AS NEEDED
COMMUNITY

## 2021-09-07 NOTE — LETTER
September 7, 2021     Lonnie Lacey MD  [address] [place] [address]    Patient: Nakia Rose   YOB: 1945   Date of Visit: 9/7/2021       Dear Dr Lashell Garza: Thank you for referring Nakia Rose to me for evaluation  Below are my notes for this consultation  If you have questions, please do not hesitate to call me  I look forward to following your patient along with you  Sincerely,        Brenna Nugent MD        CC: MD Fidelia Wing MD Catherine Booth, PA-C Matha Junker, MD  9/7/2021  1:28 PM  Sign when Signing Visit  RENAL FOLLOW UP NOTE: td     ASSESSMENT AND PLAN:  51-year-old male with a history of prostate CA who we are seeing for CKD stage 3:     1   CKD stage 3B  · Etiology: Arteriolar nephrosclerosis,?  Hypertensive nephrosclerosis;  no evidence of primary glomerular process with a bland urinalysis without proteinuria or hematuria; no evidence of obstructive uropathy   · Baseline creatinine: 1 8  · Current creatinine:  1 72 at baseline  · Urine protein creatinine ratio:  0 11 g at goal  ·  UA:  No proteinuria and no hematuria or pyuria  ·  PVR with bladder scan:  ·  renal artery duplex: negative  ·  renal ultrasound demonstrated cysts which are benign and simple no further evaluation required per the [facility] [place] [facility] radiologist Dr Steve Sawyer  Recommendations:  · Treat hypertension-please see below  · Treat dyslipidemia-please see below  · Maintain proteinuria less than 1 g or as low as possible  · Avoid nephrotoxic agents such as NSAIDs, and proton pump inhibitors if possible; patient counseled as such     2   Volume:  Euvolemic     3   Hypertension:       Current blood pressure averages:   Blood pressures   -a  m :  128/74  -p m   123/70  Heart rate:  50-60 range     · Goal blood pressure: less than 130/80 given CKD     Recommendations:  · Push nonmedical regimen including weight loss, isotonic exercise and a low sodium diet   Patient has been counseled the such   ? MedicationChanges today:    § No changes as patient is at goal     4   Electrolytes:  · All acceptable  · Low anion gap: Most likely spurious related to the lab; just repeat a basic metabolic profile nonfasting***     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:  · Calcium/magnesium/phosphorus:  · All acceptable  · PTH intact:  62 2 which is normal  · Vitamin-D:  41 4 at goal     6   Dyslipidemia:  · Goal LDL: less than 100 given CKD  · Current lipid profile:  LDL 94/HDL 52/triglycerides 166  Recommendations:  ·  Low-cholesterol/low-fat diet / weight loss as appropriate and isotonic exercise  ·  Medication changes today:  Doing well off statin therapy just monitor for now on a low-cholesterol low-fat diet     7   Anemia:  · Current hemoglobin:  Normal at 12 7     8   Other problems:  · Prostate CA followed by Urology with negative biopsies of the last several years   Last biopsy was 03/02/2021  · Avascular necrosis of the left knee        GI health maintenance:  11/04/2020:  By Dr Elio Mcclain no polyps normal colonoscopy no further procedure require    PATIENT INSTRUCTIONS:    Patient Instructions   1  Medication changes today:   No medication changes today  2  Please go for non fasting  lab work AT [address] [facility]    3  In 3 months:   Please go for nonfasting lab work but in the morning   Please take 1 week a blood pressure readings as outlined below and mail those into the office       Please take 1 week a blood pressure readings  in 3 months     AS FOLLOWS  MORNING AND EVENING, SITTING as follows:  · TAKE THE MORNING READINGS BEFORE ANY MEDICATIONS AND WHEN YOU ARE RELAXED FOR SEVERAL MINUTES  · TAKE THE EVENING READINGS:  BETWEEN 7-10 P M ; PRIOR TO ANY MEDICATIONS; AT LEAST IN OUR  FROM DINNER; AND CERTAINLY AFTER RELAXING FOR A FEW MINUTES  · PLEASE INCLUDE HEART RATE WITH YOUR BLOOD PRESSURE READINGS  · When taking standing readings, keep your arm supported at heart level and not dangling  · Make sure you are sitting with your back supported and feet on the ground and do not cross your legs or feet  · Make sure you have not taken any coffee or caffeine products or exercised or smoke cigarettes at least 30 minutes before taking your blood pressure  Then please mail these readings into the office    4  Follow-up in 6  months   Please bring in 1 week a blood pressure readings morning evening, sitting and standing is outlined above   Please go for fasting lab work 1-2 weeks prior to your appointment      5  General instructions:   AVOID SALT BUT NOT ADDING AN READING LABELS TO MAKE SURE THERE IS LOW-SALT IN THE FOOD THAT YOU ARE EATING  o Goal is less than 2 g of sodium intake or less than 5 g of sodium chloride intake per day     Avoid nonsteroidal anti-inflammatory drugs such as Naprosyn, ibuprofen, Aleve, Advil, Celebrex, Meloxicam (Mobic) etc   You can use Tylenol as needed if you do not have any liver condition to be concerned about     Avoid medications such as Sudafed or decongestants and antihistamines that contained the D component which is the decongestant  You can take antihistamines without the decongestant or D component   Try to avoid medications such as pantoprazole or  Protonix/Nexium or Esomeprazole)/Prilosec or omeprazole/Prevacid or lansoprazole/AcipHex or Rabeprazole  If you are able to, use Pepcid as this is safer for your kidneys   Try to exercise at least 30 minutes 3 days a week to begin with with an ultimate goal of 5 days a week for at least 30 minutes     Please do not drink more than 2 glasses of alcohol/wine on a daily basis as this may contribute to your high blood pressure  Subjective: There has been no hospitalizations or acute illnesses since last visit  The patient overall is feeling well  No fevers, chills, or cough or colds    Good appetite and good energy  No hematuria, dysuria, voiding symptoms or foamy urine  No gastrointestinal symptoms  No cardiovascular symptoms including swelling of the legs  No headaches, dizziness or lightheadedness  Blood pressure medications:   None      ROS:  See HPI, otherwise review of systems as completely reviewed with the patient are negative    Past Medical History:   Diagnosis Date    ED (erectile dysfunction)     Prostate cancer [facility])      Past Surgical History:   Procedure Laterality Date    COLONOSCOPY      HERNIA REPAIR Left     NE BIOPSY OF PROSTATE,NEEDLE/PUNCH N/A 3/2/2021    Procedure: Transperineal MRI Fusion prostate biopsy and gold seed marker insertion;  Surgeon: Filomena Arshad MD;  [facility];  Service: Urology    PROSTATE BIOPSY      1/26/2015, 2/10/2017    PROSTATE BIOPSY       Family History   Problem Relation Age of Onset    Hypertension Mother     Prostate cancer Brother       reports that he has never smoked  He has never used smokeless tobacco  He reports current alcohol use of about 1 0 standard drinks of alcohol per week  He reports that he does not use drugs  I COMPLETELY REVIEWED THE PAST MEDICAL HISTORY/PAST SURGICAL HISTORY/SOCIAL HISTORY/FAMILY HISTORY/AND MEDICATIONS  AND UPDATED ALL    Objective:     Vitals:   BP sitting on left:  120/80 with a heart rate 68 and slightly irregular  BP standing on left:  132/82 with a heart rate of 72 and regular    Weight (last 2 days)     Date/Time   Weight    09/07/21 1256   64 9 (143)            Wt Readings from Last 3 Encounters:   09/07/21 64 9 kg (143 lb)   07/20/21 66 7 kg (147 lb 0 8 oz)   03/17/21 66 7 kg (147 lb)       Body mass index is 25 33 kg/m²      Physical Exam: General:  No acute distress  Skin:  No acute rash  Eyes:  No scleral icterus, noninjected, no discharge from eyes  ENT:  Moist mucous membranes  Neck:  Supple, no jugular venous distention, trachea is midline, no lymphadenopathy and no thyromegaly  Back   No CVAT  Chest:  Clear to auscultation and percussion, good respiratory effort  CVS: Regular rate and rhythm without a rub, or gallops or murmurs  Abdomen:  Soft and nontender with normal bowel sounds  Extremities:  No cyanosis and no edema, no arthritic changes, normal range of motion  Neuro:  Grossly intact  Psych:  Alert, oriented x3 and appropriate      Medications:    Current Outpatient Medications:     finasteride (PROSCAR) 5 mg tablet, Take 1 tablet (5 mg total) by mouth daily, Disp: 90 tablet, Rfl: 3    ibuprofen (MOTRIN) 200 mg tablet, Take by mouth as needed for mild pain, Disp: , Rfl:     sildenafil (VIAGRA) 50 MG tablet, Take 2 tablets (100 mg total) by mouth daily as needed for erectile dysfunction, Disp: 6 tablet, Rfl: 12    [START ON 9/23/2021] atovaquone-proguanil (MALARONE) 250-100 mg, Take 1 tablet by mouth daily with breakfast for 20 days Begin 1 day before entering malaria area and continue daily while in malaria area and for 1 week after leaving area (Patient not taking: Reported on 9/7/2021), Disp: 20 tablet, Rfl: 1    Lab, Imaging and other studies: I have personally reviewed pertinent labs    Laboratory Results:  Results for orders placed or performed in visit on 08/26/21   Comprehensive metabolic panel   Result Value Ref Range    Sodium 141 136 - 145 mmol/L    Potassium 4 5 3 5 - 5 3 mmol/L    Chloride 109 (H) 100 - 108 mmol/L    CO2 29 21 - 32 mmol/L    ANION GAP 3 (L) 4 - 13 mmol/L    BUN 31 (H) 5 - 25 mg/dL    Creatinine 1 72 (H) 0 60 - 1 30 mg/dL    Glucose, Fasting 87 65 - 99 mg/dL    Calcium 8 7 8 3 - 10 1 mg/dL    Corrected Calcium 9 3 8 3 - 10 1 mg/dL    AST 17 5 - 45 U/L    ALT 20 12 - 78 U/L    Alkaline Phosphatase 102 46 - 116 U/L    Total Protein 7 3 6 4 - 8 2 g/dL    Albumin 3 2 (L) 3 5 - 5 0 g/dL    Total Bilirubin 0 32 0 20 - 1 00 mg/dL    eGFR 38 ml/min/1 73sq m   CBC   Result Value Ref Range    WBC 9 00 4 31 - 10 16 Thousand/uL    RBC 4 21 3 88 - 5 62 Million/uL    Hemoglobin 12 7 12 0 - 17 0 g/dL    Hematocrit 39 6 36 5 - 49 3 %    MCV 94 82 - 98 fL MCH 30 2 26 8 - 34 3 pg    MCHC 32 1 31 4 - 37 4 g/dL    RDW 12 8 11 6 - 15 1 %    Platelets 037 713 - 006 Thousands/uL    MPV 10 3 8 9 - 12 7 fL   Lipid Panel with Direct LDL reflex   Result Value Ref Range    Cholesterol 166 50 - 200 mg/dL    Triglycerides 102 <=150 mg/dL    HDL, Direct 52 >=40 mg/dL    LDL Calculated 94 0 - 100 mg/dL   Magnesium   Result Value Ref Range    Magnesium 2 5 1 6 - 2 6 mg/dL   Phosphorus   Result Value Ref Range    Phosphorus 3 6 2 3 - 4 1 mg/dL   Protein / creatinine ratio, urine   Result Value Ref Range    Creatinine, Ur 110 0 mg/dL    Protein Urine Random 12 mg/dL    Prot/Creat Ratio, Ur 0 11 (H) 0 00 - 0 10   PTH, intact   Result Value Ref Range    PTH 62 2 18 4 - 80 1 pg/mL             Invalid input(s): ALBUMIN      Radiology review:   chest X-ray    Ultrasound      Portions of the record may have been created with voice recognition software  Occasional wrong word or "sound a like" substitutions may have occurred due to the inherent limitations of voice recognition software  Read the chart carefully and recognize, using context, where substitutions have occurred

## 2021-09-07 NOTE — LETTER
September 7, 2021     Elijah Stephenson MD  [address] [place] [address]    Patient: Jennyfer Mccormick   YOB: 1945   Date of Visit: 9/7/2021       Dear Dr Tresa Villalpando: Thank you for referring Jennyfer Mccormick to me for evaluation  Below are my notes for this consultation  If you have questions, please do not hesitate to call me  I look forward to following your patient along with you  Sincerely,        Lee Ann Graves MD        CC: MD Arian Holloway MD Rona Sauer PAJONA Graves MD  9/7/2021  1:29 PM  Sign when Signing Visit  RENAL FOLLOW UP NOTE: td     ASSESSMENT AND PLAN:  66-year-old male with a history of prostate CA who we are seeing for CKD stage 3:     1   CKD stage 3B  · Etiology: Arteriolar nephrosclerosis,?  Hypertensive nephrosclerosis;  no evidence of primary glomerular process with a bland urinalysis without proteinuria or hematuria; no evidence of obstructive uropathy   · Baseline creatinine: 1 8  · Current creatinine:  1 72 at baseline  · Urine protein creatinine ratio:  0 11 g at goal  ·  UA:  No proteinuria and no hematuria or pyuria  ·  PVR with bladder scan:  ·  renal artery duplex: negative  ·  renal ultrasound demonstrated cysts which are benign and simple no further evaluation required per the [facility] radiologist Dr Russell Stage  Recommendations:  · Treat hypertension-please see below  · Treat dyslipidemia-please see below  · Maintain proteinuria less than 1 g or as low as possible  · Avoid nephrotoxic agents such as NSAIDs, and proton pump inhibitors if possible; patient counseled as such     2   Volume:  Euvolemic     3   Hypertension:       Current blood pressure averages:   Blood pressures   -a  m :  128/74  -p m   123/70  Heart rate:  50-60 range     · Goal blood pressure: less than 130/80 given CKD     Recommendations:  · Push nonmedical regimen including weight loss, isotonic exercise and a low sodium diet   Patient has been counseled the such   ? 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:  · Calcium/magnesium/phosphorus:  · All acceptable  · PTH intact:  62 2 which is normal  · Vitamin-D:  41 4 at goal     6   Dyslipidemia:  · Goal LDL: less than 100 given CKD  · Current lipid profile:  LDL 94/HDL 52/triglycerides 166  Recommendations:  ·  Low-cholesterol/low-fat diet / weight loss as appropriate and isotonic exercise  ·  Medication changes today:  Doing well off statin therapy just monitor for now on a low-cholesterol low-fat diet     7   Anemia:  · Current hemoglobin:  Normal at 12 7     8   Other problems:  · Prostate CA followed by Urology with negative biopsies of the last several years   Last biopsy was 03/02/2021  · Avascular necrosis of the left knee        GI health maintenance:  11/04/2020:  By Dr Alexey Little no polyps normal colonoscopy no further procedure require    PATIENT INSTRUCTIONS:    Patient Instructions   1  Medication changes today:   No medication changes today  2  Please go for non fasting  lab work AT [address] [place] [address]    3  In 3 months:   Please go for nonfasting lab work but in the morning   Please take 1 week a blood pressure readings as outlined below and mail those into the office       Please take 1 week a blood pressure readings  in 3 months     AS FOLLOWS  MORNING AND EVENING, SITTING as follows:  · TAKE THE MORNING READINGS BEFORE ANY MEDICATIONS AND WHEN YOU ARE RELAXED FOR SEVERAL MINUTES  · TAKE THE EVENING READINGS:  BETWEEN 7-10 P M ; PRIOR TO ANY MEDICATIONS; AT LEAST IN OUR  FROM DINNER; AND CERTAINLY AFTER RELAXING FOR A FEW MINUTES  · PLEASE INCLUDE HEART RATE WITH YOUR BLOOD PRESSURE READINGS  · When taking standing readings, keep your arm supported at heart level and not dangling  · Make sure you are sitting with your back supported and feet on the ground and do not cross your legs or feet  · Make sure you have not taken any coffee or caffeine products or exercised or smoke cigarettes at least 30 minutes before taking your blood pressure  Then please mail these readings into the office    4  Follow-up in 6  months   Please bring in 1 week a blood pressure readings morning evening, sitting and standing is outlined above   Please go for fasting lab work 1-2 weeks prior to your appointment      5  General instructions:   AVOID SALT BUT NOT ADDING AN READING LABELS TO MAKE SURE THERE IS LOW-SALT IN THE FOOD THAT YOU ARE EATING  o Goal is less than 2 g of sodium intake or less than 5 g of sodium chloride intake per day     Avoid nonsteroidal anti-inflammatory drugs such as Naprosyn, ibuprofen, Aleve, Advil, Celebrex, Meloxicam (Mobic) etc   You can use Tylenol as needed if you do not have any liver condition to be concerned about     Avoid medications such as Sudafed or decongestants and antihistamines that contained the D component which is the decongestant  You can take antihistamines without the decongestant or D component   Try to avoid medications such as pantoprazole or  Protonix/Nexium or Esomeprazole)/Prilosec or omeprazole/Prevacid or lansoprazole/AcipHex or Rabeprazole  If you are able to, use Pepcid as this is safer for your kidneys   Try to exercise at least 30 minutes 3 days a week to begin with with an ultimate goal of 5 days a week for at least 30 minutes     Please do not drink more than 2 glasses of alcohol/wine on a daily basis as this may contribute to your high blood pressure  Subjective: There has been no hospitalizations or acute illnesses since last visit  The patient overall is feeling well  No fevers, chills, or cough or colds    Good appetite and good energy  No hematuria, dysuria, voiding symptoms or foamy urine  No gastrointestinal symptoms  No cardiovascular symptoms including swelling of the legs  No headaches, dizziness or lightheadedness  Blood pressure medications:   None      ROS:  See HPI, otherwise review of systems as completely reviewed with the patient are negative    Past Medical History:   Diagnosis Date    ED (erectile dysfunction)     Prostate cancer [facility])      Past Surgical History:   Procedure Laterality Date    COLONOSCOPY      HERNIA REPAIR Left     [place] BIOPSY OF PROSTATE,NEEDLE/PUNCH N/A 3/2/2021    Procedure: Transperineal MRI Fusion prostate biopsy and gold seed marker insertion;  Surgeon: Noel Ba MD;  Location: [facility];  Service: Urology    PROSTATE BIOPSY      1/26/2015, 2/10/2017    PROSTATE BIOPSY       Family History   Problem Relation Age of Onset    Hypertension Mother     Prostate cancer Brother       reports that he has never smoked  He has never used smokeless tobacco  He reports current alcohol use of about 1 0 standard drinks of alcohol per week  He reports that he does not use drugs  I COMPLETELY REVIEWED THE PAST MEDICAL HISTORY/PAST SURGICAL HISTORY/SOCIAL HISTORY/FAMILY HISTORY/AND MEDICATIONS  AND UPDATED ALL    Objective:     Vitals:   BP sitting on left:  120/80 with a heart rate 68 and slightly irregular  BP standing on left:  132/82 with a heart rate of 72 and regular    Weight (last 2 days)     Date/Time   Weight    09/07/21 1256   64 9 (143)            Wt Readings from Last 3 Encounters:   09/07/21 64 9 kg (143 lb)   07/20/21 66 7 kg (147 lb 0 8 oz)   03/17/21 66 7 kg (147 lb)       Body mass index is 25 33 kg/m²      Physical Exam: General:  No acute distress  Skin:  No acute rash  Eyes:  No scleral icterus, noninjected, no discharge from eyes  ENT:  Moist mucous membranes  Neck:  Supple, no jugular venous distention, trachea is midline, no lymphadenopathy and no thyromegaly  Back   No CVAT  Chest:  Clear to auscultation and percussion, good respiratory effort  CVS: Regular rate and rhythm without a rub, or gallops or murmurs  Abdomen:  Soft and nontender with normal bowel sounds  Extremities:  No cyanosis and no edema, no arthritic changes, normal range of motion  Neuro:  Grossly intact  Psych:  Alert, oriented x3 and appropriate      Medications:    Current Outpatient Medications:     finasteride (PROSCAR) 5 mg tablet, Take 1 tablet (5 mg total) by mouth daily, Disp: 90 tablet, Rfl: 3    ibuprofen (MOTRIN) 200 mg tablet, Take by mouth as needed for mild pain, Disp: , Rfl:     sildenafil (VIAGRA) 50 MG tablet, Take 2 tablets (100 mg total) by mouth daily as needed for erectile dysfunction, Disp: 6 tablet, Rfl: 12    [START ON 9/23/2021] atovaquone-proguanil (MALARONE) 250-100 mg, Take 1 tablet by mouth daily with breakfast for 20 days Begin 1 day before entering malaria area and continue daily while in malaria area and for 1 week after leaving area (Patient not taking: Reported on 9/7/2021), Disp: 20 tablet, Rfl: 1    Lab, Imaging and other studies: I have personally reviewed pertinent labs    Laboratory Results:  Results for orders placed or performed in visit on 08/26/21   Comprehensive metabolic panel   Result Value Ref Range    Sodium 141 136 - 145 mmol/L    Potassium 4 5 3 5 - 5 3 mmol/L    Chloride 109 (H) 100 - 108 mmol/L    CO2 29 21 - 32 mmol/L    ANION GAP 3 (L) 4 - 13 mmol/L    BUN 31 (H) 5 - 25 mg/dL    Creatinine 1 72 (H) 0 60 - 1 30 mg/dL    Glucose, Fasting 87 65 - 99 mg/dL    Calcium 8 7 8 3 - 10 1 mg/dL    Corrected Calcium 9 3 8 3 - 10 1 mg/dL    AST 17 5 - 45 U/L    ALT 20 12 - 78 U/L    Alkaline Phosphatase 102 46 - 116 U/L    Total Protein 7 3 6 4 - 8 2 g/dL    Albumin 3 2 (L) 3 5 - 5 0 g/dL    Total Bilirubin 0 32 0 20 - 1 00 mg/dL    eGFR 38 ml/min/1 73sq m   CBC   Result Value Ref Range    WBC 9 00 4 31 - 10 16 Thousand/uL    RBC 4 21 3 88 - 5 62 Million/uL    Hemoglobin 12 7 12 0 - 17 0 g/dL    Hematocrit 39 6 36 5 - 49 3 %    MCV 94 82 - 98 fL MCH 30 2 26 8 - 34 3 pg    MCHC 32 1 31 4 - 37 4 g/dL    RDW 12 8 11 6 - 15 1 %    Platelets 225 349 - 319 Thousands/uL    MPV 10 3 8 9 - 12 7 fL   Lipid Panel with Direct LDL reflex   Result Value Ref Range    Cholesterol 166 50 - 200 mg/dL    Triglycerides 102 <=150 mg/dL    HDL, Direct 52 >=40 mg/dL    LDL Calculated 94 0 - 100 mg/dL   Magnesium   Result Value Ref Range    Magnesium 2 5 1 6 - 2 6 mg/dL   Phosphorus   Result Value Ref Range    Phosphorus 3 6 2 3 - 4 1 mg/dL   Protein / creatinine ratio, urine   Result Value Ref Range    Creatinine, Ur 110 0 mg/dL    Protein Urine Random 12 mg/dL    Prot/Creat Ratio, Ur 0 11 (H) 0 00 - 0 10   PTH, intact   Result Value Ref Range    PTH 62 2 18 4 - 80 1 pg/mL             Invalid input(s): ALBUMIN      Radiology review:   chest X-ray    Ultrasound      Portions of the record may have been created with voice recognition software  Occasional wrong word or "sound a like" substitutions may have occurred due to the inherent limitations of voice recognition software  Read the chart carefully and recognize, using context, where substitutions have occurred

## 2021-09-07 NOTE — PATIENT INSTRUCTIONS
1  Medication changes today:   No medication changes today  2  Please go for non fasting  lab work AT [facility]    3  In 3 months:   Please go for nonfasting lab work but in the morning   Please take 1 week a blood pressure readings as outlined below and mail those into the office       Please take 1 week a blood pressure readings  in 3 months     AS FOLLOWS  MORNING AND EVENING, SITTING as follows:  · TAKE THE MORNING READINGS BEFORE ANY MEDICATIONS AND WHEN YOU ARE RELAXED FOR SEVERAL MINUTES  · TAKE THE EVENING READINGS:  BETWEEN 7-10 P M ; PRIOR TO ANY MEDICATIONS; AT LEAST IN OUR  FROM DINNER; AND CERTAINLY AFTER RELAXING FOR A FEW MINUTES  · PLEASE INCLUDE HEART RATE WITH YOUR BLOOD PRESSURE READINGS  · When taking standing readings, keep your arm supported at heart level and not dangling  · Make sure you are sitting with your back supported and feet on the ground and do not cross your legs or feet  · Make sure you have not taken any coffee or caffeine products or exercised or smoke cigarettes at least 30 minutes before taking your blood pressure  Then please mail these readings into the office    4  Follow-up in 6  months   Please bring in 1 week a blood pressure readings morning evening, sitting and standing is outlined above   Please go for fasting lab work 1-2 weeks prior to your appointment      5  General instructions:   AVOID SALT BUT NOT ADDING AN READING LABELS TO MAKE SURE THERE IS LOW-SALT IN THE FOOD THAT YOU ARE EATING  o Goal is less than 2 g of sodium intake or less than 5 g of sodium chloride intake per day     Avoid nonsteroidal anti-inflammatory drugs such as Naprosyn, ibuprofen, Aleve, Advil, Celebrex, Meloxicam (Mobic) etc   You can use Tylenol as needed if you do not have any liver condition to be concerned about     Avoid medications such as Sudafed or decongestants and antihistamines that contained the D component which is the decongestant    You can take antihistamines without the decongestant or D component   Try to avoid medications such as pantoprazole or  Protonix/Nexium or Esomeprazole)/Prilosec or omeprazole/Prevacid or lansoprazole/AcipHex or Rabeprazole  If you are able to, use Pepcid as this is safer for your kidneys   Try to exercise at least 30 minutes 3 days a week to begin with with an ultimate goal of 5 days a week for at least 30 minutes     Please do not drink more than 2 glasses of alcohol/wine on a daily basis as this may contribute to your high blood pressure

## 2021-09-22 DIAGNOSIS — C61 PROSTATE CA (HCC): ICD-10-CM

## 2021-09-22 RX ORDER — SILDENAFIL 50 MG/1
TABLET, FILM COATED ORAL
Qty: 6 TABLET | Refills: 12 | Status: SHIPPED | OUTPATIENT
Start: 2021-09-22 | End: 2022-04-26 | Stop reason: SDUPTHER

## 2021-10-06 ENCOUNTER — APPOINTMENT (OUTPATIENT)
Dept: LAB | Facility: CLINIC | Age: 76
End: 2021-10-06
Payer: MEDICARE

## 2021-10-06 DIAGNOSIS — C61 PROSTATE CANCER (HCC): ICD-10-CM

## 2021-10-06 LAB — PSA SERPL-MCNC: 3.2 NG/ML (ref 0–4)

## 2021-10-06 PROCEDURE — 84153 ASSAY OF PSA TOTAL: CPT

## 2021-10-12 ENCOUNTER — OFFICE VISIT (OUTPATIENT)
Dept: UROLOGY | Facility: CLINIC | Age: 76
End: 2021-10-12
Payer: MEDICARE

## 2021-10-12 VITALS
HEIGHT: 63 IN | WEIGHT: 146 LBS | SYSTOLIC BLOOD PRESSURE: 130 MMHG | HEART RATE: 59 BPM | BODY MASS INDEX: 25.87 KG/M2 | DIASTOLIC BLOOD PRESSURE: 82 MMHG

## 2021-10-12 DIAGNOSIS — C61 PROSTATE CANCER (HCC): Primary | ICD-10-CM

## 2021-10-12 PROCEDURE — 99213 OFFICE O/P EST LOW 20 MIN: CPT | Performed by: PHYSICIAN ASSISTANT

## 2022-01-24 ENCOUNTER — TELEPHONE (OUTPATIENT)
Dept: NEPHROLOGY | Facility: CLINIC | Age: 77
End: 2022-01-24

## 2022-03-07 NOTE — PROGRESS NOTES
RENAL FOLLOW UP NOTE: td     ASSESSMENT AND PLAN:  66-year-old male with a history of prostate CA who we are seeing for CKD stage 3:     1   CKD stage 3B  · Etiology: Arteriolar nephrosclerosis,?  Hypertensive nephrosclerosis;  no evidence of primary glomerular process with a bland urinalysis without proteinuria or hematuria; no evidence of obstructive uropathy   · Baseline creatinine: 1 8  · Current creatinine:  1 83 essentially  at baseline  · Urine protein creatinine ratio:  0 10 g at goal  ·  UA:  No proteinuria and no hematuria or pyuria  ·  PVR with bladder scan:  ·  renal artery duplex: negative  ·  renal ultrasound demonstrated cysts which are benign and simple no further evaluation required per the [facility] radiologist Dr Santhosh Olmos  Recommendations:  · Treat hypertension-please see below  · Treat dyslipidemia-please see below  · Maintain proteinuria less than 1 g or as low as possible  · Avoid nephrotoxic agents such as NSAIDs, and proton pump inhibitors if possible; patient counseled as such     2   Volume:  Euvolemic     3   Hypertension:       Current blood pressure averages:   Blood pressures   -a m :  142/76  -p m:  142/76  Heart rate:  50-60 range     · Goal blood pressure: less than 130/80 given CKD     Recommendations:  · Push nonmedical regimen including weight loss, isotonic exercise and a low sodium diet   Patient has been counseled the such  ? MedicationChanges today:    § Patient has been traveling not on his usual diet he will check another week a blood pressure readings in a few weeks when he is back on his usual regimen    Re-evaluate at that time to consider small dose of amlodipine such as 2 5 mg daily     4   Electrolytes:  · All acceptable  · Low anion gap:  Persistent: I would check SPEP UPEP and light chain ratio at this time!     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:  · Calcium/magnesium/phosphorus:  · All acceptable  · PTH intact:  66 6  which is normal  · Vitamin-D:  41 4 at goal from prior value     6   Dyslipidemia:  · Goal LDL: less than 100 given CKD  · Current lipid profile:  LDL  115/HDL 59/triglycerides 45  Recommendations:  ·  Low-cholesterol/low-fat diet / weight loss as appropriate and isotonic exercise  ·  Medication changes today:  Above goal so I would recommend statin therapy:  He was traveling he would prefer to re-evaluate next visit he will consider statin if persistent      7   Anemia:  · Current hemoglobin:  Normal at  13 9     8   Other problems:  · Prostate CA followed by Urology with negative biopsies of the last several years   Last biopsy was 03/02/2021  · Avascular necrosis of the left knee        GI health maintenance:  11/04/2020:  By Dr Cesar Hogue no polyps normal colonoscopy no further procedure require       PATIENT INSTRUCTIONS:    Patient Instructions     1  Medication changes today:   No medication changes today pending home blood pressure readings    2  Please go for non fasting  lab work AT [facility]      3  Please take 1 week a blood pressure readings  AT THE END OF APRIL/EARLY MAY     AS FOLLOWS  MORNING AND EVENING, SITTING AND STANDING as follows:  · TAKE THE MORNING READINGS BEFORE ANY MEDICATIONS AND WHEN YOU ARE RELAXED FOR SEVERAL MINUTES  · TAKE THE EVENING READINGS:  BETWEEN 7-10 P M ; PRIOR TO ANY MEDICATIONS; AT LEAST IN OUR  FROM DINNER; AND CERTAINLY AFTER RELAXING FOR A FEW MINUTES  · PLEASE INCLUDE HEART RATE WITH YOUR BLOOD PRESSURE READINGS  · When taking standing readings, keep your arm supported at heart level and not dangling  · Make sure you are sitting with your back supported and feet on the ground and do not cross your legs or feet  · Make sure you have not taken any coffee or caffeine products or exercised or smoke cigarettes at least 30 minutes before taking your blood pressure  Then please mail these readings into the office    4  In 3 months:   Please go for nonfasting lab work but in the morning   Please take 1 week a blood pressure readings as outlined above and mail those into the office       5  Follow-up in 6  months   Please bring in 1 week a blood pressure readings morning evening, sitting and standing is outlined above   Please go for fasting lab work 1-2 weeks prior to your appointment      6  General instructions:   AVOID SALT BUT NOT ADDING AN READING LABELS TO MAKE SURE THERE IS LOW-SALT IN THE FOOD THAT YOU ARE EATING  o Goal is less than 2 g of sodium intake or less than 5 g of sodium chloride intake per day     Avoid nonsteroidal anti-inflammatory drugs such as Naprosyn, ibuprofen, Aleve, Advil, Celebrex, Meloxicam (Mobic) etc   You can use Tylenol as needed if you do not have any liver condition to be concerned about     Avoid medications such as Sudafed or decongestants and antihistamines that contained the D component which is the decongestant  You can take antihistamines without the decongestant or D component   Try to avoid medications such as pantoprazole or  Protonix/Nexium or Esomeprazole)/Prilosec or omeprazole/Prevacid or lansoprazole/AcipHex or Rabeprazole  If you are able to, use Pepcid as this is safer for your kidneys   Try to exercise at least 30 minutes 3 days a week to begin with with an ultimate goal of 5 days a week for at least 30 minutes     Please do not drink more than 2 glasses of alcohol/wine on a daily basis as this may contribute to your high blood pressure  Cholesterol and Your Health   AMBULATORY CARE:   Cholesterol  is a waxy, fat-like substance  Your body uses cholesterol to make hormones and new cells, and to protect nerves  Cholesterol is made by your body  It also comes from certain foods you eat, such as meat and dairy products  Your healthcare provider can help you set goals for your cholesterol levels  He or she can help you create a plan to meet your goals  Cholesterol level goals:   Your cholesterol level goals depend on your risk for heart disease, your age, and your other health conditions  The following are general guidelines:  · Total cholesterol  includes low-density lipoprotein (LDL), high-density lipoprotein (HDL), and triglyceride levels  The total cholesterol level should be lower than 200 mg/dL and is best at about 150 mg/dL  · LDL cholesterol  is called bad cholesterol  because it forms plaque in your arteries  As plaque builds up, your arteries become narrow, and less blood flows through  When plaque decreases blood flow to your heart, you may have chest pain  If plaque completely blocks an artery that brings blood to your heart, you may have a heart attack  Plaque can break off and form blood clots  Blood clots may block arteries in your brain and cause a stroke  The level should be less than 130 mg/dL and is best at about 100 mg/dL  · HDL cholesterol  is called good cholesterol  because it helps remove LDL cholesterol from your arteries  It does this by attaching to LDL cholesterol and carrying it to your liver  Your liver breaks down LDL cholesterol so your body can get rid of it  High levels of HDL cholesterol can help prevent a heart attack and stroke  Low levels of HDL cholesterol can increase your risk for heart disease, heart attack, and stroke  The level should be 60 mg/dL or higher  · Triglycerides  are a type of fat that store energy from foods you eat  High levels of triglycerides also cause plaque buildup  This can increase your risk for a heart attack or stroke  If your triglyceride level is high, your LDL cholesterol level may also be high  The level should be less than 150 mg/dL      Any of the following can increase your risk for high cholesterol:   · Smoking cigarettes    · Being overweight or obese, or not getting enough exercise    · Drinking large amounts of alcohol    · A medical condition such as hypertension (high blood pressure) or diabetes    · Certain genes passed from your parents to you    · Age older than 72 years    What you need to know about having your cholesterol levels checked: Adults 21to 39years of age should have their cholesterol levels checked every 4 to 6 years  Adults 45 years or older should have their cholesterol checked every 1 to 2 years  You may need your cholesterol checked more often, or at a younger age, if you have risk factors for heart disease  You may also need to have your cholesterol checked more often if you have other health conditions, such as diabetes  Blood tests are used to check cholesterol levels  Blood tests measure your levels of triglycerides, LDL cholesterol, and HDL cholesterol  How healthy fats affect your cholesterol levels:  Healthy fats, also called unsaturated fats, help lower LDL cholesterol and triglyceride levels  Healthy fats include the following:  · Monounsaturated fats  are found in foods such as olive oil, canola oil, avocado, nuts, and olives  · Polyunsaturated fats,  such as omega 3 fats, are found in fish, such as salmon, trout, and tuna  They can also be found in plant foods such as flaxseed, walnuts, and soybeans  How unhealthy fats affect your cholesterol levels:  Unhealthy fats increase LDL cholesterol and triglyceride levels  They are found in foods high in cholesterol, saturated fat, and trans fat:  · Cholesterol  is found in eggs, dairy, and meat  · Saturated fat  is found in butter, cheese, ice cream, whole milk, and coconut oil  Saturated fat is also found in meat, such as sausage, hot dogs, and bologna  · Trans fat  is found in liquid oils and is used in fried and baked foods  Foods that contain trans fats include chips, crackers, muffins, sweet rolls, microwave popcorn, and cookies  Treatment  for high cholesterol will also decrease your risk of heart disease, heart attack, and stroke   Treatment may include any of the following:  · Lifestyle changes  may include food, exercise, weight loss, and quitting smoking  You may also need to decrease the amount of alcohol you drink  Your healthcare provider will want you to start with lifestyle changes  Other treatment may be added if lifestyle changes are not enough  Your healthcare provider may recommend you work with a team to manage hyperlipidemia  The team may include medical experts such as a dietitian, an exercise or physical therapist, and a behavior therapist  Your family members may be included in helping you create lifestyle changes  · Medicines  may be given to lower your LDL cholesterol, triglyceride levels, or total cholesterol level  You may need medicines to lower your cholesterol if any of the following is true:    ? You have a history of stroke, TIA, unstable angina, or a heart attack  ? Your LDL cholesterol level is 190 mg/dL or higher  ? You are age 36 to 76 years, have diabetes or heart disease risk factors, and your LDL cholesterol is 70 mg/dL or higher  · Supplements  include fish oil, red yeast rice, and garlic  Fish oil may help lower your triglyceride and LDL cholesterol levels  It may also increase your HDL cholesterol level  Red yeast rice may help decrease your total cholesterol level and LDL cholesterol level  Garlic may help lower your total cholesterol level  Do not take any supplements without talking to your healthcare provider  Food changes you can make to lower your cholesterol levels:  A dietitian can help you create a healthy eating plan  He or she can show you how to read food labels and choose foods low in saturated fat, trans fats, and cholesterol  · Decrease the total amount of fat you eat  Choose lean meats, fat-free or 1% fat milk, and low-fat dairy products, such as yogurt and cheese  Try to limit or avoid red meats  Limit or do not eat fried foods or baked goods, such as cookies  · Replace unhealthy fats with healthy fats  Cook foods in olive oil or canola oil   Choose soft margarines that are low in saturated fat and trans fat  Seeds, nuts, and avocados are other examples of healthy fats  · Eat foods with omega-3 fats  Examples include salmon, tuna, mackerel, walnuts, and flaxseed  Eat fish 2 times per week  Pregnant women should not eat fish that have high levels of mercury, such as shark, swordfish, and kemar mackerel  · Increase the amount of high-fiber foods you eat  High-fiber foods can help lower your LDL cholesterol  Aim to get between 20 and 30 grams of fiber each day  Fruits and vegetables are high in fiber  Eat at least 5 servings each day  Other high-fiber foods are whole-grain or whole-wheat breads, pastas, or cereals, and brown rice  Eat 3 ounces of whole-grain foods each day  Increase fiber slowly  You may have abdominal discomfort, bloating, and gas if you add fiber to your diet too quickly  · Eat healthy protein foods  Examples include low-fat dairy products, skinless chicken and turkey, fish, and nuts  · Limit foods and drinks that are high in sugar  Your dietitian or healthcare provider can help you create daily limits for high-sugar foods and drinks  The limit may be lower if you have diabetes or another health condition  Limits can also help you lose weight if needed  Lifestyle changes you can make to lower your cholesterol levels:   · Maintain a healthy weight  Ask your healthcare provider what a healthy weight is for you  Ask him or her to help you create a weight loss plan if needed  Weight loss can decrease your total cholesterol and triglyceride levels  Weight loss may also help keep your blood pressure at a healthy level  · Be physically active throughout the day  Physical activity, such as exercise, can help lower your total cholesterol level and maintain a healthy weight  Physical activity can also help increase your HDL cholesterol level  Work with your healthcare provider to create an program that is right for you   Get at least 30 to 40 minutes of moderate physical activity most days of the week  Examples of exercise include brisk walking, swimming, or biking  Also include strength training at least 2 times each week  Your healthcare providers can help you create a physical activity plan  · Do not smoke  Nicotine and other chemicals in cigarettes and cigars can raise your cholesterol levels  Ask your healthcare provider for information if you currently smoke and need help to quit  E-cigarettes or smokeless tobacco still contain nicotine  Talk to your healthcare provider before you use these products  · Limit or do not drink alcohol  Alcohol can increase your triglyceride levels  Ask your healthcare provider before you drink alcohol  Ask how much is okay for you to drink in 24 hours or 1 week  Follow up with your doctor as directed:  Write down your questions so you remember to ask them during your visits  © [employer] 2022 Information is for End User's use only and may not be sold, redistributed or otherwise used for commercial purposes  All illustrations and images included in CareNotes® are the copyrighted property of [employer] Inc  or [facility] [facility]  The above information is an  only  It is not intended as medical advice for individual conditions or treatments  Talk to your doctor, nurse or pharmacist before following any medical regimen to see if it is safe and effective for you  Subjective: There has been no hospitalizations or acute illnesses since last visit  The patient overall is feeling well  No fevers, chills, or cough or colds    Good appetite and good energy  No hematuria, dysuria, voiding symptoms or foamy urine  No gastrointestinal symptoms  No cardiovascular symptoms including swelling of the legs  No headaches, dizziness or lightheadedness  Blood pressure medications:   None at this time      ROS:  See HPI, otherwise review of systems as completely reviewed with the patient are negative    Past Medical History:   Diagnosis Date    ED (erectile dysfunction)     Prostate cancer [facility])      Past Surgical History:   Procedure Laterality Date    COLONOSCOPY      HERNIA REPAIR Left     [place] BIOPSY OF PROSTATE,NEEDLE/PUNCH N/A 3/2/2021    Procedure: Transperineal MRI Fusion prostate biopsy and gold seed marker insertion;  Surgeon: Carolina Rivera MD;  Location: [facility];  Service: Urology    PROSTATE BIOPSY      1/26/2015, 2/10/2017    PROSTATE BIOPSY       Family History   Problem Relation Age of Onset    Hypertension Mother     Prostate cancer Brother       reports that he has never smoked  He has never used smokeless tobacco  He reports current alcohol use of about 1 0 standard drink of alcohol per week  He reports that he does not use drugs  I COMPLETELY REVIEWED THE PAST MEDICAL HISTORY/PAST SURGICAL HISTORY/SOCIAL HISTORY/FAMILY HISTORY/AND MEDICATIONS  AND UPDATED ALL    Objective:     Vitals:   BP sitting on left:  132/84 with a heart rate 68 and regular  BP standing on right:  144/76 with a heart rate of 68 and regular    Weight (last 2 days)     Date/Time Weight    03/16/22 0754 66 7 (147)        Wt Readings from Last 3 Encounters:   03/16/22 66 7 kg (147 lb)   10/12/21 66 2 kg (146 lb)   09/07/21 64 9 kg (143 lb)       Body mass index is 26 04 kg/m²      Physical Exam: General:  No acute distress  Skin:  No acute rash  Eyes:  No scleral icterus, noninjected, no discharge from eyes  ENT:  Moist mucous membranes  Neck:  Supple, no jugular venous distention, trachea is midline, no lymphadenopathy and no thyromegaly  Back   No CVAT  Chest:  Clear to auscultation and percussion, good respiratory effort  CVS:  Regular rate and rhythm without a rub, or gallops or murmurs  Abdomen:  Soft and nontender with normal bowel sounds  Extremities:  No cyanosis and no edema, no arthritic changes, normal range of motion  Neuro:  Grossly intact  Psych:  Alert, oriented x3 and appropriate      Medications:    Current Outpatient Medications:     finasteride (PROSCAR) 5 mg tablet, Take 1 tablet (5 mg total) by mouth daily, Disp: 90 tablet, Rfl: 3    ibuprofen (MOTRIN) 200 mg tablet, Take by mouth as needed for mild pain, Disp: , Rfl:     Multiple Vitamins-Minerals (CENTRUM SILVER 50+MEN PO), Take by mouth, Disp: , Rfl:     sildenafil (VIAGRA) 50 MG tablet, take 2 tablets by mouth once daily if needed for ERECTILE DYSFUNCTION, Disp: 6 tablet, Rfl: 12    Lab, Imaging and other studies: I have personally reviewed pertinent labs    Laboratory Results:  Results for orders placed or performed in visit on 03/14/22   Comprehensive metabolic panel   Result Value Ref Range    Sodium 142 136 - 145 mmol/L    Potassium 4 7 3 5 - 5 3 mmol/L    Chloride 112 (H) 100 - 108 mmol/L    CO2 28 21 - 32 mmol/L    ANION GAP 2 (L) 4 - 13 mmol/L    BUN 29 (H) 5 - 25 mg/dL    Creatinine 1 83 (H) 0 60 - 1 30 mg/dL    Glucose, Fasting 102 (H) 65 - 99 mg/dL    Calcium 9 1 8 3 - 10 1 mg/dL    AST 20 5 - 45 U/L    ALT 20 12 - 78 U/L    Alkaline Phosphatase 88 46 - 116 U/L    Total Protein 7 3 6 4 - 8 2 g/dL    Albumin 3 6 3 5 - 5 0 g/dL    Total Bilirubin 0 81 0 20 - 1 00 mg/dL    eGFR 34 ml/min/1 73sq m   CK   Result Value Ref Range    Total CK 45 39 - 308 U/L   CBC   Result Value Ref Range    WBC 6 61 4 31 - 10 16 Thousand/uL    RBC 4 71 3 88 - 5 62 Million/uL    Hemoglobin 13 9 12 0 - 17 0 g/dL    Hematocrit 42 4 36 5 - 49 3 %    MCV 90 82 - 98 fL    MCH 29 5 26 8 - 34 3 pg    MCHC 32 8 31 4 - 37 4 g/dL    RDW 12 9 11 6 - 15 1 %    Platelets 302 467 - 802 Thousands/uL    MPV 9 6 8 9 - 12 7 fL   Lipid Panel with Direct LDL reflex   Result Value Ref Range    Cholesterol 183 See Comment mg/dL    Triglycerides 45 See Comment mg/dL    HDL, Direct 59 >=40 mg/dL    LDL Calculated 115 (H) 0 - 100 mg/dL   Magnesium   Result Value Ref Range    Magnesium 2 4 1 6 - 2 6 mg/dL   Phosphorus   Result Value Ref Range    Phosphorus 3 2 2 3 - 4 1 mg/dL   Protein / creatinine ratio, urine   Result Value Ref Range    Creatinine, Ur 95 9 mg/dL    Protein Urine Random 10 mg/dL    Prot/Creat Ratio, Ur 0 10 0 00 - 0 10   PTH, intact   Result Value Ref Range    PTH 66 6 18 4 - 80 1 pg/mL       Results from last 7 days   Lab Units 03/14/22  0850   WBC Thousand/uL 6 61   HEMOGLOBIN g/dL 13 9   HEMATOCRIT % 42 4   PLATELETS Thousands/uL 254   POTASSIUM mmol/L 4 7   CHLORIDE mmol/L 112*   CO2 mmol/L 28   BUN mg/dL 29*   CREATININE mg/dL 1 83*   CALCIUM mg/dL 9 1   MAGNESIUM mg/dL 2 4   PHOSPHORUS mg/dL 3 2         Radiology review:   chest X-ray    Ultrasound      Portions of the record may have been created with voice recognition software  Occasional wrong word or "sound a like" substitutions may have occurred due to the inherent limitations of voice recognition software  Read the chart carefully and recognize, using context, where substitutions have occurred

## 2022-03-11 ENCOUNTER — TELEPHONE (OUTPATIENT)
Dept: NEPHROLOGY | Facility: CLINIC | Age: 77
End: 2022-03-11

## 2022-03-11 NOTE — TELEPHONE ENCOUNTER
Spoke with patient reminding him to go for lab work and bring BP readings for his appt on 3/16  He expressed understanding and will go Monday for his lab work

## 2022-03-14 ENCOUNTER — APPOINTMENT (OUTPATIENT)
Dept: LAB | Facility: CLINIC | Age: 77
End: 2022-03-14
Payer: MEDICARE

## 2022-03-14 DIAGNOSIS — I12.9 PARENCHYMAL RENAL HYPERTENSION, STAGE 1 THROUGH STAGE 4 OR UNSPECIFIED CHRONIC KIDNEY DISEASE: ICD-10-CM

## 2022-03-14 DIAGNOSIS — E78.5 DYSLIPIDEMIA: ICD-10-CM

## 2022-03-14 DIAGNOSIS — N18.32 STAGE 3B CHRONIC KIDNEY DISEASE (HCC): ICD-10-CM

## 2022-03-14 LAB
ALBUMIN SERPL BCP-MCNC: 3.6 G/DL (ref 3.5–5)
ALP SERPL-CCNC: 88 U/L (ref 46–116)
ALT SERPL W P-5'-P-CCNC: 20 U/L (ref 12–78)
ANION GAP SERPL CALCULATED.3IONS-SCNC: 2 MMOL/L (ref 4–13)
AST SERPL W P-5'-P-CCNC: 20 U/L (ref 5–45)
BILIRUB SERPL-MCNC: 0.81 MG/DL (ref 0.2–1)
BUN SERPL-MCNC: 29 MG/DL (ref 5–25)
CALCIUM SERPL-MCNC: 9.1 MG/DL (ref 8.3–10.1)
CHLORIDE SERPL-SCNC: 112 MMOL/L (ref 100–108)
CHOLEST SERPL-MCNC: 183 MG/DL
CK SERPL-CCNC: 45 U/L (ref 39–308)
CO2 SERPL-SCNC: 28 MMOL/L (ref 21–32)
CREAT SERPL-MCNC: 1.83 MG/DL (ref 0.6–1.3)
CREAT UR-MCNC: 95.9 MG/DL
ERYTHROCYTE [DISTWIDTH] IN BLOOD BY AUTOMATED COUNT: 12.9 % (ref 11.6–15.1)
GFR SERPL CREATININE-BSD FRML MDRD: 34 ML/MIN/1.73SQ M
GLUCOSE P FAST SERPL-MCNC: 102 MG/DL (ref 65–99)
HCT VFR BLD AUTO: 42.4 % (ref 36.5–49.3)
HDLC SERPL-MCNC: 59 MG/DL
HGB BLD-MCNC: 13.9 G/DL (ref 12–17)
LDLC SERPL CALC-MCNC: 115 MG/DL (ref 0–100)
MAGNESIUM SERPL-MCNC: 2.4 MG/DL (ref 1.6–2.6)
MCH RBC QN AUTO: 29.5 PG (ref 26.8–34.3)
MCHC RBC AUTO-ENTMCNC: 32.8 G/DL (ref 31.4–37.4)
MCV RBC AUTO: 90 FL (ref 82–98)
PHOSPHATE SERPL-MCNC: 3.2 MG/DL (ref 2.3–4.1)
PLATELET # BLD AUTO: 254 THOUSANDS/UL (ref 149–390)
PMV BLD AUTO: 9.6 FL (ref 8.9–12.7)
POTASSIUM SERPL-SCNC: 4.7 MMOL/L (ref 3.5–5.3)
PROT SERPL-MCNC: 7.3 G/DL (ref 6.4–8.2)
PROT UR-MCNC: 10 MG/DL
PROT/CREAT UR: 0.1 MG/G{CREAT} (ref 0–0.1)
PTH-INTACT SERPL-MCNC: 66.6 PG/ML (ref 18.4–80.1)
RBC # BLD AUTO: 4.71 MILLION/UL (ref 3.88–5.62)
SODIUM SERPL-SCNC: 142 MMOL/L (ref 136–145)
TRIGL SERPL-MCNC: 45 MG/DL
WBC # BLD AUTO: 6.61 THOUSAND/UL (ref 4.31–10.16)

## 2022-03-14 PROCEDURE — 85027 COMPLETE CBC AUTOMATED: CPT

## 2022-03-14 PROCEDURE — 83970 ASSAY OF PARATHORMONE: CPT

## 2022-03-14 PROCEDURE — 80061 LIPID PANEL: CPT

## 2022-03-14 PROCEDURE — 82570 ASSAY OF URINE CREATININE: CPT

## 2022-03-14 PROCEDURE — 80053 COMPREHEN METABOLIC PANEL: CPT

## 2022-03-14 PROCEDURE — 82550 ASSAY OF CK (CPK): CPT

## 2022-03-14 PROCEDURE — 84100 ASSAY OF PHOSPHORUS: CPT

## 2022-03-14 PROCEDURE — 84156 ASSAY OF PROTEIN URINE: CPT

## 2022-03-14 PROCEDURE — 36415 COLL VENOUS BLD VENIPUNCTURE: CPT

## 2022-03-14 PROCEDURE — 83735 ASSAY OF MAGNESIUM: CPT

## 2022-03-16 ENCOUNTER — OFFICE VISIT (OUTPATIENT)
Dept: NEPHROLOGY | Facility: CLINIC | Age: 77
End: 2022-03-16
Payer: MEDICARE

## 2022-03-16 VITALS — WEIGHT: 147 LBS | HEIGHT: 63 IN | BODY MASS INDEX: 26.05 KG/M2

## 2022-03-16 DIAGNOSIS — N18.32 STAGE 3B CHRONIC KIDNEY DISEASE (HCC): ICD-10-CM

## 2022-03-16 DIAGNOSIS — I12.9 PARENCHYMAL RENAL HYPERTENSION, STAGE 1 THROUGH STAGE 4 OR UNSPECIFIED CHRONIC KIDNEY DISEASE: Primary | ICD-10-CM

## 2022-03-16 DIAGNOSIS — E78.5 DYSLIPIDEMIA: ICD-10-CM

## 2022-03-16 PROCEDURE — 99214 OFFICE O/P EST MOD 30 MIN: CPT | Performed by: INTERNAL MEDICINE

## 2022-03-16 NOTE — PATIENT INSTRUCTIONS
1  Medication changes today:   No medication changes today pending home blood pressure readings    2  Please go for non fasting  lab work AT [facility]      3  Please take 1 week a blood pressure readings  AT THE END OF APRIL/EARLY MAY     AS FOLLOWS  MORNING AND EVENING, SITTING AND STANDING as follows:  · TAKE THE MORNING READINGS BEFORE ANY MEDICATIONS AND WHEN YOU ARE RELAXED FOR SEVERAL MINUTES  · TAKE THE EVENING READINGS:  BETWEEN 7-10 P M ; PRIOR TO ANY MEDICATIONS; AT LEAST IN OUR  FROM DINNER; AND CERTAINLY AFTER RELAXING FOR A FEW MINUTES  · PLEASE INCLUDE HEART RATE WITH YOUR BLOOD PRESSURE READINGS  · When taking standing readings, keep your arm supported at heart level and not dangling  · Make sure you are sitting with your back supported and feet on the ground and do not cross your legs or feet  · Make sure you have not taken any coffee or caffeine products or exercised or smoke cigarettes at least 30 minutes before taking your blood pressure  Then please mail these readings into the office    4  In 3 months:   Please go for nonfasting lab work but in the morning   Please take 1 week a blood pressure readings as outlined above and mail those into the office       5  Follow-up in 6  months   Please bring in 1 week a blood pressure readings morning evening, sitting and standing is outlined above   Please go for fasting lab work 1-2 weeks prior to your appointment      6   General instructions:   AVOID SALT BUT NOT ADDING AN READING LABELS TO MAKE SURE THERE IS LOW-SALT IN THE FOOD THAT YOU ARE EATING  o Goal is less than 2 g of sodium intake or less than 5 g of sodium chloride intake per day     Avoid nonsteroidal anti-inflammatory drugs such as Naprosyn, ibuprofen, Aleve, Advil, Celebrex, Meloxicam (Mobic) etc   You can use Tylenol as needed if you do not have any liver condition to be concerned about     Avoid medications such as Sudafed or decongestants and antihistamines that contained the D component which is the decongestant  You can take antihistamines without the decongestant or D component   Try to avoid medications such as pantoprazole or  Protonix/Nexium or Esomeprazole)/Prilosec or omeprazole/Prevacid or lansoprazole/AcipHex or Rabeprazole  If you are able to, use Pepcid as this is safer for your kidneys   Try to exercise at least 30 minutes 3 days a week to begin with with an ultimate goal of 5 days a week for at least 30 minutes     Please do not drink more than 2 glasses of alcohol/wine on a daily basis as this may contribute to your high blood pressure  Cholesterol and Your Health   AMBULATORY CARE:   Cholesterol  is a waxy, fat-like substance  Your body uses cholesterol to make hormones and new cells, and to protect nerves  Cholesterol is made by your body  It also comes from certain foods you eat, such as meat and dairy products  Your healthcare provider can help you set goals for your cholesterol levels  He or she can help you create a plan to meet your goals  Cholesterol level goals: Your cholesterol level goals depend on your risk for heart disease, your age, and your other health conditions  The following are general guidelines:  · Total cholesterol  includes low-density lipoprotein (LDL), high-density lipoprotein (HDL), and triglyceride levels  The total cholesterol level should be lower than 200 mg/dL and is best at about 150 mg/dL  · LDL cholesterol  is called bad cholesterol  because it forms plaque in your arteries  As plaque builds up, your arteries become narrow, and less blood flows through  When plaque decreases blood flow to your heart, you may have chest pain  If plaque completely blocks an artery that brings blood to your heart, you may have a heart attack  Plaque can break off and form blood clots  Blood clots may block arteries in your brain and cause a stroke  The level should be less than 130 mg/dL and is best at about 100 mg/dL  · HDL cholesterol  is called good cholesterol  because it helps remove LDL cholesterol from your arteries  It does this by attaching to LDL cholesterol and carrying it to your liver  Your liver breaks down LDL cholesterol so your body can get rid of it  High levels of HDL cholesterol can help prevent a heart attack and stroke  Low levels of HDL cholesterol can increase your risk for heart disease, heart attack, and stroke  The level should be 60 mg/dL or higher  · Triglycerides  are a type of fat that store energy from foods you eat  High levels of triglycerides also cause plaque buildup  This can increase your risk for a heart attack or stroke  If your triglyceride level is high, your LDL cholesterol level may also be high  The level should be less than 150 mg/dL  Any of the following can increase your risk for high cholesterol:   · Smoking cigarettes    · Being overweight or obese, or not getting enough exercise    · Drinking large amounts of alcohol    · A medical condition such as hypertension (high blood pressure) or diabetes    · Certain genes passed from your parents to you    · Age older than 65 years    What you need to know about having your cholesterol levels checked: Adults 21to 39years of age should have their cholesterol levels checked every 4 to 6 years  Adults 45 years or older should have their cholesterol checked every 1 to 2 years  You may need your cholesterol checked more often, or at a younger age, if you have risk factors for heart disease  You may also need to have your cholesterol checked more often if you have other health conditions, such as diabetes  Blood tests are used to check cholesterol levels  Blood tests measure your levels of triglycerides, LDL cholesterol, and HDL cholesterol  How healthy fats affect your cholesterol levels:  Healthy fats, also called unsaturated fats, help lower LDL cholesterol and triglyceride levels   Healthy fats include the following:  · Monounsaturated fats  are found in foods such as olive oil, canola oil, avocado, nuts, and olives  · Polyunsaturated fats,  such as omega 3 fats, are found in fish, such as salmon, trout, and tuna  They can also be found in plant foods such as flaxseed, walnuts, and soybeans  How unhealthy fats affect your cholesterol levels:  Unhealthy fats increase LDL cholesterol and triglyceride levels  They are found in foods high in cholesterol, saturated fat, and trans fat:  · Cholesterol  is found in eggs, dairy, and meat  · Saturated fat  is found in butter, cheese, ice cream, whole milk, and coconut oil  Saturated fat is also found in meat, such as sausage, hot dogs, and bologna  · Trans fat  is found in liquid oils and is used in fried and baked foods  Foods that contain trans fats include chips, crackers, muffins, sweet rolls, microwave popcorn, and cookies  Treatment  for high cholesterol will also decrease your risk of heart disease, heart attack, and stroke  Treatment may include any of the following:  · Lifestyle changes  may include food, exercise, weight loss, and quitting smoking  You may also need to decrease the amount of alcohol you drink  Your healthcare provider will want you to start with lifestyle changes  Other treatment may be added if lifestyle changes are not enough  Your healthcare provider may recommend you work with a team to manage hyperlipidemia  The team may include medical experts such as a dietitian, an exercise or physical therapist, and a behavior therapist  Your family members may be included in helping you create lifestyle changes  · Medicines  may be given to lower your LDL cholesterol, triglyceride levels, or total cholesterol level  You may need medicines to lower your cholesterol if any of the following is true:    ? You have a history of stroke, TIA, unstable angina, or a heart attack  ? Your LDL cholesterol level is 190 mg/dL or higher      ? You are age 36 to 76 years, have diabetes or heart disease risk factors, and your LDL cholesterol is 70 mg/dL or higher  · Supplements  include fish oil, red yeast rice, and garlic  Fish oil may help lower your triglyceride and LDL cholesterol levels  It may also increase your HDL cholesterol level  Red yeast rice may help decrease your total cholesterol level and LDL cholesterol level  Garlic may help lower your total cholesterol level  Do not take any supplements without talking to your healthcare provider  Food changes you can make to lower your cholesterol levels:  A dietitian can help you create a healthy eating plan  He or she can show you how to read food labels and choose foods low in saturated fat, trans fats, and cholesterol  · Decrease the total amount of fat you eat  Choose lean meats, fat-free or 1% fat milk, and low-fat dairy products, such as yogurt and cheese  Try to limit or avoid red meats  Limit or do not eat fried foods or baked goods, such as cookies  · Replace unhealthy fats with healthy fats  Cook foods in olive oil or canola oil  Choose soft margarines that are low in saturated fat and trans fat  Seeds, nuts, and avocados are other examples of healthy fats  · Eat foods with omega-3 fats  Examples include salmon, tuna, mackerel, walnuts, and flaxseed  Eat fish 2 times per week  Pregnant women should not eat fish that have high levels of mercury, such as shark, swordfish, and kemar mackerel  · Increase the amount of high-fiber foods you eat  High-fiber foods can help lower your LDL cholesterol  Aim to get between 20 and 30 grams of fiber each day  Fruits and vegetables are high in fiber  Eat at least 5 servings each day  Other high-fiber foods are whole-grain or whole-wheat breads, pastas, or cereals, and brown rice  Eat 3 ounces of whole-grain foods each day  Increase fiber slowly   You may have abdominal discomfort, bloating, and gas if you add fiber to your diet too quickly  · Eat healthy protein foods  Examples include low-fat dairy products, skinless chicken and turkey, fish, and nuts  · Limit foods and drinks that are high in sugar  Your dietitian or healthcare provider can help you create daily limits for high-sugar foods and drinks  The limit may be lower if you have diabetes or another health condition  Limits can also help you lose weight if needed  Lifestyle changes you can make to lower your cholesterol levels:   · Maintain a healthy weight  Ask your healthcare provider what a healthy weight is for you  Ask him or her to help you create a weight loss plan if needed  Weight loss can decrease your total cholesterol and triglyceride levels  Weight loss may also help keep your blood pressure at a healthy level  · Be physically active throughout the day  Physical activity, such as exercise, can help lower your total cholesterol level and maintain a healthy weight  Physical activity can also help increase your HDL cholesterol level  Work with your healthcare provider to create an program that is right for you  Get at least 30 to 40 minutes of moderate physical activity most days of the week  Examples of exercise include brisk walking, swimming, or biking  Also include strength training at least 2 times each week  Your healthcare providers can help you create a physical activity plan  · Do not smoke  Nicotine and other chemicals in cigarettes and cigars can raise your cholesterol levels  Ask your healthcare provider for information if you currently smoke and need help to quit  E-cigarettes or smokeless tobacco still contain nicotine  Talk to your healthcare provider before you use these products  · Limit or do not drink alcohol  Alcohol can increase your triglyceride levels  Ask your healthcare provider before you drink alcohol  Ask how much is okay for you to drink in 24 hours or 1 week      Follow up with your doctor as directed: Write down your questions so you remember to ask them during your visits  © Copyright [employer] 2022 Information is for End User's use only and may not be sold, redistributed or otherwise used for commercial purposes  All illustrations and images included in CareNotes® are the copyrighted property of [employer] Inc  or [employer]  The above information is an  only  It is not intended as medical advice for individual conditions or treatments  Talk to your doctor, nurse or pharmacist before following any medical regimen to see if it is safe and effective for you

## 2022-03-29 DIAGNOSIS — N40.1 NOCTURIA ASSOCIATED WITH BENIGN PROSTATIC HYPERPLASIA: ICD-10-CM

## 2022-03-29 DIAGNOSIS — R35.1 NOCTURIA ASSOCIATED WITH BENIGN PROSTATIC HYPERPLASIA: ICD-10-CM

## 2022-03-29 RX ORDER — FINASTERIDE 5 MG/1
5 TABLET, FILM COATED ORAL DAILY
Qty: 90 TABLET | Refills: 0 | Status: SHIPPED | OUTPATIENT
Start: 2022-03-29 | End: 2022-06-27

## 2022-04-20 ENCOUNTER — TELEPHONE (OUTPATIENT)
Dept: UROLOGY | Facility: CLINIC | Age: 77
End: 2022-04-20

## 2022-04-20 NOTE — TELEPHONE ENCOUNTER
Pt called to return VM and confirmed appt for Tuesday 4/26/22 not Monday 4/25/22 and pt stated he is having bloodwork tomorrow

## 2022-04-20 NOTE — TELEPHONE ENCOUNTER
Called patient and left message  Patient is required to get a PSA done prior to his appointment,  The script is in the chart and they can go to any [facility] lab to have it done  If he is unable to get it done prior the patient will have to reschedule  I left the call centers number for any questions at [phone]    If patient can not have it done prior please assist in rescheduling the appointment

## 2022-04-21 ENCOUNTER — APPOINTMENT (OUTPATIENT)
Dept: LAB | Facility: CLINIC | Age: 77
End: 2022-04-21
Payer: MEDICARE

## 2022-04-21 DIAGNOSIS — N18.32 STAGE 3B CHRONIC KIDNEY DISEASE (HCC): ICD-10-CM

## 2022-04-21 DIAGNOSIS — E78.5 DYSLIPIDEMIA: ICD-10-CM

## 2022-04-21 DIAGNOSIS — C61 PROSTATE CANCER (HCC): ICD-10-CM

## 2022-04-21 DIAGNOSIS — I12.9 PARENCHYMAL RENAL HYPERTENSION, STAGE 1 THROUGH STAGE 4 OR UNSPECIFIED CHRONIC KIDNEY DISEASE: ICD-10-CM

## 2022-04-21 LAB
ANION GAP SERPL CALCULATED.3IONS-SCNC: 1 MMOL/L (ref 4–13)
BUN SERPL-MCNC: 31 MG/DL (ref 5–25)
CALCIUM SERPL-MCNC: 9.1 MG/DL (ref 8.3–10.1)
CHLORIDE SERPL-SCNC: 111 MMOL/L (ref 100–108)
CO2 SERPL-SCNC: 30 MMOL/L (ref 21–32)
CREAT SERPL-MCNC: 1.86 MG/DL (ref 0.6–1.3)
CREAT UR-MCNC: 86.9 MG/DL
GFR SERPL CREATININE-BSD FRML MDRD: 34 ML/MIN/1.73SQ M
GLUCOSE P FAST SERPL-MCNC: 98 MG/DL (ref 65–99)
POTASSIUM SERPL-SCNC: 4.7 MMOL/L (ref 3.5–5.3)
PROT UR-MCNC: 8 MG/DL
PROT/CREAT UR: 0.09 MG/G{CREAT} (ref 0–0.1)
PSA SERPL-MCNC: 3.5 NG/ML (ref 0–4)
SODIUM SERPL-SCNC: 142 MMOL/L (ref 136–145)

## 2022-04-21 PROCEDURE — 84165 PROTEIN E-PHORESIS SERUM: CPT

## 2022-04-21 PROCEDURE — 84166 PROTEIN E-PHORESIS/URINE/CSF: CPT | Performed by: INTERNAL MEDICINE

## 2022-04-21 PROCEDURE — 84165 PROTEIN E-PHORESIS SERUM: CPT | Performed by: PATHOLOGY

## 2022-04-21 PROCEDURE — 86334 IMMUNOFIX E-PHORESIS SERUM: CPT

## 2022-04-21 PROCEDURE — 84156 ASSAY OF PROTEIN URINE: CPT

## 2022-04-21 PROCEDURE — 36415 COLL VENOUS BLD VENIPUNCTURE: CPT

## 2022-04-21 PROCEDURE — 83521 IG LIGHT CHAINS FREE EACH: CPT

## 2022-04-21 PROCEDURE — 80048 BASIC METABOLIC PNL TOTAL CA: CPT

## 2022-04-21 PROCEDURE — 84166 PROTEIN E-PHORESIS/URINE/CSF: CPT | Performed by: PATHOLOGY

## 2022-04-21 PROCEDURE — 84153 ASSAY OF PSA TOTAL: CPT

## 2022-04-21 PROCEDURE — 82570 ASSAY OF URINE CREATININE: CPT

## 2022-04-22 LAB
KAPPA LC FREE SER-MCNC: 30 MG/L (ref 3.3–19.4)
KAPPA LC FREE/LAMBDA FREE SER: 0.81 {RATIO} (ref 0.26–1.65)
LAMBDA LC FREE SERPL-MCNC: 36.9 MG/L (ref 5.7–26.3)

## 2022-04-23 LAB
ALBUMIN SERPL ELPH-MCNC: 3.96 G/DL (ref 3.5–5)
ALBUMIN SERPL ELPH-MCNC: 56.5 % (ref 52–65)
ALBUMIN UR ELPH-MCNC: 100 %
ALPHA1 GLOB MFR UR ELPH: 0 %
ALPHA1 GLOB SERPL ELPH-MCNC: 0.25 G/DL (ref 0.1–0.4)
ALPHA1 GLOB SERPL ELPH-MCNC: 3.5 % (ref 2.5–5)
ALPHA2 GLOB MFR UR ELPH: 0 %
ALPHA2 GLOB SERPL ELPH-MCNC: 0.6 G/DL (ref 0.4–1.2)
ALPHA2 GLOB SERPL ELPH-MCNC: 8.5 % (ref 7–13)
B-GLOBULIN MFR UR ELPH: 0 %
BETA GLOB ABNORMAL SERPL ELPH-MCNC: 0.39 G/DL (ref 0.4–0.8)
BETA1 GLOB SERPL ELPH-MCNC: 5.5 % (ref 5–13)
BETA2 GLOB SERPL ELPH-MCNC: 5.3 % (ref 2–8)
BETA2+GAMMA GLOB SERPL ELPH-MCNC: 0.37 G/DL (ref 0.2–0.5)
GAMMA GLOB ABNORMAL SERPL ELPH-MCNC: 1.45 G/DL (ref 0.5–1.6)
GAMMA GLOB MFR UR ELPH: 0 %
GAMMA GLOB SERPL ELPH-MCNC: 20.7 % (ref 12–22)
IGG/ALB SER: 1.3 {RATIO} (ref 1.1–1.8)
INTERPRETATION UR IFE-IMP: NORMAL
M PEAK ID 2: 3.5 %
M PROTEIN 1 MFR SERPL ELPH: 3.3 %
M PROTEIN 1 SERPL ELPH-MCNC: 0.23 G/DL
M PROTEIN 2 SERPL ELPH-MCNC: 0.25 G/DL
PROT PATTERN SERPL ELPH-IMP: ABNORMAL
PROT PATTERN UR ELPH-IMP: NORMAL
PROT SERPL-MCNC: 7 G/DL (ref 6.4–8.2)
PROT UR-MCNC: 9 MG/DL

## 2022-04-25 ENCOUNTER — TELEPHONE (OUTPATIENT)
Dept: OTHER | Facility: HOSPITAL | Age: 77
End: 2022-04-25

## 2022-04-25 DIAGNOSIS — D47.2 BICLONAL GAMMOPATHY: Primary | ICD-10-CM

## 2022-04-25 NOTE — PROGRESS NOTES
4/26/2022      Chief Complaint   Patient presents with    Prostate Cancer     Assessment and Plan    1  Den[place] 6 prostate cancer on active surveillance   - Diagnosed in 2011 with outside urologist    - Multiparametric prostate MRI no 12/1/20 - PI-RADS Category 5  - S/p MRI targeted transperineal prostate biopsy on 3/2/21 showing only 1 core of [place] 3+3=6, less than 5% of the core  Prior to this he had 5 or 6 prostate biopsies which were more recently negative for cancer    - He opted for continued observation and decided to forego radiation therapy  - Most recent PSA from 4/21/22 was stable at 3 5 (7 0 finasteride adjusted)  Previously PSA from 10/6/21 was 3 2 (6 4 finasteride adjusted), 10 5 on 12/11/20 and 8 8 on 5/15/20    - F/u in 6 months with repeat PSA and LEILA       2  BPH with LUTS  - Continue finasteride     3  ED   - Continue Viagra PRN  Rx refilled  History of Present Illness  Caroline Sepulveda is a 68 y o  male here for follow up evaluation of  prostate cancer  Patient has history of Melany 6 prostate cancer that was diagnosed in 2011 by outside urologist   He apparently had 5-6 prostate biopsies after initial diagnosis, the most recent biopsy being negative for cancer  He did complete prostate MRI in 2018 which was negative  He did have a progressive increase in PSA to 10 5 and a repeat MRI was performed showing a PI-RADS category 5 lesion  He then underwent MRI targeted prostate biopsy in March of 2021 which showed 1 core [place] 6 prostate cancer in less than 5% of the core  He opted for continued active surveillance  His PSA has remained stable  He currently utilizes finasteride for lower urinary tract symptoms which was started about 1 year ago  He denies any new urinary issues or complaints  Continues to use Viagra as needed for erectile dysfunction  Review of Systems   Constitutional: Negative for chills and fever  Respiratory: Negative for shortness of breath  Cardiovascular: Negative for chest pain  Gastrointestinal: Negative for abdominal pain  Genitourinary: Negative for difficulty urinating, dysuria, flank pain, frequency, hematuria and urgency  Neurological: Negative for dizziness  Past Medical History  Past Medical History:   Diagnosis Date    ED (erectile dysfunction)     Prostate cancer [facility])        Past Social History  Past Surgical History:   Procedure Laterality Date    COLONOSCOPY      HERNIA REPAIR Left     [place] BIOPSY OF PROSTATE,NEEDLE/PUNCH N/A 3/2/2021    Procedure: Transperineal MRI Fusion prostate biopsy and gold seed marker insertion;  Surgeon: Geronimo Miller MD;  Location[facility];  Service: Urology    PROSTATE BIOPSY      1/26/2015, 2/10/2017    PROSTATE BIOPSY       Social History     Tobacco Use   Smoking Status Never Smoker   Smokeless Tobacco Never Used   Tobacco Comment    tried it once        Past Family History  Family History   Problem Relation Age of Onset    Hypertension Mother     Prostate cancer Brother        Past Social history  Social History     Socioeconomic History    Marital status: /Civil Union     Spouse name: Not on file    Number of children: Not on file    Years of education: Not on file    Highest education level: Not on file   Occupational History    Not on file   Tobacco Use    Smoking status: Never Smoker    Smokeless tobacco: Never Used    Tobacco comment: tried it once    Vaping Use    Vaping Use: Never used   Substance and Sexual Activity    Alcohol use:  Yes     Alcohol/week: 1 0 standard drink     Types: 1 Glasses of wine per week     Comment: social    Drug use: No    Sexual activity: Not on file   Other Topics Concern    Not on file   Social History Narrative    Not on file     Social Determinants of Health     Financial Resource Strain: Not on file   Food Insecurity: Not on file   Transportation Needs: Not on file   Physical Activity: Not on file   Stress: Not on file   Social Connections: Not on file   Intimate Partner Violence: Not on file   Housing Stability: Not on file       Current Medications  Current Outpatient Medications   Medication Sig Dispense Refill    finasteride (PROSCAR) 5 mg tablet Take 1 tablet (5 mg total) by mouth daily 90 tablet 0    ibuprofen (MOTRIN) 200 mg tablet Take by mouth as needed for mild pain      Multiple Vitamins-Minerals (CENTRUM SILVER 50+MEN PO) Take by mouth      sildenafil (VIAGRA) 50 MG tablet take 2 tablets by mouth once daily if needed for ERECTILE DYSFUNCTION 6 tablet 12     No current facility-administered medications for this visit  Allergies  No Known Allergies      The following portions of the patient's history were reviewed and updated as appropriate: allergies, current medications, past medical history, past social history, past surgical history and problem list       Vitals  Vitals:    04/26/22 0803   BP: 130/72   BP Location: Left arm   Patient Position: Sitting   Cuff Size: Large   Pulse: 95   Resp: 16   SpO2: 98%   Weight: 66 6 kg (146 lb 12 8 oz)   Height: 5' 3" (1 6 m)           Physical Exam  Physical Exam  Constitutional:       Appearance: Normal appearance  HENT:      Head: Normocephalic and atraumatic  Right Ear: External ear normal       Left Ear: External ear normal    Eyes:      General: No scleral icterus  Conjunctiva/sclera: Conjunctivae normal    Cardiovascular:      Pulses: Normal pulses  Pulmonary:      Effort: Pulmonary effort is normal    Musculoskeletal:         General: Normal range of motion  Cervical back: Normal range of motion  Neurological:      General: No focal deficit present  Mental Status: He is alert and oriented to person, place, and time  Psychiatric:         Mood and Affect: Mood normal          Behavior: Behavior normal          Thought Content:  Thought content normal          Judgment: Judgment normal            Results  No results found for this or any previous visit (from the past 1 hour(s)) ]  Lab Results   Component Value Date    PSA 3 5 04/21/2022    PSA 3 2 10/06/2021    PSA 10 5 (H) 12/11/2020     Lab Results   Component Value Date    CALCIUM 9 1 04/21/2022    K 4 7 04/21/2022    CO2 30 04/21/2022     (H) 04/21/2022    BUN 31 (H) 04/21/2022    CREATININE 1 86 (H) 04/21/2022     Lab Results   Component Value Date    WBC 6 61 03/14/2022    HGB 13 9 03/14/2022    HCT 42 4 03/14/2022    MCV 90 03/14/2022     03/14/2022           Orders  No orders of the defined types were placed in this encounter        Damaris Christianson PA-C

## 2022-04-25 NOTE — TELEPHONE ENCOUNTER
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

## 2022-04-26 ENCOUNTER — OFFICE VISIT (OUTPATIENT)
Dept: UROLOGY | Facility: CLINIC | Age: 77
End: 2022-04-26
Payer: MEDICARE

## 2022-04-26 VITALS
RESPIRATION RATE: 16 BRPM | HEIGHT: 63 IN | DIASTOLIC BLOOD PRESSURE: 72 MMHG | OXYGEN SATURATION: 98 % | HEART RATE: 95 BPM | SYSTOLIC BLOOD PRESSURE: 130 MMHG | WEIGHT: 146.8 LBS | BODY MASS INDEX: 26.01 KG/M2

## 2022-04-26 DIAGNOSIS — N52.9 ORGANIC IMPOTENCE: Primary | ICD-10-CM

## 2022-04-26 DIAGNOSIS — C61 PROSTATE CA (HCC): ICD-10-CM

## 2022-04-26 PROCEDURE — 99213 OFFICE O/P EST LOW 20 MIN: CPT | Performed by: PHYSICIAN ASSISTANT

## 2022-04-26 RX ORDER — SILDENAFIL 50 MG/1
50 TABLET, FILM COATED ORAL AS NEEDED
Qty: 30 TABLET | Refills: 2 | Status: SHIPPED | OUTPATIENT
Start: 2022-04-26

## 2022-04-28 ENCOUNTER — TELEPHONE (OUTPATIENT)
Dept: HEMATOLOGY ONCOLOGY | Facility: CLINIC | Age: 77
End: 2022-04-28

## 2022-04-28 NOTE — TELEPHONE ENCOUNTER
Made attempt to schedule new pt appt, patient not sure as to why he is being referred will contact referring doctor and call back if he needs to schedule an appointment

## 2022-05-10 ENCOUNTER — TELEPHONE (OUTPATIENT)
Dept: HEMATOLOGY ONCOLOGY | Facility: CLINIC | Age: 77
End: 2022-05-10

## 2022-05-10 NOTE — TELEPHONE ENCOUNTER
New Patient Intake Form   Patient Details:    Marilyn Gaspar  1945    Appointment Information   Who is calling to schedule? Patient   If not self, what is the caller's name? Please put name of [facility] nurse as well  self   DID YOU CONFIRM INSURANCE WITH PATIENT? yes   Referring provider Dipika Mcclain   What is the diagnosis? 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   no   Is there a biopsy ordered or pending? Please specify dates   no     Is patient aware of diagnosis?   no   Have you had any imaging or labs done? If yes, where? (If imaging done outside of [facility], please remind patient to bring a disk ) Yes [facility]     If imaging done at outside facility, did you instruct patient to obtain discs and bring to visit? no   Have you been seen by another Oncologist/Hematologist?  If so, who and where? no   Are the records in [facility] or Care Everywhere? yes    no   If yes, Name of facility, city and state where facility is located  Did you instruct patient to have records faxed to [facility] and provide [facility] number? na   Preferred [facility]   Is the patient willing to be seen by another provider? (This is for breast patients only) na     Did you send new patient paperwork?   Email or mail? na   Miscellaneous Information: 6/2/22 @ 9:40am

## 2022-05-23 NOTE — PROGRESS NOTES
Hematology Outpatient Office Note    Date of Service: 6/2/2022    [facility]     Reason for Consultation:   Chief Complaint   Patient presents with    Consult       Referral Physician: Juana Cool MD  (Nephrologist)    Primary Care Physician:  Earle Curling, MD     Nickname: Dolly Willams    Wife: Eva Roberto  (4 years)    ASSESSMENT & PLAN      Diagnosis ICD-10-CM Associated Orders   1  Prostate cancer ([facility] [address] )  C61    2  Biclonal gammopathy  D47 2 Ambulatory Referral to Hematology / Oncology         This is a 68 y o  c PMHx notable for early prostate cancer on active surveillance, 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      IgG MGUS     4/21/2022 SPEP and GAGE showed: Serum immunofixation shows a bioclonal gammopathy identified as IgG (0 23 g/dL, 0 25 g/dL g/dL)  K/lambda ratio 0 8  There is an absence of end-organ damage (his CKD is from HTN), hypercalcemia, or bone pain  CKD can lead to elevations in the free light chain ratio, a recent study observed that patients with a ratio less than 5 05 did not have myeloma, yet myeloma is usually associated with extreme ratios ~200 (Hamida 2015)  In this study patients with CKD commonly had elevations of LC ratios beyond the traditional normal range, 33% for nephrotic syndomre, 36% non-nephrotic proteinuria, 47% of CKD of unknown origin  Idiopathic Bence-Solorio Proteinuria (abnormal free light chains in urine only, not serum) is a rare plasma cell dyscrasia  Julio et al [facility] 2013) reported that progression to malignancy or amyloidosis was low at 0 4%/yr  , this was much lower than MGUS+BJP at 1 6%/year  While most patients who progressed did so to myeloma or smoldering myeloma, other lymphomas were also noted  Interestingly, BJP was more commonly found in those with neuropathy and anemia  Imaging: generally not recommended for MGUS  If high risk features are present PET-CT can be helpful   NCCN guidelines recommend skeletal survey for lytic lesions in the assessment of myeloma, however the sensitivity is poor and many false positive findings are noted  Whole body MRI can also be effective  Since monoclonal gammopathy is not treated, extensive evaluation of low level paraprotein levels is not recommended  Please note that in patients with underlying renal disease will have elevated kappa light chains as a result of decreased renal clearance, this can lead to FLC ratios and further investigation is not recommended for FLC ratios <5  Recommendations:   International Myeloma Working Group has recommended that low risk MGUS (M-spike <1 5 g/dl, IgG, FLC ratio <1 65) be observed with SPEP in 6months, and if stable they are followed q2-3 years  Bone marrow biopsy and skeletal survey are not indicated provided there are not high risk features  · Discussion of decision making    I personally reviewed the following lab results, the image studies, pathology, other specialty/physicians consult notes and recommendations, and [facility]  I had a lengthy discussion with the patient and shared the work-up findings  We discussed the diagnosis and management plan as below  I spent 46 minutes reviewing the records (labs, clinician notes, outside records, medical history, ordering medicine/tests/procedures, interpreting the imaging/labs previously done) and coordination of care as well as direct time with the patient today, of which greater than 50% of the time was spent in counseling and coordination of care with the patient/family  · Plan/Labs  · MGUS labs including SPEP, CBC, CMP in 6 months, then q2 years thereafter        Follow Up: 6 months    All questions were answered to the patient's satisfaction during this encounter  The patient knows the contact information for our office and knows to reach out for any relevant concerns related to this encounter   They are to call for any temperature 100 4 or higher, new symptoms including but not restricted to shaking chills, decreased appetite, nausea, vomiting, diarrhea, increased fatigue, shortness of breath or chest pain, confusion, and not feeling the strength to come to the clinic  For all other listed problems and medical diagnosis in their chart - they are managed by PCP and/or other specialists, which the patient acknowledges  Thank you very much for your consultation and making us a part of this patient's care  We are continuing to follow closely with you  Please do not hesitate to reach out to me with any additional questions or concerns  Geremias Mathur MD  Hematology & Medical Oncology Staff Physician             Disclaimer: This document was prepared using [employer] Direct technology  If a word or phrase is confusing, or does not make sense, this is likely due to recognition error which was not discovered during this clinician's review  If you believe an error has occurred, please contact me through [address] [place] line for shaniqua? 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   Pain: Denies    3/2/21: 1/13 prostate cores with 3+3 (G6) is on active surveillance  4/21/22: PSA 3 5, creat 1 86,  biclonal peak IgG     SUBJECTIVE  (INTERVAL HISTORY)        I have reviewed the relevant past medical, surgical, social and family history  I have also reviewed allergies and medications for this patient      Review of Systems  Baseline weight: 141 lbs     Denies F/C, bone pain, joints except L shoulder, night sweats, N/V, Sob, CP, LH, HA, falls, gen weakness, hematuria, melena, hematochezia, rash, or itching  6 months L shoulder discomfort since he started playing pickle-ball  A 10-point review of system was performed, pertinent positive and negative were detailed as above  Otherwise, the 10-point review of system was negative  Past Medical History:   Diagnosis Date    ED (erectile dysfunction)     Prostate cancer [facility])        Past Surgical History:   Procedure Laterality Date    COLONOSCOPY      HERNIA REPAIR Left     MI BIOPSY OF PROSTATE,NEEDLE/PUNCH N/A 3/2/2021    Procedure: Transperineal MRI Fusion prostate biopsy and gold seed marker insertion;  Surgeon: Jyoti Baron MD;  Location: [facility];  Service: Urology    PROSTATE BIOPSY      1/26/2015, 2/10/2017    PROSTATE BIOPSY         Family History   Problem Relation Age of Onset    Hypertension Mother     Prostate cancer Brother        Social History     Socioeconomic History    Marital status: /Civil Union     Spouse name: Not on file    Number of children: Not on file    Years of education: Not on file    Highest education level: Not on file   Occupational History    Not on file   Tobacco Use    Smoking status: Never Smoker    Smokeless tobacco: Never Used    Tobacco comment: tried it once    Vaping Use    Vaping Use: Never used   Substance and Sexual Activity    Alcohol use:  Yes     Alcohol/week: 1 0 standard drink     Types: 1 Glasses of wine per week     Comment: social    Drug use: No    Sexual activity: Not on file   Other Topics Concern    Not on file   Social History Narrative    Not on file     Social Determinants of Health     Financial Resource Strain: Not on file   Food Insecurity: Not on file   Transportation Needs: Not on file   Physical Activity: Not on file   Stress: Not on file   Social Connections: Not on file   Intimate Partner Violence: Not on file   Housing Stability: Not on file       No Known Allergies    Current Outpatient Medications Medication Sig Dispense Refill    Apoaequorin (Prevagen Extra Strength) 20 MG CAPS Take 20 mg by mouth in the morning Once daily      finasteride (PROSCAR) 5 mg tablet Take 1 tablet (5 mg total) by mouth daily 90 tablet 0    ibuprofen (MOTRIN) 200 mg tablet Take by mouth as needed for mild pain      Multiple Vitamins-Minerals (CENTRUM SILVER 50+MEN PO) Take by mouth      sildenafil (VIAGRA) 50 MG tablet Take 1 tablet (50 mg total) by mouth as needed for erectile dysfunction 30 tablet 2     No current facility-administered medications for this visit  (Not in a hospital admission)        Objective:     24 Hour Vitals Assessment:     Vitals:    06/02/22 0942   BP: 130/80   Pulse: 61   Resp: 16   Temp: 98 2 °F (36 8 °C)   SpO2: 98%       PHYSICIAN EXAM:    General: Appearance: alert, cooperative, no distress  HEENT: Normocephalic, atraumatic  No scleral icterus  conjunctivae clear  EOMI  Chest: No tenderness to palpation  No open wound noted  Lungs: Clear to auscultation bilaterally, Respirations unlabored  Cardiac: Regular rate and rhythm, +S1and S2  Abdomen: Soft, non-tender, non-distended  Bowel sounds are normal    Extremities:  No edema, cyanosis, clubbing  Skin: Skin color, turgor are normal  No rashes  Neurologic: Awake, Alert, and oriented, no gross focal deficits noted b/l  DATA REVIEW:    Pathology Result:    Final Diagnosis   Date Value Ref Range Status   03/02/2021   Final    A  Prostate, L lat base, Biopsy:  - Benign prostatic tissue  B  Prostate, L base, Biopsy:  - Benign prostatic tissue  C  Prostate, L lat med, Biopsy:  - Benign prostatic tissue  D  Prostate, L med, Biopsy:  - Benign prostatic tissue  E  Prostate, L lat apex, Biopsy:  - Benign prostatic tissue  F  Prostate, L apex, Biopsy:  - Benign prostatic tissue  G  Prostate, R lat base, Biopsy:  - Benign prostatic tissue        H  Prostate, R base, Biopsy:  - Prostatic adenocarcinoma, Black score 3+3=6, Prognostic Grade Group 1, involving 1 of 1 core (5% involvement)  - Focal high-grade prostatic intraepithelial neoplasia (HGPIN)  - Perineural invasion is not identified  I  Prostate, R lat med, Biopsy:  - Benign prostatic tissue  J  Prostate, R med, Biopsy:  - Benign prostatic tissue  K  Prostate, R lat apex, Biopsy:  - No tissue present  L  Prostate, R apex, Biopsy:  - Benign prostatic tissue  M  Prostate, Target L lat med, Biopsy:  - Benign prostatic tissue  11/24/2020   Final    A  Esophagus, barretts distal:  - Squamocolumnar mucosa with chronic inflammatory and reactive changes  - Negative for intestinal metaplasia, dysplasia, and malignancy  04/24/2018   Final    A  Prostate, RPZ, needle biopsy:  -  Benign prostate tissue, negative for malignancy  B   Prostate, RCZ, needle biopsy:  -  Benign prostate tissue, negative for malignancy  -  Multiplex immunohistochemical stain performed with appropriate control highlights intact basal layer cells staining for p63 and high molecular weight keratin with no significant luminal racemase expression, supporting the diagnosis  C   Prostate, MADI, needle biopsy:  -  Benign prostate tissue, negative for malignancy  -  Multiplex immunohistochemical stain performed with appropriate control highlights intact basal layer cells staining for p63 and high molecular weight keratin with no significant luminal racemase expression, supporting the diagnosis  D   Prostate, LCZ, needle biopsy:  -  Benign prostate tissue, negative for malignancy  02/10/2017   Final    A  Prostate, left peripheral zone, biopsy:  -  Benign prostate tissue with focal features of atrophy, negative for malignancy  -  Multiplex immunohistochemical staining performed with appropriate controls shows intact basal layer cells staining for p63 and molecular weight keratin without luminal expression of racemase, supporting the diagnosis      B   Prostate, left central zone, biopsy:  -  Benign prostate tissue, negative for malignancy  C   Prostate, right peripheral zone, biopsy:  -  Benign prostate tissue with focal features of atrophy, negative for malignancy  -  Multiplex immunohistochemical staining performed with appropriate controls shows intact basal layer cells staining for p63 and molecular weight keratin without luminal expression of racemase, supporting the diagnosis  D   Prostate, right central zone, biopsy:  -  Benign prostate tissue, negative for malignancy  Image Results:   Image result are reviewed and documented in Hematology/Oncology history  I personally reviewed these images  XR shoulder 2+ vw left  Narrative: LEFT SHOULDER    INDICATION:   M25 512: Pain in left shoulder  COMPARISON:  None    VIEWS:  XR SHOULDER 2+ VW LEFT     FINDINGS:    There is no acute fracture or dislocation  No significant degenerative changes  No lytic or blastic osseous lesion  Soft tissues are unremarkable  Impression: No acute osseous abnormality  Workstation performed: [id number]      LABS:  Lab data are reviewed and documented in HemOn history  Lab Results   Component Value Date    HGB 13 9 03/14/2022    HCT 42 4 03/14/2022    MCV 90 03/14/2022     03/14/2022    WBC 6 61 03/14/2022    NRBC 0 09/11/2020     Lab Results   Component Value Date    K 4 5 05/26/2022     (H) 05/26/2022    CO2 28 05/26/2022    BUN 32 (H) 05/26/2022    CREATININE 2 16 (H) 05/26/2022    GLUF 88 05/26/2022    CALCIUM 9 0 05/26/2022    CORRECTEDCA 9 3 08/26/2021    AST 19 05/26/2022    ALT 20 05/26/2022    ALKPHOS 78 05/26/2022    EGFR 28 05/26/2022       No results found for: IRON, TIBC, FERRITIN    No results found for: [id number]    No results for input(s): WBC, CREAT in the last 72 hours      Invalid input(s):  PLT     By:  Justyna Mcadams MD, 6/2/2022, 10:00 AM

## 2022-05-26 ENCOUNTER — APPOINTMENT (OUTPATIENT)
Dept: RADIOLOGY | Facility: CLINIC | Age: 77
End: 2022-05-26
Payer: MEDICARE

## 2022-05-26 ENCOUNTER — APPOINTMENT (OUTPATIENT)
Dept: LAB | Facility: CLINIC | Age: 77
End: 2022-05-26
Payer: MEDICARE

## 2022-05-26 DIAGNOSIS — M25.512 PAIN IN JOINT OF LEFT SHOULDER: ICD-10-CM

## 2022-05-26 DIAGNOSIS — E78.2 MIXED HYPERLIPIDEMIA: ICD-10-CM

## 2022-05-26 LAB
ALBUMIN SERPL BCP-MCNC: 3.6 G/DL (ref 3.5–5)
ALP SERPL-CCNC: 78 U/L (ref 46–116)
ALT SERPL W P-5'-P-CCNC: 20 U/L (ref 12–78)
ANION GAP SERPL CALCULATED.3IONS-SCNC: 3 MMOL/L (ref 4–13)
AST SERPL W P-5'-P-CCNC: 19 U/L (ref 5–45)
BILIRUB SERPL-MCNC: 0.71 MG/DL (ref 0.2–1)
BUN SERPL-MCNC: 32 MG/DL (ref 5–25)
CALCIUM SERPL-MCNC: 9 MG/DL (ref 8.3–10.1)
CHLORIDE SERPL-SCNC: 110 MMOL/L (ref 100–108)
CHOLEST SERPL-MCNC: 162 MG/DL
CO2 SERPL-SCNC: 28 MMOL/L (ref 21–32)
CREAT SERPL-MCNC: 2.16 MG/DL (ref 0.6–1.3)
ERYTHROCYTE [SEDIMENTATION RATE] IN BLOOD: 21 MM/HOUR (ref 0–19)
EST. AVERAGE GLUCOSE BLD GHB EST-MCNC: 117 MG/DL
GFR SERPL CREATININE-BSD FRML MDRD: 28 ML/MIN/1.73SQ M
GLUCOSE P FAST SERPL-MCNC: 88 MG/DL (ref 65–99)
HBA1C MFR BLD: 5.7 %
HDLC SERPL-MCNC: 51 MG/DL
LDLC SERPL CALC-MCNC: 101 MG/DL (ref 0–100)
NONHDLC SERPL-MCNC: 111 MG/DL
POTASSIUM SERPL-SCNC: 4.5 MMOL/L (ref 3.5–5.3)
PROT SERPL-MCNC: 7.5 G/DL (ref 6.4–8.2)
SODIUM SERPL-SCNC: 141 MMOL/L (ref 136–145)
TRIGL SERPL-MCNC: 48 MG/DL
TSH SERPL DL<=0.05 MIU/L-ACNC: 1.08 UIU/ML (ref 0.45–4.5)

## 2022-05-26 PROCEDURE — 80053 COMPREHEN METABOLIC PANEL: CPT

## 2022-05-26 PROCEDURE — 80061 LIPID PANEL: CPT

## 2022-05-26 PROCEDURE — 84443 ASSAY THYROID STIM HORMONE: CPT

## 2022-05-26 PROCEDURE — 36415 COLL VENOUS BLD VENIPUNCTURE: CPT

## 2022-05-26 PROCEDURE — 73030 X-RAY EXAM OF SHOULDER: CPT

## 2022-05-26 PROCEDURE — 83036 HEMOGLOBIN GLYCOSYLATED A1C: CPT

## 2022-05-26 PROCEDURE — 85652 RBC SED RATE AUTOMATED: CPT

## 2022-05-31 ENCOUNTER — TELEPHONE (OUTPATIENT)
Dept: HEMATOLOGY ONCOLOGY | Facility: CLINIC | Age: 77
End: 2022-05-31

## 2022-05-31 NOTE — TELEPHONE ENCOUNTER
Appointment Confirmation (to confirm pre existing appointments - ONLY)     Appointment with  Dr Princess Nix   Appointment date & time  6/2/22 9:40 am   Location [place]   Patient verbilized Understanding Yes

## 2022-06-02 ENCOUNTER — CONSULT (OUTPATIENT)
Dept: HEMATOLOGY ONCOLOGY | Facility: CLINIC | Age: 77
End: 2022-06-02
Payer: MEDICARE

## 2022-06-02 VITALS
HEIGHT: 63 IN | TEMPERATURE: 98.2 F | SYSTOLIC BLOOD PRESSURE: 130 MMHG | BODY MASS INDEX: 26.22 KG/M2 | OXYGEN SATURATION: 98 % | WEIGHT: 148 LBS | RESPIRATION RATE: 16 BRPM | DIASTOLIC BLOOD PRESSURE: 80 MMHG | HEART RATE: 61 BPM

## 2022-06-02 DIAGNOSIS — C61 PROSTATE CANCER (HCC): Primary | ICD-10-CM

## 2022-06-02 DIAGNOSIS — D47.2 BICLONAL GAMMOPATHY: ICD-10-CM

## 2022-06-02 PROCEDURE — 99204 OFFICE O/P NEW MOD 45 MIN: CPT | Performed by: INTERNAL MEDICINE

## 2022-06-02 NOTE — PATIENT INSTRUCTIONS
Patient information  Some of the immune system cells can cause issues over time that need to be monitored, one very common form of this is called MGUS or monoclonal gammopathy of undetermined significance  Immune cells make immunoglobulin, this is a family of proteins that help fight infections  When there are too many of these cells, we can detect the specific protein that they make  MGUS is extremely common, 1 out of 10 people will develop MGUS  It is more common as people age, and when there are inflammatory conditions (such as rheumatologic diseases like lupus, rheumatoid arthritis, inflammatory arthritis, myositis, etc)  Because MGUS is so common, there is a good chance we will find it on the testing today  About 5% of people develop what is called light chain MGUS (found in the urine or the blood), and about 5% of people develop MGUS from the intact immunoglobulin molecule (found in the blood primarily)  MGUS is monitored because it can turn into a blood cancer called multiple myeloma  We look for MGUS or myeloma when people have low blood counts  Most often MGUS is found when people have rheumatologic diseases or neurologic diseases, but no blood issues  There are different forms of MGUS, the most common form has only a 5% chance of turning into myeloma over 20 years  Other forms are monitored more differently  Several of the names are MGUS (abnormal full-length immunoglobulin in blood), Idiopathic Bence-Solorio Proteinuria (abnormal immunoglobulin fragments in the urine only, but not the blood), light chain MGUS (abnormal immunoglobulin fragments in the blood), multiple myeloma (abnormal immunoglobulin in blood, and the immune cells are causing problems with blood cell production or bone health)  Abnormal SPEP/UPEP:  Norkevin Anderson et al, Leukemia 2010)  Monoclonal gammopathy is a common condition, present in ~3% of patients older than 50, and ~5% of those aged >74   A recent population survey from [place] indicates that 10% of older patients have MGUS (median age 67-76, range 65-80) ([place] 2017)  Monoclonal gammopathy can arise from a clone of lymphocytes or plasma cells, it can be found as a result of CLL, indolent lymphomas, autoimmune conditions (SLE, RA) and  chronic liver diseases such as hepatitis C  The disorder is more common in men, and in  americans  IgG disease is the most common form of monoclonal gammopathy  MGUS is associated with several other conditions  Sarcoidosis, tuberculosis, hepatitis C virus, CMV infection, chronic hepatitis, colon cancer, hairy cell leukemia, CLL, acute leukemias  C1 esterase deficiency, diabetic neuropathy, Hashimoto's thyroiditis, hyperparathyroidism have also been reported  We note that lupus, cirrhosis, glomerular nephritis, psoriasis, pyoderma gangrenosum, scleroderma, Sjogren syndrome seronegative polyarthritis, scleroderma, rheumatoid arthritis, connective tissue disorders, ankylosing spondylitis have been associated with MGUS  The transformation of MGUS to myeloma, B-cell lymphoma, light chain amyloidosis, or macroglobulinemia depends on disease subtype  M-protein levels greater than 1 5g/dl, non-IgG monoclonal proteins, and free light chain ratios >1 65 are at risk factors  Low risk disease has none of the above risk factors and has a 20yr risk of progression of 5%  With 1 risk factor the 20yr risk is 21%, and with 2 risk factors the 20yr risk is 37%  Bone marrow biopsy is recommended in patients with paraprotein levels >1 5g/dl, elevated FLC ratio (usually >5), non-IgG paraprotein, and in patients with clear anemia/bone lesions/renal insufficiency/hypercalcemia without  an alternative explanation

## 2022-08-28 NOTE — PROGRESS NOTES
RENAL FOLLOW UP NOTE: td     ASSESSMENT AND PLAN:  26-year-old male with a history of prostate CA who we are seeing for CKD stage 3:     1   CKD stage 3B  · Etiology: Arteriolar nephrosclerosis,?  Hypertensive nephrosclerosis;  no evidence of primary glomerular process with a bland urinalysis without proteinuria or hematuria; no evidence of obstructive uropathy   · Baseline creatinine: 1 8  · Current creatinine:  Elevated at 2 50 from August 30 was on a medication for fungal toenail infection; also feels he was dehydrated on that day! · Urine protein creatinine ratio:  0 09 g at goal  ·  UA:  No proteinuria and no hematuria or pyuria  ·  PVR with bladder scan:  ·  renal artery duplex: negative  ·  renal ultrasound demonstrated cysts which are benign and simple no further evaluation required per the [facility] radiologist Dr Basilio Collier  Recommendations:  · Treat hypertension-please see below  · Treat dyslipidemia-please see below  · Maintain proteinuria less than 1 g or as low as possible  · Avoid nephrotoxic agents such as NSAIDs, and proton pump inhibitors if possible; patient counseled as such  · Repeat basic metabolic profile with good hydration along with a UA with microscopic  If persistently elevated to consider renal ultrasound      2   Volume:  Euvolemic     3   Hypertension:       Current blood pressure averages:   Blood pressures   No formal readings except for 2 days worth 1 elevated 1 low normal       · Goal blood pressure: less than 130/80 given CKD     Recommendations:  · Push nonmedical regimen including weight loss, isotonic exercise and a low sodium diet   Patient has been counseled the such  ? 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  No changes especially with good HDL     7   Anemia:  · Current hemoglobin:  Normal at   12 6 slightly lower; will repeat with iron studies  If persistently low to consider stool for fecal immuno chemical testing     8   Other problems:  · Prostate CA followed by Urology with negative biopsies of the last several years   Last biopsy was 03/02/2021  · Avascular necrosis of the left knee    · MGUS followed by Hematology      GI health maintenance:  11/04/2020:  By Dr Abigail Spears no polyps normal colonoscopy no further procedure require    PATIENT INSTRUCTIONS:    Patient Instructions   1  Medication changes today:   No medication changes today  2  Please go for non fasting  lab work at this time in the morning but nonfasting to recheck your kidney tests and blood count/iron studies    3   Please take 1 week a blood pressure readings  at this time using your left arm    AS FOLLOWS  MORNING AND EVENING, SITTING as follows:  · TAKE THE MORNING READINGS BEFORE ANY MEDICATIONS AND WHEN YOU ARE RELAXED FOR SEVERAL MINUTES  · TAKE THE EVENING READINGS:  BETWEEN 7-10 P M ; PRIOR TO ANY MEDICATIONS; AT LEAST IN OUR  FROM DINNER; AND CERTAINLY AFTER RELAXING FOR A FEW MINUTES  · PLEASE INCLUDE HEART RATE WITH YOUR BLOOD PRESSURE READINGS  · When taking standing readings, keep your arm supported at heart level and not dangling  · Make sure you are sitting with your back supported and feet on the ground and do not cross your legs or feet  · Make sure you have not taken any coffee or caffeine products or exercised or smoke cigarettes at least 30 minutes before taking your blood pressure  Then please mail these readings into the office    4  Follow-up in 3  months   Please bring in 1 week a blood pressure readings morning evening, sitting and standing is outlined above     Please go for fasting lab work 1-2 weeks prior to your appointment      5  General instructions:   AVOID SALT BUT NOT ADDING AN READING LABELS TO MAKE SURE THERE IS LOW-SALT IN THE FOOD THAT YOU ARE EATING  o Goal is less than 2 g of sodium intake or less than 5 g of sodium chloride intake per day     Avoid nonsteroidal anti-inflammatory drugs such as Naprosyn, ibuprofen, Aleve, Advil, Celebrex, Meloxicam (Mobic) etc   You can use Tylenol as needed if you do not have any liver condition to be concerned about     Avoid medications such as Sudafed or decongestants and antihistamines that contained the D component which is the decongestant  You can take antihistamines without the decongestant or D component   Try to avoid medications such as pantoprazole or  Protonix/Nexium or Esomeprazole)/Prilosec or omeprazole/Prevacid or lansoprazole/AcipHex or Rabeprazole  If you are able to, use Pepcid as this is safer for your kidneys   Try to exercise at least 30 minutes 3 days a week to begin with with an ultimate goal of 5 days a week for at least 30 minutes     Please do not drink more than 2 glasses of alcohol/wine on a daily basis as this may contribute to your high blood pressure  I did reach out to Dr Milad Topete the hematologist to follow-up roughly 6 months after his 1st appointment  Please let me know if you do not hear from him          Subjective: There has been no hospitalizations or acute illnesses since last visit  The patient overall is feeling well  No fevers, chills, or cough or colds  Good appetite and good energy  No hematuria, dysuria, voiding symptoms or foamy urine  No gastrointestinal symptoms  No cardiovascular symptoms including swelling of the legs  No headaches, dizziness or lightheadedness  Blood pressure medications:   None At this time      ROS:  See HPI, otherwise review of systems as completely reviewed with the patient are negative    Past Medical History:   Diagnosis Date    ED (erectile dysfunction)     Prostate cancer [facility])      Past Surgical History:   Procedure Laterality Date    COLONOSCOPY      HERNIA REPAIR Left     [place] BIOPSY OF PROSTATE,NEEDLE/PUNCH N/A 3/2/2021    Procedure: Transperineal MRI Fusion prostate biopsy and gold seed marker insertion;  Surgeon: Linh Borja MD;  [facility];  Service: Urology    PROSTATE BIOPSY      1/26/2015, 2/10/2017    PROSTATE BIOPSY       Family History   Problem Relation Age of Onset    Hypertension Mother     Prostate cancer Brother       reports that he has never smoked  He has never used smokeless tobacco  He reports current alcohol use of about 1 0 standard drink of alcohol per week  He reports that he does not use drugs  I COMPLETELY REVIEWED THE PAST MEDICAL HISTORY/PAST SURGICAL HISTORY/SOCIAL HISTORY/FAMILY HISTORY/AND MEDICATIONS  AND UPDATED ALL    Objective:     Vitals:   BP sitting on left:  158/70 with a heart rate 60 and regular  BP standing on left:  158/80 with rate of 60 and regular    Weight (last 2 days)     Date/Time Weight    09/06/22 1324 64 5 (142 2)        Wt Readings from Last 3 Encounters:   09/06/22 64 5 kg (142 lb 3 2 oz)   06/02/22 67 1 kg (148 lb)   04/26/22 66 6 kg (146 lb 12 8 oz)       Body mass index is 25 19 kg/m²      Physical Exam: General:  No acute distress  Skin:  No acute rash  Eyes:  No scleral icterus, noninjected, no discharge from eyes  ENT:  Moist mucous membranes  Neck:  Supple, no jugular venous distention, trachea is midline, no lymphadenopathy and no thyromegaly  Back   No CVAT  Chest:  Clear to auscultation and percussion, good respiratory effort  CVS:  Regular rate and rhythm without a rub, or gallops or murmurs  Abdomen:  Soft and nontender with normal bowel sounds  Extremities:  No cyanosis and no edema, no arthritic changes, normal range of motion  Neuro:  Grossly intact  Psych:  Alert, oriented x3 and appropriate      Medications:    Current Outpatient Medications:     Apoaequorin (Prevagen Extra Strength) 20 MG CAPS, Take 20 mg by mouth in the morning Once daily, Disp: , Rfl:     finasteride (PROSCAR) 5 mg tablet, take 1 tablet by mouth once daily, Disp: 90 tablet, Rfl: 3    ibuprofen (MOTRIN) 200 mg tablet, Take by mouth as needed for mild pain Takes two tablets monthly, Disp: , Rfl:     Multiple Vitamins-Minerals (CENTRUM SILVER 50+MEN PO), Take by mouth, Disp: , Rfl:     sildenafil (VIAGRA) 50 MG tablet, Take 1 tablet (50 mg total) by mouth as needed for erectile dysfunction, Disp: 30 tablet, Rfl: 2    Lab, Imaging and other studies: I have personally reviewed pertinent labs    Laboratory Results:  Results for orders placed or performed in visit on 08/30/22   Comprehensive metabolic panel   Result Value Ref Range    Sodium 143 135 - 147 mmol/L    Potassium 4 3 3 5 - 5 3 mmol/L    Chloride 110 (H) 96 - 108 mmol/L    CO2 29 21 - 32 mmol/L    ANION GAP 4 4 - 13 mmol/L    BUN 39 (H) 5 - 25 mg/dL    Creatinine 2 50 (H) 0 60 - 1 30 mg/dL    Glucose, Fasting 89 65 - 99 mg/dL    Calcium 9 4 8 3 - 10 1 mg/dL    Corrected Calcium 9 9 8 3 - 10 1 mg/dL    AST 17 5 - 45 U/L    ALT 22 12 - 78 U/L    Alkaline Phosphatase 89 46 - 116 U/L    Total Protein 7 8 6 4 - 8 4 g/dL    Albumin 3 4 (L) 3 5 - 5 0 g/dL    Total Bilirubin 0 70 0 20 - 1 00 mg/dL    eGFR 23 ml/min/1 73sq m   CBC   Result Value Ref Range    WBC 8 36 4 31 - 10 16 Thousand/uL    RBC 4 25 3 88 - 5 62 Million/uL    Hemoglobin 12 6 12 0 - 17 0 g/dL    Hematocrit 39 6 36 5 - 49 3 %    MCV 93 82 - 98 fL    MCH 29 6 26 8 - 34 3 pg    MCHC 31 8 31 4 - 37 4 g/dL    RDW 12 7 11 6 - 15 1 %    Platelets 928 177 - 406 Thousands/uL    MPV 9 9 8 9 - 12 7 fL   Lipid Panel with Direct LDL reflex   Result Value Ref Range    Cholesterol 168 See Comment mg/dL    Triglycerides 46 See Comment mg/dL    HDL, Direct 58 >=40 mg/dL    LDL Calculated 101 (H) 0 - 100 mg/dL   Magnesium   Result Value Ref Range    Magnesium 2 7 (H) 1 6 - 2 6 mg/dL   Phosphorus   Result Value Ref Range    Phosphorus 3 4 2 3 - 4 1 mg/dL   Protein / creatinine ratio, urine   Result Value Ref Range    Creatinine, Ur 175 0 mg/dL    Protein Urine Random 16 mg/dL    Prot/Creat Ratio, Ur 0 09 0 00 - 0 10   PTH, intact   Result Value Ref Range    PTH 59 2 18 4 - 80 1 pg/mL             Invalid input(s): ALBUMIN      Radiology review:   chest X-ray    Ultrasound      Portions of the record may have been created with voice recognition software  Occasional wrong word or "sound a like" substitutions may have occurred due to the inherent limitations of voice recognition software  Read the chart carefully and recognize, using context, where substitutions have occurred

## 2022-08-29 ENCOUNTER — TELEPHONE (OUTPATIENT)
Dept: NEPHROLOGY | Facility: CLINIC | Age: 77
End: 2022-08-29

## 2022-08-29 NOTE — TELEPHONE ENCOUNTER
SAMINA for patient reminding them to go for lab work and bring a week's worth of BP readings to his appt on 9/6  Asked him to call back if he has any questions

## 2022-08-30 ENCOUNTER — APPOINTMENT (OUTPATIENT)
Dept: LAB | Facility: CLINIC | Age: 77
End: 2022-08-30
Payer: MEDICARE

## 2022-08-30 DIAGNOSIS — I12.9 PARENCHYMAL RENAL HYPERTENSION, STAGE 1 THROUGH STAGE 4 OR UNSPECIFIED CHRONIC KIDNEY DISEASE: ICD-10-CM

## 2022-08-30 DIAGNOSIS — N18.32 STAGE 3B CHRONIC KIDNEY DISEASE (HCC): ICD-10-CM

## 2022-08-30 DIAGNOSIS — E78.5 DYSLIPIDEMIA: ICD-10-CM

## 2022-08-30 LAB
ALBUMIN SERPL BCP-MCNC: 3.4 G/DL (ref 3.5–5)
ALP SERPL-CCNC: 89 U/L (ref 46–116)
ALT SERPL W P-5'-P-CCNC: 22 U/L (ref 12–78)
ANION GAP SERPL CALCULATED.3IONS-SCNC: 4 MMOL/L (ref 4–13)
AST SERPL W P-5'-P-CCNC: 17 U/L (ref 5–45)
BILIRUB SERPL-MCNC: 0.7 MG/DL (ref 0.2–1)
BUN SERPL-MCNC: 39 MG/DL (ref 5–25)
CALCIUM ALBUM COR SERPL-MCNC: 9.9 MG/DL (ref 8.3–10.1)
CALCIUM SERPL-MCNC: 9.4 MG/DL (ref 8.3–10.1)
CHLORIDE SERPL-SCNC: 110 MMOL/L (ref 96–108)
CHOLEST SERPL-MCNC: 168 MG/DL
CO2 SERPL-SCNC: 29 MMOL/L (ref 21–32)
CREAT SERPL-MCNC: 2.5 MG/DL (ref 0.6–1.3)
CREAT UR-MCNC: 175 MG/DL
ERYTHROCYTE [DISTWIDTH] IN BLOOD BY AUTOMATED COUNT: 12.7 % (ref 11.6–15.1)
GFR SERPL CREATININE-BSD FRML MDRD: 23 ML/MIN/1.73SQ M
GLUCOSE P FAST SERPL-MCNC: 89 MG/DL (ref 65–99)
HCT VFR BLD AUTO: 39.6 % (ref 36.5–49.3)
HDLC SERPL-MCNC: 58 MG/DL
HGB BLD-MCNC: 12.6 G/DL (ref 12–17)
LDLC SERPL CALC-MCNC: 101 MG/DL (ref 0–100)
MAGNESIUM SERPL-MCNC: 2.7 MG/DL (ref 1.6–2.6)
MCH RBC QN AUTO: 29.6 PG (ref 26.8–34.3)
MCHC RBC AUTO-ENTMCNC: 31.8 G/DL (ref 31.4–37.4)
MCV RBC AUTO: 93 FL (ref 82–98)
PHOSPHATE SERPL-MCNC: 3.4 MG/DL (ref 2.3–4.1)
PLATELET # BLD AUTO: 236 THOUSANDS/UL (ref 149–390)
PMV BLD AUTO: 9.9 FL (ref 8.9–12.7)
POTASSIUM SERPL-SCNC: 4.3 MMOL/L (ref 3.5–5.3)
PROT SERPL-MCNC: 7.8 G/DL (ref 6.4–8.4)
PROT UR-MCNC: 16 MG/DL
PROT/CREAT UR: 0.09 MG/G{CREAT} (ref 0–0.1)
PTH-INTACT SERPL-MCNC: 59.2 PG/ML (ref 18.4–80.1)
RBC # BLD AUTO: 4.25 MILLION/UL (ref 3.88–5.62)
SODIUM SERPL-SCNC: 143 MMOL/L (ref 135–147)
TRIGL SERPL-MCNC: 46 MG/DL
WBC # BLD AUTO: 8.36 THOUSAND/UL (ref 4.31–10.16)

## 2022-08-30 PROCEDURE — 36415 COLL VENOUS BLD VENIPUNCTURE: CPT

## 2022-08-30 PROCEDURE — 80053 COMPREHEN METABOLIC PANEL: CPT

## 2022-08-30 PROCEDURE — 80061 LIPID PANEL: CPT

## 2022-08-30 PROCEDURE — 84100 ASSAY OF PHOSPHORUS: CPT

## 2022-08-30 PROCEDURE — 85027 COMPLETE CBC AUTOMATED: CPT

## 2022-08-30 PROCEDURE — 82570 ASSAY OF URINE CREATININE: CPT

## 2022-08-30 PROCEDURE — 83735 ASSAY OF MAGNESIUM: CPT

## 2022-08-30 PROCEDURE — 84156 ASSAY OF PROTEIN URINE: CPT

## 2022-08-30 PROCEDURE — 83970 ASSAY OF PARATHORMONE: CPT

## 2022-09-01 ENCOUNTER — TELEPHONE (OUTPATIENT)
Dept: NEPHROLOGY | Facility: CLINIC | Age: 77
End: 2022-09-01

## 2022-09-01 DIAGNOSIS — N18.32 STAGE 3B CHRONIC KIDNEY DISEASE (HCC): Primary | ICD-10-CM

## 2022-09-01 NOTE — TELEPHONE ENCOUNTER
----- Message from Suresh Darnell MD sent at 8/30/2022  2:13 PM EDT -----  Creatinine higher usual  Please review meds make sure he is on no NSAIDs and no new medications  Make sure he is feeling well  Repeat a basic metabolic profile with good hydration/UA with microscopic and urine protein creatinine ratio  Also repeat a renal ultrasound at this time with PVR

## 2022-09-02 NOTE — TELEPHONE ENCOUNTER
Spoke with patient about the following, he is feeling good  Only change in medication was an antifungal for his toe nail  Otherwise, no changes  He wasn't sure if he would be able to go for lab work before his appt    I told him I would place the orders and if he could do it then that would be great :    Creatinine higher usual  Please review meds make sure he is on no NSAIDs and no new medications  Make sure he is feeling well  Repeat a basic metabolic profile with good hydration/UA with microscopic and urine protein creatinine ratio  Also repeat a renal ultrasound at this time with PVR

## 2022-09-06 ENCOUNTER — OFFICE VISIT (OUTPATIENT)
Dept: NEPHROLOGY | Facility: CLINIC | Age: 77
End: 2022-09-06
Payer: MEDICARE

## 2022-09-06 VITALS — BODY MASS INDEX: 25.2 KG/M2 | WEIGHT: 142.2 LBS | HEIGHT: 63 IN

## 2022-09-06 DIAGNOSIS — N18.32 STAGE 3B CHRONIC KIDNEY DISEASE (HCC): ICD-10-CM

## 2022-09-06 DIAGNOSIS — E78.5 DYSLIPIDEMIA: ICD-10-CM

## 2022-09-06 DIAGNOSIS — D63.1 ANEMIA IN STAGE 3B CHRONIC KIDNEY DISEASE (HCC): ICD-10-CM

## 2022-09-06 DIAGNOSIS — N18.32 ANEMIA IN STAGE 3B CHRONIC KIDNEY DISEASE (HCC): ICD-10-CM

## 2022-09-06 DIAGNOSIS — N17.9 AKI (ACUTE KIDNEY INJURY) (HCC): ICD-10-CM

## 2022-09-06 DIAGNOSIS — I12.9 PARENCHYMAL RENAL HYPERTENSION, STAGE 1 THROUGH STAGE 4 OR UNSPECIFIED CHRONIC KIDNEY DISEASE: Primary | ICD-10-CM

## 2022-09-06 PROBLEM — N18.30 ANEMIA IN STAGE 3 CHRONIC KIDNEY DISEASE (HCC): Status: ACTIVE | Noted: 2022-09-06

## 2022-09-06 PROCEDURE — 99215 OFFICE O/P EST HI 40 MIN: CPT | Performed by: INTERNAL MEDICINE

## 2022-09-06 NOTE — LETTER
September 6, 2022     Fei Dawson MD  [facility]  [address]  [address] [facility] [address] [facility]    Patient: Zoie Howell   YOB: 1945   Date of Visit: 9/6/2022       Dear Dr Gtz Grad: Thank you for referring Zoie Howell to me for evaluation  Below are my notes for this consultation  If you have questions, please do not hesitate to call me  I look forward to following your patient along with you  Sincerely,        Branden Peguero MD        CC: No Recipients  Branden Peguero MD  9/6/2022  1:59 PM  Sign when Signing Visit  RENAL FOLLOW UP NOTE: td     ASSESSMENT AND PLAN:  75-year-old male with a history of prostate CA who we are seeing for CKD stage 3:     1   CKD stage 3B  · Etiology: Arteriolar nephrosclerosis,?  Hypertensive nephrosclerosis;  no evidence of primary glomerular process with a bland urinalysis without proteinuria or hematuria; no evidence of obstructive uropathy   · Baseline creatinine: 1 8  · Current creatinine:  Elevated at 2 50 from August 30 was on a medication for fungal toenail infection; also feels he was dehydrated on that day! · Urine protein creatinine ratio:  0 09 g at goal  ·  UA:  No proteinuria and no hematuria or pyuria  ·  PVR with bladder scan:  ·  renal artery duplex: negative  ·  renal ultrasound demonstrated cysts which are benign and simple no further evaluation required per the [facility] radiologist Dr uJstin Lloyd  Recommendations:  · Treat hypertension-please see below  · Treat dyslipidemia-please see below  · Maintain proteinuria less than 1 g or as low as possible  · Avoid nephrotoxic agents such as NSAIDs, and proton pump inhibitors if possible; patient counseled as such  · Repeat basic metabolic profile with good hydration along with a UA with microscopic    If persistently elevated to consider renal ultrasound      2   Volume:  Euvolemic     3   Hypertension:       Current blood pressure averages:   Blood pressures   No formal readings except for 2 days worth 1 elevated 1 low normal       · Goal blood pressure: less than 130/80 given CKD     Recommendations:  · Push nonmedical regimen including weight loss, isotonic exercise and a low sodium diet   Patient has been counseled the such  ? MedicationChanges today:    § The patient will send in a full week a blood pressure readings on the left arm  His blood pressure is elevated today but in the past has been quite good      4   Electrolytes:  · All acceptable  · Low anion gap:  immunofixation shows biclonal IgG kappa seen by Hematology UPEP negative and light chain ratio negative felt to be related to MGUS  Seen by Hematology who recommended follow-up labs do December for MGUS     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:  · Calcium/magnesium/phosphorus:  · All acceptable, no elevated magnesium so avoid any magnesium containing products including multivitamins  · PTH intact:  59 2   which is normal  · Vitamin-D:  41 4 at goal from prior value     6   Dyslipidemia:  · Goal LDL: less than 100 given CKD  · Current lipid profile:  LDL 101/HDL 58/triglycerides 46  Recommendations:  ·  Low-cholesterol/low-fat diet / weight loss as appropriate and isotonic exercise  ·  Medication changes today:  close to goal!  No changes especially with good HDL     7   Anemia:  · Current hemoglobin:  Normal at   12 6 slightly lower; will repeat with iron studies  If persistently low to consider stool for fecal immuno chemical testing     8   Other problems:  · Prostate CA followed by Urology with negative biopsies of the last several years   Last biopsy was 03/02/2021  · Avascular necrosis of the left knee    · MGUS followed by Hematology      GI health maintenance:  11/04/2020:  By Dr Calvert July no polyps normal colonoscopy no further procedure require    PATIENT INSTRUCTIONS:    Patient Instructions   1  Medication changes today:   No medication changes today      2  Please go for non fasting  lab work at this time in the morning but nonfasting to recheck your kidney tests and blood count/iron studies    3  Please take 1 week a blood pressure readings  at this time using your left arm    AS FOLLOWS  MORNING AND EVENING, SITTING as follows:  · TAKE THE MORNING READINGS BEFORE ANY MEDICATIONS AND WHEN YOU ARE RELAXED FOR SEVERAL MINUTES  · TAKE THE EVENING READINGS:  BETWEEN 7-10 P M ; PRIOR TO ANY MEDICATIONS; AT LEAST IN OUR  FROM DINNER; AND CERTAINLY AFTER RELAXING FOR A FEW MINUTES  · PLEASE INCLUDE HEART RATE WITH YOUR BLOOD PRESSURE READINGS  · When taking standing readings, keep your arm supported at heart level and not dangling  · Make sure you are sitting with your back supported and feet on the ground and do not cross your legs or feet  · Make sure you have not taken any coffee or caffeine products or exercised or smoke cigarettes at least 30 minutes before taking your blood pressure  Then please mail these readings into the office    4  Follow-up in 3  months   Please bring in 1 week a blood pressure readings morning evening, sitting and standing is outlined above     Please go for fasting lab work 1-2 weeks prior to your appointment      5  General instructions:   AVOID SALT BUT NOT ADDING AN READING LABELS TO MAKE SURE THERE IS LOW-SALT IN THE FOOD THAT YOU ARE EATING  o Goal is less than 2 g of sodium intake or less than 5 g of sodium chloride intake per day     Avoid nonsteroidal anti-inflammatory drugs such as Naprosyn, ibuprofen, Aleve, Advil, Celebrex, Meloxicam (Mobic) etc   You can use Tylenol as needed if you do not have any liver condition to be concerned about     Avoid medications such as Sudafed or decongestants and antihistamines that contained the D component which is the decongestant  You can take antihistamines without the decongestant or D component       Try to avoid medications such as pantoprazole or  Protonix/Nexium or Esomeprazole)/Prilosec or omeprazole/Prevacid or lansoprazole/AcipHex or Rabeprazole  If you are able to, use Pepcid as this is safer for your kidneys   Try to exercise at least 30 minutes 3 days a week to begin with with an ultimate goal of 5 days a week for at least 30 minutes     Please do not drink more than 2 glasses of alcohol/wine on a daily basis as this may contribute to your high blood pressure  Subjective: There has been no hospitalizations or acute illnesses since last visit  The patient overall is feeling well  No fevers, chills, or cough or colds  Good appetite and good energy  No hematuria, dysuria, voiding symptoms or foamy urine  No gastrointestinal symptoms  No cardiovascular symptoms including swelling of the legs  No headaches, dizziness or lightheadedness  Blood pressure medications:   None At this time      ROS:  See HPI, otherwise review of systems as completely reviewed with the patient are negative    Past Medical History:   Diagnosis Date    ED (erectile dysfunction)     Prostate cancer [facility])      Past Surgical History:   Procedure Laterality Date    COLONOSCOPY      HERNIA REPAIR Left     [place] BIOPSY OF PROSTATE,NEEDLE/PUNCH N/A 3/2/2021    Procedure: Transperineal MRI Fusion prostate biopsy and gold seed marker insertion;  Surgeon: Hoda Musa MD;  [facility];  Service: Urology    PROSTATE BIOPSY      1/26/2015, 2/10/2017    PROSTATE BIOPSY       Family History   Problem Relation Age of Onset    Hypertension Mother     Prostate cancer Brother       reports that he has never smoked  He has never used smokeless tobacco  He reports current alcohol use of about 1 0 standard drink of alcohol per week  He reports that he does not use drugs      I COMPLETELY REVIEWED THE PAST MEDICAL HISTORY/PAST SURGICAL HISTORY/SOCIAL HISTORY/FAMILY HISTORY/AND MEDICATIONS  AND UPDATED ALL    Objective:     Vitals:   BP sitting on left:  158/70 with a heart rate 60 and regular  BP standing on left:  158/80 with rate of 60 and regular    Weight (last 2 days)     Date/Time Weight    09/06/22 1324 64 5 (142 2)        Wt Readings from Last 3 Encounters:   09/06/22 64 5 kg (142 lb 3 2 oz)   06/02/22 67 1 kg (148 lb)   04/26/22 66 6 kg (146 lb 12 8 oz)       Body mass index is 25 19 kg/m²  Physical Exam: General:  No acute distress  Skin:  No acute rash  Eyes:  No scleral icterus, noninjected, no discharge from eyes  ENT:  Moist mucous membranes  Neck:  Supple, no jugular venous distention, trachea is midline, no lymphadenopathy and no thyromegaly  Back   No CVAT  Chest:  Clear to auscultation and percussion, good respiratory effort  CVS:  Regular rate and rhythm without a rub, or gallops or murmurs  Abdomen:  Soft and nontender with normal bowel sounds  Extremities:  No cyanosis and no edema, no arthritic changes, normal range of motion  Neuro:  Grossly intact  Psych:  Alert, oriented x3 and appropriate      Medications:    Current Outpatient Medications:     Apoaequorin (Prevagen Extra Strength) 20 MG CAPS, Take 20 mg by mouth in the morning Once daily, Disp: , Rfl:     finasteride (PROSCAR) 5 mg tablet, take 1 tablet by mouth once daily, Disp: 90 tablet, Rfl: 3    ibuprofen (MOTRIN) 200 mg tablet, Take by mouth as needed for mild pain Takes two tablets monthly, Disp: , Rfl:     Multiple Vitamins-Minerals (CENTRUM SILVER 50+MEN PO), Take by mouth, Disp: , Rfl:     sildenafil (VIAGRA) 50 MG tablet, Take 1 tablet (50 mg total) by mouth as needed for erectile dysfunction, Disp: 30 tablet, Rfl: 2    Lab, Imaging and other studies: I have personally reviewed pertinent labs    Laboratory Results:  Results for orders placed or performed in visit on 08/30/22   Comprehensive metabolic panel   Result Value Ref Range    Sodium 143 135 - 147 mmol/L    Potassium 4 3 3 5 - 5 3 mmol/L    Chloride 110 (H) 96 - 108 mmol/L    CO2 29 21 - 32 mmol/L    ANION GAP 4 4 - 13 mmol/L    BUN 39 (H) 5 - 25 mg/dL    Creatinine 2 50 (H) 0 60 - 1 30 mg/dL    Glucose, Fasting 89 65 - 99 mg/dL    Calcium 9 4 8 3 - 10 1 mg/dL    Corrected Calcium 9 9 8 3 - 10 1 mg/dL    AST 17 5 - 45 U/L    ALT 22 12 - 78 U/L    Alkaline Phosphatase 89 46 - 116 U/L    Total Protein 7 8 6 4 - 8 4 g/dL    Albumin 3 4 (L) 3 5 - 5 0 g/dL    Total Bilirubin 0 70 0 20 - 1 00 mg/dL    eGFR 23 ml/min/1 73sq m   CBC   Result Value Ref Range    WBC 8 36 4 31 - 10 16 Thousand/uL    RBC 4 25 3 88 - 5 62 Million/uL    Hemoglobin 12 6 12 0 - 17 0 g/dL    Hematocrit 39 6 36 5 - 49 3 %    MCV 93 82 - 98 fL    MCH 29 6 26 8 - 34 3 pg    MCHC 31 8 31 4 - 37 4 g/dL    RDW 12 7 11 6 - 15 1 %    Platelets 055 952 - 285 Thousands/uL    MPV 9 9 8 9 - 12 7 fL   Lipid Panel with Direct LDL reflex   Result Value Ref Range    Cholesterol 168 See Comment mg/dL    Triglycerides 46 See Comment mg/dL    HDL, Direct 58 >=40 mg/dL    LDL Calculated 101 (H) 0 - 100 mg/dL   Magnesium   Result Value Ref Range    Magnesium 2 7 (H) 1 6 - 2 6 mg/dL   Phosphorus   Result Value Ref Range    Phosphorus 3 4 2 3 - 4 1 mg/dL   Protein / creatinine ratio, urine   Result Value Ref Range    Creatinine, Ur 175 0 mg/dL    Protein Urine Random 16 mg/dL    Prot/Creat Ratio, Ur 0 09 0 00 - 0 10   PTH, intact   Result Value Ref Range    PTH 59 2 18 4 - 80 1 pg/mL             Invalid input(s): ALBUMIN      Radiology review:   chest X-ray    Ultrasound      Portions of the record may have been created with voice recognition software  Occasional wrong word or "sound a like" substitutions may have occurred due to the inherent limitations of voice recognition software  Read the chart carefully and recognize, using context, where substitutions have occurred

## 2022-09-06 NOTE — PATIENT INSTRUCTIONS
1  Medication changes today:  No medication changes today  2  Please go for non fasting  lab work at this time in the morning but nonfasting to recheck your kidney tests and blood count/iron studies    3  Please take 1 week a blood pressure readings  at this time using your left arm    AS FOLLOWS  MORNING AND EVENING, SITTING as follows:  TAKE THE MORNING READINGS BEFORE ANY MEDICATIONS AND WHEN YOU ARE RELAXED FOR SEVERAL MINUTES  TAKE THE EVENING READINGS:  BETWEEN 7-10 P M ; PRIOR TO ANY MEDICATIONS; AT LEAST IN OUR  FROM DINNER; AND CERTAINLY AFTER RELAXING FOR A FEW MINUTES  PLEASE INCLUDE HEART RATE WITH YOUR BLOOD PRESSURE READINGS  When taking standing readings, keep your arm supported at heart level and not dangling  Make sure you are sitting with your back supported and feet on the ground and do not cross your legs or feet  Make sure you have not taken any coffee or caffeine products or exercised or smoke cigarettes at least 30 minutes before taking your blood pressure  Then please mail these readings into the office    4  Follow-up in 3  months  Please bring in 1 week a blood pressure readings morning evening, sitting and standing is outlined above    Please go for fasting lab work 1-2 weeks prior to your appointment      5  General instructions:  AVOID SALT BUT NOT ADDING AN READING LABELS TO MAKE SURE THERE IS LOW-SALT IN THE FOOD THAT YOU ARE EATING  Goal is less than 2 g of sodium intake or less than 5 g of sodium chloride intake per day    Avoid nonsteroidal anti-inflammatory drugs such as Naprosyn, ibuprofen, Aleve, Advil, Celebrex, Meloxicam (Mobic) etc   You can use Tylenol as needed if you do not have any liver condition to be concerned about    Avoid medications such as Sudafed or decongestants and antihistamines that contained the D component which is the decongestant  You can take antihistamines without the decongestant or D component      Try to avoid medications such as pantoprazole or  Protonix/Nexium or Esomeprazole)/Prilosec or omeprazole/Prevacid or lansoprazole/AcipHex or Rabeprazole  If you are able to, use Pepcid as this is safer for your kidneys  Try to exercise at least 30 minutes 3 days a week to begin with with an ultimate goal of 5 days a week for at least 30 minutes    Please do not drink more than 2 glasses of alcohol/wine on a daily basis as this may contribute to your high blood pressure  I did reach out to Dr Beatrice Kc the hematologist to follow-up roughly 6 months after his 1st appointment  Please let me know if you do not hear from him

## 2022-09-27 ENCOUNTER — APPOINTMENT (OUTPATIENT)
Dept: LAB | Facility: CLINIC | Age: 77
End: 2022-09-27
Payer: MEDICARE

## 2022-09-27 DIAGNOSIS — N18.32 STAGE 3B CHRONIC KIDNEY DISEASE (HCC): ICD-10-CM

## 2022-09-27 DIAGNOSIS — N18.32 ANEMIA IN STAGE 3B CHRONIC KIDNEY DISEASE (HCC): ICD-10-CM

## 2022-09-27 DIAGNOSIS — D63.1 ANEMIA IN STAGE 3B CHRONIC KIDNEY DISEASE (HCC): ICD-10-CM

## 2022-09-27 DIAGNOSIS — E78.5 DYSLIPIDEMIA: ICD-10-CM

## 2022-09-27 DIAGNOSIS — I12.9 PARENCHYMAL RENAL HYPERTENSION, STAGE 1 THROUGH STAGE 4 OR UNSPECIFIED CHRONIC KIDNEY DISEASE: ICD-10-CM

## 2022-09-27 DIAGNOSIS — N17.9 AKI (ACUTE KIDNEY INJURY) (HCC): ICD-10-CM

## 2022-09-27 LAB
ANION GAP SERPL CALCULATED.3IONS-SCNC: 6 MMOL/L (ref 4–13)
BACTERIA UR QL AUTO: NORMAL /HPF
BILIRUB UR QL STRIP: NEGATIVE
BUN SERPL-MCNC: 37 MG/DL (ref 5–25)
CALCIUM SERPL-MCNC: 8.7 MG/DL (ref 8.3–10.1)
CHLORIDE SERPL-SCNC: 110 MMOL/L (ref 96–108)
CLARITY UR: CLEAR
CO2 SERPL-SCNC: 25 MMOL/L (ref 21–32)
COLOR UR: COLORLESS
CREAT SERPL-MCNC: 2.15 MG/DL (ref 0.6–1.3)
ERYTHROCYTE [DISTWIDTH] IN BLOOD BY AUTOMATED COUNT: 12.5 % (ref 11.6–15.1)
FERRITIN SERPL-MCNC: 113 NG/ML (ref 8–388)
GFR SERPL CREATININE-BSD FRML MDRD: 28 ML/MIN/1.73SQ M
GLUCOSE P FAST SERPL-MCNC: 88 MG/DL (ref 65–99)
GLUCOSE UR STRIP-MCNC: NEGATIVE MG/DL
HCT VFR BLD AUTO: 38.3 % (ref 36.5–49.3)
HGB BLD-MCNC: 12.2 G/DL (ref 12–17)
HGB UR QL STRIP.AUTO: NEGATIVE
IRON SATN MFR SERPL: 23 % (ref 20–50)
IRON SERPL-MCNC: 58 UG/DL (ref 65–175)
KETONES UR STRIP-MCNC: NEGATIVE MG/DL
LEUKOCYTE ESTERASE UR QL STRIP: NEGATIVE
MCH RBC QN AUTO: 30 PG (ref 26.8–34.3)
MCHC RBC AUTO-ENTMCNC: 31.9 G/DL (ref 31.4–37.4)
MCV RBC AUTO: 94 FL (ref 82–98)
NITRITE UR QL STRIP: NEGATIVE
NON-SQ EPI CELLS URNS QL MICRO: NORMAL /HPF
PH UR STRIP.AUTO: 5.5 [PH]
PLATELET # BLD AUTO: 211 THOUSANDS/UL (ref 149–390)
PMV BLD AUTO: 9.8 FL (ref 8.9–12.7)
POTASSIUM SERPL-SCNC: 4.3 MMOL/L (ref 3.5–5.3)
PROT UR STRIP-MCNC: NEGATIVE MG/DL
RBC # BLD AUTO: 4.06 MILLION/UL (ref 3.88–5.62)
RBC #/AREA URNS AUTO: NORMAL /HPF
SODIUM SERPL-SCNC: 141 MMOL/L (ref 135–147)
SP GR UR STRIP.AUTO: 1.01 (ref 1–1.03)
TIBC SERPL-MCNC: 248 UG/DL (ref 250–450)
UROBILINOGEN UR STRIP-ACNC: <2 MG/DL
WBC # BLD AUTO: 6.55 THOUSAND/UL (ref 4.31–10.16)
WBC #/AREA URNS AUTO: NORMAL /HPF

## 2022-09-27 PROCEDURE — 36415 COLL VENOUS BLD VENIPUNCTURE: CPT

## 2022-09-27 PROCEDURE — 80048 BASIC METABOLIC PNL TOTAL CA: CPT

## 2022-09-27 PROCEDURE — 83550 IRON BINDING TEST: CPT

## 2022-09-27 PROCEDURE — 82728 ASSAY OF FERRITIN: CPT

## 2022-09-27 PROCEDURE — 81001 URINALYSIS AUTO W/SCOPE: CPT

## 2022-09-27 PROCEDURE — 85027 COMPLETE CBC AUTOMATED: CPT

## 2022-09-27 PROCEDURE — 83540 ASSAY OF IRON: CPT

## 2022-09-28 ENCOUNTER — TELEPHONE (OUTPATIENT)
Dept: NEPHROLOGY | Facility: CLINIC | Age: 77
End: 2022-09-28

## 2022-09-28 DIAGNOSIS — N18.32 STAGE 3B CHRONIC KIDNEY DISEASE (HCC): Primary | ICD-10-CM

## 2022-09-28 NOTE — TELEPHONE ENCOUNTER
----- Message from Coleen Lozano MD sent at 9/28/2022  7:42 AM EDT -----  Let the patient know that the kidney function  better  For completeness I would do a renal us at this time

## 2022-09-28 NOTE — TELEPHONE ENCOUNTER
LM for patient about the following, asked him to call back if he has any questions:    Let the patient know that the kidney function  better  For completeness I would do a renal us at this time

## 2022-10-03 ENCOUNTER — HOSPITAL ENCOUNTER (OUTPATIENT)
Dept: ULTRASOUND IMAGING | Facility: HOSPITAL | Age: 77
Discharge: HOME | End: 2022-10-03
Attending: INTERNAL MEDICINE
Payer: MEDICARE

## 2022-10-03 DIAGNOSIS — N18.32 STAGE 3B CHRONIC KIDNEY DISEASE (HCC): ICD-10-CM

## 2022-10-03 PROCEDURE — 76775 US EXAM ABDO BACK WALL LIM: CPT

## 2022-10-19 ENCOUNTER — APPOINTMENT (OUTPATIENT)
Dept: LAB | Facility: CLINIC | Age: 77
End: 2022-10-19
Payer: MEDICARE

## 2022-10-19 DIAGNOSIS — C61 PROSTATE CA (HCC): ICD-10-CM

## 2022-10-19 LAB — PSA SERPL-MCNC: 3.4 NG/ML (ref 0–4)

## 2022-10-19 PROCEDURE — 84153 ASSAY OF PSA TOTAL: CPT

## 2022-10-19 PROCEDURE — 36415 COLL VENOUS BLD VENIPUNCTURE: CPT

## 2022-10-25 NOTE — PROGRESS NOTES
10/26/2022      Chief Complaint   Patient presents with   • Follow-up       Assessment and Plan    1  Melany 6 prostate cancer on active surveillance   - Diagnosed in 2011 with outside urologist    - Multiparametric prostate MRI 12/1/20 - PI-RADS Category 5  - S/p MRI targeted transperineal prostate biopsy on 3/2/21 showing only 1 core of [place] 3+3=6, less than 5% of the core  Prior to this he had 5 or 6 prostate biopsies which were more recently negative for cancer    - PSA from 10/19/22 was stable at 3 4  Previous PSA trend below   - LEILA negative for nodules  - Follow up in 6 months with repeat PSA  Should there be any progressive elevation of PSA in the future, would recommend repeat mpMRI    2  BPH with LUTS  - Well managed on finasteride  - PVR=80 ML    3  ED  - Continue Viagra PRN  Rx refilled    History of Present Illness  Ruth Terrazas is a 68 y o  male here for follow up evaluation of  Prostate cancer  Patient has history of Melany 6 prostate cancer that was diagnosed in 2011 by outside urologist   He apparently had 5-6 prostate biopsies after initial diagnosis, the most recent biopsy being negative for cancer  He did complete prostate MRI in 2018 which was negative  He did have a progressive increase in PSA to 10 5 and a repeat MRI was performed showing a PI-RADS category 5 lesion  He then underwent MRI targeted prostate biopsy in March of 2021 which showed 1 core [place]enton 6 prostate cancer in less than 5% of the core  He opted for continued active surveillance  His most recent PSA remains stable at 3 4  He is on daily finasteride for lower urinary tract symptoms  He takes Viagra as needed for erectile dysfunction  He denies any new or bothersome urinary issues or complaints        Component Ref Range & Units 10/19/22  8:49 AM 4/21/22  7:44 AM 10/6/21  8:59 AM 12/11/20  1:45 PM 5/15/20  9:00 AM   PSA, Diagnostic 0 0 - 4 0 ng/mL 3 4  3 5 CM  3 2 CM  10 5 High  CM  8 8 High  CM           Review of Systems   Constitutional: Negative for chills and fever  Respiratory: Negative for shortness of breath  Cardiovascular: Negative for chest pain  Gastrointestinal: Negative for abdominal pain  Genitourinary: Negative for difficulty urinating, dysuria, flank pain, frequency, hematuria and urgency  Neurological: Negative for dizziness  Past Medical History  Past Medical History:   Diagnosis Date   • ED (erectile dysfunction)    • Prostate cancer [facility])        Past Social History  Past Surgical History:   Procedure Laterality Date   • COLONOSCOPY     • HERNIA REPAIR Left    • [place] BIOPSY OF PROSTATE,NEEDLE/PUNCH N/A 3/2/2021    Procedure: Transperineal MRI Fusion prostate biopsy and gold seed marker insertion;  Surgeon: Lowell Farooq MD;  Location: [facility];  Service: Urology   • PROSTATE BIOPSY      1/26/2015, 2/10/2017   • PROSTATE BIOPSY       Social History     Tobacco Use   Smoking Status Never Smoker   Smokeless Tobacco Never Used   Tobacco Comment    tried it once        Past Family History  Family History   Problem Relation Age of Onset   • Hypertension Mother    • Prostate cancer Brother    • No Known Problems Brother    • No Known Problems Sister    • No Known Problems Son    • No Known Problems Son    • No Known Problems Daughter        Past Social history  Social History     Socioeconomic History   • Marital status: /Civil Union     Spouse name: Not on file   • Number of children: Not on file   • Years of education: Not on file   • Highest education level: Not on file   Occupational History   • Not on file   Tobacco Use   • Smoking status: Never Smoker   • Smokeless tobacco: Never Used   • Tobacco comment: tried it once    Vaping Use   • Vaping Use: Never used   Substance and Sexual Activity   • Alcohol use:  Yes     Alcohol/week: 1 0 standard drink     Types: 1 Glasses of wine per week     Comment: social   • Drug use: No   • Sexual activity: Not on file   Other Topics Concern • Not on file   Social History Narrative   • Not on file     Social Determinants of Health     Financial Resource Strain: Not on file   Food Insecurity: Not on file   Transportation Needs: Not on file   Physical Activity: Not on file   Stress: Not on file   Social Connections: Not on file   Intimate Partner Violence: Not on file   Housing Stability: Not on file       Current Medications  Current Outpatient Medications   Medication Sig Dispense Refill   • Apoaequorin (Prevagen Extra Strength) 20 MG CAPS Take 20 mg by mouth in the morning Once daily     • finasteride (PROSCAR) 5 mg tablet take 1 tablet by mouth once daily 90 tablet 3   • ibuprofen (MOTRIN) 200 mg tablet Take by mouth as needed for mild pain Takes two tablets monthly     • Multiple Vitamins-Minerals (CENTRUM SILVER 50+MEN PO) Take by mouth     • sildenafil (VIAGRA) 50 MG tablet Take 1 tablet (50 mg total) by mouth as needed for erectile dysfunction 30 tablet 2     No current facility-administered medications for this visit  Allergies  No Known Allergies      The following portions of the patient's history were reviewed and updated as appropriate: allergies, current medications, past medical history, past social history, past surgical history and problem list       Vitals  Vitals:    10/26/22 0752   BP: 124/84   Pulse: 64   SpO2: 98%   Weight: 65 8 kg (145 lb)   Height: 5' 3" (1 6 m)           Physical Exam  Physical Exam  Constitutional:       Appearance: Normal appearance  HENT:      Head: Normocephalic and atraumatic  Right Ear: External ear normal       Left Ear: External ear normal    Eyes:      General: No scleral icterus  Conjunctiva/sclera: Conjunctivae normal    Cardiovascular:      Pulses: Normal pulses  Pulmonary:      Effort: Pulmonary effort is normal    Genitourinary:     Comments: Prostate approximately 45 g without nodules or tenderness  Musculoskeletal:         General: Normal range of motion        Cervical back: Normal range of motion  Skin:     General: Skin is warm and dry  Neurological:      General: No focal deficit present  Mental Status: He is alert and oriented to person, place, and time  Psychiatric:         Mood and Affect: Mood normal          Behavior: Behavior normal          Thought Content:  Thought content normal          Judgment: Judgment normal            Results  Recent Results (from the past 1 hour(s))   POCT Measure PVR    Collection Time: 10/26/22  7:55 AM   Result Value Ref Range    POST-VOID RESIDUAL VOLUME, ML POC 83 mL   ]  Lab Results   Component Value Date    PSA 3 4 10/19/2022    PSA 3 5 04/21/2022    PSA 3 2 10/06/2021     Lab Results   Component Value Date    CALCIUM 8 7 09/27/2022    K 4 3 09/27/2022    CO2 25 09/27/2022     (H) 09/27/2022    BUN 37 (H) 09/27/2022    CREATININE 2 15 (H) 09/27/2022     Lab Results   Component Value Date    WBC 6 55 09/27/2022    HGB 12 2 09/27/2022    HCT 38 3 09/27/2022    MCV 94 09/27/2022     09/27/2022           Orders  Orders Placed This Encounter   Procedures   • POCT Measure PVR       Viji Milder

## 2022-10-26 ENCOUNTER — OFFICE VISIT (OUTPATIENT)
Dept: UROLOGY | Facility: CLINIC | Age: 77
End: 2022-10-26
Payer: MEDICARE

## 2022-10-26 VITALS
HEART RATE: 64 BPM | HEIGHT: 63 IN | BODY MASS INDEX: 25.69 KG/M2 | OXYGEN SATURATION: 98 % | WEIGHT: 145 LBS | SYSTOLIC BLOOD PRESSURE: 124 MMHG | DIASTOLIC BLOOD PRESSURE: 84 MMHG

## 2022-10-26 DIAGNOSIS — N40.1 BENIGN LOCALIZED PROSTATIC HYPERPLASIA WITH LOWER URINARY TRACT SYMPTOMS (LUTS): Primary | ICD-10-CM

## 2022-10-26 DIAGNOSIS — N52.9 ORGANIC IMPOTENCE: ICD-10-CM

## 2022-10-26 LAB — POST-VOID RESIDUAL VOLUME, ML POC: 83 ML

## 2022-10-26 PROCEDURE — 51798 US URINE CAPACITY MEASURE: CPT | Performed by: PHYSICIAN ASSISTANT

## 2022-10-26 PROCEDURE — 99213 OFFICE O/P EST LOW 20 MIN: CPT | Performed by: PHYSICIAN ASSISTANT

## 2022-10-26 RX ORDER — SILDENAFIL 50 MG/1
50 TABLET, FILM COATED ORAL AS NEEDED
Qty: 30 TABLET | Refills: 2 | Status: SHIPPED | OUTPATIENT
Start: 2022-10-26

## 2022-11-29 NOTE — PROGRESS NOTES
Hematology Outpatient Office Note    Date of Service: 12/8/2022    [facility] HEMATOLOGY SPECIALISTS     Reason for Consultation:   Chief Complaint   Patient presents with   • Follow-up       Referral Physician: No ref  provider found  (Nephrologist)    Primary Care Physician:  Tara Campbell MD     Nickname: Micasandra Quinn    Wife: Jocelyn Johnston  (4 years)    ASSESSMENT & PLAN      Diagnosis ICD-10-CM Associated Orders   1  Prostate cancer ([facility] [address] )  C61       2  Biclonal gammopathy  D47 2 IgG, IgA, IgM     Immunoglobulin free LT chains blood     Protein, urine, 24 hour     Protein electrophoresis, urine     Protein, Total and Protein Electrophoresis with Immunofixation            This is a 68 y o  c PMHx notable for early prostate cancer on active surveillance, 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      IgG MGUS     4/21/2022 SPEP and GAGE showed: Serum immunofixation shows a bioclonal gammopathy identified as IgG (0 23 g/dL, 0 25 g/dL g/dL)  K/lambda ratio 0 8  There is an absence of end-organ damage (his CKD is from HTN), hypercalcemia, or bone pain  CKD can lead to elevations in the free light chain ratio, a recent study observed that patients with a ratio less than 5 05 did not have myeloma, yet myeloma is usually associated with extreme ratios ~200 (Hamida 2015)  In this study patients with CKD commonly had elevations of LC ratios beyond the traditional normal range, 33% for nephrotic syndomre, 36% non-nephrotic proteinuria, 47% of CKD of unknown origin  Idiopathic Bence-Solorio Proteinuria (abnormal free light chains in urine only, not serum) is a rare plasma cell dyscrasia  Julio et al [facility] Hematol 2013) reported that progression to malignancy or amyloidosis was low at 0 4%/yr  , this was much lower than MGUS+BJP at 1 6%/year  While most patients who progressed did so to myeloma or smoldering myeloma, other lymphomas were also noted   Interestingly, BJP was more commonly found in those with neuropathy and anemia  Imaging: generally not recommended for MGUS  If high risk features are present PET-CT can be helpful  NCCN guidelines recommend skeletal survey for lytic lesions in the assessment of myeloma, however the sensitivity is poor and many false positive findings are noted  Whole body MRI can also be effective  Since monoclonal gammopathy is not treated, extensive evaluation of low level paraprotein levels is not recommended  Please note that in patients with underlying renal disease will have elevated kappa light chains as a result of decreased renal clearance, this can lead to FLC ratios and further investigation is not recommended for FLC ratios <5  Recommendations:   International Myeloma Working Group has recommended that low risk MGUS (M-spike <1 5 g/dl, IgG, FLC ratio <1 65) be observed with SPEP in 6months, and if stable they are followed q2-3 years  Bone marrow biopsy and skeletal survey are not indicated provided there are not high risk features  Recent labs not done  · Discussion of decision making    I personally reviewed the following lab results, the image studies, pathology, other specialty/physicians consult notes and recommendations, and [facility]  I had a lengthy discussion with the patient and shared the work-up findings  We discussed the diagnosis and management plan as below  I spent 33 minutes reviewing the records (labs, clinician notes, outside records, medical history, ordering medicine/tests/procedures, interpreting the imaging/labs previously done) and coordination of care as well as direct time with the patient today, of which greater than 50% of the time was spent in counseling and coordination of care with the patient/family      · Plan/Labs  · MGUS labs including SPEP, CBC, CMP q1-2 years         Follow Up: 12 months    All questions were answered to the patient's satisfaction during this encounter  The patient knows the contact information for our office and knows to reach out for any relevant concerns related to this encounter  They are to call for any temperature 100 4 or higher, new symptoms including but not restricted to shaking chills, decreased appetite, nausea, vomiting, diarrhea, increased fatigue, shortness of breath or chest pain, confusion, and not feeling the strength to come to the clinic  For all other listed problems and medical diagnosis in their chart - they are managed by PCP and/or other specialists, which the patient acknowledges  Thank you very much for your consultation and making us a part of this patient's care  We are continuing to follow closely with you  Please do not hesitate to reach out to me with any additional questions or concerns  Geremias Eddy MD  Hematology & Medical Oncology Staff Physician             Disclaimer: This document was prepared using [employer] Fluency Direct technology  If a word or phrase is confusing, or does not make sense, this is likely due to recognition error which was not discovered during this clinician's review  If you believe an error has occurred, please contact me through [address] line for shaniqua? 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   Pain: Denies    3/2/21: 1/13 prostate cores with 3+3 (G6) is on active surveillance  4/21/22: PSA 3 5, creat 1 86,  biclonal peak IgG   10/19/2022: PSA 3 4    SUBJECTIVE  (INTERVAL HISTORY)        I have reviewed the relevant past medical, surgical, social and family history   I have also reviewed allergies and medications for this patient  Interval events: no acute issues, feeling good  Brother passed away and mother on hospice and has been a little depressed from that  Review of Systems  Baseline weight: 141 lbs     Denies F/C, bone pain, joints except L shoulder, night sweats, N/V, Sob, CP, LH, HA, falls, gen weakness, hematuria, melena, hematochezia, rash, or itching  6 months L shoulder discomfort since he started playing pickle-ball  A 10-point review of system was performed, pertinent positive and negative were detailed as above  Otherwise, the 10-point review of system was negative  Past Medical History:   Diagnosis Date   • ED (erectile dysfunction)    • Prostate cancer [facility])        Past Surgical History:   Procedure Laterality Date   • COLONOSCOPY     • HERNIA REPAIR Left    • FL BIOPSY OF PROSTATE,NEEDLE/PUNCH N/A 3/2/2021    Procedure: Transperineal MRI Fusion prostate biopsy and gold seed marker insertion;  Surgeon: Alvarado Roldan MD;  Location[facility];  Service: Urology   • PROSTATE BIOPSY      1/26/2015, 2/10/2017   • PROSTATE BIOPSY         Family History   Problem Relation Age of Onset   • Hypertension Mother    • Prostate cancer Brother    • No Known Problems Brother    • No Known Problems Sister    • No Known Problems Son    • No Known Problems Son    • No Known Problems Daughter        Social History     Socioeconomic History   • Marital status: /Civil Union     Spouse name: Not on file   • Number of children: Not on file   • Years of education: Not on file   • Highest education level: Not on file   Occupational History   • Not on file   Tobacco Use   • Smoking status: Never   • Smokeless tobacco: Never   • Tobacco comments:     tried it once    Vaping Use   • Vaping Use: Never used   Substance and Sexual Activity   • Alcohol use:  Yes     Alcohol/week: 1 0 standard drink     Types: 1 Glasses of wine per week     Comment: social   • Drug use: No   • Sexual activity: Not on file Other Topics Concern   • Not on file   Social History Narrative   • Not on file     Social Determinants of Health     Financial Resource Strain: Not on file   Food Insecurity: Not on file   Transportation Needs: Not on file   Physical Activity: Not on file   Stress: Not on file   Social Connections: Not on file   Intimate Partner Violence: Not on file   Housing Stability: Not on file       No Known Allergies    Current Outpatient Medications   Medication Sig Dispense Refill   • Apoaequorin (Prevagen Extra Strength) 20 MG CAPS Take 20 mg by mouth in the morning Once daily     • azithromycin (ZITHROMAX) 250 mg tablet      • finasteride (PROSCAR) 5 mg tablet take 1 tablet by mouth once daily 90 tablet 3   • Multiple Vitamins-Minerals (CENTRUM SILVER 50+MEN PO) Take by mouth     • sildenafil (VIAGRA) 50 MG tablet Take 1 tablet (50 mg total) by mouth as needed for erectile dysfunction 30 tablet 2     No current facility-administered medications for this visit  (Not in a hospital admission)        Objective:     24 Hour Vitals Assessment:     Vitals:    12/08/22 0912   BP: 152/88   Pulse: 64   Resp: 16   Temp: 97 6 °F (36 4 °C)   SpO2: 99%       PHYSICIAN EXAM:    General: Appearance: alert, cooperative, no distress  HEENT: Normocephalic, atraumatic  No scleral icterus  conjunctivae clear  EOMI  Chest: No tenderness to palpation  No open wound noted  Lungs: Clear to auscultation bilaterally, Respirations unlabored  Cardiac: Regular rate and rhythm, +S1and S2  Abdomen: Soft, non-tender, non-distended  Bowel sounds are normal    Extremities:  No edema, cyanosis, clubbing  Skin: Skin color, turgor are normal  No rashes  Neurologic: Awake, Alert, and oriented, no gross focal deficits noted b/l  DATA REVIEW:    Pathology Result:    Final Diagnosis   Date Value Ref Range Status   03/02/2021   Final    A  Prostate, L lat base, Biopsy:  - Benign prostatic tissue       B  Prostate, L base, Biopsy:  - Benign prostatic tissue  C  Prostate, L lat med, Biopsy:  - Benign prostatic tissue  D  Prostate, L med, Biopsy:  - Benign prostatic tissue  E  Prostate, L lat apex, Biopsy:  - Benign prostatic tissue  F  Prostate, L apex, Biopsy:  - Benign prostatic tissue  G  Prostate, R lat base, Biopsy:  - Benign prostatic tissue  H  Prostate, R base, Biopsy:  - Prostatic adenocarcinoma, [place] score 3+3=6, Prognostic Grade Group 1, involving 1 of 1 core (5% involvement)  - Focal high-grade prostatic intraepithelial neoplasia (HGPIN)  - Perineural invasion is not identified  I  Prostate, R lat med, Biopsy:  - Benign prostatic tissue  J  Prostate, R med, Biopsy:  - Benign prostatic tissue  K  Prostate, R lat apex, Biopsy:  - No tissue present  L  Prostate, R apex, Biopsy:  - Benign prostatic tissue  M  Prostate, Target L lat med, Biopsy:  - Benign prostatic tissue  11/24/2020   Final    A  Esophagus, barretts distal:  - Squamocolumnar mucosa with chronic inflammatory and reactive changes  - Negative for intestinal metaplasia, dysplasia, and malignancy  04/24/2018   Final    A  Prostate, RPZ, needle biopsy:  -  Benign prostate tissue, negative for malignancy  B   Prostate, RCZ, needle biopsy:  -  Benign prostate tissue, negative for malignancy  -  Multiplex immunohistochemical stain performed with appropriate control highlights intact basal layer cells staining for p63 and high molecular weight keratin with no significant luminal racemase expression, supporting the diagnosis  C   Prostate, MADI, needle biopsy:  -  Benign prostate tissue, negative for malignancy  -  Multiplex immunohistochemical stain performed with appropriate control highlights intact basal layer cells staining for p63 and high molecular weight keratin with no significant luminal racemase expression, supporting the diagnosis      D   Prostate, LCZ, needle biopsy:  -  Benign prostate tissue, negative for malignancy  02/10/2017   Final    A  Prostate, left peripheral zone, biopsy:  -  Benign prostate tissue with focal features of atrophy, negative for malignancy  -  Multiplex immunohistochemical staining performed with appropriate controls shows intact basal layer cells staining for p63 and molecular weight keratin without luminal expression of racemase, supporting the diagnosis  B   Prostate, left central zone, biopsy:  -  Benign prostate tissue, negative for malignancy  C   Prostate, right peripheral zone, biopsy:  -  Benign prostate tissue with focal features of atrophy, negative for malignancy  -  Multiplex immunohistochemical staining performed with appropriate controls shows intact basal layer cells staining for p63 and molecular weight keratin without luminal expression of racemase, supporting the diagnosis  D   Prostate, right central zone, biopsy:  -  Benign prostate tissue, negative for malignancy  Image Results:   Image result are reviewed and documented in Hematology/Oncology history  I personally reviewed these images  US kidney and bladder  Narrative: RENAL ULTRASOUND    INDICATION:   N18 32: Chronic kidney disease, stage 3b  COMPARISON: None    TECHNIQUE:   Ultrasound of the retroperitoneum was performed with a curvilinear transducer utilizing volumetric sweeps and still imaging techniques  FINDINGS:    KIDNEYS:  Symmetric and normal size  Right kidney:  9 0 x 7 4 x 3 3 cm  Volume 116 3 mL  Left kidney:  8 7 x 5 3 x 4 2 cm  Volume 102 8 mL    Right kidney  Normal echogenicity and contour  No mass is identified  There is a 4 5 x 4 2 x 4 6 cm partially exophytic lower pole simple cyst is seen  No hydronephrosis  No shadowing calculi  No perinephric fluid collections  Left kidney  Normal echogenicity and contour  No mass is identified  1 7 x 1 4 x 1 4 cm cyst in the lower pole  No hydronephrosis  No shadowing calculi    No perinephric fluid collections  URETERS:  Nonvisualized  BLADDER:   Normally distended  No focal thickening or mass lesions  Bilateral ureteral jets detected  Prominent prostate gland measuring 4 3 x 4 5 x 4 5 cm protruding into the posterior inferior bladder wall  Impression: Bilateral renal cysts as described above  No nephrolithiasis, focal renal mass, or hydronephrosis bilaterally  Prominent prostate gland protruding into the posterior inferior bladder wall  Workstation performed: [id number]      LABS:  Lab data are reviewed and documented in HemOnc history  Lab Results   Component Value Date    HGB 12 6 12/01/2022    HCT 39 6 12/01/2022    MCV 92 12/01/2022     12/01/2022    WBC 9 32 12/01/2022    NRBC 0 09/11/2020     Lab Results   Component Value Date    K 4 2 12/01/2022     (H) 12/01/2022    CO2 27 12/01/2022    BUN 32 (H) 12/01/2022    CREATININE 1 94 (H) 12/01/2022    GLUF 98 12/01/2022    CALCIUM 8 9 12/01/2022    CORRECTEDCA 9 5 12/01/2022    AST 18 12/01/2022    ALT 22 12/01/2022    ALKPHOS 88 12/01/2022    EGFR 32 12/01/2022       Lab Results   Component Value Date    IRON 56 (L) 12/01/2022    TIBC 228 (L) 12/01/2022    FERRITIN 101 12/01/2022    FERRITIN 113 09/27/2022       No results found for: [id number]    No results for input(s): WBC, CREAT in the last 72 hours      Invalid input(s):  PLT     By:  Cely Lagos, 12/8/2022, 9:28 AM

## 2022-11-30 ENCOUNTER — TELEPHONE (OUTPATIENT)
Dept: NEPHROLOGY | Facility: CLINIC | Age: 77
End: 2022-11-30

## 2022-11-30 NOTE — TELEPHONE ENCOUNTER
Spoke with patient reminding him to go for lab work before his appt on 12/6  He expressed understanding and thanked us for the call

## 2022-12-01 ENCOUNTER — APPOINTMENT (OUTPATIENT)
Dept: LAB | Facility: CLINIC | Age: 77
End: 2022-12-01

## 2022-12-01 DIAGNOSIS — N18.32 ANEMIA IN STAGE 3B CHRONIC KIDNEY DISEASE (HCC): ICD-10-CM

## 2022-12-01 DIAGNOSIS — E78.5 DYSLIPIDEMIA: ICD-10-CM

## 2022-12-01 DIAGNOSIS — N17.9 AKI (ACUTE KIDNEY INJURY) (HCC): ICD-10-CM

## 2022-12-01 DIAGNOSIS — D63.1 ANEMIA IN STAGE 3B CHRONIC KIDNEY DISEASE (HCC): ICD-10-CM

## 2022-12-01 DIAGNOSIS — N18.32 STAGE 3B CHRONIC KIDNEY DISEASE (HCC): ICD-10-CM

## 2022-12-01 DIAGNOSIS — I12.9 PARENCHYMAL RENAL HYPERTENSION, STAGE 1 THROUGH STAGE 4 OR UNSPECIFIED CHRONIC KIDNEY DISEASE: ICD-10-CM

## 2022-12-01 LAB
ALBUMIN SERPL BCP-MCNC: 3.3 G/DL (ref 3.5–5)
ALP SERPL-CCNC: 88 U/L (ref 46–116)
ALT SERPL W P-5'-P-CCNC: 22 U/L (ref 12–78)
ANION GAP SERPL CALCULATED.3IONS-SCNC: 4 MMOL/L (ref 4–13)
AST SERPL W P-5'-P-CCNC: 18 U/L (ref 5–45)
BILIRUB SERPL-MCNC: 0.42 MG/DL (ref 0.2–1)
BUN SERPL-MCNC: 32 MG/DL (ref 5–25)
CALCIUM ALBUM COR SERPL-MCNC: 9.5 MG/DL (ref 8.3–10.1)
CALCIUM SERPL-MCNC: 8.9 MG/DL (ref 8.3–10.1)
CHLORIDE SERPL-SCNC: 110 MMOL/L (ref 96–108)
CHOLEST SERPL-MCNC: 178 MG/DL
CK SERPL-CCNC: 47 U/L (ref 39–308)
CO2 SERPL-SCNC: 27 MMOL/L (ref 21–32)
CREAT SERPL-MCNC: 1.94 MG/DL (ref 0.6–1.3)
CREAT UR-MCNC: 92.8 MG/DL
ERYTHROCYTE [DISTWIDTH] IN BLOOD BY AUTOMATED COUNT: 12.3 % (ref 11.6–15.1)
FERRITIN SERPL-MCNC: 101 NG/ML (ref 8–388)
GFR SERPL CREATININE-BSD FRML MDRD: 32 ML/MIN/1.73SQ M
GLUCOSE P FAST SERPL-MCNC: 98 MG/DL (ref 65–99)
HCT VFR BLD AUTO: 39.6 % (ref 36.5–49.3)
HDLC SERPL-MCNC: 58 MG/DL
HGB BLD-MCNC: 12.6 G/DL (ref 12–17)
IRON SATN MFR SERPL: 25 % (ref 20–50)
IRON SERPL-MCNC: 56 UG/DL (ref 65–175)
LDLC SERPL CALC-MCNC: 110 MG/DL (ref 0–100)
MAGNESIUM SERPL-MCNC: 2.3 MG/DL (ref 1.6–2.6)
MCH RBC QN AUTO: 29.2 PG (ref 26.8–34.3)
MCHC RBC AUTO-ENTMCNC: 31.8 G/DL (ref 31.4–37.4)
MCV RBC AUTO: 92 FL (ref 82–98)
PHOSPHATE SERPL-MCNC: 3.4 MG/DL (ref 2.3–4.1)
PLATELET # BLD AUTO: 241 THOUSANDS/UL (ref 149–390)
PMV BLD AUTO: 9.7 FL (ref 8.9–12.7)
POTASSIUM SERPL-SCNC: 4.2 MMOL/L (ref 3.5–5.3)
PROT SERPL-MCNC: 7.6 G/DL (ref 6.4–8.4)
PROT UR-MCNC: 10 MG/DL
PROT/CREAT UR: 0.11 MG/G{CREAT} (ref 0–0.1)
PTH-INTACT SERPL-MCNC: 74.2 PG/ML (ref 18.4–80.1)
RBC # BLD AUTO: 4.31 MILLION/UL (ref 3.88–5.62)
SODIUM SERPL-SCNC: 141 MMOL/L (ref 135–147)
TIBC SERPL-MCNC: 228 UG/DL (ref 250–450)
TRIGL SERPL-MCNC: 49 MG/DL
WBC # BLD AUTO: 9.32 THOUSAND/UL (ref 4.31–10.16)

## 2022-12-05 PROBLEM — N28.1 BILATERAL RENAL CYSTS: Status: ACTIVE | Noted: 2022-12-05

## 2022-12-05 RX ORDER — ATORVASTATIN CALCIUM 20 MG/1
20 TABLET, FILM COATED ORAL DAILY
Qty: 30 TABLET | Refills: 2 | Status: CANCELLED | OUTPATIENT
Start: 2022-12-05

## 2022-12-05 NOTE — PATIENT INSTRUCTIONS
Your kidney function remains stable  Most recent creatinine 1 94, at baseline  We will continue routine surveillance as outlined below  Avoid all NSAIDs to include ibuprofen, Motrin, Aleve, Advil, Naproxen, Celebrex, Indomethacin, Toradol  Stop taking Motrin   Recent lipid panel with LDL slightly above goal   Discussed with Dr Neo Andrew who recommends starting statin  Will hold off currently at  your request and modify diet  Repeat labs prior to next appt  Stay well hydrated  Continue all prescribed medications  Call if blood pressure consistently more than 140/90 or less than 110/50s  High and low blood pressures may affect your kidney function  Recommend low salt diet (2 gm sodium diet)  Please let us know if you are scheduled for any studies with IV contrast (ex: CT scan, arteriogram or cardiac catheterization)   Follow up in 4 months with Dr Neo Andrew with repeat labs prior to appointment  Kidney Smart education program recommended for general education regarding your known chronic kidney disease  Please contact the office with new symptoms or concerns

## 2022-12-06 ENCOUNTER — OFFICE VISIT (OUTPATIENT)
Dept: NEPHROLOGY | Facility: CLINIC | Age: 77
End: 2022-12-06

## 2022-12-06 VITALS — BODY MASS INDEX: 26.19 KG/M2 | WEIGHT: 147.8 LBS | HEIGHT: 63 IN

## 2022-12-06 DIAGNOSIS — E78.5 DYSLIPIDEMIA: ICD-10-CM

## 2022-12-06 DIAGNOSIS — N40.1 BENIGN LOCALIZED PROSTATIC HYPERPLASIA WITH LOWER URINARY TRACT SYMPTOMS (LUTS): ICD-10-CM

## 2022-12-06 DIAGNOSIS — N18.32 STAGE 3B CHRONIC KIDNEY DISEASE (HCC): Primary | ICD-10-CM

## 2022-12-06 DIAGNOSIS — D63.1 ANEMIA IN STAGE 3B CHRONIC KIDNEY DISEASE (HCC): ICD-10-CM

## 2022-12-06 DIAGNOSIS — N28.1 BILATERAL RENAL CYSTS: ICD-10-CM

## 2022-12-06 DIAGNOSIS — N18.32 ANEMIA IN STAGE 3B CHRONIC KIDNEY DISEASE (HCC): ICD-10-CM

## 2022-12-06 DIAGNOSIS — C61 PROSTATE CANCER (HCC): ICD-10-CM

## 2022-12-06 DIAGNOSIS — I12.9 PARENCHYMAL RENAL HYPERTENSION, STAGE 1 THROUGH STAGE 4 OR UNSPECIFIED CHRONIC KIDNEY DISEASE: ICD-10-CM

## 2022-12-08 ENCOUNTER — OFFICE VISIT (OUTPATIENT)
Dept: HEMATOLOGY ONCOLOGY | Facility: CLINIC | Age: 77
End: 2022-12-08

## 2022-12-08 VITALS
HEART RATE: 64 BPM | RESPIRATION RATE: 16 BRPM | TEMPERATURE: 97.6 F | SYSTOLIC BLOOD PRESSURE: 152 MMHG | DIASTOLIC BLOOD PRESSURE: 88 MMHG | HEIGHT: 63 IN | OXYGEN SATURATION: 99 % | BODY MASS INDEX: 25.78 KG/M2 | WEIGHT: 145.5 LBS

## 2022-12-08 DIAGNOSIS — D47.2 BICLONAL GAMMOPATHY: ICD-10-CM

## 2022-12-08 DIAGNOSIS — C61 PROSTATE CANCER (HCC): Primary | ICD-10-CM

## 2022-12-08 RX ORDER — AZITHROMYCIN 250 MG/1
TABLET, FILM COATED ORAL
COMMUNITY
Start: 2022-12-05

## 2023-04-03 ENCOUNTER — TELEPHONE (OUTPATIENT)
Dept: OTHER | Facility: OTHER | Age: 78
End: 2023-04-03

## 2023-04-06 ENCOUNTER — TELEPHONE (OUTPATIENT)
Dept: NEPHROLOGY | Facility: CLINIC | Age: 78
End: 2023-04-06

## 2023-04-06 NOTE — TELEPHONE ENCOUNTER
----- Message from Yeny Valentin PA-C sent at 4/6/2023  2:15 PM EDT -----  Labs reviewed  Creatinine slightly above baseline at 2 47  History of prior creatinine elevation due to dehydration and antifungal medications  Please call patient and let him know his labs are relatively stable with exception of bump in his creatinine  Please encourage him to push hydration and make sure he is not taking any NSAIDs  Repeat labs to be determined after evaluation by Dr Estela Otero on Monday at appointment

## 2023-04-06 NOTE — TELEPHONE ENCOUNTER
Called and spoke to the patient to relay the message above  Patient expressed understanding and had no further questions or concerns at this time  Confirmed appointment with Dr Estela Otero on 4/10 in EO

## 2023-04-10 PROBLEM — N18.4 CHRONIC RENAL DISEASE, STAGE IV (HCC): Status: ACTIVE | Noted: 2023-04-10

## 2023-04-20 ENCOUNTER — APPOINTMENT (OUTPATIENT)
Dept: LAB | Facility: CLINIC | Age: 78
End: 2023-04-20

## 2023-04-20 DIAGNOSIS — E78.5 DYSLIPIDEMIA: ICD-10-CM

## 2023-04-20 DIAGNOSIS — N28.1 BILATERAL RENAL CYSTS: ICD-10-CM

## 2023-04-20 DIAGNOSIS — N18.32 ANEMIA IN STAGE 3B CHRONIC KIDNEY DISEASE (HCC): ICD-10-CM

## 2023-04-20 DIAGNOSIS — N18.32 STAGE 3B CHRONIC KIDNEY DISEASE (HCC): ICD-10-CM

## 2023-04-20 DIAGNOSIS — N40.1 BENIGN LOCALIZED PROSTATIC HYPERPLASIA WITH LOWER URINARY TRACT SYMPTOMS (LUTS): ICD-10-CM

## 2023-04-20 DIAGNOSIS — N18.4 CHRONIC RENAL DISEASE, STAGE IV (HCC): ICD-10-CM

## 2023-04-20 DIAGNOSIS — I12.9 PARENCHYMAL RENAL HYPERTENSION, STAGE 1 THROUGH STAGE 4 OR UNSPECIFIED CHRONIC KIDNEY DISEASE: ICD-10-CM

## 2023-04-20 DIAGNOSIS — D63.1 ANEMIA IN STAGE 3B CHRONIC KIDNEY DISEASE (HCC): ICD-10-CM

## 2023-04-20 LAB
ANION GAP SERPL CALCULATED.3IONS-SCNC: 2 MMOL/L (ref 4–13)
BACTERIA UR QL AUTO: NORMAL /HPF
BILIRUB UR QL STRIP: NEGATIVE
BUN SERPL-MCNC: 30 MG/DL (ref 5–25)
CALCIUM SERPL-MCNC: 9.1 MG/DL (ref 8.3–10.1)
CHLORIDE SERPL-SCNC: 112 MMOL/L (ref 96–108)
CLARITY UR: CLEAR
CO2 SERPL-SCNC: 28 MMOL/L (ref 21–32)
COLOR UR: COLORLESS
CREAT SERPL-MCNC: 1.97 MG/DL (ref 0.6–1.3)
GFR SERPL CREATININE-BSD FRML MDRD: 31 ML/MIN/1.73SQ M
GLUCOSE P FAST SERPL-MCNC: 90 MG/DL (ref 65–99)
GLUCOSE UR STRIP-MCNC: NEGATIVE MG/DL
HGB UR QL STRIP.AUTO: NEGATIVE
KETONES UR STRIP-MCNC: NEGATIVE MG/DL
LEUKOCYTE ESTERASE UR QL STRIP: NEGATIVE
NITRITE UR QL STRIP: NEGATIVE
NON-SQ EPI CELLS URNS QL MICRO: NORMAL /HPF
PH UR STRIP.AUTO: 6.5 [PH]
POTASSIUM SERPL-SCNC: 4.6 MMOL/L (ref 3.5–5.3)
PROT UR STRIP-MCNC: NEGATIVE MG/DL
PSA SERPL-MCNC: 8.9 NG/ML (ref 0–4)
RBC #/AREA URNS AUTO: NORMAL /HPF
SODIUM SERPL-SCNC: 142 MMOL/L (ref 135–147)
SP GR UR STRIP.AUTO: 1.01 (ref 1–1.03)
UROBILINOGEN UR STRIP-ACNC: <2 MG/DL
WBC #/AREA URNS AUTO: NORMAL /HPF

## 2023-04-25 NOTE — PROGRESS NOTES
4/26/2023      Chief Complaint   Patient presents with   • Prostate Cancer         Assessment and Plan    66 y o  male     1  [place] 6 prostate cancer on active surveillance   - Diagnosed in 2011 with outside urologist    - Multiparametric prostate MRI 12/1/20 - PI-RADS Category 5  - S/p MRI targeted transperineal prostate biopsy on 3/2/21 showing only 1 core of [place] 3+3=6, less than 5% of the core  Prior to this he had 5 or 6 prostate biopsies which were more recently negative for cancer  - PSA now 8 9, previously 6 8 (when corrected for finasteride)  Patient has discontinued finasteride however would like to restart this medication  Refill sent to pharmacy  - LEILA (10/26/22) negative, next LEILA due in October 2023  - Will obtain routine mpMRI prior to next visit, to be done every 18 months  - PSA and mpMRI prior to follow up in 3-4 months  - Call with any questions or concerns in the meantime  - All questions answered; patient understands and agrees with plan       History of Present Illness  Alex Hawk is a 66 y o  male patient with history of [place] 6 prostate cancer on active surveillance here for follow up  Patient has history of [place] 6 prostate cancer that was diagnosed in 2011 by outside urologist  [facility] apparently had 5-6 prostate biopsies after initial diagnosis, the most recent biopsy being negative for cancer  [facility] did complete prostate MRI in 2018 which was negative  [facility] did have a progressive increase in PSA to 10 5 and a repeat MRI was performed showing a PI-RADS category 5 lesion   He then underwent MRI targeted prostate biopsy in March of 2021 which showed 1 core [place] 6 prostate cancer in less than 5% of the core   He opted for continued active surveillance  Patient was last seen in October with PSA of 6 8 when corrected for finasteride  Patient has since stopped finasteride use and now PSA is 8 9  Denies any new or worsening symptoms      Review of Systems   Constitutional: Negative "for activity change, appetite change, chills and fever  HENT: Negative for congestion and trouble swallowing  Respiratory: Negative for cough and shortness of breath  Cardiovascular: Negative for chest pain, palpitations and leg swelling  Gastrointestinal: Negative for abdominal pain, constipation, diarrhea, nausea and vomiting  Genitourinary: Negative for difficulty urinating, dysuria, flank pain, frequency, hematuria and urgency  Musculoskeletal: Negative for back pain and gait problem  Skin: Negative for wound  Allergic/Immunologic: Negative for immunocompromised state  Neurological: Negative for dizziness and syncope  Hematological: Does not bruise/bleed easily  Psychiatric/Behavioral: Negative for confusion  All other systems reviewed and are negative  AUA SYMPTOM SCORE    Flowsheet Row Most Recent Value   AUA SYMPTOM SCORE    How often have you had a sensation of not emptying your bladder completely after you finished urinating? 1 (P)     How often have you had to urinate again less than two hours after you finished urinating? 1 (P)     How often have you found you stopped and started again several times when you urinate? 1 (P)     How often have you found it difficult to postpone urination? 0 (P)     How often have you had a weak urinary stream? 2 (P)     How often have you had to push or strain to begin urination? 1 (P)     How many times did you most typically get up to urinate from the time you went to bed at night until the time you got up in the morning? 1 (P)     Quality of Life: If you were to spend the rest of your life with your urinary condition just the way it is now, how would you feel about that? 2 (P)     AUA SYMPTOM SCORE 7 (P)            Vitals  Vitals:    04/26/23 0834   BP: 150/92   Pulse: 61   SpO2: 98%   Weight: 64 9 kg (143 lb)   Height: 5' 3\" (1 6 m)       Physical Exam  Constitutional:       General: He is not in acute distress       Appearance: Normal " appearance  He is not ill-appearing, toxic-appearing or diaphoretic  HENT:      Head: Normocephalic  Nose: No congestion  Eyes:      General: No scleral icterus  Right eye: No discharge  Left eye: No discharge  Conjunctiva/sclera: Conjunctivae normal       Pupils: Pupils are equal, round, and reactive to light  Pulmonary:      Effort: Pulmonary effort is normal    Musculoskeletal:      Cervical back: Normal range of motion  Skin:     General: Skin is warm and dry  [place]: Skin is not jaundiced or pale  Findings: No bruising, erythema, lesion or rash  Neurological:      General: No focal deficit present  Mental Status: He is alert and oriented to person, place, and time  Mental status is at baseline  Gait: Gait normal    Psychiatric:         Mood and Affect: Mood normal          Behavior: Behavior normal          Thought Content: Thought content normal          Judgment: Judgment normal            Past History  Past Medical History:   Diagnosis Date   • BPH (benign prostatic hypertrophy) 2009   • Cancer [facility]) 2009   • ED (erectile dysfunction)    • Elevated PSA 2009   • Prostate cancer [facility])      Social History     Socioeconomic History   • Marital status: /Civil Union     Spouse name: None   • Number of children: None   • Years of education: None   • Highest education level: None   Occupational History   • None   Tobacco Use   • Smoking status: Never     Passive exposure: Past   • Smokeless tobacco: Never   • Tobacco comments:     tried it once    Vaping Use   • Vaping Use: Never used   Substance and Sexual Activity   • Alcohol use:  Yes     Alcohol/week: 2 0 standard drinks     Types: 1 Glasses of wine, 1 Cans of beer per week     Comment: social   • Drug use: No   • Sexual activity: Yes   Other Topics Concern   • None   Social History Narrative   • None     Social Determinants of Health     Financial Resource Strain: Not on file   Food Insecurity: Not on file   Transportation Needs: Not on file   Physical Activity: Not on file   Stress: Not on file   Social Connections: Not on file   Intimate Partner Violence: Not on file   Housing Stability: Not on file     Social History     Tobacco Use   Smoking Status Never   • Passive exposure: Past   Smokeless Tobacco Never   Tobacco Comments    tried it once      Family History   Problem Relation Age of Onset   • Hypertension Mother    • Prostate cancer Brother    • No Known Problems Brother    • No Known Problems Sister    • No Known Problems Son    • No Known Problems Son    • No Known Problems Daughter        The following portions of the patient's history were reviewed and updated as appropriate: allergies, current medications, past medical history, past social history, past surgical history and problem list     Results  No results found for this or any previous visit (from the past 1 hour(s)) ]  Lab Results   Component Value Date    PSA 8 9 (H) 04/20/2023    PSA 3 4 10/19/2022    PSA 3 5 04/21/2022    PSA 3 2 10/06/2021     Lab Results   Component Value Date    CALCIUM 9 1 04/20/2023    K 4 6 04/20/2023    CO2 28 04/20/2023     (H) 04/20/2023    BUN 30 (H) 04/20/2023    CREATININE 1 97 (H) 04/20/2023     Lab Results   Component Value Date    WBC 7 97 04/05/2023    HGB 12 2 04/05/2023    HCT 38 5 04/05/2023    MCV 92 04/05/2023     04/05/2023       Helder Bermudez PA-C

## 2023-04-26 ENCOUNTER — OFFICE VISIT (OUTPATIENT)
Dept: UROLOGY | Facility: CLINIC | Age: 78
End: 2023-04-26

## 2023-04-26 VITALS
HEIGHT: 63 IN | OXYGEN SATURATION: 98 % | DIASTOLIC BLOOD PRESSURE: 92 MMHG | HEART RATE: 61 BPM | WEIGHT: 143 LBS | SYSTOLIC BLOOD PRESSURE: 150 MMHG | BODY MASS INDEX: 25.34 KG/M2

## 2023-04-26 DIAGNOSIS — R35.1 NOCTURIA ASSOCIATED WITH BENIGN PROSTATIC HYPERPLASIA: ICD-10-CM

## 2023-04-26 DIAGNOSIS — N40.1 NOCTURIA ASSOCIATED WITH BENIGN PROSTATIC HYPERPLASIA: ICD-10-CM

## 2023-04-26 DIAGNOSIS — C61 PROSTATE CA (HCC): Primary | ICD-10-CM

## 2023-04-26 RX ORDER — ATOVAQUONE AND PROGUANIL HYDROCHLORIDE 250; 100 MG/1; MG/1
TABLET, FILM COATED ORAL
COMMUNITY
Start: 2023-04-17

## 2023-04-26 RX ORDER — FINASTERIDE 5 MG/1
5 TABLET, FILM COATED ORAL DAILY
Qty: 90 TABLET | Refills: 3 | Status: SHIPPED | OUTPATIENT
Start: 2023-04-26

## 2023-04-27 ENCOUNTER — HOSPITAL ENCOUNTER (OUTPATIENT)
Dept: ULTRASOUND IMAGING | Facility: HOSPITAL | Age: 78
Discharge: HOME | End: 2023-04-27
Attending: INTERNAL MEDICINE

## 2023-04-27 DIAGNOSIS — N28.1 BILATERAL RENAL CYSTS: ICD-10-CM

## 2023-04-27 DIAGNOSIS — N18.4 CHRONIC RENAL DISEASE, STAGE IV (HCC): ICD-10-CM

## 2023-04-27 DIAGNOSIS — N18.32 ANEMIA IN STAGE 3B CHRONIC KIDNEY DISEASE (HCC): ICD-10-CM

## 2023-04-27 DIAGNOSIS — E78.5 DYSLIPIDEMIA: ICD-10-CM

## 2023-04-27 DIAGNOSIS — N18.32 STAGE 3B CHRONIC KIDNEY DISEASE (HCC): ICD-10-CM

## 2023-04-27 DIAGNOSIS — I12.9 PARENCHYMAL RENAL HYPERTENSION, STAGE 1 THROUGH STAGE 4 OR UNSPECIFIED CHRONIC KIDNEY DISEASE: ICD-10-CM

## 2023-04-27 DIAGNOSIS — D63.1 ANEMIA IN STAGE 3B CHRONIC KIDNEY DISEASE (HCC): ICD-10-CM

## 2023-05-01 ENCOUNTER — TELEPHONE (OUTPATIENT)
Dept: NEPHROLOGY | Facility: CLINIC | Age: 78
End: 2023-05-01

## 2023-05-01 DIAGNOSIS — N18.32 STAGE 3B CHRONIC KIDNEY DISEASE (HCC): Primary | ICD-10-CM

## 2023-05-01 NOTE — TELEPHONE ENCOUNTER
Spoke with patient about the following, he expressed understanding and thanked us for the call:    ----- Message from Vilma Peterson MD sent at 5/1/2023 12:56 PM EDT -----  Per radiology please order renal ultrasound and 12 months to follow-up regarding the cysts although none looks significant  Thank you  ----- Message -----  From: Interface, Radiology Results In  Sent: 5/1/2023   8:03 AM EDT  To: Vilma Peterson MD

## 2023-07-10 ENCOUNTER — APPOINTMENT (OUTPATIENT)
Dept: LAB | Facility: CLINIC | Age: 78
End: 2023-07-10
Payer: MEDICARE

## 2023-07-10 DIAGNOSIS — N18.32 STAGE 3B CHRONIC KIDNEY DISEASE (HCC): ICD-10-CM

## 2023-07-10 DIAGNOSIS — N18.4 CHRONIC RENAL DISEASE, STAGE IV (HCC): ICD-10-CM

## 2023-07-10 DIAGNOSIS — I12.9 PARENCHYMAL RENAL HYPERTENSION, STAGE 1 THROUGH STAGE 4 OR UNSPECIFIED CHRONIC KIDNEY DISEASE: ICD-10-CM

## 2023-07-10 DIAGNOSIS — D63.1 ANEMIA IN STAGE 3B CHRONIC KIDNEY DISEASE (HCC): ICD-10-CM

## 2023-07-10 DIAGNOSIS — N18.32 ANEMIA IN STAGE 3B CHRONIC KIDNEY DISEASE (HCC): ICD-10-CM

## 2023-07-10 DIAGNOSIS — E78.5 DYSLIPIDEMIA: ICD-10-CM

## 2023-07-10 DIAGNOSIS — N28.1 BILATERAL RENAL CYSTS: ICD-10-CM

## 2023-07-10 LAB
ANION GAP SERPL CALCULATED.3IONS-SCNC: 3 MMOL/L
BUN SERPL-MCNC: 43 MG/DL (ref 5–25)
CALCIUM SERPL-MCNC: 9 MG/DL (ref 8.3–10.1)
CHLORIDE SERPL-SCNC: 111 MMOL/L (ref 96–108)
CO2 SERPL-SCNC: 28 MMOL/L (ref 21–32)
CREAT SERPL-MCNC: 2.13 MG/DL (ref 0.6–1.3)
CREAT UR-MCNC: 90.1 MG/DL
GFR SERPL CREATININE-BSD FRML MDRD: 28 ML/MIN/1.73SQ M
GLUCOSE P FAST SERPL-MCNC: 91 MG/DL (ref 65–99)
MAGNESIUM SERPL-MCNC: 2.5 MG/DL (ref 1.6–2.6)
POTASSIUM SERPL-SCNC: 4.5 MMOL/L (ref 3.5–5.3)
PROT UR-MCNC: 10 MG/DL
PROT/CREAT UR: 0.11 MG/G{CREAT} (ref 0–0.1)
SODIUM SERPL-SCNC: 142 MMOL/L (ref 135–147)

## 2023-07-10 PROCEDURE — 82570 ASSAY OF URINE CREATININE: CPT

## 2023-07-10 PROCEDURE — 83735 ASSAY OF MAGNESIUM: CPT

## 2023-07-10 PROCEDURE — 36415 COLL VENOUS BLD VENIPUNCTURE: CPT

## 2023-07-10 PROCEDURE — 80048 BASIC METABOLIC PNL TOTAL CA: CPT

## 2023-07-10 PROCEDURE — 84156 ASSAY OF PROTEIN URINE: CPT

## 2023-07-11 ENCOUNTER — TELEPHONE (OUTPATIENT)
Dept: NEPHROLOGY | Facility: CLINIC | Age: 78
End: 2023-07-11

## 2023-07-11 NOTE — TELEPHONE ENCOUNTER
LM for patient about the following, asked him to call back if he has any questions:    ----- Message from Bosie Libman, MD sent at 7/11/2023  7:08 AM EDT -----  Please let the patient know that the labs essentially are stable squamation with changes  ----- Message -----  From: Lab, Background User  Sent: 7/10/2023   5:32 PM EDT  To: Bosie Libman, MD

## 2023-07-20 ENCOUNTER — TELEPHONE (OUTPATIENT)
Dept: UROLOGY | Facility: CLINIC | Age: 78
End: 2023-07-20

## 2023-07-20 NOTE — TELEPHONE ENCOUNTER
Called and left VM for the PT to call back. PT has an appointment with Kenyon Eisenmenger on 7/21/23. Appointment is to go over PT MRI which was rescheduled for 7/2623. Appointment needs to be rescheduled. Office number left for the PT to call the Office back.

## 2023-07-21 ENCOUNTER — APPOINTMENT (OUTPATIENT)
Dept: LAB | Facility: CLINIC | Age: 78
End: 2023-07-21
Payer: MEDICARE

## 2023-07-21 DIAGNOSIS — C61 PROSTATE CA (HCC): ICD-10-CM

## 2023-07-21 LAB
ANION GAP SERPL CALCULATED.3IONS-SCNC: 3 MMOL/L
BUN SERPL-MCNC: 36 MG/DL (ref 5–25)
CALCIUM SERPL-MCNC: 9 MG/DL (ref 8.3–10.1)
CHLORIDE SERPL-SCNC: 113 MMOL/L (ref 96–108)
CO2 SERPL-SCNC: 26 MMOL/L (ref 21–32)
CREAT SERPL-MCNC: 2.2 MG/DL (ref 0.6–1.3)
GFR SERPL CREATININE-BSD FRML MDRD: 27 ML/MIN/1.73SQ M
GLUCOSE P FAST SERPL-MCNC: 84 MG/DL (ref 65–99)
POTASSIUM SERPL-SCNC: 5.4 MMOL/L (ref 3.5–5.3)
PSA SERPL-MCNC: 4.76 NG/ML (ref 0–4)
SODIUM SERPL-SCNC: 142 MMOL/L (ref 135–147)

## 2023-07-21 PROCEDURE — 36415 COLL VENOUS BLD VENIPUNCTURE: CPT

## 2023-07-21 PROCEDURE — 84153 ASSAY OF PSA TOTAL: CPT

## 2023-07-21 PROCEDURE — 80048 BASIC METABOLIC PNL TOTAL CA: CPT

## 2023-07-21 NOTE — TELEPHONE ENCOUNTER
My chart message sent as well:  PT has an appointment with Bree Grant on 7/21/23. Appointment is to go over PT MRI which was rescheduled for 7/2623. Appointment needs to be rescheduled. Office number left for the PT to call the Office back.

## 2023-07-26 ENCOUNTER — HOSPITAL ENCOUNTER (OUTPATIENT)
Dept: RADIOLOGY | Age: 78
Discharge: HOME | End: 2023-07-26
Payer: MEDICARE

## 2023-07-26 DIAGNOSIS — C61 PROSTATE CA (HCC): ICD-10-CM

## 2023-07-26 PROCEDURE — G1004 CDSM NDSC: HCPCS

## 2023-07-26 PROCEDURE — A9585 GADOBUTROL INJECTION: HCPCS | Performed by: PHYSICIAN ASSISTANT

## 2023-07-26 PROCEDURE — 76377 3D RENDER W/INTRP POSTPROCES: CPT

## 2023-07-26 PROCEDURE — 72197 MRI PELVIS W/O & W/DYE: CPT

## 2023-07-26 RX ADMIN — GADOBUTROL 6 ML: 604.72 INJECTION INTRAVENOUS at 14:28

## 2023-08-01 ENCOUNTER — TELEPHONE (OUTPATIENT)
Dept: UROLOGY | Facility: AMBULATORY SURGERY CENTER | Age: 78
End: 2023-08-01

## 2023-08-08 ENCOUNTER — TELEPHONE (OUTPATIENT)
Dept: UROLOGY | Facility: CLINIC | Age: 78
End: 2023-08-08

## 2023-08-08 NOTE — PROGRESS NOTES
8/9/2023      Chief Complaint   Patient presents with   • Benign Prostatic Hypertrophy     Assessment and Plan    1. [place] 6 prostate cancer on active surveillance   - Diagnosed in 2011 with outside urologist.   - Multiparametric prostate MRI 12/1/20 - PI-RADS Category 5  - S/p MRI targeted transperineal prostate biopsy on 3/2/21 showing only 1 core of [place] 3+3=6, less than 5% of the core. Prior to this he had 5 or 6 prostate biopsies which were more recently negative for cancer  - PSA increased to 8.9 in April 2023, previously 6.8 (when corrected for finasteride). - Restarted on finasteride at last visit. Most recent PSA from 7/21/23 was 4.76 unadjusted for finasteride.   - mpMRI from 7/26/23 showing both PIRADS 5 and PIRADS 4 category lesions  - LEILA nodularity/asymmetry noted along left gland  - Recommend repeat MRI fusion biopsy given imaging findings. He is agreeable.      2. BPH with LUTS  - Well managed on finasteride     History of Present Illness  Juan Villa is a 66 y.o. male here for follow up evaluation of prostate cancer and BPH.     Patient has history of [place] 6 prostate cancer that was diagnosed in 2011 by outside urologist. [facility] apparently had 5-6 prostate biopsies after initial diagnosis, the most recent biopsy being negative for cancer. [facility] did complete prostate MRI in 2018 which was negative. [facility] had a progressive increase in PSA to 10.5 and a repeat MRI was performed showing a PI-RADS category 5 lesion.  He then underwent MRI targeted prostate biopsy in March of 2021 which showed 1 core [place] 6 prostate cancer in less than 5% of the core.  He opted for continued active surveillance. Most recently his PSA did increase to 8.9. Previously PSA was 6.8 when corrected for finasteride. He was advised to undergo prostate MRI as well as repeat PSA in 3 to 4 months. He presents today for follow-up. He was restarted on finasteride at last visit. Most recent PSA was 4.76.  MRI of the prostate is showing a PIRADS 5 and PIRADS 4 lesion.        Review of Systems   Constitutional: Negative for chills and fever. Respiratory: Negative for shortness of breath. Cardiovascular: Negative for chest pain. Gastrointestinal: Negative for abdominal pain. Genitourinary: Negative for difficulty urinating, dysuria, flank pain, frequency, hematuria and urgency. Neurological: Negative for dizziness. Past Medical History  Past Medical History:   Diagnosis Date   • BPH (benign prostatic hypertrophy) 2009   • Cancer [facility]) 2009   • ED (erectile dysfunction)    • Elevated PSA 2009   • Prostate cancer [facility])        Past Social History  Past Surgical History:   Procedure Laterality Date   • COLONOSCOPY     • HERNIA REPAIR Left    • SD PROSTATE NEEDLE BIOPSY ANY APPROACH N/A 03/02/2021    Procedure: Transperineal MRI Fusion prostate biopsy and gold seed marker insertion;  Surgeon: Nadine Moore MD;  Location: [facility];  Service: Urology   • PROSTATE BIOPSY      1/26/2015, 2/10/2017   • PROSTATE BIOPSY     • VASECTOMY  ?      Social History     Tobacco Use   Smoking Status Never   • Passive exposure: Past   Smokeless Tobacco Never   Tobacco Comments    tried it once        Past Family History  Family History   Problem Relation Age of Onset   • Hypertension Mother    • Prostate cancer Brother    • No Known Problems Brother    • No Known Problems Sister    • No Known Problems Son    • No Known Problems Son    • No Known Problems Daughter        Past Social history  Social History     Socioeconomic History   • Marital status: /Civil Union     Spouse name: Not on file   • Number of children: Not on file   • Years of education: Not on file   • Highest education level: Not on file   Occupational History   • Not on file   Tobacco Use   • Smoking status: Never     Passive exposure: Past   • Smokeless tobacco: Never   • Tobacco comments:     tried it once    Vaping Use   • Vaping Use: Never used   Substance and Sexual Activity   • Alcohol use: Yes     Alcohol/week: 2.0 standard drinks of alcohol     Types: 1 Glasses of wine, 1 Cans of beer per week     Comment: social   • Drug use: No   • Sexual activity: Yes   Other Topics Concern   • Not on file   Social History Narrative   • Not on file     Social Determinants of Health     Financial Resource Strain: Not on file   Food Insecurity: Not on file   Transportation Needs: Not on file   Physical Activity: Not on file   Stress: Not on file   Social Connections: Not on file   Intimate Partner Violence: Not on file   Housing Stability: Not on file       Current Medications  Current Outpatient Medications   Medication Sig Dispense Refill   • amLODIPine (NORVASC) 5 mg tablet Take 1 tablet (5 mg total) by mouth daily in the early morning 30 tablet 5   • Apoaequorin (Prevagen Extra Strength) 20 MG CAPS Take 20 mg by mouth in the morning Once daily     • finasteride (PROSCAR) 5 mg tablet Take 1 tablet (5 mg total) by mouth daily 90 tablet 3   • Multiple Vitamins-Minerals (CENTRUM SILVER 50+MEN PO) Take by mouth as needed     • sildenafil (VIAGRA) 50 MG tablet Take 1 tablet (50 mg total) by mouth as needed for erectile dysfunction 30 tablet 2   • atovaquone-proguanil (MALARONE) 250-100 mg take 1 tablet by mouth once daily WITH BREAKFAST BEGIN 1 day PRIO. ..  (REFER TO PRESCRIPTION NOTES). (Patient not taking: Reported on 4/26/2023)       No current facility-administered medications for this visit.        Allergies  No Known Allergies      The following portions of the patient's history were reviewed and updated as appropriate: allergies, current medications, past medical history, past social history, past surgical history and problem list.      Vitals  Vitals:    08/09/23 1006   BP: 138/70   BP Location: Left arm   Patient Position: Sitting   Cuff Size: Large   Pulse: 85   Resp: 16   SpO2: 99%   Weight: 64.8 kg (142 lb 12.8 oz)   Height: 5' 3" (1.6 m)           Physical Exam  Physical Exam  Constitutional:       Appearance: Normal appearance. HENT:      Head: Normocephalic and atraumatic. Right Ear: External ear normal.      Left Ear: External ear normal.      Nose: Nose normal.   Eyes:      General: No scleral icterus. Conjunctiva/sclera: Conjunctivae normal.   Cardiovascular:      Pulses: Normal pulses. Pulmonary:      Effort: Pulmonary effort is normal.   Genitourinary:     Comments: Possible nodularity/asymmetry noted along left gland on LEILA  Musculoskeletal:         General: Normal range of motion. Cervical back: Normal range of motion. Skin:     General: Skin is warm and dry. Neurological:      General: No focal deficit present. Mental Status: He is alert and oriented to person, place, and time. Psychiatric:         Mood and Affect: Mood normal.         Behavior: Behavior normal.         Thought Content: Thought content normal.         Judgment: Judgment normal.           Results  No results found for this or any previous visit (from the past 1 hour(s)).]  Lab Results   Component Value Date    PSA 4.76 (H) 07/21/2023    PSA 8.9 (H) 04/20/2023    PSA 3.4 10/19/2022     Lab Results   Component Value Date    CALCIUM 9.0 07/21/2023    K 5.4 (H) 07/21/2023    CO2 26 07/21/2023     (H) 07/21/2023    BUN 36 (H) 07/21/2023    CREATININE 2.20 (H) 07/21/2023     Lab Results   Component Value Date    WBC 7.97 04/05/2023    HGB 12.2 04/05/2023    HCT 38.5 04/05/2023    MCV 92 04/05/2023     04/05/2023           Orders  No orders of the defined types were placed in this encounter.       Tomeka Best

## 2023-08-09 ENCOUNTER — TELEPHONE (OUTPATIENT)
Dept: UROLOGY | Facility: CLINIC | Age: 78
End: 2023-08-09

## 2023-08-09 ENCOUNTER — OFFICE VISIT (OUTPATIENT)
Dept: UROLOGY | Facility: CLINIC | Age: 78
End: 2023-08-09
Payer: MEDICARE

## 2023-08-09 VITALS
BODY MASS INDEX: 25.3 KG/M2 | OXYGEN SATURATION: 99 % | SYSTOLIC BLOOD PRESSURE: 138 MMHG | DIASTOLIC BLOOD PRESSURE: 70 MMHG | HEART RATE: 85 BPM | HEIGHT: 63 IN | RESPIRATION RATE: 16 BRPM | WEIGHT: 142.8 LBS

## 2023-08-09 DIAGNOSIS — N40.1 BENIGN LOCALIZED PROSTATIC HYPERPLASIA WITH LOWER URINARY TRACT SYMPTOMS (LUTS): ICD-10-CM

## 2023-08-09 DIAGNOSIS — C61 PROSTATE CANCER (HCC): Primary | ICD-10-CM

## 2023-08-09 PROCEDURE — 99214 OFFICE O/P EST MOD 30 MIN: CPT | Performed by: PHYSICIAN ASSISTANT

## 2023-08-14 ENCOUNTER — HOSPITAL ENCOUNTER (OUTPATIENT)
Dept: RADIOLOGY | Facility: MEDICAL CENTER | Age: 78
Discharge: HOME | End: 2023-08-14
Payer: MEDICARE

## 2023-08-14 DIAGNOSIS — T14.90XA TRAUMA: ICD-10-CM

## 2023-08-14 PROCEDURE — G1004 CDSM NDSC: HCPCS

## 2023-08-14 PROCEDURE — 70450 CT HEAD/BRAIN W/O DYE: CPT

## 2023-09-12 ENCOUNTER — OFFICE VISIT (OUTPATIENT)
Dept: LAB | Facility: HOSPITAL | Age: 78
End: 2023-09-12
Payer: MEDICARE

## 2023-09-12 ENCOUNTER — APPOINTMENT (OUTPATIENT)
Dept: LAB | Facility: HOSPITAL | Age: 78
End: 2023-09-12
Payer: MEDICARE

## 2023-09-12 DIAGNOSIS — Z01.812 PRE-OPERATIVE LABORATORY EXAMINATION: ICD-10-CM

## 2023-09-12 DIAGNOSIS — C61 MALIGNANT NEOPLASM OF PROSTATE (HCC): ICD-10-CM

## 2023-09-12 DIAGNOSIS — Z01.810 PRE-OPERATIVE CARDIOVASCULAR EXAMINATION: ICD-10-CM

## 2023-09-12 DIAGNOSIS — R39.89 SUSPECTED UTI: ICD-10-CM

## 2023-09-12 LAB
ALBUMIN SERPL BCP-MCNC: 4 G/DL (ref 3.5–5)
ALP SERPL-CCNC: 68 U/L (ref 34–104)
ALT SERPL W P-5'-P-CCNC: 10 U/L (ref 7–52)
ANION GAP SERPL CALCULATED.3IONS-SCNC: 4 MMOL/L
AST SERPL W P-5'-P-CCNC: 16 U/L (ref 13–39)
ATRIAL RATE: 46 BPM
BASOPHILS # BLD AUTO: 0.1 THOUSANDS/ÂΜL (ref 0–0.1)
BASOPHILS NFR BLD AUTO: 1 % (ref 0–1)
BILIRUB SERPL-MCNC: 0.67 MG/DL (ref 0.2–1)
BUN SERPL-MCNC: 45 MG/DL (ref 5–25)
CALCIUM SERPL-MCNC: 9.3 MG/DL (ref 8.4–10.2)
CHLORIDE SERPL-SCNC: 108 MMOL/L (ref 96–108)
CO2 SERPL-SCNC: 28 MMOL/L (ref 21–32)
CREAT SERPL-MCNC: 2.27 MG/DL (ref 0.6–1.3)
EOSINOPHIL # BLD AUTO: 0.44 THOUSAND/ÂΜL (ref 0–0.61)
EOSINOPHIL NFR BLD AUTO: 6 % (ref 0–6)
ERYTHROCYTE [DISTWIDTH] IN BLOOD BY AUTOMATED COUNT: 12.2 % (ref 11.6–15.1)
GFR SERPL CREATININE-BSD FRML MDRD: 26 ML/MIN/1.73SQ M
GLUCOSE P FAST SERPL-MCNC: 82 MG/DL (ref 65–99)
HCT VFR BLD AUTO: 38.8 % (ref 36.5–49.3)
HGB BLD-MCNC: 12 G/DL (ref 12–17)
IMM GRANULOCYTES # BLD AUTO: 0.02 THOUSAND/UL (ref 0–0.2)
IMM GRANULOCYTES NFR BLD AUTO: 0 % (ref 0–2)
LYMPHOCYTES # BLD AUTO: 1.84 THOUSANDS/ÂΜL (ref 0.6–4.47)
LYMPHOCYTES NFR BLD AUTO: 25 % (ref 14–44)
MCH RBC QN AUTO: 28.8 PG (ref 26.8–34.3)
MCHC RBC AUTO-ENTMCNC: 30.9 G/DL (ref 31.4–37.4)
MCV RBC AUTO: 93 FL (ref 82–98)
MONOCYTES # BLD AUTO: 0.72 THOUSAND/ÂΜL (ref 0.17–1.22)
MONOCYTES NFR BLD AUTO: 10 % (ref 4–12)
NEUTROPHILS # BLD AUTO: 4.25 THOUSANDS/ÂΜL (ref 1.85–7.62)
NEUTS SEG NFR BLD AUTO: 58 % (ref 43–75)
NRBC BLD AUTO-RTO: 0 /100 WBCS
P AXIS: 29 DEGREES
PLATELET # BLD AUTO: 259 THOUSANDS/UL (ref 149–390)
PMV BLD AUTO: 9.3 FL (ref 8.9–12.7)
POTASSIUM SERPL-SCNC: 4.7 MMOL/L (ref 3.5–5.3)
PR INTERVAL: 164 MS
PROT SERPL-MCNC: 7.4 G/DL (ref 6.4–8.4)
QRS AXIS: -28 DEGREES
QRSD INTERVAL: 92 MS
QT INTERVAL: 446 MS
QTC INTERVAL: 390 MS
RBC # BLD AUTO: 4.17 MILLION/UL (ref 3.88–5.62)
SODIUM SERPL-SCNC: 140 MMOL/L (ref 135–147)
T WAVE AXIS: 29 DEGREES
VENTRICULAR RATE: 46 BPM
WBC # BLD AUTO: 7.37 THOUSAND/UL (ref 4.31–10.16)

## 2023-09-12 PROCEDURE — 87086 URINE CULTURE/COLONY COUNT: CPT

## 2023-09-12 PROCEDURE — 36415 COLL VENOUS BLD VENIPUNCTURE: CPT

## 2023-09-12 PROCEDURE — 93010 ELECTROCARDIOGRAM REPORT: CPT | Performed by: INTERNAL MEDICINE

## 2023-09-12 PROCEDURE — 80053 COMPREHEN METABOLIC PANEL: CPT

## 2023-09-12 PROCEDURE — 85025 COMPLETE CBC W/AUTO DIFF WBC: CPT

## 2023-09-12 PROCEDURE — 93005 ELECTROCARDIOGRAM TRACING: CPT

## 2023-09-13 LAB — BACTERIA UR CULT: NORMAL

## 2023-09-26 ENCOUNTER — HOSPITAL ENCOUNTER (OUTPATIENT)
Facility: HOSPITAL | Age: 78
Setting detail: OUTPATIENT SURGERY
Discharge: HOME | End: 2023-09-26
Attending: UROLOGY | Admitting: UROLOGY
Payer: MEDICARE

## 2023-09-26 ENCOUNTER — ANESTHESIA (OUTPATIENT)
Dept: GASTROENTEROLOGY | Facility: HOSPITAL | Age: 78
End: 2023-09-26
Payer: MEDICARE

## 2023-09-26 ENCOUNTER — ANESTHESIA EVENT (OUTPATIENT)
Dept: GASTROENTEROLOGY | Facility: HOSPITAL | Age: 78
End: 2023-09-26
Payer: MEDICARE

## 2023-09-26 VITALS
HEART RATE: 52 BPM | DIASTOLIC BLOOD PRESSURE: 75 MMHG | SYSTOLIC BLOOD PRESSURE: 128 MMHG | TEMPERATURE: 96.7 F | RESPIRATION RATE: 16 BRPM | OXYGEN SATURATION: 98 %

## 2023-09-26 DIAGNOSIS — C61 MALIGNANT NEOPLASM OF PROSTATE (HCC): ICD-10-CM

## 2023-09-26 PROBLEM — K03.6 DEPOSITS (ACCRETIONS) ON TEETH: Status: ACTIVE | Noted: 2023-09-26

## 2023-09-26 PROBLEM — H02.889 MEIBOMIAN GLAND DYSFUNCTION (MGD): Status: ACTIVE | Noted: 2023-09-26

## 2023-09-26 PROBLEM — B35.1 ONYCHOMYCOSIS: Status: ACTIVE | Noted: 2023-09-26

## 2023-09-26 PROBLEM — K08.499 PARTIAL LOSS OF TEETH DUE TO OTHER SPECIFIED CAUSE, UNSPECIFIED CLASS: Status: ACTIVE | Noted: 2023-09-26

## 2023-09-26 PROBLEM — IMO0002 POSTERIOR SUBCAPSULAR CATARACT: Status: ACTIVE | Noted: 2023-09-26

## 2023-09-26 PROBLEM — H52.4 PRESBYOPIA: Status: ACTIVE | Noted: 2023-09-26

## 2023-09-26 PROCEDURE — G0416 PROSTATE BIOPSY, ANY MTHD: HCPCS | Performed by: PATHOLOGY

## 2023-09-26 PROCEDURE — 76942 ECHO GUIDE FOR BIOPSY: CPT | Performed by: UROLOGY

## 2023-09-26 PROCEDURE — NC001 PR NO CHARGE: Performed by: UROLOGY

## 2023-09-26 PROCEDURE — 88344 IMHCHEM/IMCYTCHM EA MLT ANTB: CPT | Performed by: PATHOLOGY

## 2023-09-26 PROCEDURE — 55700 PR PROSTATE NEEDLE BIOPSY ANY APPROACH: CPT | Performed by: UROLOGY

## 2023-09-26 RX ORDER — PROPOFOL 10 MG/ML
INJECTION, EMULSION INTRAVENOUS AS NEEDED
Status: DISCONTINUED | OUTPATIENT
Start: 2023-09-26 | End: 2023-09-26

## 2023-09-26 RX ORDER — PROPOFOL 10 MG/ML
INJECTION, EMULSION INTRAVENOUS CONTINUOUS PRN
Status: DISCONTINUED | OUTPATIENT
Start: 2023-09-26 | End: 2023-09-26

## 2023-09-26 RX ORDER — CEFAZOLIN SODIUM 1 G/3ML
INJECTION, POWDER, FOR SOLUTION INTRAMUSCULAR; INTRAVENOUS AS NEEDED
Status: DISCONTINUED | OUTPATIENT
Start: 2023-09-26 | End: 2023-09-26

## 2023-09-26 RX ORDER — SODIUM CHLORIDE 9 MG/ML
INJECTION, SOLUTION INTRAVENOUS CONTINUOUS PRN
Status: DISCONTINUED | OUTPATIENT
Start: 2023-09-26 | End: 2023-09-26

## 2023-09-26 RX ORDER — CEFAZOLIN SODIUM 1 G/50ML
1000 SOLUTION INTRAVENOUS ONCE
Status: DISCONTINUED | OUTPATIENT
Start: 2023-09-26 | End: 2023-09-26 | Stop reason: HOSPADM

## 2023-09-26 RX ORDER — LIDOCAINE HYDROCHLORIDE 20 MG/ML
INJECTION, SOLUTION EPIDURAL; INFILTRATION; INTRACAUDAL; PERINEURAL AS NEEDED
Status: DISCONTINUED | OUTPATIENT
Start: 2023-09-26 | End: 2023-09-26 | Stop reason: HOSPADM

## 2023-09-26 RX ADMIN — PROPOFOL 100 MCG/KG/MIN: 10 INJECTION, EMULSION INTRAVENOUS at 09:26

## 2023-09-26 RX ADMIN — CEFAZOLIN 1000 MG: 330 INJECTION, POWDER, FOR SOLUTION INTRAMUSCULAR; INTRAVENOUS at 09:24

## 2023-09-26 RX ADMIN — SODIUM CHLORIDE: 0.9 INJECTION, SOLUTION INTRAVENOUS at 09:21

## 2023-09-26 RX ADMIN — PROPOFOL 70 MG: 10 INJECTION, EMULSION INTRAVENOUS at 09:26

## 2023-09-26 RX ADMIN — PROPOFOL 30 MG: 10 INJECTION, EMULSION INTRAVENOUS at 09:36

## 2023-09-26 NOTE — ANESTHESIA PREPROCEDURE EVALUATION
Procedure:  TRANSPERINEAL MRI FUSION BIOPSY PROSTATE (Perineum)    Relevant Problems   CARDIO   (+) Parenchymal renal hypertension      /RENAL   (+) SHER (acute kidney injury) ([address])   (+) Benign localized prostatic hyperplasia with lower urinary tract symptoms (LUTS)   (+) Bilateral renal cysts   (+) Chronic renal disease, stage IV (HCC)   (+) Parenchymal renal hypertension   (+) Prostate cancer (HCC)   (+) Stage 3b chronic kidney disease (HCC)      HEMATOLOGY   (+) Anemia in stage 3 chronic kidney disease       Digestive   (+) Deposits (accretions) on teeth   (+) Partial loss of teeth due to other specified cause, unspecified class      Musculoskeletal and Integument   (+) Meibomian gland dysfunction (MGD)   (+) Onychomycosis      Other   (+) Dyslipidemia   (+) Organic impotence   (+) Posterior subcapsular cataract   (+) Presbyopia      Lab Results   Component Value Date    SODIUM 140 09/12/2023    K 4.7 09/12/2023     09/12/2023    CO2 28 09/12/2023    AGAP 4 09/12/2023    BUN 45 (H) 09/12/2023    CREATININE 2.27 (H) 09/12/2023    GLUF 82 09/12/2023    CALCIUM 9.3 09/12/2023    AST 16 09/12/2023    ALT 10 09/12/2023    ALKPHOS 68 09/12/2023    TP 7.4 09/12/2023    TBILI 0.67 09/12/2023    EGFR 26 09/12/2023     Lab Results   Component Value Date    WBC 7.37 09/12/2023    HGB 12.0 09/12/2023    HCT 38.8 09/12/2023    MCV 93 09/12/2023     09/12/2023              Anesthesia Plan  ASA Score- 2     Anesthesia Type- IV sedation with anesthesia with ASA Monitors. Additional Monitors:   Airway Plan:           Plan Factors-Exercise tolerance (METS): >4 METS. Chart reviewed. EKG reviewed. Imaging results reviewed. Existing labs reviewed. Patient summary reviewed. Induction- intravenous.     Postoperative Plan-     Informed Consent-

## 2023-09-26 NOTE — OP NOTE
OPERATIVE REPORT  PATIENT NAME: Soto Marrufo    :  1945  MRN: [id number]  Pt Location: [facility] 01    SURGERY DATE: 2023    Surgeon(s) and Role:     * Claudeen Acres, MD - Primary    Preop Diagnosis:  Malignant neoplasm of prostate ([address]) Emily Carrasquillo    Post-Op Diagnosis Codes:     * Malignant neoplasm of prostate ([address]) Emily Carrasquillo    Procedure(s):  TRANSPERINEAL MRI FUSION BIOPSY PROSTATE, SATURATION    Specimen(s):  ID Type Source Tests Collected by Time Destination   1 : FABIANA #1 L base x 5 Tissue Prostate TISSUE EXAM Claudeen Acres, MD 2023    2 : L post lat x 2 Tissue Prostate TISSUE EXAM Claudeen Acres, MD 2023    3 : L post med x 2 Tissue Prostate TISSUE EXAM Claudeen Acres, MD 202314    4 : L base x 2 Tissue Prostate TISSUE EXAM Claudeen Acres, MD 202314    5 : L ant lat x 2 Tissue Prostate TISSUE EXAM Claudeen Acres, MD 2023 0914    6 : L ant med x 2 Tissue Prostate TISSUE EXAM Claudeen Acres, MD 202314    7 : FABIANA # 2R apex x 5 Tissue Prostate TISSUE EXAM Claudeen Acres, MD 202314    8 : R post lat x 2 Tissue Prostate TISSUE EXAM Claudeen Acres, MD 2023 0914    9 : R post med x2 Tissue Prostate TISSUE EXAM Claudeen Acres, MD 2023 0914    10 : R base x2 Tissue Prostate TISSUE EXAM Claudeen Acres, MD 2023 0914    11 : R ant latx2 Tissue Prostate TISSUE EXAM Claudeen Acres, MD 2023 0914    12 : R ant med x 2 Tissue Prostate TISSUE EXAM Claudeen Acres, MD 2023 [id number]        Estimated Blood Loss:   Minimal    Drains:  * No LDAs found *    Anesthesia Type:   IV Sedation with Anesthesia    Operative Indications:  Malignant neoplasm of prostate ([address]) [C61]      Operative Findings:  2 lesions    Complications:   None    Procedure and Technique:  Procedure was transperineal saturation prostate biopsy with MRI fusion sampling of lesion or lesions utilizing the Precision Point perineal access device utilized to map the standard geographic sites of the prostate. Minerva Carranza is a 66 y.o. male with history of prostate cancer on surveillance. Patient has undergone prior biopsy of the prostate . Patient did undergo pre biopsy multiparametric MRI of the prostate. There were 2 lesions with highest PIRADs rating 5 so the patient was scheduled for transperineal saturation biopsy with addition with MRI fusion of the abnormal lesions. The patient was scheduled for his procedure in an outpatient surgical context with the assistance of the anesthesia team for sedation. He was met in the preoperative holding area by the performing urologist, the anesthesia team and the intraoperative nursing staff. The procedure along with its risks and benefits were discussed and reviewed. Patient signed an informed consent. Patient was then transferred to procedure suite. Pre-procedural time out was performed with all parties present. He was treated with periprocedural antibiotics, ancef. He was placed in dorsal lithotomy with care to pad all pressure points. His perineum and genitalia were shave/prepped with hibiclens. The scrotum was elevated a secured aware from the perineum above the rectum. Side-fire, biplanar transrectal ultrasound was introduced and the prostate was imaged in the sagittal and axial views. Prostate gland measurements taken, Height: 3.4cm, Width: 4.6cm, Lenth:4.4cm = volumetric calculation:35.8    With the assistance of the UroNav technician, a survey of the prostate anatomy was performed. The technician then linked the previously obtained MRI images to the real-time ultrasound. The PIRADs lesions seen on MRI were identified on the ultrasound. In the midportion of the left and right prostate, a jenifer was denoted in the perineal skin. Skin wheal was elevated with 1% lidocaine plain on either side.     The PrecisionPoint access needle was then introduced into the left perineal subcutaneous tissue. We visualized the tip of the needle. Spinal needle was introduced through the subcutaneous fat and into the pelvic floor muscle where a perineal pudendal block was performed with additional local anesthetic. This was repeated on the patient's right side. Utilizing the Precision Point access needle and stepper, ultrasound guidance was utilized to place the spring-loaded biopsy needle into MRI lesions. The first lesion that was a PIRADs 5 in the left base. 5 samples were taken. We confirmed good position of the needle strikes utilizing the fusion software. The first lesion that was a PIRADs 4 in the right apex. 5 samples were taken. We confirmed good position of the needle strikes utilizing the fusion software. We now moved to take our standard transperineal samples, which allowed us to carefully map and saturate each of the 10 zones that have subdivided the prostate into zonal anatomy. The Precision Point access needle and stepper was positioned into the RIGHT perineal space and ultrasound guidance was utilized to place the spring-loaded biopsy needle into the following areas    RIGHT anterior medial: 2 biopsies taken  RIGHT posterior medial: 2 biopsies taken  RIGHT posterior lateral: 2 biopsies taken  RIGHT base: 2 biopsies taken  RIGHT anterior lateral: 2 biopsies taken  The Precision Point access needle and stepper was re-positioned into the LEFT perineal space and ultrasound guidance was utilized to place the spring-loaded biopsy needle into the following areas  LEFT anterior medial: 2 biopsies taken  LEFT posterior medial: 2 biopsies taken  LEFT posterior lateral: 2 biopsies taken  LEFT base: 2 biopsies taken  LEFT anterior lateral: 2 biopsies taken  LEFT anterior medial: 2 biopsies taken    A total of 30 biopsies (including standard transperineal saturation and additional fusion biopsy):30    Transrectal ultrasound removed.  The scrotum was released. Some gentle pressure was placed on the perineum for approximately 5 minutes for hemostasis. At the completion of the procedure, the patient was taken out of dorsal lithotomy, recovered from their anesthesia, and transferred to PACU in good condition. Overall, the patient tolerated the procedure and there were no complications. Plan: The patient will be monitored in recovery, allowed to void prior to discharge and return for biopsy pathology review in the office with their primary urologist.     I was present for the entire procedure.     Patient Disposition:  PACU         SIGNATURE: Taniya Kingsley MD  DATE: September 26, 2023  TIME: 9:55 AM

## 2023-09-26 NOTE — H&P
Chief Complaint   Patient presents with   • Benign Prostatic Hypertrophy      Assessment and Plan     1. [place] 6 prostate cancer on active surveillance   - Diagnosed in 2011 with outside urologist.   - Multiparametric prostate MRI 12/1/20 - PI-RADS Category 5  - S/p MRI targeted transperineal prostate biopsy on 3/2/21 showing only 1 core of [place] 3+3=6, less than 5% of the core. Prior to this he had 5 or 6 prostate biopsies which were more recently negative for cancer  - PSA increased to 8.9 in April 2023, previously 6.8 (when corrected for finasteride).    - Restarted on finasteride at last visit. Most recent PSA from 7/21/23 was 4.76 unadjusted for finasteride.   - mpMRI from 7/26/23 showing both PIRADS 5 and PIRADS 4 category lesions  - LEILA nodularity/asymmetry noted along left gland  - Recommend repeat MRI fusion biopsy given imaging findings. He is agreeable.      2. BPH with LUTS  - Well managed on finasteride      Transperineal MRI fusion prostate biopsy as planned. Technique, risks, benefits reviewed. Consent obtained.       History of Present Illness  Soto Marrufo is a 66 y.o. male here for follow up evaluation of prostate cancer and BPH.     Patient has history of [place] 6 prostate cancer that was diagnosed in 2011 by outside urologist. Mamadou Vogt apparently had 5-6 prostate biopsies after initial diagnosis, the most recent biopsy being negative for cancer. Mamadou Vogt did complete prostate MRI in 2018 which was negative. Mamadou Vogt had a progressive increase in PSA to 10.5 and a repeat MRI was performed showing a PI-RADS category 5 lesion.  He then underwent MRI targeted prostate biopsy in March of 2021 which showed 1 core [place] 6 prostate cancer in less than 5% of the core.  He opted for continued active surveillance.  Most recently his PSA did increase to 8.9.  Previously PSA was 6.8 when corrected for finasteride. Mamadou Vogt was advised to undergo prostate MRI as well as repeat PSA in 3 to 4 months. Mamadou Vogt presents today for Opioid Medication Information    You have been given a prescription for an opioid (narcotic) pain medicine and/or have received a pain medicine. These medicines can make you drowsy or impaired. You must not drive, operate dangerous equipment, or engage in any other dangerous activities while taking these medications. If you drive while taking these medications, you could be arrested for DUI, or driving under the influence. Do not drink any alcohol while you are taking these medications.   Opioid pain medications can cause addiction. If you have a history of chemical dependency of any type, you are at a higher risk of becoming addicted to pain medications.  Only take these prescribed medications to treat your pain when all other options have been tried. Take it for as short a time and as few doses as possible. Store your pain pills in a secure place, as they are frequently stolen and provide a dangerous opportunity for children or visitors in your house to start abusing these powerful medications. We will not replace any lost or stolen medicine.  As soon as your pain is better, you should seek out a drug take back program (see your local police department) to dispose of them.   Over-the-counter medications and prescription drugs can pollute [place] and be harmful to humans, fish, and other wildlife when disposed of improperly -- do not flush medications down the toilet or place in the trash.  Properly disposing of medicines is important to prevent abuse or poisoning and protect the environment.     Prescription and over-the-counter medications are collected anonymously from residents for free at [facility] drop-off locations. Visit the [facility]'s Office Prescription Drug Drop-Off page for the list of drop-off sites and information about the program.       [place]  Police Department [phone] Mon-Fri  8am to 4:30pm     [place]  Police Department [phone] 24hrs a day    [place]  Police  Department [phone] Mon-Fri  8am to 6:00pm     [place]  Police Department [phone] Mon-Fri  8am to 4:30pm     [place]  Police Department [phone] 24hrs a day      [place]  Police Department [phone] Mon-Fri  8am to 4:30pm   Many prescription pain medications contain Tylenol  (acetaminophen), including Vicodin , Tylenol #3 , Norco , Lortab , and Percocet .  You should not take any extra pills of Tylenol  if you are using these prescription medications or you can get very sick.  Do not ever take more than 3000 mg of acetaminophen in any 24 hour period.  All opioids tend to cause constipation. Drink plenty of water and eat foods that have a lot of fiber, such as fruits, vegetables, prune juice, apple juice and high fiber cereal.  Take a laxative if you don t move your bowels at least every other day. Miralax , Milk of Magnesia, Colace , or Senna  can be used to keep you regular.  You will likely need to continue stool softeners and stimulants while taking opioids.      follow-up. [facility] was restarted on finasteride at last visit. Most recent PSA was 4.76. MRI of the prostate is showing a PIRADS 5 and PIRADS 4 lesion.         Review of Systems   Constitutional: Negative for chills and fever. Respiratory: Negative for shortness of breath. Cardiovascular: Negative for chest pain. Gastrointestinal: Negative for abdominal pain. Genitourinary: Negative for difficulty urinating, dysuria, flank pain, frequency, hematuria and urgency.    Neurological: Negative for dizziness.                        Past Medical History  Medical History        Past Medical History:   Diagnosis Date   • BPH (benign prostatic hypertrophy) 2009   • Cancer [facility]) 2009   • ED (erectile dysfunction)     • Elevated PSA 2009   • Prostate cancer [facility])              Past Social History  Surgical History         Past Surgical History:   Procedure Laterality Date   • COLONOSCOPY       • HERNIA REPAIR Left     • ID PROSTATE NEEDLE BIOPSY ANY APPROACH N/A 03/02/2021     Procedure: Transperineal MRI Fusion prostate biopsy and gold seed marker insertion;  Surgeon: Jocy Arias MD;  Location: [facility];  Service: Urology   • PROSTATE BIOPSY         1/26/2015, 2/10/2017   • PROSTATE BIOPSY       • VASECTOMY   ?         Social History           Tobacco Use   Smoking Status Never   • Passive exposure: Past   Smokeless Tobacco Never   Tobacco Comments     tried it once          Past Family History  Family History         Family History   Problem Relation Age of Onset   • Hypertension Mother     • Prostate cancer Brother     • No Known Problems Brother     • No Known Problems Sister     • No Known Problems Son     • No Known Problems Son     • No Known Problems Daughter              Past Social history  Social History               Socioeconomic History   • Marital status: /Civil Union       Spouse name: Not on file   • Number of children: Not on file   • Years of education: Not on file   • Highest education level: Not on file   Occupational History   • Not on file   Tobacco Use   • Smoking status: Never       Passive exposure: Past   • Smokeless tobacco: Never   • Tobacco comments:       tried it once    Vaping Use   • Vaping Use: Never used   Substance and Sexual Activity   • Alcohol use: Yes       Alcohol/week: 2.0 standard drinks of alcohol       Types: 1 Glasses of wine, 1 Cans of beer per week       Comment: social   • Drug use: No   • Sexual activity: Yes   Other Topics Concern   • Not on file   Social History Narrative   • Not on file      Social Determinants of Health      Financial Resource Strain: Not on file   Food Insecurity: Not on file   Transportation Needs: Not on file   Physical Activity: Not on file   Stress: Not on file   Social Connections: Not on file   Intimate Partner Violence: Not on file   Housing Stability: Not on file            Current Medications  Current Medications          Current Outpatient Medications   Medication Sig Dispense Refill   • amLODIPine (NORVASC) 5 mg tablet Take 1 tablet (5 mg total) by mouth daily in the early morning 30 tablet 5   • Apoaequorin (Prevagen Extra Strength) 20 MG CAPS Take 20 mg by mouth in the morning Once daily       • finasteride (PROSCAR) 5 mg tablet Take 1 tablet (5 mg total) by mouth daily 90 tablet 3   • Multiple Vitamins-Minerals (CENTRUM SILVER 50+MEN PO) Take by mouth as needed       • sildenafil (VIAGRA) 50 MG tablet Take 1 tablet (50 mg total) by mouth as needed for erectile dysfunction 30 tablet 2   • atovaquone-proguanil (MALARONE) 250-100 mg take 1 tablet by mouth once daily WITH BREAKFAST BEGIN 1 day PRIO. ..  (REFER TO PRESCRIPTION NOTES).  (Patient not taking: Reported on 4/26/2023)          No current facility-administered medications for this visit.            Allergies  No Known Allergies        The following portions of the patient's history were reviewed and updated as appropriate: allergies, current medications, past medical history, past social history, past surgical history and problem list.        Vitals  /93   Pulse (!) 54   Temp (!) 97 °F (36.1 °C) (Tympanic)   Resp 18   SpO2 97%                     Physical Exam  Physical Exam  Constitutional:       Appearance: Normal appearance. HENT:      Head: Normocephalic and atraumatic. Right Ear: External ear normal.      Left Ear: External ear normal.      Nose: Nose normal.   Eyes:      General: No scleral icterus. Conjunctiva/sclera: Conjunctivae normal.   Cardiovascular:      Pulses: Normal pulses. Pulmonary:      Effort: Pulmonary effort is normal.   Genitourinary:     Comments: Possible nodularity/asymmetry noted along left gland on LEILA  Musculoskeletal:         General: Normal range of motion. Cervical back: Normal range of motion. Skin:     General: Skin is warm and dry. Neurological:      General: No focal deficit present. Mental Status: He is alert and oriented to person, place, and time. Psychiatric:         Mood and Affect: Mood normal.         Behavior: Behavior normal.         Thought Content:  Thought content normal.         Judgment: Judgment normal.

## 2023-09-26 NOTE — ANESTHESIA POSTPROCEDURE EVALUATION
Post-Op Assessment Note    CV Status:  Stable  Pain Score: 0    Pain management: adequate     Mental Status:  Sleepy   Hydration Status:  Stable   PONV Controlled:  Controlled   Airway Patency:  Patent      Post Op Vitals Reviewed: Yes      Staff: Anesthesiologist, CRNA         No notable events documented.     BP      Temp     Pulse    Resp      SpO2

## 2023-09-28 PROCEDURE — G0416 PROSTATE BIOPSY, ANY MTHD: HCPCS | Performed by: PATHOLOGY

## 2023-09-28 PROCEDURE — 88344 IMHCHEM/IMCYTCHM EA MLT ANTB: CPT | Performed by: PATHOLOGY

## 2023-10-02 DIAGNOSIS — I12.9 PARENCHYMAL RENAL HYPERTENSION, STAGE 1 THROUGH STAGE 4 OR UNSPECIFIED CHRONIC KIDNEY DISEASE: ICD-10-CM

## 2023-10-02 DIAGNOSIS — N28.1 BILATERAL RENAL CYSTS: ICD-10-CM

## 2023-10-02 DIAGNOSIS — N18.32 STAGE 3B CHRONIC KIDNEY DISEASE (HCC): ICD-10-CM

## 2023-10-02 DIAGNOSIS — D63.1 ANEMIA IN STAGE 3B CHRONIC KIDNEY DISEASE: ICD-10-CM

## 2023-10-02 DIAGNOSIS — N18.32 ANEMIA IN STAGE 3B CHRONIC KIDNEY DISEASE: ICD-10-CM

## 2023-10-02 DIAGNOSIS — E78.5 DYSLIPIDEMIA: ICD-10-CM

## 2023-10-02 RX ORDER — AMLODIPINE BESYLATE 5 MG/1
TABLET ORAL
Qty: 30 TABLET | Refills: 5 | Status: SHIPPED | OUTPATIENT
Start: 2023-10-02

## 2023-10-09 ENCOUNTER — APPOINTMENT (OUTPATIENT)
Dept: LAB | Facility: CLINIC | Age: 78
End: 2023-10-09
Payer: MEDICARE

## 2023-10-09 DIAGNOSIS — E78.5 DYSLIPIDEMIA: ICD-10-CM

## 2023-10-09 DIAGNOSIS — N18.4 CHRONIC RENAL DISEASE, STAGE IV (HCC): ICD-10-CM

## 2023-10-09 DIAGNOSIS — N18.32 ANEMIA IN STAGE 3B CHRONIC KIDNEY DISEASE: ICD-10-CM

## 2023-10-09 DIAGNOSIS — N18.32 STAGE 3B CHRONIC KIDNEY DISEASE (HCC): ICD-10-CM

## 2023-10-09 DIAGNOSIS — N28.1 BILATERAL RENAL CYSTS: ICD-10-CM

## 2023-10-09 DIAGNOSIS — D63.1 ANEMIA IN STAGE 3B CHRONIC KIDNEY DISEASE: ICD-10-CM

## 2023-10-09 DIAGNOSIS — I12.9 PARENCHYMAL RENAL HYPERTENSION, STAGE 1 THROUGH STAGE 4 OR UNSPECIFIED CHRONIC KIDNEY DISEASE: ICD-10-CM

## 2023-10-09 LAB
ALBUMIN SERPL BCP-MCNC: 3.9 G/DL (ref 3.5–5)
ALP SERPL-CCNC: 75 U/L (ref 34–104)
ALT SERPL W P-5'-P-CCNC: 12 U/L (ref 7–52)
ANION GAP SERPL CALCULATED.3IONS-SCNC: 7 MMOL/L
AST SERPL W P-5'-P-CCNC: 18 U/L (ref 13–39)
BILIRUB SERPL-MCNC: 0.41 MG/DL (ref 0.2–1)
BUN SERPL-MCNC: 39 MG/DL (ref 5–25)
CALCIUM SERPL-MCNC: 9.1 MG/DL (ref 8.4–10.2)
CHLORIDE SERPL-SCNC: 106 MMOL/L (ref 96–108)
CHOLEST SERPL-MCNC: 185 MG/DL
CO2 SERPL-SCNC: 29 MMOL/L (ref 21–32)
CREAT SERPL-MCNC: 2.07 MG/DL (ref 0.6–1.3)
CREAT UR-MCNC: 72.2 MG/DL
ERYTHROCYTE [DISTWIDTH] IN BLOOD BY AUTOMATED COUNT: 12.5 % (ref 11.6–15.1)
GFR SERPL CREATININE-BSD FRML MDRD: 29 ML/MIN/1.73SQ M
GLUCOSE P FAST SERPL-MCNC: 89 MG/DL (ref 65–99)
HCT VFR BLD AUTO: 39.4 % (ref 36.5–49.3)
HDLC SERPL-MCNC: 56 MG/DL
HGB BLD-MCNC: 12.3 G/DL (ref 12–17)
LDLC SERPL CALC-MCNC: 112 MG/DL (ref 0–100)
MAGNESIUM SERPL-MCNC: 2.2 MG/DL (ref 1.9–2.7)
MCH RBC QN AUTO: 29.5 PG (ref 26.8–34.3)
MCHC RBC AUTO-ENTMCNC: 31.2 G/DL (ref 31.4–37.4)
MCV RBC AUTO: 95 FL (ref 82–98)
PHOSPHATE SERPL-MCNC: 3.3 MG/DL (ref 2.3–4.1)
PLATELET # BLD AUTO: 279 THOUSANDS/UL (ref 149–390)
PMV BLD AUTO: 9.6 FL (ref 8.9–12.7)
POTASSIUM SERPL-SCNC: 4.4 MMOL/L (ref 3.5–5.3)
PROT SERPL-MCNC: 7.2 G/DL (ref 6.4–8.4)
PROT UR-MCNC: 7 MG/DL
PROT/CREAT UR: 0.1 MG/G{CREAT} (ref 0–0.1)
PTH-INTACT SERPL-MCNC: 43.6 PG/ML (ref 12–88)
RBC # BLD AUTO: 4.17 MILLION/UL (ref 3.88–5.62)
SODIUM SERPL-SCNC: 142 MMOL/L (ref 135–147)
TRIGL SERPL-MCNC: 83 MG/DL
WBC # BLD AUTO: 7.25 THOUSAND/UL (ref 4.31–10.16)

## 2023-10-09 PROCEDURE — 84156 ASSAY OF PROTEIN URINE: CPT

## 2023-10-09 PROCEDURE — 82570 ASSAY OF URINE CREATININE: CPT

## 2023-10-09 PROCEDURE — 80053 COMPREHEN METABOLIC PANEL: CPT

## 2023-10-09 PROCEDURE — 36415 COLL VENOUS BLD VENIPUNCTURE: CPT

## 2023-10-09 PROCEDURE — 83735 ASSAY OF MAGNESIUM: CPT

## 2023-10-09 PROCEDURE — 83970 ASSAY OF PARATHORMONE: CPT

## 2023-10-09 PROCEDURE — 84100 ASSAY OF PHOSPHORUS: CPT

## 2023-10-09 PROCEDURE — 85027 COMPLETE CBC AUTOMATED: CPT

## 2023-10-09 PROCEDURE — 80061 LIPID PANEL: CPT

## 2023-10-09 PROCEDURE — 82306 VITAMIN D 25 HYDROXY: CPT

## 2023-10-15 LAB
25(OH)D2 SERPL-MCNC: <1 NG/ML
25(OH)D3 SERPL-MCNC: 73 NG/ML
25(OH)D3+25(OH)D2 SERPL-MCNC: 73 NG/ML

## 2023-10-23 PROBLEM — Z71.2 ENCOUNTER TO DISCUSS TEST RESULTS: Status: ACTIVE | Noted: 2023-10-23

## 2023-10-23 NOTE — PROGRESS NOTES
Problem List Items Addressed This Visit          Genitourinary    Prostate cancer ([address]) - Primary    Relevant Orders    Comprehensive metabolic panel    NM bone scan whole body    Ambulatory Referral to Radiation Oncology    POCT Measure PVR (Completed)    XR pelvis ap only 1 or 2 vw    Benign localized prostatic hyperplasia with lower urinary tract symptoms (LUTS)    Stage 3b chronic kidney disease (HCC)    Anemia in stage 3 chronic kidney disease        Other    Organic impotence    Nocturia associated with benign prostatic hyperplasia    Encounter to discuss test results                 Discussion:      Today we discussed his pathology from his prostate biopsy including definition of prostate cancer risk groups, a discussion of the Melany score and the meaning of his biopsy results in terms of his future management and overall health and treatment. I had a discussion with the patient regarding the natural history and treatment options for prostate cancer and the potential need for multimodal treatments. Active surveillance, surgical excision in the form of open or robotic surgery, and radiation therapies plus or minus androgen deprivation therapy were discussed at length with the patient. With regard to multimodal therapy discussion this could include surgical excision with postoperative radiation therapy (RT) +/-androgen deprivation therapy (ADT) if adverse pathology or RT with long-term ADT. I discussed robot assisted laparoscopic radical prostatectomy with the inclusion or exclusion of bilateral pelvic lymph node dissection (depending on disease parameters and intraoperative findings) in depth with the patient.       I discussed that surgery has potential advantages compared with radiation including the more accurate prognostic, pathologic information afforded by the surgical pathology, the more immediate indication of effective treatment as indicated by an undetectable PSA, as well as the relatively easier use of adjuvant or salvage radiotherapy postoperatively if the need arises. I discussed that in comparison with open surgery that robotic prostatectomy has the benefits of improved convalescence and decreased blood loss and appears to have equivalent cancer control rates and quality-of-life side effects such similar rates of urinary incontinence and erectile dysfunction. I discussed that most men stay in the hospital for 1-2 days and go home with a catheter for 7-10 days. I discussed the side effects of surgery including stress incontinence, and I counseled him that most men leak urine immediately after surgery. I expect a stress incontinence rate at six months in the 10%-15% range, improving to 5%-10% in one year. I did communicate that there additional treatments for urinary incontinence that may be persistent in the post-prostatectomy setting. In terms of erectile dysfunction, we discussed the normal physiology of the erectile response as well as discussing the anatomy of the cavernous nerves. I drew a diagram for him outlining the pelvic anatomy with regard to the bladder, prostate, and parasympathetic nerve plexus running laterally to the prostate. I will do everything that I safely can to attempt a nerve-sparing procedure if deemed to be appropriate intraoperatively. I did discuss with him that my priority is 1st to rid him of his cancer, 2nd to keep him dry, and 3rd to keep him with sexual function. He does understand that his sexual function postoperatively is a function of his current sexual function preoperatively and is closely tied to his overall general health. He understands that there are other therapies for erectile dysfunction the post-prostatectomy setting including oral therapies, injections, and penile prosthesis placement.  Additionally, I did stress to him that his post-operative sexual function will not be as good as his preoperative sexual function due to the nature of prostate surgery. We discussed that men are surgically sterile after radical prostatectomy    I discussed other surgical complications including, but not limited to, a bladder neck contracture rate in the 5%-10% range any time after surgery, a rectal injury rate of 1% or less, an open conversion rate in the 1% range, a lymphocele causing symptoms and/or requiring intervention in the 5% range, and a transfusion rate in the 1% to 2% range. Additionally, he may experience some degree of penile remodeling and/or loss of penile length after surgery. He has participated in shared/informed decision-making model with regard to this major urologic surgery with risk. All questions answered and addressed    We discussed radiation therapy, and what this would entail. I have referred him to radiation oncology. He does have some baseline benign prostatic enlargement with lower urinary tract symptoms. He is taking finasteride at this time. He does think that he may have had fiducial markers placed previously. As such I have ordered a pelvic x-ray to look for these fiducial markers. I do not see any mention of this in his previous biopsy notes here at [facility]. He may be a candidate for surgery or radiation. He does think that he would likely need toward radiation and this is reasonable. I did also encourage him to obtain a second opinion should he desire this. Multiple questions and concerns answered and addressed. I will see him back in some weeks for review of his staging studies.     I have spent a total time of 60 minutes on 10/24/23 in caring for this patient including Diagnostic results, Prognosis, Risks and benefits of tx options, Instructions for management, Patient and family education, Importance of tx compliance, Risk factor reductions, Impressions, Counseling / Coordination of care, Documenting in the medical record, Reviewing / ordering tests, medicine, procedures  , Obtaining or reviewing history  , and Communicating with other healthcare professionals . Portions of the above record have been created with voice recognition software. Occasional wrong word or "sound alike" substitution may have occurred due to the inherent limitations of voice recognition software. Read the chart carefully and recognize, using context, where substitution may have occurred. Assessment and plan:       Please see problem oriented charting for the assessment plan of today's urological complaints      Ana Paula Bowen MD      Chief Complaint     As listed above      History of Present Illness     Paula Wooten is a 66 y.o. man with history of low risk prostate cancer on active surveillance, MRI.  MRI targeted biopsy shows progression to Melany 9 disease    Extensive discussion as above    PVR 12 mL    The following portions of the patient's history were reviewed and updated as appropriate: allergies, current medications, past family history, past medical history, past social history, past surgical history and problem list.    AUA SYMPTOM SCORE      Flowsheet Row Most Recent Value   AUA SYMPTOM SCORE    How often have you had a sensation of not emptying your bladder completely after you finished urinating? 1 (P)     How often have you had to urinate again less than two hours after you finished urinating? 2 (P)     How often have you found you stopped and started again several times when you urinate? 1 (P)     How often have you found it difficult to postpone urination? 1 (P)     How often have you had a weak urinary stream? 2 (P)     How often have you had to push or strain to begin urination? 1 (P)     How many times did you most typically get up to urinate from the time you went to bed at night until the time you got up in the morning? 1 (P)     Quality of Life: If you were to spend the rest of your life with your urinary condition just the way it is now, how would you feel about that? 2 (P)     AUA SYMPTOM SCORE 9 (P)               Detailed Urologic History     - please refer to HPI    Review of Systems     Review of Systems   Constitutional: Negative. HENT: Negative. Eyes: Negative. Respiratory: Negative. Cardiovascular: Negative. Gastrointestinal: Negative. Endocrine: Negative. Genitourinary:         Baseline LUTS   Musculoskeletal: Negative. Skin: Negative. Allergic/Immunologic: Negative. Neurological: Negative. Hematological: Negative. Psychiatric/Behavioral: Negative. AUA SYMPTOM SCORE      Flowsheet Row Most Recent Value   AUA SYMPTOM SCORE    How often have you had a sensation of not emptying your bladder completely after you finished urinating? 1 (P)     How often have you had to urinate again less than two hours after you finished urinating? 2 (P)     How often have you found you stopped and started again several times when you urinate? 1 (P)     How often have you found it difficult to postpone urination? 1 (P)     How often have you had a weak urinary stream? 2 (P)     How often have you had to push or strain to begin urination? 1 (P)     How many times did you most typically get up to urinate from the time you went to bed at night until the time you got up in the morning? 1 (P)     Quality of Life: If you were to spend the rest of your life with your urinary condition just the way it is now, how would you feel about that? 2 (P)     AUA SYMPTOM SCORE 9 (P)               Allergies     No Known Allergies    Physical Exam     Physical Exam  Constitutional:       General: He is not in acute distress. Appearance: Normal appearance. He is not ill-appearing, toxic-appearing or diaphoretic. HENT:      Head: Normocephalic and atraumatic. Eyes:      General: No scleral icterus. Right eye: No discharge. Left eye: No discharge. Pulmonary:      Effort: No respiratory distress. Abdominal:      General: There is no distension.    Musculoskeletal: General: No swelling. Skin:     Coloration: Skin is not jaundiced. Neurological:      General: No focal deficit present. Mental Status: He is alert and oriented to person, place, and time. Cranial Nerves: No cranial nerve deficit. Psychiatric:         Mood and Affect: Mood normal.         Behavior: Behavior normal.         Thought Content: Thought content normal.         Judgment: Judgment normal.             Vital Signs  Vitals:    10/24/23 0935   BP: 126/68   Pulse: 60   SpO2: 98%   Weight: 63.5 kg (140 lb)   Height: 5' 3" (1.6 m)         Current Medications       Current Outpatient Medications:     amLODIPine (NORVASC) 5 mg tablet, take 1 tablet by mouth once daily IN THE EARLY MORNING, Disp: 30 tablet, Rfl: 5    Apoaequorin (Prevagen Extra Strength) 20 MG CAPS, Take 20 mg by mouth in the morning Once daily, Disp: , Rfl:     finasteride (PROSCAR) 5 mg tablet, Take 1 tablet (5 mg total) by mouth daily, Disp: 90 tablet, Rfl: 3    Multiple Vitamins-Minerals (CENTRUM SILVER 50+MEN PO), Take by mouth as needed, Disp: , Rfl:     sildenafil (VIAGRA) 50 MG tablet, Take 1 tablet (50 mg total) by mouth as needed for erectile dysfunction, Disp: 30 tablet, Rfl: 2    atovaquone-proguanil (MALARONE) 250-100 mg, take 1 tablet by mouth once daily WITH BREAKFAST BEGIN 1 day PRIO. ..  (REFER TO PRESCRIPTION NOTES).  (Patient not taking: Reported on 4/26/2023), Disp: , Rfl:       Active Problems     Patient Active Problem List   Diagnosis    Organic impotence    Prostate cancer ([address])    Benign localized prostatic hyperplasia with lower urinary tract symptoms (LUTS)    Stage 3b chronic kidney disease (HCC)    Dyslipidemia    Parenchymal renal hypertension    Nocturia associated with benign prostatic hyperplasia    SHER (acute kidney injury) ([address])    Anemia in stage 3 chronic kidney disease     Bilateral renal cysts    Chronic renal disease, stage IV (HCC)    Meibomian gland dysfunction (MGD)    Deposits (accretions) on teeth    Onychomycosis    Partial loss of teeth due to other specified cause, unspecified class    Posterior subcapsular cataract    Presbyopia    Encounter to discuss test results         Past Medical History     Past Medical History:   Diagnosis Date    Prostate cancer [facility])          Surgical History     Past Surgical History:   Procedure Laterality Date    COLONOSCOPY      HERNIA REPAIR Left     OK BX PROSTATE STRTCTC SATURATION SAMPLING IMG GID N/A 9/26/2023    Procedure: TRANSPERINEAL MRI FUSION BIOPSY PROSTATE;  Surgeon: John Ayala MD;  Location: [facility];  Service: Urology    OK PROSTATE NEEDLE BIOPSY ANY APPROACH N/A 03/02/2021    Procedure: Transperineal MRI Fusion prostate biopsy and gold seed marker insertion;  Surgeon: Silvestre Parker MD;  Location: [facility];  Service: Urology    PROSTATE BIOPSY      1/26/2015, 2/10/2017    PROSTATE BIOPSY      VASECTOMY  ? Family History     Family History   Problem Relation Age of Onset    Hypertension Mother     Prostate cancer Brother     No Known Problems Brother     No Known Problems Sister     No Known Problems Son     No Known Problems Son     No Known Problems Daughter          Social History     Social History     Social History     Tobacco Use   Smoking Status Never    Passive exposure: Past   Smokeless Tobacco Never   Tobacco Comments    tried it once          Pertinent Lab Values     Lab Results   Component Value Date    CREATININE 2.07 (H) 10/09/2023       Lab Results   Component Value Date    PSA 4.76 (H) 07/21/2023    PSA 8.9 (H) 04/20/2023    PSA 3.4 10/19/2022       Final Diagnosis   A. Prostate, FABIANA #1 L base x 5:  Acinar adenocarcinoma  - Grade group 3 (Melany score: 4+3 =7)  - Involving 70%, 70% and 13% of 3 out of 5 core fragments, tumor lengths are 11 mm, 11 mm and 2 mm  - Percentage of pattern 4: approx. 92%  - Perineural invasion identified     B.  Prostate, L post lat x 2:  Benign prostate tissue with basal cell hyperplasia and chronic prostatitis     C. Prostate, L post med x 2:  Benign prostatic hyperplasia and chronic prostatitis     D. Prostate, L base x 2:  Benign prostatic hyperplasia and chronic prostatitis     E. Prostate, L ant lat x 2:  Acinar adenocarcinoma  - Grade group 5 (Temple score: 4+5 =9)  - Involving 30% of 1 out of 2 core fragments, tumor length is 5 mm  - Percentage of pattern 5: approx. 5%     F. Prostate, L ant med x 2:  Benign prostate tissue with basal cell hyperplasia and chronic prostatitis     G. Prostate, FABIANA # 2R apex x 5:  Acinar adenocarcinoma  - Grade group 2 (Temple score: 3+4 =7)  - Involving 50%, 45%, 35%, 15% and 11% of 5 out of 5 core fragments, tumor lengths are 4.5 mm, 4.5 mm, 2 mm, 3 mm and 1 mm  - Percentage of pattern 4: approx. 45%  - High grade prostatic intraepithelial neoplasia     H. Prostate, R post lat x 2:  Atypical glands, suspicious for malignancy  High grade prostatic intraepithelial neoplasia     I. Prostate, R post med x2:  Rare atypical glands  Background of benign prostate tissue with basal cell hyperplasia and chronic prostatitis     J. Prostate, R base x2:  Benign prostate tissue     K. Prostate, R ant latx2:  Benign prostatic hyperplasia and chronic prostatitis     L. Prostate, R ant med x 2:  Benign prostate tissue with basal cell hyperplasia and chronic prostatitis     See note      Electronically signed by Vimal Mohamud MD on 9/28/2023 at  1:05 PM           Pertinent Imaging       The patient's images were reviewed by me personally and also in real time with them in the examination room using our PACS imaging system. The imaging findings are significant for some extracapsular extension at the left base of the gland and a 35.8 gram gland. No SV invasion, pelvic LAD, or pelvic osseous disease .

## 2023-10-24 ENCOUNTER — OFFICE VISIT (OUTPATIENT)
Dept: UROLOGY | Facility: CLINIC | Age: 78
End: 2023-10-24
Payer: MEDICARE

## 2023-10-24 VITALS
WEIGHT: 140 LBS | BODY MASS INDEX: 24.8 KG/M2 | OXYGEN SATURATION: 98 % | DIASTOLIC BLOOD PRESSURE: 68 MMHG | HEIGHT: 63 IN | HEART RATE: 60 BPM | SYSTOLIC BLOOD PRESSURE: 126 MMHG

## 2023-10-24 DIAGNOSIS — Z71.2 ENCOUNTER TO DISCUSS TEST RESULTS: ICD-10-CM

## 2023-10-24 DIAGNOSIS — C61 PROSTATE CANCER (HCC): Primary | ICD-10-CM

## 2023-10-24 DIAGNOSIS — N40.1 BENIGN LOCALIZED PROSTATIC HYPERPLASIA WITH LOWER URINARY TRACT SYMPTOMS (LUTS): ICD-10-CM

## 2023-10-24 DIAGNOSIS — N18.32 ANEMIA IN STAGE 3B CHRONIC KIDNEY DISEASE: ICD-10-CM

## 2023-10-24 DIAGNOSIS — D63.1 ANEMIA IN STAGE 3B CHRONIC KIDNEY DISEASE: ICD-10-CM

## 2023-10-24 DIAGNOSIS — N18.32 STAGE 3B CHRONIC KIDNEY DISEASE (HCC): ICD-10-CM

## 2023-10-24 DIAGNOSIS — R35.1 NOCTURIA ASSOCIATED WITH BENIGN PROSTATIC HYPERPLASIA: ICD-10-CM

## 2023-10-24 DIAGNOSIS — N40.1 NOCTURIA ASSOCIATED WITH BENIGN PROSTATIC HYPERPLASIA: ICD-10-CM

## 2023-10-24 DIAGNOSIS — N52.9 ORGANIC IMPOTENCE: ICD-10-CM

## 2023-10-24 LAB — POST-VOID RESIDUAL VOLUME, ML POC: 12 ML

## 2023-10-24 PROCEDURE — 99215 OFFICE O/P EST HI 40 MIN: CPT | Performed by: UROLOGY

## 2023-10-24 PROCEDURE — 51798 US URINE CAPACITY MEASURE: CPT | Performed by: UROLOGY

## 2023-10-29 PROBLEM — N18.32 STAGE 3B CHRONIC KIDNEY DISEASE (HCC): Status: RESOLVED | Noted: 2020-11-02 | Resolved: 2023-10-29

## 2023-10-29 NOTE — PROGRESS NOTES
RENAL FOLLOW UP NOTE:.td    ASSESSMENT AND PLAN:  27-year-old male with a history of prostate CA who we are seeing for CKD stage 3:     1. CKD stage 4  Etiology: Arteriolar nephrosclerosis,? Hypertensive nephrosclerosis;  no evidence of primary glomerular process with a bland urinalysis without proteinuria or hematuria; no evidence of obstructive uropathy   Baseline creatinine: 1.8-2.2  Current creatinine: 2.07 at baseline  Urine protein creatinine ratio:  0.10 g at goal   UA:  No proteinuria and no hematuria or pyuria   PVR with bladder scan:   renal artery duplex: negative   renal ultrasound demonstrated cysts which are benign and simple no further evaluation required per the [facility] radiologist Dr. Yo Moncada  Recommendations:  Treat hypertension-please see below  Treat dyslipidemia-please see below  Maintain proteinuria less than 1 g or as low as possible  Avoid nephrotoxic agents such as NSAIDs, and proton pump inhibitors if possible; patient counseled as such  Follow-up renal ultrasound 4/27/2023 was negative with bilateral renal cysts most simple  Kidney smart completed        2. Volume:  Euvolemic     3. Hypertension:       Current blood pressure averages:   Blood pressures   AM: 126/76  P.m.: 128/71  Heart rate: 60 range     Goal blood pressure: less than 130/80 given CKD     Recommendations:  Push nonmedical regimen including weight loss, isotonic exercise and a low sodium diet. Patient has been counseled the such. MedicationChanges today:    No changes as patient's blood pressure is at goal  4. Electrolytes: All acceptable       5. Mineral bone disorder:  Of chronic kidney disease:  Calcium/magnesium/phosphorus:  All acceptable  PTH intact: Already 3.6 which is normal  Vitamin-D: 73 at goal     6.   Dyslipidemia:  Goal LDL: less than 100 given CKD  Current lipid profile:  /HDL 56/triglycerides 83  Recommendations:   Low-cholesterol/low-fat diet / weight loss as appropriate and isotonic exercise   Medication changes today: I am recommending treatment with a statin he will consider it he is hesitant to take medications but we discussed the benefits would outweigh the risk as his risk at this juncture given CKD is higher for a stroke/heart attack/heart disease given his chronic kidney disease. He will again consider it and contact me with his decision     7. Anemia:  Current hemoglobin:  Normal at   12.3 stable     8. Other problems:  Prostate CA followed by Urology with negative biopsies of the last several years. Last biopsy was 03/02/2021.:  Status post recent biopsy by Dr. Ankush Hernandez 9/26/2023: Positive for prostate cancer potential radiation therapy  Needs follow-up renal ultrasound 12 months after 4/27/2023 for some mildly complex cysts: Discussed with patient to follow-up in April 2024 to follow the cysts  Avascular necrosis of the left knee    MGUS followed by Hematology      GI health maintenance:  11/04/2020:  By Dr. Laura Andrews no polyps normal colonoscopy no further procedure require       PATIENT INSTRUCTIONS:    Patient Instructions   Visit summary:  - Overall kidney function is stable which is our goal at this time!  - In general labs look great, your bad cholesterol or  is above goal of 100 and it has been hovering a little bit above goal.  Typically we recommend treatment with medication such as atorvastatin 5 mg which is a very small dose in order to get your LDL to goal below 100. You are doing everything great on your hand including diet exercise and being at the proper weight.   So you really would need help with the medication.  - Blood pressures are excellent no changes    Let me know when you make your decision 1 way or the other about the medication and I am happy to answer any other questions you may have in that regard    You have some kidney cysts that the radiologist recommended follow-up in 1 year which would be April 2024 and I will give you the request for that. 1. Medication changes today:  No medication changes today. Pending your decision on the atorvastatin. 2.  Please take 1 week a blood pressure readings prior to your next appointment    AS FOLLOWS  MORNING AND EVENING, SITTING AND STANDING as follows:  TAKE THE MORNING READINGS BEFORE ANY MEDICATIONS AND WHEN YOU ARE RELAXED FOR SEVERAL MINUTES  TAKE THE EVENING READINGS:  BETWEEN 7-10 P.M.; PRIOR TO ANY MEDICATIONS; AT LEAST IN OUR  FROM DINNER; AND CERTAINLY AFTER RELAXING FOR A FEW MINUTES  PLEASE INCLUDE HEART RATE WITH YOUR BLOOD PRESSURE READINGS  When taking standing readings, keep your arm supported at heart level and not dangling  Make sure you are sitting with your back supported and feet on the ground and do not cross your legs or feet  Make sure you have not taken any coffee or caffeine products or exercised or smoke cigarettes at least 30 minutes before taking your blood pressure  Then please mail these readings into the office    3. Please go for an ultrasound of your kidneys April 2024 to follow-up regarding the kidney cysts    5. Please go for nonfasting lab work in 3 months    6. Follow-up in 6 months  Please bring in 1 week a blood pressure readings morning evening, sitting and standing is outlined above  PLEASE BRING AN YOUR BLOOD PRESSURE MACHINE TO CORRELATE WITH THE OFFICE MACHINE AT THIS NEXT SCHEDULED VISIT  Please go for fasting lab work 1-2 weeks prior to your appointment      7.   General instructions:  AVOID SALT BUT NOT ADDING AN READING LABELS TO MAKE SURE THERE IS LOW-SALT IN THE FOOD THAT YOU ARE EATING  Goal is less than 2 g of sodium intake or less than 5 g of sodium chloride intake per day    Avoid nonsteroidal anti-inflammatory drugs such as Naprosyn, ibuprofen, Aleve, Advil, Celebrex, Meloxicam (Mobic) etc.  You can use Tylenol as needed if you do not have any liver condition to be concerned about    Avoid medications such as Sudafed or decongestants and antihistamines that contained the D component which is the decongestant. You can take antihistamines without the decongestant or D component. Try to avoid medications such as pantoprazole or  Protonix/Nexium or Esomeprazole)/Prilosec or omeprazole/Prevacid or lansoprazole/AcipHex or Rabeprazole. If you are able to, use Pepcid as this is safer for your kidneys. Try to exercise at least 30 minutes 3 days a week to begin with with an ultimate goal of 5 days a week for at least 30 minutes      Please do not drink more than 2 glasses of alcohol/wine on a daily basis as this may contribute to your high blood pressure. Subjective: There has been no hospitalizations or acute illnesses since last visit. The patient overall is feeling well. No fevers, chills, or cough or colds. Good appetite and good energy  No hematuria, dysuria, voiding symptoms or foamy urine  No gastrointestinal symptoms  No cardiovascular symptoms including swelling of the legs  No headaches, dizziness or lightheadedness  Blood pressure medications:  Amlodipine 5 mg daily in the morning    Renal pertinent medications:  None     ROS:  See HPI, otherwise review of systems as completely reviewed with the patient are negative    Past Medical History:   Diagnosis Date    Prostate cancer ([address])      Past Surgical History:   Procedure Laterality Date    COLONOSCOPY      HERNIA REPAIR Left     NE BX PROSTATE STRTCTC SATURATION SAMPLING IMG GID N/A 9/26/2023    Procedure: TRANSPERINEAL MRI FUSION BIOPSY PROSTATE;  Surgeon: Almas Pineda MD;  Location: [facility];  Service: Urology    NE PROSTATE NEEDLE BIOPSY ANY APPROACH N/A 03/02/2021    Procedure: Transperineal MRI Fusion prostate biopsy and gold seed marker insertion;  Surgeon: Danielle Abel MD;  Location: [facility];  Service: Urology    PROSTATE BIOPSY      1/26/2015, 2/10/2017    PROSTATE BIOPSY      VASECTOMY  ?      Family History   Problem Relation Age of Onset Hypertension Mother     Prostate cancer Brother     No Known Problems Brother     No Known Problems Sister     No Known Problems Son     No Known Problems Son     No Known Problems Daughter       reports that he has never smoked. He has been exposed to tobacco smoke. He has never used smokeless tobacco. He reports current alcohol use of about 2.0 standard drinks of alcohol per week. He reports that he does not use drugs. I COMPLETELY REVIEWED THE PAST MEDICAL HISTORY/PAST SURGICAL HISTORY/SOCIAL HISTORY/FAMILY HISTORY/AND MEDICATIONS  AND UPDATED ALL    Objective:     Vitals:   BP sitting on left: 146/78 with a heart rate of 60 and regular  BP standing on left: 156/78 with a heart rate of 60 and regular    Weight (last 2 days)       Date/Time Weight    11/07/23 1248 67.1 (148)          Wt Readings from Last 3 Encounters:   11/07/23 67.1 kg (148 lb)   10/24/23 63.5 kg (140 lb)   08/09/23 64.8 kg (142 lb 12.8 oz)       Body mass index is 26.22 kg/m².     Physical Exam: General:  No acute distress  Skin:  No acute rash  Eyes:  No scleral icterus, noninjected, no discharge from eyes  ENT:  Moist mucous membranes  Neck:  Supple, no jugular venous distention, trachea is midline, no lymphadenopathy and no thyromegaly  Back   No CVAT  Chest:  Clear to auscultation and percussion, good respiratory effort  CVS:  Regular rate and rhythm without a rub, or gallops or murmurs  Abdomen:  Soft and nontender with normal bowel sounds  Extremities:  No cyanosis and no edema, no arthritic changes, normal range of motion  Neuro:  Grossly intact  Psych:  Alert, oriented x3 and appropriate      Medications:    Current Outpatient Medications:     amLODIPine (NORVASC) 5 mg tablet, take 1 tablet by mouth once daily IN THE EARLY MORNING, Disp: 30 tablet, Rfl: 5    Apoaequorin (Prevagen Extra Strength) 20 MG CAPS, Take 20 mg by mouth in the morning Once daily, Disp: , Rfl:     finasteride (PROSCAR) 5 mg tablet, Take 1 tablet (5 mg total) by mouth daily, Disp: 90 tablet, Rfl: 3    sildenafil (VIAGRA) 50 MG tablet, Take 1 tablet (50 mg total) by mouth as needed for erectile dysfunction, Disp: 30 tablet, Rfl: 2    atovaquone-proguanil (MALARONE) 250-100 mg, take 1 tablet by mouth once daily WITH BREAKFAST BEGIN 1 day PRIO. ..  (REFER TO PRESCRIPTION NOTES). (Patient not taking: Reported on 4/26/2023), Disp: , Rfl:     Multiple Vitamins-Minerals (CENTRUM SILVER 50+MEN PO), Take by mouth as needed (Patient not taking: Reported on 11/7/2023), Disp: , Rfl:     Lab, Imaging and other studies: I have personally reviewed pertinent labs. Laboratory Results:  Results for orders placed or performed in visit on 10/24/23   POCT Measure PVR   Result Value Ref Range    POST-VOID RESIDUAL VOLUME, ML POC 12 mL             Invalid input(s): "ALBUMIN"      Radiology review:   chest X-ray    Ultrasound      Portions of the record may have been created with voice recognition software. Occasional wrong word or "sound a like" substitutions may have occurred due to the inherent limitations of voice recognition software. Read the chart carefully and recognize, using context, where substitutions have occurred.

## 2023-11-07 ENCOUNTER — OFFICE VISIT (OUTPATIENT)
Dept: NEPHROLOGY | Facility: CLINIC | Age: 78
End: 2023-11-07
Payer: MEDICARE

## 2023-11-07 VITALS — HEIGHT: 63 IN | WEIGHT: 148 LBS | BODY MASS INDEX: 26.22 KG/M2

## 2023-11-07 DIAGNOSIS — N18.32 ANEMIA IN STAGE 3B CHRONIC KIDNEY DISEASE: ICD-10-CM

## 2023-11-07 DIAGNOSIS — N18.4 CHRONIC RENAL DISEASE, STAGE IV (HCC): ICD-10-CM

## 2023-11-07 DIAGNOSIS — E78.5 DYSLIPIDEMIA: ICD-10-CM

## 2023-11-07 DIAGNOSIS — N18.32 STAGE 3B CHRONIC KIDNEY DISEASE (HCC): ICD-10-CM

## 2023-11-07 DIAGNOSIS — N28.1 BILATERAL RENAL CYSTS: ICD-10-CM

## 2023-11-07 DIAGNOSIS — D63.1 ANEMIA IN STAGE 3B CHRONIC KIDNEY DISEASE: ICD-10-CM

## 2023-11-07 DIAGNOSIS — I12.9 PARENCHYMAL RENAL HYPERTENSION, STAGE 1 THROUGH STAGE 4 OR UNSPECIFIED CHRONIC KIDNEY DISEASE: Primary | ICD-10-CM

## 2023-11-07 PROCEDURE — 99214 OFFICE O/P EST MOD 30 MIN: CPT | Performed by: INTERNAL MEDICINE

## 2023-11-07 NOTE — PATIENT INSTRUCTIONS
Visit summary:  - Overall kidney function is stable which is our goal at this time!  - In general labs look great, your bad cholesterol or  is above goal of 100 and it has been hovering a little bit above goal.  Typically we recommend treatment with medication such as atorvastatin 5 mg which is a very small dose in order to get your LDL to goal below 100. You are doing everything great on your hand including diet exercise and being at the proper weight. So you really would need help with the medication.  - Blood pressures are excellent no changes    Let me know when you make your decision 1 way or the other about the medication and I am happy to answer any other questions you may have in that regard    You have some kidney cysts that the radiologist recommended follow-up in 1 year which would be April 2024 and I will give you the request for that. 1. Medication changes today:  No medication changes today. Pending your decision on the atorvastatin. 2.  Please take 1 week a blood pressure readings prior to your next appointment    AS FOLLOWS  MORNING AND EVENING, SITTING AND STANDING as follows:  TAKE THE MORNING READINGS BEFORE ANY MEDICATIONS AND WHEN YOU ARE RELAXED FOR SEVERAL MINUTES  TAKE THE EVENING READINGS:  BETWEEN 7-10 P.M.; PRIOR TO ANY MEDICATIONS; AT LEAST IN OUR  FROM DINNER; AND CERTAINLY AFTER RELAXING FOR A FEW MINUTES  PLEASE INCLUDE HEART RATE WITH YOUR BLOOD PRESSURE READINGS  When taking standing readings, keep your arm supported at heart level and not dangling  Make sure you are sitting with your back supported and feet on the ground and do not cross your legs or feet  Make sure you have not taken any coffee or caffeine products or exercised or smoke cigarettes at least 30 minutes before taking your blood pressure  Then please mail these readings into the office    3.   Please go for an ultrasound of your kidneys April 2024 to follow-up regarding the kidney cysts    5. Please go for nonfasting lab work in 3 months    6. Follow-up in 6 months  Please bring in 1 week a blood pressure readings morning evening, sitting and standing is outlined above  PLEASE BRING AN YOUR BLOOD PRESSURE MACHINE TO CORRELATE WITH THE OFFICE MACHINE AT THIS NEXT SCHEDULED VISIT  Please go for fasting lab work 1-2 weeks prior to your appointment      7. General instructions:  AVOID SALT BUT NOT ADDING AN READING LABELS TO MAKE SURE THERE IS LOW-SALT IN THE FOOD THAT YOU ARE EATING  Goal is less than 2 g of sodium intake or less than 5 g of sodium chloride intake per day    Avoid nonsteroidal anti-inflammatory drugs such as Naprosyn, ibuprofen, Aleve, Advil, Celebrex, Meloxicam (Mobic) etc.  You can use Tylenol as needed if you do not have any liver condition to be concerned about    Avoid medications such as Sudafed or decongestants and antihistamines that contained the D component which is the decongestant. You can take antihistamines without the decongestant or D component. Try to avoid medications such as pantoprazole or  Protonix/Nexium or Esomeprazole)/Prilosec or omeprazole/Prevacid or lansoprazole/AcipHex or Rabeprazole. If you are able to, use Pepcid as this is safer for your kidneys. Try to exercise at least 30 minutes 3 days a week to begin with with an ultimate goal of 5 days a week for at least 30 minutes      Please do not drink more than 2 glasses of alcohol/wine on a daily basis as this may contribute to your high blood pressure.

## 2023-11-07 NOTE — LETTER
November 7, 2023     Fiordaliza Mauro MD  [address]  [address] [place]. [place]eredith [place]ino    Patient: Dorota Borrego   YOB: 1945   Date of Visit: 11/7/2023       Dear Dr. Vero David: Thank you for referring Dorota Borrego to me for evaluation. Below are my notes for this consultation. If you have questions, please do not hesitate to call me. I look forward to following your patient along with you. Sincerely,        Erica Guzman MD        CC: No Recipients    Erica Guzman MD  11/7/2023  1:28 PM  Sign when Signing Visit  RENAL FOLLOW UP NOTE:.td    ASSESSMENT AND PLAN:  79-year-old male with a history of prostate CA who we are seeing for CKD stage 3:     1. CKD stage 4  Etiology: Arteriolar nephrosclerosis,? Hypertensive nephrosclerosis;  no evidence of primary glomerular process with a bland urinalysis without proteinuria or hematuria; no evidence of obstructive uropathy   Baseline creatinine: 1.8-2.2  Current creatinine: 2.07 at baseline  Urine protein creatinine ratio:  0.10 g at goal   UA:  No proteinuria and no hematuria or pyuria   PVR with bladder scan:   renal artery duplex: negative   renal ultrasound demonstrated cysts which are benign and simple no further evaluation required per the [facility] radiologist Dr. Beto Chun  Recommendations:  Treat hypertension-please see below  Treat dyslipidemia-please see below  Maintain proteinuria less than 1 g or as low as possible  Avoid nephrotoxic agents such as NSAIDs, and proton pump inhibitors if possible; patient counseled as such  Follow-up renal ultrasound 4/27/2023 was negative with bilateral renal cysts most simple  Kidney smart completed        2. Volume:  Euvolemic     3.   Hypertension:       Current blood pressure averages:   Blood pressures   AM: 126/76  P.m.: 128/71  Heart rate: 60 range     Goal blood pressure: less than 130/80 given CKD     Recommendations:  Push nonmedical regimen including weight loss, isotonic exercise and a low sodium diet. Patient has been counseled the such. MedicationChanges today:    No changes as patient's blood pressure is at goal  4. Electrolytes: All acceptable       5. Mineral bone disorder:  Of chronic kidney disease:  Calcium/magnesium/phosphorus:  All acceptable  PTH intact: Already 3.6 which is normal  Vitamin-D: 73 at goal     6. Dyslipidemia:  Goal LDL: less than 100 given CKD  Current lipid profile:  /HDL 56/triglycerides 83  Recommendations:   Low-cholesterol/low-fat diet / weight loss as appropriate and isotonic exercise   Medication changes today: I am recommending treatment with a statin he will consider it he is hesitant to take medications but we discussed the benefits would outweigh the risk as his risk at this juncture given CKD is higher for a stroke/heart attack/heart disease given his chronic kidney disease. He will again consider it and contact me with his decision     7. Anemia:  Current hemoglobin:  Normal at   12.3 stable     8. Other problems:  Prostate CA followed by Urology with negative biopsies of the last several years.   Last biopsy was 03/02/2021.:  Status post recent biopsy by Dr. Isaura Vences 9/26/2023: Positive for prostate cancer potential radiation therapy  Needs follow-up renal ultrasound 12 months after 4/27/2023 for some mildly complex cysts: Discussed with patient to follow-up in April 2024 to follow the cysts  Avascular necrosis of the left knee    MGUS followed by Hematology      GI health maintenance:  11/04/2020:  By Dr. Dandy Dacosta no polyps normal colonoscopy no further procedure require       PATIENT INSTRUCTIONS:    Patient Instructions   Visit summary:  - Overall kidney function is stable which is our goal at this time!  - In general labs look great, your bad cholesterol or  is above goal of 100 and it has been hovering a little bit above goal.  Typically we recommend treatment with medication such as atorvastatin 5 mg which is a very small dose in order to get your LDL to goal below 100. You are doing everything great on your hand including diet exercise and being at the proper weight. So you really would need help with the medication. Let me know when you make your decision 1 way or the other about the medication and I am happy to answer any other questions you may have in that regard    You have some kidney cysts that the radiologist recommended follow-up in 1 year which would be April 2024 and I will give you the request for that. 1. Medication changes today:  No medication changes today. Pending your decision on the atorvastatin. 2.  Please take 1 week a blood pressure readings prior to your next appointment    AS FOLLOWS  MORNING AND EVENING, SITTING AND STANDING as follows:  TAKE THE MORNING READINGS BEFORE ANY MEDICATIONS AND WHEN YOU ARE RELAXED FOR SEVERAL MINUTES  TAKE THE EVENING READINGS:  BETWEEN 7-10 P.M.; PRIOR TO ANY MEDICATIONS; AT LEAST IN OUR  FROM DINNER; AND CERTAINLY AFTER RELAXING FOR A FEW MINUTES  PLEASE INCLUDE HEART RATE WITH YOUR BLOOD PRESSURE READINGS  When taking standing readings, keep your arm supported at heart level and not dangling  Make sure you are sitting with your back supported and feet on the ground and do not cross your legs or feet  Make sure you have not taken any coffee or caffeine products or exercised or smoke cigarettes at least 30 minutes before taking your blood pressure  Then please mail these readings into the office    3. Please go for an ultrasound of your kidneys April 2024 to follow-up regarding the kidney cysts    5. Please go for nonfasting lab work in 3 months    6.   Follow-up in 6 months  Please bring in 1 week a blood pressure readings morning evening, sitting and standing is outlined above  PLEASE BRING AN YOUR BLOOD PRESSURE MACHINE TO CORRELATE WITH THE OFFICE MACHINE AT THIS NEXT SCHEDULED VISIT  Please go for fasting lab work 1-2 weeks prior to your appointment      7. General instructions:  AVOID SALT BUT NOT ADDING AN READING LABELS TO MAKE SURE THERE IS LOW-SALT IN THE FOOD THAT YOU ARE EATING  Goal is less than 2 g of sodium intake or less than 5 g of sodium chloride intake per day    Avoid nonsteroidal anti-inflammatory drugs such as Naprosyn, ibuprofen, Aleve, Advil, Celebrex, Meloxicam (Mobic) etc.  You can use Tylenol as needed if you do not have any liver condition to be concerned about    Avoid medications such as Sudafed or decongestants and antihistamines that contained the D component which is the decongestant. You can take antihistamines without the decongestant or D component. Try to avoid medications such as pantoprazole or  Protonix/Nexium or Esomeprazole)/Prilosec or omeprazole/Prevacid or lansoprazole/AcipHex or Rabeprazole. If you are able to, use Pepcid as this is safer for your kidneys. Try to exercise at least 30 minutes 3 days a week to begin with with an ultimate goal of 5 days a week for at least 30 minutes      Please do not drink more than 2 glasses of alcohol/wine on a daily basis as this may contribute to your high blood pressure. Subjective: There has been no hospitalizations or acute illnesses since last visit. The patient overall is feeling well. No fevers, chills, or cough or colds.   Good appetite and good energy  No hematuria, dysuria, voiding symptoms or foamy urine  No gastrointestinal symptoms  No cardiovascular symptoms including swelling of the legs  No headaches, dizziness or lightheadedness  Blood pressure medications:  Amlodipine 5 mg daily in the morning    Renal pertinent medications:  None     ROS:  See HPI, otherwise review of systems as completely reviewed with the patient are negative    Past Medical History:   Diagnosis Date   • Prostate cancer [facility])      Past Surgical History:   Procedure Laterality Date   • COLONOSCOPY     • HERNIA REPAIR Left    • OK BX PROSTATE STRTCTC SATURATION SAMPLING IMG GID N/A 9/26/2023    Procedure: TRANSPERINEAL MRI FUSION BIOPSY PROSTATE;  Surgeon: Tamy Castrejon MD;  Location: [facility];  Service: Urology   • TN PROSTATE NEEDLE BIOPSY ANY APPROACH N/A 03/02/2021    Procedure: Transperineal MRI Fusion prostate biopsy and gold seed marker insertion;  Surgeon: Jocy Arias MD;  Location: [facility];  Service: Urology   • PROSTATE BIOPSY      1/26/2015, 2/10/2017   • PROSTATE BIOPSY     • VASECTOMY  ? Family History   Problem Relation Age of Onset   • Hypertension Mother    • Prostate cancer Brother    • No Known Problems Brother    • No Known Problems Sister    • No Known Problems Son    • No Known Problems Son    • No Known Problems Daughter       reports that he has never smoked. He has been exposed to tobacco smoke. He has never used smokeless tobacco. He reports current alcohol use of about 2.0 standard drinks of alcohol per week. He reports that he does not use drugs. I COMPLETELY REVIEWED THE PAST MEDICAL HISTORY/PAST SURGICAL HISTORY/SOCIAL HISTORY/FAMILY HISTORY/AND MEDICATIONS  AND UPDATED ALL    Objective:     Vitals:   BP sitting on left: 146/78 with a heart rate of 60 and regular  BP standing on left: 156/78 with a heart rate of 60 and regular    Weight (last 2 days)       Date/Time Weight    11/07/23 1248 67.1 (148)          Wt Readings from Last 3 Encounters:   11/07/23 67.1 kg (148 lb)   10/24/23 63.5 kg (140 lb)   08/09/23 64.8 kg (142 lb 12.8 oz)       Body mass index is 26.22 kg/m².     Physical Exam: General:  No acute distress  Skin:  No acute rash  Eyes:  No scleral icterus, noninjected, no discharge from eyes  ENT:  Moist mucous membranes  Neck:  Supple, no jugular venous distention, trachea is midline, no lymphadenopathy and no thyromegaly  Back   No CVAT  Chest:  Clear to auscultation and percussion, good respiratory effort  CVS:  Regular rate and rhythm without a rub, or gallops or murmurs  Abdomen:  Soft and nontender with normal bowel sounds  Extremities:  No cyanosis and no edema, no arthritic changes, normal range of motion  Neuro:  Grossly intact  Psych:  Alert, oriented x3 and appropriate      Medications:    Current Outpatient Medications:   •  amLODIPine (NORVASC) 5 mg tablet, take 1 tablet by mouth once daily IN THE EARLY MORNING, Disp: 30 tablet, Rfl: 5  •  Apoaequorin (Prevagen Extra Strength) 20 MG CAPS, Take 20 mg by mouth in the morning Once daily, Disp: , Rfl:   •  finasteride (PROSCAR) 5 mg tablet, Take 1 tablet (5 mg total) by mouth daily, Disp: 90 tablet, Rfl: 3  •  sildenafil (VIAGRA) 50 MG tablet, Take 1 tablet (50 mg total) by mouth as needed for erectile dysfunction, Disp: 30 tablet, Rfl: 2  •  atovaquone-proguanil (MALARONE) 250-100 mg, take 1 tablet by mouth once daily WITH BREAKFAST BEGIN 1 day PRIO. ..  (REFER TO PRESCRIPTION NOTES). (Patient not taking: Reported on 4/26/2023), Disp: , Rfl:   •  Multiple Vitamins-Minerals (CENTRUM SILVER 50+MEN PO), Take by mouth as needed (Patient not taking: Reported on 11/7/2023), Disp: , Rfl:     Lab, Imaging and other studies: I have personally reviewed pertinent labs. Laboratory Results:  Results for orders placed or performed in visit on 10/24/23   POCT Measure PVR   Result Value Ref Range    POST-VOID RESIDUAL VOLUME, ML POC 12 mL             Invalid input(s): "ALBUMIN"      Radiology review:   chest X-ray    Ultrasound      Portions of the record may have been created with voice recognition software. Occasional wrong word or "sound a like" substitutions may have occurred due to the inherent limitations of voice recognition software. Read the chart carefully and recognize, using context, where substitutions have occurred.

## 2023-11-08 ENCOUNTER — HOSPITAL ENCOUNTER (OUTPATIENT)
Dept: NUCLEAR MEDICINE | Facility: HOSPITAL | Age: 78
Discharge: HOME | End: 2023-11-08
Attending: UROLOGY
Payer: MEDICARE

## 2023-11-08 ENCOUNTER — TELEPHONE (OUTPATIENT)
Age: 78
End: 2023-11-08

## 2023-11-08 DIAGNOSIS — C61 PROSTATE CANCER (HCC): ICD-10-CM

## 2023-11-08 PROCEDURE — A9503 TC99M MEDRONATE: HCPCS

## 2023-11-08 PROCEDURE — G1004 CDSM NDSC: HCPCS

## 2023-11-08 PROCEDURE — 78306 BONE IMAGING WHOLE BODY: CPT

## 2023-11-08 NOTE — TELEPHONE ENCOUNTER
Radiology Test Results - Radiology Calling with report update    Pt under care of:  Dr. Lubin Sessions Provider:  Dr. Michael Farah Completed:  11/8/2023     Significant Findings - Please review    Radiology  [facility] [phone]

## 2023-11-16 ENCOUNTER — APPOINTMENT (OUTPATIENT)
Dept: RADIATION ONCOLOGY | Facility: CLINIC | Age: 78
End: 2023-11-16
Payer: MEDICARE

## 2023-11-27 ENCOUNTER — CLINICAL SUPPORT (OUTPATIENT)
Dept: RADIATION ONCOLOGY | Facility: CLINIC | Age: 78
End: 2023-11-27
Attending: RADIOLOGY
Payer: MEDICARE

## 2023-11-27 ENCOUNTER — TELEPHONE (OUTPATIENT)
Dept: RADIATION ONCOLOGY | Facility: CLINIC | Age: 78
End: 2023-11-27

## 2023-11-27 ENCOUNTER — APPOINTMENT (OUTPATIENT)
Dept: LAB | Facility: CLINIC | Age: 78
End: 2023-11-27
Payer: MEDICARE

## 2023-11-27 ENCOUNTER — HOSPITAL ENCOUNTER (OUTPATIENT)
Dept: RADIOLOGY | Facility: HOSPITAL | Age: 78
Discharge: HOME | End: 2023-11-27
Payer: MEDICARE

## 2023-11-27 ENCOUNTER — RADIATION ONCOLOGY CONSULT (OUTPATIENT)
Dept: RADIATION ONCOLOGY | Facility: CLINIC | Age: 78
End: 2023-11-27
Attending: STUDENT IN AN ORGANIZED HEALTH CARE EDUCATION/TRAINING PROGRAM
Payer: MEDICARE

## 2023-11-27 VITALS
SYSTOLIC BLOOD PRESSURE: 138 MMHG | HEART RATE: 58 BPM | WEIGHT: 143 LBS | DIASTOLIC BLOOD PRESSURE: 74 MMHG | BODY MASS INDEX: 25.33 KG/M2 | TEMPERATURE: 97.2 F | RESPIRATION RATE: 16 BRPM | OXYGEN SATURATION: 98 %

## 2023-11-27 DIAGNOSIS — C61 PROSTATE CANCER (HCC): ICD-10-CM

## 2023-11-27 DIAGNOSIS — C61 PROSTATE CANCER (HCC): Primary | ICD-10-CM

## 2023-11-27 LAB
ALBUMIN SERPL BCP-MCNC: 3.8 G/DL (ref 3.5–5)
ALP SERPL-CCNC: 80 U/L (ref 34–104)
ALT SERPL W P-5'-P-CCNC: 13 U/L (ref 7–52)
ANION GAP SERPL CALCULATED.3IONS-SCNC: 8 MMOL/L
AST SERPL W P-5'-P-CCNC: 20 U/L (ref 13–39)
BILIRUB SERPL-MCNC: 0.42 MG/DL (ref 0.2–1)
BUN SERPL-MCNC: 35 MG/DL (ref 5–25)
CALCIUM SERPL-MCNC: 8.9 MG/DL (ref 8.4–10.2)
CHLORIDE SERPL-SCNC: 105 MMOL/L (ref 96–108)
CO2 SERPL-SCNC: 28 MMOL/L (ref 21–32)
CREAT SERPL-MCNC: 2.04 MG/DL (ref 0.6–1.3)
GFR SERPL CREATININE-BSD FRML MDRD: 30 ML/MIN/1.73SQ M
GLUCOSE P FAST SERPL-MCNC: 91 MG/DL (ref 65–99)
POTASSIUM SERPL-SCNC: 4.1 MMOL/L (ref 3.5–5.3)
PROT SERPL-MCNC: 6.7 G/DL (ref 6.4–8.4)
SODIUM SERPL-SCNC: 141 MMOL/L (ref 135–147)

## 2023-11-27 PROCEDURE — 36415 COLL VENOUS BLD VENIPUNCTURE: CPT

## 2023-11-27 PROCEDURE — 80053 COMPREHEN METABOLIC PANEL: CPT

## 2023-11-27 PROCEDURE — 99211 OFF/OP EST MAY X REQ PHY/QHP: CPT | Performed by: RADIOLOGY

## 2023-11-27 PROCEDURE — 99215 OFFICE O/P EST HI 40 MIN: CPT | Performed by: RADIOLOGY

## 2023-11-27 PROCEDURE — 72170 X-RAY EXAM OF PELVIS: CPT

## 2023-11-27 NOTE — PROGRESS NOTES
Consultation - Radiation Oncology      DRM:[id number] : 1945  Encounter: [id number]  Patient Information: Paula Wooten      CHIEF COMPLAINT  Chief Complaint   Patient presents with    Prostate Cancer    Consult     Cancer Staging   Prostate cancer [facility])  Staging form: Prostate, AJCC 7th Edition  - Clinical stage from 2023: Stage IIB (T2c, N0, M0, PSA: Less than 10, [place] 8-10) - Signed by Sarah Cisneros MD on 2023           History of Present Illness   Paula Wooten is a 66 y.o. initially diagnosed with low risk prostate cancer, Melany score 6 in  per medical records. He elected to undergo active surveillance and her medical records underwent at least 4 additional biopsies at an outside facility which were reported as negative. The patient has had a fluctuating PSA that increased to 10.5ng/mL in 2020 associated with 20 prostate MRI  showing PIRADS-5 lesion in the left periopheral zone with SHELDON. This prompted referral for consideration for definitive radiation. He was seen in consultation on 2020 with Dr. Tarik Odell. 3/2/21 Transperineal MRI Fusion prostate biopsy and gold seed marker insertion performed  Pathology demonstrated 1/13 positive cores of GS 6 (3+3) disease. The lesion on MRI was biopsied and benign. The positive core was right base. PSA was noted to declined 3.2 on 10/6/21 on finasteride. His case was discussed and he was continued active surveillance was recommend. Lab Results   Component Value Date    PSA 4.76 (H) 2023    PSA 8.9 (H) 2023    PSA 3.4 10/19/2022        PSA         3.5             2022     PSA has been noted to rise and was elevated to 8.9 as above (off finasteride). Finasteride restarted and PSA returned as 4.76 (corrected to 9.52) 23. This prompted MRI evaluation. 23 mpMRI prostate wo w contrast  1. PI-RADSv2.1 Category 5 - Very high (clinically significant cancer is highly likely to be present). Lesion #1 in the left base to mid gland posterolateral peripheral zone measuring up to 1.6 cm with overlying gross extraprostatic extension. 2. PI-RADSv2.1 Category 4 -High (clinically significant cancer is likely to be present). Lesion #2 in the right apex posterolateral peripheral zone measuring up to 0.9 cm.   3. No seminal vesicle invasion, pelvic lymphadenopathy, or pelvic osseous metastatic disease. 4. Calculated prostate volume of 35.8 cc.     9/26/23 TRANSPERINEAL MRI FUSION BIOPSY PROSTATE   Pathology demonstrated 3/21 cores involving the left base lesion of interest (GS 7 (4+3)) associated with PNI, a left anterior lateral core (GS 9 (4+5) with 5% specimen showing GS5 disease, and right apical lesion of interest (GS 7(3+4)). 10/24/23 Dr. Dalton Marte  He may be a candidate for surgery or radiation. He does think that he would likely need toward radiation and this is reasonable. Order staging studies     11/8/23 Bone scan  No focal tracer activity characteristic of osseous metastatic disease. However, note is made of nonspecific prominent multifocal tracer activity involving the spine. While findings most likely reflect degenerative changes, there is no comparison cross-sectional imaging such as PSMA PET/CT or CT/MRI to confirm degenerative changes and exclude less likely metastatic disease to the spine. Consider further evaluation with additional imaging to confirm degenerative changes as clinically indicated. The patient has chronic lower back stiffness, but denies any significant pain. He denies any new focal numbness or weakness. He denies progressive stiffness of his back. He denies significant urinary symptoms including hematuria, dysuria, or urinary incontinence. IPSS score is 11. He takes finasteride for LUTS symptoms with improvement. He does empty his bladder fully. He denies diarrhea or history of rectal bleeding. He is not sexually active.   He does have erectile dysfunction at baseline improved with Viagra and like products. Jameelwafia EF: 0/3 without medication to 1/3 with medication. Upcomin23 Dr. Hdz Hugo  23 Dr. Felicia Krishna- F/U for biclonal IgG MGUS     Historical Information   Oncology History   Prostate cancer [facility])   3/20/2013 Initial Diagnosis    Prostate cancer ([address] [facility])     3/2/2021 Biopsy    A. Prostate, L lat base, Biopsy:  - Benign prostatic tissue. B. Prostate, L base, Biopsy:  - Benign prostatic tissue. C. Prostate, L lat med, Biopsy:  - Benign prostatic tissue. D. Prostate, L med, Biopsy:  - Benign prostatic tissue. E. Prostate, L lat apex, Biopsy:  - Benign prostatic tissue. F. Prostate, L apex, Biopsy:  - Benign prostatic tissue. G. Prostate, R lat base, Biopsy:  - Benign prostatic tissue. H. Prostate, R base, Biopsy:  - Prostatic adenocarcinoma, [place] score 3+3=6, Prognostic Grade Group 1, involving 1 of 1 core (5% involvement). - Focal high-grade prostatic intraepithelial neoplasia (HGPIN). - Perineural invasion is not identified. I. Prostate, R lat med, Biopsy:  - Benign prostatic tissue. J. Prostate, R med, Biopsy:  - Benign prostatic tissue. K. Prostate, R lat apex, Biopsy:  - No tissue present. L. Prostate, R apex, Biopsy:  - Benign prostatic tissue. M. Prostate, Target L lat med, Biopsy:  - Benign prostatic tissue. 2023 Biopsy    A. Prostate, FABIANA #1 L base x 5:  Acinar adenocarcinoma  - Grade group 3 (Melany score: 4+3 =7)  - Involving 70%, 70% and 13% of 3 out of 5 core fragments, tumor lengths are 11 mm, 11 mm and 2 mm  - Percentage of pattern 4: approx. 92%  - Perineural invasion identified     B. Prostate, L post lat x 2:  Benign prostate tissue with basal cell hyperplasia and chronic prostatitis     C. Prostate, L post med x 2:  Benign prostatic hyperplasia and chronic prostatitis     D.  Prostate, L base x 2:  Benign prostatic hyperplasia and chronic prostatitis     E. Prostate, L ant lat x 2:  Acinar adenocarcinoma  - Grade group 5 (Melany score: 4+5 =9)  - Involving 30% of 1 out of 2 core fragments, tumor length is 5 mm  - Percentage of pattern 5: approx. 5%     F. Prostate, L ant med x 2:  Benign prostate tissue with basal cell hyperplasia and chronic prostatitis     G. Prostate, FABIANA # 2R apex x 5:  Acinar adenocarcinoma  - Grade group 2 ([place] score: 3+4 =7)  - Involving 50%, 45%, 35%, 15% and 11% of 5 out of 5 core fragments, tumor lengths are 4.5 mm, 4.5 mm, 2 mm, 3 mm and 1 mm  - Percentage of pattern 4: approx. 45%  - High grade prostatic intraepithelial neoplasia     H. Prostate, R post lat x 2:  Atypical glands, suspicious for malignancy  High grade prostatic intraepithelial neoplasia     I. Prostate, R post med x2:  Rare atypical glands  Background of benign prostate tissue with basal cell hyperplasia and chronic prostatitis     J. Prostate, R base x2:  Benign prostate tissue     K. Prostate, R ant latx2:  Benign prostatic hyperplasia and chronic prostatitis     L.  Prostate, R ant med x 2:  Benign prostate tissue with basal cell hyperplasia and chronic prostatitis        9/6/2023 -  Cancer Staged    Staging form: Prostate, AJCC 7th Edition  - Clinical stage from 9/6/2023: Stage III (T3a, N0, M0, PSA: Less than 10, [place] 8-10) - Signed by Miri Monteiro MD on 11/27/2023             Past Medical History:   Diagnosis Date    Hypertension     Prostate cancer [facility])      Past Surgical History:   Procedure Laterality Date    COLONOSCOPY      HERNIA REPAIR Left     KY BX PROSTATE STRTCTC SATURATION SAMPLING IMG GID N/A 9/26/2023    Procedure: TRANSPERINEAL MRI FUSION BIOPSY PROSTATE;  Surgeon: Emy Thompson MD;  Location: [facility];  Service: Urology    KY PROSTATE NEEDLE BIOPSY ANY APPROACH N/A 03/02/2021    Procedure: Transperineal MRI Fusion prostate biopsy and gold seed marker insertion;  Surgeon: Saulo Sands MD;  Location: [facility];  Service: Urology    PROSTATE BIOPSY      1/26/2015, 2/10/2017    PROSTATE BIOPSY      VASECTOMY  ? Family History   Problem Relation Age of Onset    Hypertension Mother     Prostate cancer Brother     No Known Problems Brother     No Known Problems Sister     No Known Problems Son     No Known Problems Son     No Known Problems Daughter        Social History   Social History     Substance and Sexual Activity   Alcohol Use Yes    Alcohol/week: 2.0 standard drinks of alcohol    Types: 1 Glasses of wine, 1 Cans of beer per week    Comment: social     Social History     Substance and Sexual Activity   Drug Use No     Social History     Tobacco Use   Smoking Status Never    Passive exposure: Past   Smokeless Tobacco Never   Tobacco Comments    tried it once          Meds/Allergies     Current Outpatient Medications:     amLODIPine (NORVASC) 5 mg tablet, take 1 tablet by mouth once daily IN THE EARLY MORNING, Disp: 30 tablet, Rfl: 5    Apoaequorin (Prevagen Extra Strength) 20 MG CAPS, Take 20 mg by mouth in the morning Once daily, Disp: , Rfl:     finasteride (PROSCAR) 5 mg tablet, Take 1 tablet (5 mg total) by mouth daily, Disp: 90 tablet, Rfl: 3    Multiple Vitamins-Minerals (CENTRUM SILVER 50+MEN PO), Take by mouth as needed, Disp: , Rfl:     sildenafil (VIAGRA) 50 MG tablet, Take 1 tablet (50 mg total) by mouth as needed for erectile dysfunction, Disp: 30 tablet, Rfl: 2    atovaquone-proguanil (MALARONE) 250-100 mg, take 1 tablet by mouth once daily WITH BREAKFAST BEGIN 1 day PRIO. ..  (REFER TO PRESCRIPTION NOTES). (Patient not taking: Reported on 4/26/2023), Disp: , Rfl:   No Known Allergies      Review of Systems   Constitutional:  Positive for fatigue. HENT:  Positive for dental problem. Eyes:         Wears glasses   Respiratory: Negative. Cardiovascular: Negative. Gastrointestinal: Negative. Genitourinary:  Positive for urgency. Musculoskeletal:  Positive for arthralgias (both wrists). Skin: Negative. Allergic/Immunologic: Negative. Neurological:  Positive for dizziness (occasional) and numbness (left index finger). Hematological: Negative. Psychiatric/Behavioral: Negative. IPSS Questionnaire (AUA-7): Over the past month…     1)  How often have you had a sensation of not emptying your bladder completely after you finish urinating? 2 - Less than half the time   2)  How often have you had to urinate again less than two hours after you finished urinating? 2 - Less than half the time   3)  How often have you found you stopped and started again several times when you urinated? 0 - Not at all   4) How difficult have you found it to postpone urination? 2 - Less than half the time   5) How often have you had a weak urinary stream?  4 - More than half the time   6) How often have you had to push or strain to begin urination? 0 - Not at all   7) How many times did you most typically get up to urinate from the time you went to bed until the time you got up in the morning? 1 - 1 time   Total Score:  11         OBJECTIVE:   /74   Pulse 58   Temp (!) 97.2 °F (36.2 °C)   Resp 16   Wt 64.9 kg (143 lb)   SpO2 98%   BMI 25.33 kg/m²   Performance Status: Karn[place]sky: 90 - Able to carry on normal activity; minor signs or symptoms of disease     Physical Exam  Vitals and nursing note reviewed. Constitutional:       General: He is not in acute distress. Appearance: He is well-developed. Cardiovascular:      Rate and Rhythm: Normal rate and regular rhythm. Pulmonary:      Breath sounds: No wheezing, rhonchi or rales. Abdominal:      General: There is no distension. Palpations: Abdomen is soft. Tenderness: There is no abdominal tenderness. Genitourinary:     Comments: LEILA demonstrates normal external sphincter tone. Prostate with some induration right base, but no definite nodules. No blood on examiner's finger. Musculoskeletal:      Right lower leg: No edema.       Left lower leg: No edema. Lymphadenopathy:      Cervical: No cervical adenopathy. Upper Body:      Right upper body: No supraclavicular adenopathy. Left upper body: No supraclavicular adenopathy. Lower Body: No right inguinal adenopathy. No left inguinal adenopathy. Neurological:      Mental Status: He is alert and oriented to person, place, and time. RESULTS  Imaging Studies  NM bone scan whole body    Result Date: 11/8/2023  Narrative: BONE SCAN  WHOLE BODY INDICATION: C61: Malignant neoplasm of prostate PREVIOUS FILM CORRELATION:    Comparison is made to the skeletal structures of CT of head dated 8/14/2023 and prostate MRI dated 7/26/2023 TECHNIQUE:   This study was performed following the intravenous administration of 26.4 mCi Tc-99m labeled MDP. Delayed, anterior and posterior whole body images were acquired, 2-3 hours after radiopharmaceutical administration. FINDINGS: There is nonspecific multifocal tracer activity involving the thoracolumbar spine and to a lesser degree cervical spine most suggestive of degenerative changes; however, there is no correlate of cross-sectional imaging such as PSMA PET/CT or CT/MRI to confirm degenerative changes and exclude less likely metastatic disease involving the spine. There is additional nonspecific multifocal tracer activity in a pattern most consistent with degenerative changes involving the bilateral acromioclavicular articulations, bilateral shoulders, bilateral wrists, bilateral hips, bilateral knees, and bilateral feet. Both kidneys are visualized. Impression: No focal tracer activity characteristic of osseous metastatic disease. However, note is made of nonspecific prominent multifocal tracer activity involving the spine. While findings most likely reflect degenerative changes, there is no comparison cross-sectional imaging such as PSMA PET/CT or CT/MRI to confirm degenerative changes and exclude less likely metastatic disease to the spine. Consider further evaluation with additional imaging to confirm degenerative changes as clinically indicated. The study was marked in EPIC for significant notification. Workstation performed: [id number]         Pathology:Collected 3/2/2021 08:58     Status: Final result     Visible to patient: Yes (not seen)     Dx:  Malignant neoplasm of prostate [facility])     2 Result Notes      Component    Case Report   Surgical Pathology Report                         Case: [id number]                                    Authorizing Provider:  Lulú Griffin MD            Collected:           03/02/2021 0858               Ordering Location:     [facility]      Received:            03/02/2021 [address] [place] [address] Endoscopy                                                            Pathologist:           Amy Abdul MD                                                                   Specimens:   A) - Prostate, L lat base                                                                            B) - Prostate, L base                                                                                C) - Prostate, L lat med                                                                             D) - Prostate, L med                                                                                 E) - Prostate, L lat apex                                                                            F) - Prostate, L apex                                                                                G) - Prostate, R lat base                                                                            H) - Prostate, R base                                                                                I) - Prostate, R lat med                                                                             J) - Prostate, R med                                                                                 K) - Prostate, R lat apex                                                                            L) - Prostate, R apex                                                                                M) - Prostate, Target L lat med                                                            Final Diagnosis   A. Prostate, L lat base, Biopsy:  - Benign prostatic tissue. B. Prostate, L base, Biopsy:  - Benign prostatic tissue. C. Prostate, L lat med, Biopsy:  - Benign prostatic tissue. D. Prostate, L med, Biopsy:  - Benign prostatic tissue. E. Prostate, L lat apex, Biopsy:  - Benign prostatic tissue. F. Prostate, L apex, Biopsy:  - Benign prostatic tissue. G. Prostate, R lat base, Biopsy:  - Benign prostatic tissue. H. Prostate, R base, Biopsy:  - Prostatic adenocarcinoma, [place]any score 3+3=6, Prognostic Grade Group 1, involving 1 of 1 core (5% involvement). - Focal high-grade prostatic intraepithelial neoplasia (HGPIN). - Perineural invasion is not identified. I. Prostate, R lat med, Biopsy:  - Benign prostatic tissue. J. Prostate, R med, Biopsy:  - Benign prostatic tissue. K. Prostate, R lat apex, Biopsy:  - No tissue present. L. Prostate, R apex, Biopsy:  - Benign prostatic tissue. M. Prostate, Target L lat med, Biopsy:  - Benign prostatic tissue. Collected 9/26/2023 09:13     Status: Final result     Visible to patient: Yes (seen)     Dx:  Malignant neoplasm of prostate [facility])     0 Result Notes      Component    Case Report   Surgical Pathology Report                         Case: [id number]                                    Authorizing Provider:  Claudeen Acres, MD  Collected:           09/26/2023 0913               Ordering Location:     [facility]      Received:            09/26/2023 [address] [facility] Endoscopy                                                            Pathologist:           León Romero Luis Boggs MD                                                             Specimens:   A) - Prostate, FABIANA #1 L base x 5                                                                     B) - Prostate, L post lat x 2                                                                        C) - Prostate, L post med x 2                                                                        D) - Prostate, L base x 2                                                                            E) - Prostate, L ant lat x 2                                                                         F) - Prostate, L ant med x 2                                                                         G) - Prostate, FABIANA # 2R apex x 5                                                                     H) - Prostate, R post lat x 2                                                                        I) - Prostate, R post med x2                                                                         J) - Prostate, R base x2                                                                             K) - Prostate, R ant latx2                                                                           L) - Prostate, R ant med x 2                                                               Final Diagnosis   A. Prostate, FABIANA #1 L base x 5:  Acinar adenocarcinoma  - Grade group 3 (Melany score: 4+3 =7)  - Involving 70%, 70% and 13% of 3 out of 5 core fragments, tumor lengths are 11 mm, 11 mm and 2 mm  - Percentage of pattern 4: approx. 92%  - Perineural invasion identified     B. Prostate, L post lat x 2:  Benign prostate tissue with basal cell hyperplasia and chronic prostatitis     C. Prostate, L post med x 2:  Benign prostatic hyperplasia and chronic prostatitis     D.  Prostate, L base x 2:  Benign prostatic hyperplasia and chronic prostatitis     E. Prostate, L ant lat x 2:  Acinar adenocarcinoma  - Grade group 5 ([place] score: 4+5 =9)  - Involving 30% of 1 out of 2 core fragments, tumor length is 5 mm  - Percentage of pattern 5: approx. 5%     F. Prostate, L ant med x 2:  Benign prostate tissue with basal cell hyperplasia and chronic prostatitis     G. Prostate, FABIANA # 2R apex x 5:  Acinar adenocarcinoma  - Grade group 2 (Melany score: 3+4 =7)  - Involving 50%, 45%, 35%, 15% and 11% of 5 out of 5 core fragments, tumor lengths are 4.5 mm, 4.5 mm, 2 mm, 3 mm and 1 mm  - Percentage of pattern 4: approx. 45%  - High grade prostatic intraepithelial neoplasia     H. Prostate, R post lat x 2:  Atypical glands, suspicious for malignancy  High grade prostatic intraepithelial neoplasia     I. Prostate, R post med x2:  Rare atypical glands  Background of benign prostate tissue with basal cell hyperplasia and chronic prostatitis     J. Prostate, R base x2:  Benign prostate tissue     K. Prostate, R ant latx2:  Benign prostatic hyperplasia and chronic prostatitis     L. Prostate, R ant med x 2:  Benign prostate tissue with basal cell hyperplasia and chronic prostatitis     See note              ASSESSMENT  1. Prostate cancer [facility])  Ambulatory Referral to Radiation Oncology    NM PET CT skull base to mid thigh        Cancer Staging   Prostate cancer [facility])  Staging form: Prostate, AJCC 7th Edition  - Clinical stage from 9/6/2023: Stage III (T3a, N0, M0, PSA: Less than 10, [place] 8-10) - Signed by Kamryn Hernandez MD on 11/27/2023      PLAN/DISCUSSION  Connor Hanson is a 66 y.o. man with a history of low risk prostate cancer initially diagnosed in 2011 and who has been on active surveillance since that time. Repeat MRI was concerning for progression with two nodules, one in each lobe, and biopsy demonstrated increase GS from 6 (3+3) to low volume mixture of GS 7 (3+4) and (4+3) and GS 9 (4+5). PSA from elevated at 4.76ng/mL on 7/21/23 on finasteride, correction is 9.52ng/mL. Bone scan does not demonstrate definite osseous metastases.   MRI did demonstrate gross SHELDON.  The     Based on the patient's clinical presentation he is consistent with high risk prostate cancer. He is 66year old, but has excellent performance status and main comorbidities of hypertension and CKD 3. I offered the patient definitive radiation therapy with long-term ADT of 18 months. We would plan a total dose of 79.2Gy with consideration for SIB boost to the prostate with inclusion of the seminal vesicles and pelvis to 45 Gy. I feel that this would offer the patient benefit in terms of local control potential cure. This is as per NCCN guidelines. The rationale and potential benefits, as well as the risks and acute and late side effects and potential toxicities of radiation were discussed with the patient at length. Side effects discussed included, but were not limited to: Fatigue, irritative urinary side effects, diarrhea, rectal urgency, radiation proctitis, erectile dysfunction, and radiation cystitis. We discussed alternative treatment options. This included continued surveillance, ADT alone, radiation therapy alone, surgery. I did not favor continued surveillance given his progression to high risk disease with small, likely tertiary component of GS 5 disease. Given his excellent performance status and likely life expectancy beyond 5 years I would recommend treatment at this time. Surgical resection has been offered to the patient and discussed in detail by Dr. Zuly Gates. We discussed ADT alone as the patient notes that one of his friends at the same age with prostate cancer is currently being maintained on finasteride and bicalutamide. I explained that this is a reasonable treatment option and could be done intermittently, typically with injections of medication such as Lupron here at [facility]. This treatment is effective, but not curative and require long-term maintenance. We discussed the rationale and potential benefit of additional ADT to radiation therapy.   For high risk prostate cancer a minimum of 18 months is recommended, but given his age if he has significant tolerance issues it would be reasonable to consider reducing the ADT duration. We discussed that some ADT in addition to radiation is superior in terms of local regional control of potential survival then no ADT with longer ADT benefiting higher risk patients in terms of distant metastatic to free survival and possible overall survival.    We briefly discussed alternative treatment options of localized treatment including SBRT with androgen deprivation therapy. I did not favor this approach given SHELDON noted in MRI. We discussed trimodality therapy, which I did not favor given the aggressiveness of the treatment and limited data indicating improved overall survival.  I did explain that he could be considered for HDR brachytherapy boost and that this would be done at a tertiary center such as [facility], or [facility]. He could also choose one of his choice for consultation and consideration. He deferred. The patient is currently undecided regarding his treatment. He is scheduled to follow-up with Dr. Yani Hadley and plans to further discuss his options. Should he wish to decide to pursue radiation treatment with ADT, then I have recommended that he initiate ADT and then return for radiation simulation. He has several trips to [place] scheduled in February and return to the [facility] in March. I feel that it would be reasonable to start his radiation in March if hormone therapy is initiated prior. He does already have fiducial markers per Dr. Nicolas Rivera operative report. I would not favor SpaceOAR placement given SHELDON noted on MRI at the left base posterolateral region. A pelvic x-ray has been scheduled by urology to confirm as it is difficult to see these markers on MRI.       The patient does not have definite bone metastasis, but there is some uptake in the spine which is likely related to degenerative disc disease. The patient denies any new areas of pain or focal numbness or weakness. He is interested in pursuing further evaluation to confirm that this area is not cancerous. We discussed PSMA versus MRI for evaluation. He is interested in pursuing PSMA PET at this time. Discovery of distant metastasis would negate our current conversation, which we discussed, and would require a new evaluation and discussion of management options. Our office will plan to schedule for evaluation. Imaging can be followed up in with our office or urology if radiation is not planned. He would likely benefit from repeat PSA in December 2023/January 2024 as his last PSA was from July 2023. The patient was given the opportunity to ask questions and all questions were answered to his satisfaction. I have asked him to contact our office should he decide to pursue concurrent ADT and radiation therapy. We will coordinate with urology going forward if this is planned. Total Time Spent  63 minutes spent reviewing EMR in preparation for visit, with the patient, coordination with other providers, and documentation. Greater than 50% of total time was spent with the patient and/or family for counseling and/or coordination of care. Nell Ross MD  11/27/2023,6:53 PM      Portions of the record may have been created with voice recognition software. Occasional wrong word or "sound a like" substitutions may have occurred due to the inherent limitations of voice recognition software. Read the chart carefully and recognize, using context, where substitutions have occurred.

## 2023-11-27 NOTE — TELEPHONE ENCOUNTER
Héctor Almanza was seen in consult today by Dr. Madelin Carroll. He has not made a treatment decision. If he decides to proceed with radiation therapy, Dr. Madelin Carroll is requesting 18 months of ADT. He had fiducial markers placed in March of 2021 but did not have treatment at that time. No SpaceOAR per Dr. Madlein Carroll. He is planning some vacations this winter and would not be interested in starting radiation therapy until March 2024. He is scheduled to follow up with Dr. Jose Matt on 11/29/23.

## 2023-11-27 NOTE — Clinical Note
Will schedule PSMA PET scan for him, but if he does not come back to us then may ask your office to follow-up for discussion. Would be happy to call him to discuss results as well, just let us know if you prefer this route. Thanks.

## 2023-11-27 NOTE — PROGRESS NOTES
Modesta Collazo 1945 is a 66 y.o. male  who was diagnosed with acinar adenocarcinoma of the prostate, Erick score 4+5=9. He was originally diagnosed with prostate cancer, Erick 6 in 2011 by an outside Urologist and has been on active surveillance since that time. He is being referred by Dr. Dylan York and presents today for follow up. 3/2/21 Transperineal MRI Fusion prostate biopsy and gold seed marker insertion      4/26/23 Urology, [facility]  1. Melany 6 prostate cancer on active surveillance   - Diagnosed in 2011 with outside urologist.   - Multiparametric prostate MRI 12/1/20 - PI-RADS Category 5  - S/p MRI targeted transperineal prostate biopsy on 3/2/21 showing only 1 core of Melany 3+3=6, less than 5% of the core. Prior to this he had 5 or 6 prostate biopsies which were more recently negative for cancer  - PSA now 8.9, previously 6.8 (when corrected for finasteride). Patient has discontinued finasteride however would like to restart this medication. Refill sent to pharmacy  - LEILA (10/26/22) negative, next LEILA due in October 2023  - Will obtain routine mpMRI prior to next visit, to be done every 18 months  - PSA and mpMRI prior to follow up in 3-4 months      7/26/23 mpMRI prostate wo w contrast  1. PI-RADSv2.1 Category 5 - Very high (clinically significant cancer is highly likely to be present). Lesion #1 in the left base to mid gland posterolateral peripheral zone measuring up to 1.6 cm with overlying gross extraprostatic extension. 2. PI-RADSv2.1 Category 4 -High (clinically significant cancer is likely to be present). Lesion #2 in the right apex posterolateral peripheral zone measuring up to 0.9 cm.   3. No seminal vesicle invasion, pelvic lymphadenopathy, or pelvic osseous metastatic disease. 4. Calculated prostate volume of 35.8 cc.      9/26/23 TRANSPERINEAL MRI FUSION BIOPSY PROSTATE     10/24/23 Dr. Dylan York  He may be a candidate for surgery or radiation.   He does think that he would likely need toward radiation and this is reasonable. Order staging studies    23 Bone scan  No focal tracer activity characteristic of osseous metastatic disease. However, note is made of nonspecific prominent multifocal tracer activity involving the spine. While findings most likely reflect degenerative changes, there is no comparison cross-sectional imaging such as PSMA PET/CT or CT/MRI to confirm degenerative   changes and exclude less likely metastatic disease to the spine. Consider further evaluation with additional imaging to confirm degenerative changes as clinically indicated. XR pelvis AP only      Upcomin23 Dr. Abhilash San  23 Dr. Edyta Garland- F/U for biclonal IgG MGUS       Oncology History   Prostate cancer ([address])   3/20/2013 Initial Diagnosis    Prostate cancer ([address])     3/2/2021 Biopsy    A. Prostate, L lat base, Biopsy:  - Benign prostatic tissue. B. Prostate, L base, Biopsy:  - Benign prostatic tissue. C. Prostate, L lat med, Biopsy:  - Benign prostatic tissue. D. Prostate, L med, Biopsy:  - Benign prostatic tissue. E. Prostate, L lat apex, Biopsy:  - Benign prostatic tissue. F. Prostate, L apex, Biopsy:  - Benign prostatic tissue. G. Prostate, R lat base, Biopsy:  - Benign prostatic tissue. H. Prostate, R base, Biopsy:  - Prostatic adenocarcinoma, Loomis score 3+3=6, Prognostic Grade Group 1, involving 1 of 1 core (5% involvement). - Focal high-grade prostatic intraepithelial neoplasia (HGPIN). - Perineural invasion is not identified. I. Prostate, R lat med, Biopsy:  - Benign prostatic tissue. J. Prostate, R med, Biopsy:  - Benign prostatic tissue. K. Prostate, R lat apex, Biopsy:  - No tissue present. L. Prostate, R apex, Biopsy:  - Benign prostatic tissue. M. Prostate, Target L lat med, Biopsy:  - Benign prostatic tissue. 2023 Biopsy    A.  Prostate, FAIBANA #1 L base x 5:  Acinar adenocarcinoma  - Grade group 3 (Niobrara score: 4+3 =7)  - Involving 70%, 70% and 13% of 3 out of 5 core fragments, tumor lengths are 11 mm, 11 mm and 2 mm  - Percentage of pattern 4: approx. 92%  - Perineural invasion identified     B. Prostate, L post lat x 2:  Benign prostate tissue with basal cell hyperplasia and chronic prostatitis     C. Prostate, L post med x 2:  Benign prostatic hyperplasia and chronic prostatitis     D. Prostate, L base x 2:  Benign prostatic hyperplasia and chronic prostatitis     E. Prostate, L ant lat x 2:  Acinar adenocarcinoma  - Grade group 5 (Melany score: 4+5 =9)  - Involving 30% of 1 out of 2 core fragments, tumor length is 5 mm  - Percentage of pattern 5: approx. 5%     F. Prostate, L ant med x 2:  Benign prostate tissue with basal cell hyperplasia and chronic prostatitis     G. Prostate, FABIANA # 2R apex x 5:  Acinar adenocarcinoma  - Grade group 2 (Niobrara score: 3+4 =7)  - Involving 50%, 45%, 35%, 15% and 11% of 5 out of 5 core fragments, tumor lengths are 4.5 mm, 4.5 mm, 2 mm, 3 mm and 1 mm  - Percentage of pattern 4: approx. 45%  - High grade prostatic intraepithelial neoplasia     H. Prostate, R post lat x 2:  Atypical glands, suspicious for malignancy  High grade prostatic intraepithelial neoplasia     I. Prostate, R post med x2:  Rare atypical glands  Background of benign prostate tissue with basal cell hyperplasia and chronic prostatitis     J. Prostate, R base x2:  Benign prostate tissue     K. Prostate, R ant latx2:  Benign prostatic hyperplasia and chronic prostatitis     L. Prostate, R ant med x 2:  Benign prostate tissue with basal cell hyperplasia and chronic prostatitis            Review of Systems:  Review of Systems   Constitutional:  Positive for fatigue. HENT:  Positive for dental problem. Eyes:         Wears glasses   Respiratory: Negative. Cardiovascular: Negative. Gastrointestinal: Negative. Genitourinary:  Positive for urgency.    Musculoskeletal:  Positive for arthralgias (both wrists). Skin: Negative. Allergic/Immunologic: Negative. Neurological:  Positive for dizziness (occasional) and numbness (left index finger). Hematological: Negative. Psychiatric/Behavioral: Negative. Clinical Trial: no    IPSS Questionnaire (AUA-7): Over the past month…    1)  How often have you had a sensation of not emptying your bladder completely after you finish urinating? 2 - Less than half the time   2)  How often have you had to urinate again less than two hours after you finished urinating? 2 - Less than half the time   3)  How often have you found you stopped and started again several times when you urinated? 0 - Not at all   4) How difficult have you found it to postpone urination? 2 - Less than half the time   5) How often have you had a weak urinary stream?  4 - More than half the time   6) How often have you had to push or strain to begin urination? 0 - Not at all   7) How many times did you most typically get up to urinate from the time you went to bed until the time you got up in the morning?   1 - 1 time   Total Score:  11       Pain assessment: 0    PFT: N/A    Prior Radiation: no    Teaching: NIH book, side effects, SIM    MST: completed    Implantable Devices (Port, pacemaker, pain stimulator): no    Hip Replacement: no    Health Maintenance   Topic Date Due    Hepatitis C Screening  Never done    Medicare Annual Wellness Visit (AWV)  Never done    BMI: Followup Plan  Never done    Fall Risk  Never done    Depression Screening  12/24/2021    COVID-19 Vaccine (4 - Moderna series) 03/22/2022    Influenza Vaccine (1) 09/01/2023    BMI: Adult  11/07/2024    Pneumococcal Vaccine: 65+ Years  Completed    HIB Vaccine  Aged Out    IPV Vaccine  Aged Out    Hepatitis A Vaccine  Aged Out    Meningococcal ACWY Vaccine  Aged Out    HPV Vaccine  Aged Out    Colorectal Cancer Screening  Discontinued       Past Medical History:   Diagnosis Date    Prostate cancer ([address])        Past Surgical History:   Procedure Laterality Date    COLONOSCOPY      HERNIA REPAIR Left     DC BX PROSTATE STRTCTC SATURATION SAMPLING IMG GID N/A 9/26/2023    Procedure: TRANSPERINEAL MRI FUSION BIOPSY PROSTATE;  Surgeon: Ryne Ashraf MD;  Location: [facility];  Service: Urology    DC PROSTATE NEEDLE BIOPSY ANY APPROACH N/A 03/02/2021    Procedure: Transperineal MRI Fusion prostate biopsy and gold seed marker insertion;  Surgeon: Elizabeth De Paz MD;  Location: [facility];  Service: Urology    PROSTATE BIOPSY      1/26/2015, 2/10/2017    PROSTATE BIOPSY      VASECTOMY  ? Family History   Problem Relation Age of Onset    Hypertension Mother     Prostate cancer Brother     No Known Problems Brother     No Known Problems Sister     No Known Problems Son     No Known Problems Son     No Known Problems Daughter        Social History     Tobacco Use    Smoking status: Never     Passive exposure: Past    Smokeless tobacco: Never    Tobacco comments:     tried it once    Vaping Use    Vaping Use: Never used   Substance Use Topics    Alcohol use: Yes     Alcohol/week: 2.0 standard drinks of alcohol     Types: 1 Glasses of wine, 1 Cans of beer per week     Comment: social    Drug use: No          Current Outpatient Medications:     amLODIPine (NORVASC) 5 mg tablet, take 1 tablet by mouth once daily IN THE EARLY MORNING, Disp: 30 tablet, Rfl: 5    Apoaequorin (Prevagen Extra Strength) 20 MG CAPS, Take 20 mg by mouth in the morning Once daily, Disp: , Rfl:     atovaquone-proguanil (MALARONE) 250-100 mg, take 1 tablet by mouth once daily WITH BREAKFAST BEGIN 1 day PRIO. ..  (REFER TO PRESCRIPTION NOTES).  (Patient not taking: Reported on 4/26/2023), Disp: , Rfl:     finasteride (PROSCAR) 5 mg tablet, Take 1 tablet (5 mg total) by mouth daily, Disp: 90 tablet, Rfl: 3    Multiple Vitamins-Minerals (CENTRUM SILVER 50+MEN PO), Take by mouth as needed (Patient not taking: Reported on 11/7/2023), Disp: , Rfl:     sildenafil (VIAGRA) 50 MG tablet, Take 1 tablet (50 mg total) by mouth as needed for erectile dysfunction, Disp: 30 tablet, Rfl: 2    No Known Allergies     There were no vitals filed for this visit.

## 2023-11-27 NOTE — LETTER
2023     Ayala Ayoub, [address] [facility]  [address]  [address] [facility]    Patient: Trisha Esqueda   YOB: 1945   Date of Visit: 2023       Dear Dr. Patsy Orozco: Thank you for referring Trisha Esqueda to me for evaluation. Below are my notes for this consultation. If you have questions, please do not hesitate to call me. I look forward to following your patient along with you. Sincerely,        Miguel Angel Anderson MD        CC: No Recipients    Miguel Angel Anderson MD  2023  6:55 PM  Sign when Signing Visit  Consultation - Radiation Oncology      MXL:[id number] : 1945  Encounter: [id number]  Patient Information: Trisha Esqueda      CHIEF COMPLAINT  Chief Complaint   Patient presents with   • Prostate Cancer   • Consult     Cancer Staging   Prostate cancer [facility])  Staging form: Prostate, AJCC 7th Edition  - Clinical stage from 2023: Stage IIB (T2c, N0, M0, PSA: Less than 10, [place] 8-10) - Signed by Miguel Angel Anderson MD on 2023           History of Present Illness  Trisha Esqueda is a 66 y.o. initially diagnosed with low risk prostate cancer, Guilford score 6 in  per medical records. He elected to undergo active surveillance and her medical records underwent at least 4 additional biopsies at an outside facility which were reported as negative. The patient has had a fluctuating PSA that increased to 10.5ng/mL in 2020 associated with 20 prostate MRI  showing PIRADS-5 lesion in the left periopheral zone with SHELDON. This prompted referral for consideration for definitive radiation. He was seen in consultation on 2020 with Dr. Shiva Esparza. 3/2/21 Transperineal MRI Fusion prostate biopsy and gold seed marker insertion performed  Pathology demonstrated 1/13 positive cores of GS 6 (3+3) disease. The lesion on MRI was biopsied and benign. The positive core was right base. PSA was noted to declined 3.2 on 10/6/21 on finasteride.   His case was discussed and he was continued active surveillance was recommend. Lab Results   Component Value Date    PSA 4.76 (H) 07/21/2023    PSA 8.9 (H) 04/20/2023    PSA 3.4 10/19/2022        PSA         3.5             4/21/2022     PSA has been noted to rise and was elevated to 8.9 as above (off finasteride). Finasteride restarted and PSA returned as 4.76 (corrected to 9.52) 7/21/23. This prompted MRI evaluation. 7/26/23 mpMRI prostate wo w contrast  1. PI-RADSv2.1 Category 5 - Very high (clinically significant cancer is highly likely to be present). Lesion #1 in the left base to mid gland posterolateral peripheral zone measuring up to 1.6 cm with overlying gross extraprostatic extension. 2. PI-RADSv2.1 Category 4 -High (clinically significant cancer is likely to be present). Lesion #2 in the right apex posterolateral peripheral zone measuring up to 0.9 cm.   3. No seminal vesicle invasion, pelvic lymphadenopathy, or pelvic osseous metastatic disease. 4. Calculated prostate volume of 35.8 cc.     9/26/23 TRANSPERINEAL MRI FUSION BIOPSY PROSTATE   Pathology demonstrated 3/21 cores involving the left base lesion of interest (GS 7 (4+3)) associated with PNI, a left anterior lateral core (GS 9 (4+5) with 5% specimen showing GS5 disease, and right apical lesion of interest (GS 7(3+4)). 10/24/23 Dr. Almeida Shouts  He may be a candidate for surgery or radiation. He does think that he would likely need toward radiation and this is reasonable. Order staging studies     11/8/23 Bone scan  No focal tracer activity characteristic of osseous metastatic disease. However, note is made of nonspecific prominent multifocal tracer activity involving the spine. While findings most likely reflect degenerative changes, there is no comparison cross-sectional imaging such as PSMA PET/CT or CT/MRI to confirm degenerative changes and exclude less likely metastatic disease to the spine.  Consider further evaluation with additional imaging to confirm degenerative changes as clinically indicated. The patient has chronic lower back stiffness, but denies any significant pain. He denies any new focal numbness or weakness. He denies progressive stiffness of his back. He denies significant urinary symptoms including hematuria, dysuria, or urinary incontinence. IPSS score is 11. He takes finasteride for LUTS symptoms with improvement. He does empty his bladder fully. He denies diarrhea or history of rectal bleeding. He is not sexually active. He does have erectile dysfunction at baseline improved with Viagra and like products. [facility]: 0/3 without medication to 1/3 with medication. Upcomin23 Dr. Capri Puri  23 Dr. Steve Ceron- F/U for biclonal IgG MGUS     Historical Information  Oncology History   Prostate cancer [facility])   3/20/2013 Initial Diagnosis    Prostate cancer ([address] [facility])     3/2/2021 Biopsy    A. Prostate, L lat base, Biopsy:  - Benign prostatic tissue. B. Prostate, L base, Biopsy:  - Benign prostatic tissue. C. Prostate, L lat med, Biopsy:  - Benign prostatic tissue. D. Prostate, L med, Biopsy:  - Benign prostatic tissue. E. Prostate, L lat apex, Biopsy:  - Benign prostatic tissue. F. Prostate, L apex, Biopsy:  - Benign prostatic tissue. G. Prostate, R lat base, Biopsy:  - Benign prostatic tissue. H. Prostate, R base, Biopsy:  - Prostatic adenocarcinoma, Valier score 3+3=6, Prognostic Grade Group 1, involving 1 of 1 core (5% involvement). - Focal high-grade prostatic intraepithelial neoplasia (HGPIN). - Perineural invasion is not identified. I. Prostate, R lat med, Biopsy:  - Benign prostatic tissue. J. Prostate, R med, Biopsy:  - Benign prostatic tissue. K. Prostate, R lat apex, Biopsy:  - No tissue present. L. Prostate, R apex, Biopsy:  - Benign prostatic tissue. M. Prostate, Target L lat med, Biopsy:  - Benign prostatic tissue. 2023 Biopsy    A.  Prostate, FABIANA #1 L base x 5:  Acinar adenocarcinoma  - Grade group 3 (Fertile score: 4+3 =7)  - Involving 70%, 70% and 13% of 3 out of 5 core fragments, tumor lengths are 11 mm, 11 mm and 2 mm  - Percentage of pattern 4: approx. 92%  - Perineural invasion identified     B. Prostate, L post lat x 2:  Benign prostate tissue with basal cell hyperplasia and chronic prostatitis     C. Prostate, L post med x 2:  Benign prostatic hyperplasia and chronic prostatitis     D. Prostate, L base x 2:  Benign prostatic hyperplasia and chronic prostatitis     E. Prostate, L ant lat x 2:  Acinar adenocarcinoma  - Grade group 5 (Melany score: 4+5 =9)  - Involving 30% of 1 out of 2 core fragments, tumor length is 5 mm  - Percentage of pattern 5: approx. 5%     F. Prostate, L ant med x 2:  Benign prostate tissue with basal cell hyperplasia and chronic prostatitis     G. Prostate, FABIANA # 2R apex x 5:  Acinar adenocarcinoma  - Grade group 2 (Melany score: 3+4 =7)  - Involving 50%, 45%, 35%, 15% and 11% of 5 out of 5 core fragments, tumor lengths are 4.5 mm, 4.5 mm, 2 mm, 3 mm and 1 mm  - Percentage of pattern 4: approx. 45%  - High grade prostatic intraepithelial neoplasia     H. Prostate, R post lat x 2:  Atypical glands, suspicious for malignancy  High grade prostatic intraepithelial neoplasia     I. Prostate, R post med x2:  Rare atypical glands  Background of benign prostate tissue with basal cell hyperplasia and chronic prostatitis     J. Prostate, R base x2:  Benign prostate tissue     K. Prostate, R ant latx2:  Benign prostatic hyperplasia and chronic prostatitis     L.  Prostate, R ant med x 2:  Benign prostate tissue with basal cell hyperplasia and chronic prostatitis        9/6/2023 -  Cancer Staged    Staging form: Prostate, AJCC 7th Edition  - Clinical stage from 9/6/2023: Stage III (T3a, N0, M0, PSA: Less than 10, Fertile 8-10) - Signed by Jazmyn Ortega MD on 11/27/2023             Past Medical History:   Diagnosis Date   • Hypertension    • Prostate cancer [facility])      Past Surgical History:   Procedure Laterality Date   • COLONOSCOPY     • HERNIA REPAIR Left    • KS BX PROSTATE STRTCTC SATURATION SAMPLING IMG GID N/A 9/26/2023    Procedure: TRANSPERINEAL MRI FUSION BIOPSY PROSTATE;  Surgeon: Sharla Lord MD;  Location: [facility];  Service: Urology   • KS PROSTATE NEEDLE BIOPSY ANY APPROACH N/A 03/02/2021    Procedure: Transperineal MRI Fusion prostate biopsy and gold seed marker insertion;  Surgeon: Janis Arellano MD;  Location: [facility];  Service: Urology   • PROSTATE BIOPSY      1/26/2015, 2/10/2017   • PROSTATE BIOPSY     • VASECTOMY  ?        Family History   Problem Relation Age of Onset   • Hypertension Mother    • Prostate cancer Brother    • No Known Problems Brother    • No Known Problems Sister    • No Known Problems Son    • No Known Problems Son    • No Known Problems Daughter        Social History  Social History     Substance and Sexual Activity   Alcohol Use Yes   • Alcohol/week: 2.0 standard drinks of alcohol   • Types: 1 Glasses of wine, 1 Cans of beer per week    Comment: social     Social History     Substance and Sexual Activity   Drug Use No     Social History     Tobacco Use   Smoking Status Never   • Passive exposure: Past   Smokeless Tobacco Never   Tobacco Comments    tried it once          Meds/Allergies    Current Outpatient Medications:   •  amLODIPine (NORVASC) 5 mg tablet, take 1 tablet by mouth once daily IN THE EARLY MORNING, Disp: 30 tablet, Rfl: 5  •  Apoaequorin (Prevagen Extra Strength) 20 MG CAPS, Take 20 mg by mouth in the morning Once daily, Disp: , Rfl:   •  finasteride (PROSCAR) 5 mg tablet, Take 1 tablet (5 mg total) by mouth daily, Disp: 90 tablet, Rfl: 3  •  Multiple Vitamins-Minerals (CENTRUM SILVER 50+MEN PO), Take by mouth as needed, Disp: , Rfl:   •  sildenafil (VIAGRA) 50 MG tablet, Take 1 tablet (50 mg total) by mouth as needed for erectile dysfunction, Disp: 30 tablet, Rfl: 2  • atovaquone-proguanil (MALARONE) 250-100 mg, take 1 tablet by mouth once daily WITH BREAKFAST BEGIN 1 day PRIO. ..  (REFER TO PRESCRIPTION NOTES). (Patient not taking: Reported on 4/26/2023), Disp: , Rfl:   No Known Allergies      Review of Systems   Constitutional:  Positive for fatigue. HENT:  Positive for dental problem. Eyes:         Wears glasses   Respiratory: Negative. Cardiovascular: Negative. Gastrointestinal: Negative. Genitourinary:  Positive for urgency. Musculoskeletal:  Positive for arthralgias (both wrists). Skin: Negative. Allergic/Immunologic: Negative. Neurological:  Positive for dizziness (occasional) and numbness (left index finger). Hematological: Negative. Psychiatric/Behavioral: Negative. IPSS Questionnaire (AUA-7): Over the past month…     1)  How often have you had a sensation of not emptying your bladder completely after you finish urinating? 2 - Less than half the time   2)  How often have you had to urinate again less than two hours after you finished urinating? 2 - Less than half the time   3)  How often have you found you stopped and started again several times when you urinated? 0 - Not at all   4) How difficult have you found it to postpone urination? 2 - Less than half the time   5) How often have you had a weak urinary stream?  4 - More than half the time   6) How often have you had to push or strain to begin urination? 0 - Not at all   7) How many times did you most typically get up to urinate from the time you went to bed until the time you got up in the morning? 1 - 1 time   Total Score:  11         OBJECTIVE:   /74   Pulse 58   Temp (!) 97.2 °F (36.2 °C)   Resp 16   Wt 64.9 kg (143 lb)   SpO2 98%   BMI 25.33 kg/m²   Performance Status: Karnofsky: 90 - Able to carry on normal activity; minor signs or symptoms of disease     Physical Exam  Vitals and nursing note reviewed.    Constitutional:       General: He is not in acute distress. Appearance: He is well-developed. Cardiovascular:      Rate and Rhythm: Normal rate and regular rhythm. Pulmonary:      Breath sounds: No wheezing, rhonchi or rales. Abdominal:      General: There is no distension. Palpations: Abdomen is soft. Tenderness: There is no abdominal tenderness. Genitourinary:     Comments: LEILA demonstrates normal external sphincter tone. Prostate with some induration right base, but no definite nodules. No blood on examiner's finger. Musculoskeletal:      Right lower leg: No edema. Left lower leg: No edema. Lymphadenopathy:      Cervical: No cervical adenopathy. Upper Body:      Right upper body: No supraclavicular adenopathy. Left upper body: No supraclavicular adenopathy. Lower Body: No right inguinal adenopathy. No left inguinal adenopathy. Neurological:      Mental Status: He is alert and oriented to person, place, and time. RESULTS  Imaging Studies  NM bone scan whole body    Result Date: 11/8/2023  Narrative: BONE SCAN  WHOLE BODY INDICATION: C61: Malignant neoplasm of prostate PREVIOUS FILM CORRELATION:    Comparison is made to the skeletal structures of CT of head dated 8/14/2023 and prostate MRI dated 7/26/2023 TECHNIQUE:   This study was performed following the intravenous administration of 26.4 mCi Tc-99m labeled MDP. Delayed, anterior and posterior whole body images were acquired, 2-3 hours after radiopharmaceutical administration. FINDINGS: There is nonspecific multifocal tracer activity involving the thoracolumbar spine and to a lesser degree cervical spine most suggestive of degenerative changes; however, there is no correlate of cross-sectional imaging such as PSMA PET/CT or CT/MRI to confirm degenerative changes and exclude less likely metastatic disease involving the spine.  There is additional nonspecific multifocal tracer activity in a pattern most consistent with degenerative changes involving the bilateral acromioclavicular articulations, bilateral shoulders, bilateral wrists, bilateral hips, bilateral knees, and bilateral feet. Both kidneys are visualized. Impression: No focal tracer activity characteristic of osseous metastatic disease. However, note is made of nonspecific prominent multifocal tracer activity involving the spine. While findings most likely reflect degenerative changes, there is no comparison cross-sectional imaging such as PSMA PET/CT or CT/MRI to confirm degenerative changes and exclude less likely metastatic disease to the spine. Consider further evaluation with additional imaging to confirm degenerative changes as clinically indicated. The study was marked in EPIC for significant notification. Workstation performed: [id number]         Pathology:Collected 3/2/2021 08:58     Status: Final result     Visible to patient: Yes (not seen)     Dx:  Malignant neoplasm of prostate [facility])     2 Result Notes      Component    Case Report   Surgical Pathology Report                         Case: [id number]                                    Authorizing Provider:  Bashir Reilly MD            Collected:           03/02/2021 0858               Ordering Location:     [facility]      Received:            03/02/2021 [address] [place] [address] Endoscopy                                                            Pathologist:           Jasson Sandra MD                                                                   Specimens:   A) - Prostate, L lat base                                                                            B) - Prostate, L base                                                                                C) - Prostate, L lat med                                                                             D) - Prostate, L med                                                                                 E) - Prostate, L lat apex F) - Prostate, L apex                                                                                G) - Prostate, R lat base                                                                            H) - Prostate, R base                                                                                I) - Prostate, R lat med                                                                             J) - Prostate, R med                                                                                 K) - Prostate, R lat apex                                                                            L) - Prostate, R apex                                                                                M) - Prostate, Target L lat med                                                            Final Diagnosis   A. Prostate, L lat base, Biopsy:  - Benign prostatic tissue. B. Prostate, L base, Biopsy:  - Benign prostatic tissue. C. Prostate, L lat med, Biopsy:  - Benign prostatic tissue. D. Prostate, L med, Biopsy:  - Benign prostatic tissue. E. Prostate, L lat apex, Biopsy:  - Benign prostatic tissue. F. Prostate, L apex, Biopsy:  - Benign prostatic tissue. G. Prostate, R lat base, Biopsy:  - Benign prostatic tissue. H. Prostate, R base, Biopsy:  - Prostatic adenocarcinoma, Melany score 3+3=6, Prognostic Grade Group 1, involving 1 of 1 core (5% involvement). - Focal high-grade prostatic intraepithelial neoplasia (HGPIN). - Perineural invasion is not identified. I. Prostate, R lat med, Biopsy:  - Benign prostatic tissue. J. Prostate, R med, Biopsy:  - Benign prostatic tissue. K. Prostate, R lat apex, Biopsy:  - No tissue present. L. Prostate, R apex, Biopsy:  - Benign prostatic tissue. M. Prostate, Target L lat med, Biopsy:  - Benign prostatic tissue.                Collected 9/26/2023 09:13     Status: Final result     Visible to patient: Yes (seen)     Dx: Malignant neoplasm of prostate [facility])     0 Result Notes      Component    Case Report   Surgical Pathology Report                         Case: [id number]                                    Authorizing Provider:  Angel Alas MD  Collected:           09/26/2023 0913               Ordering Location:     [facility]      Received:            09/26/2023 [address] [facility] Endoscopy                                                            Pathologist:           Vimal Mohamud MD                                                             Specimens:   A) - Prostate, FABIANA #1 L base x 5                                                                     B) - Prostate, L post lat x 2                                                                        C) - Prostate, L post med x 2                                                                        D) - Prostate, L base x 2                                                                            E) - Prostate, L ant lat x 2                                                                         F) - Prostate, L ant med x 2                                                                         G) - Prostate, FABIANA # 2R apex x 5                                                                     H) - Prostate, R post lat x 2                                                                        I) - Prostate, R post med x2                                                                         J) - Prostate, R base x2                                                                             K) - Prostate, R ant latx2                                                                           L) - Prostate, R ant med x 2                                                               Final Diagnosis   A.  Prostate, FABIANA #1 L base x 5:  Acinar adenocarcinoma  - Grade group 3 ([place] score: 4+3 =7)  - Involving 70%, 70% and 13% of 3 out of 5 core fragments, tumor lengths are 11 mm, 11 mm and 2 mm  - Percentage of pattern 4: approx. 92%  - Perineural invasion identified     B. Prostate, L post lat x 2:  Benign prostate tissue with basal cell hyperplasia and chronic prostatitis     C. Prostate, L post med x 2:  Benign prostatic hyperplasia and chronic prostatitis     D. Prostate, L base x 2:  Benign prostatic hyperplasia and chronic prostatitis     E. Prostate, L ant lat x 2:  Acinar adenocarcinoma  - Grade group 5 (Melany score: 4+5 =9)  - Involving 30% of 1 out of 2 core fragments, tumor length is 5 mm  - Percentage of pattern 5: approx. 5%     F. Prostate, L ant med x 2:  Benign prostate tissue with basal cell hyperplasia and chronic prostatitis     G. Prostate, FABIANA # 2R apex x 5:  Acinar adenocarcinoma  - Grade group 2 ([place] score: 3+4 =7)  - Involving 50%, 45%, 35%, 15% and 11% of 5 out of 5 core fragments, tumor lengths are 4.5 mm, 4.5 mm, 2 mm, 3 mm and 1 mm  - Percentage of pattern 4: approx. 45%  - High grade prostatic intraepithelial neoplasia     H. Prostate, R post lat x 2:  Atypical glands, suspicious for malignancy  High grade prostatic intraepithelial neoplasia     I. Prostate, R post med x2:  Rare atypical glands  Background of benign prostate tissue with basal cell hyperplasia and chronic prostatitis     J. Prostate, R base x2:  Benign prostate tissue     K. Prostate, R ant latx2:  Benign prostatic hyperplasia and chronic prostatitis     L. Prostate, R ant med x 2:  Benign prostate tissue with basal cell hyperplasia and chronic prostatitis     See note              ASSESSMENT  1.  Prostate cancer [facility])  Ambulatory Referral to Radiation Oncology    NM PET CT skull base to mid thigh        Cancer Staging   Prostate cancer [facility])  Staging form: Prostate, AJCC 7th Edition  - Clinical stage from 9/6/2023: Stage III (T3a, N0, M0, PSA: Less than 10, Melany 8-10) - Signed by Comfort Carlos MD on 11/27/2023      PLAN/DISCUSSION  Dorota Borrego is a 66 y.o. man with a history of low risk prostate cancer initially diagnosed in 2011 and who has been on active surveillance since that time. Repeat MRI was concerning for progression with two nodules, one in each lobe, and biopsy demonstrated increase GS from 6 (3+3) to low volume mixture of GS 7 (3+4) and (4+3) and GS 9 (4+5). PSA from elevated at 4.76ng/mL on 7/21/23 on finasteride, correction is 9.52ng/mL. Bone scan does not demonstrate definite osseous metastases. MRI did demonstrate gross SHELDON. The     Based on the patient's clinical presentation he is consistent with high risk prostate cancer. He is 66year old, but has excellent performance status and main comorbidities of hypertension and CKD 3. I offered the patient definitive radiation therapy with long-term ADT of 18 months. We would plan a total dose of 79.2Gy with consideration for SIB boost to the prostate with inclusion of the seminal vesicles and pelvis to 45 Gy. I feel that this would offer the patient benefit in terms of local control potential cure. This is as per NCCN guidelines. The rationale and potential benefits, as well as the risks and acute and late side effects and potential toxicities of radiation were discussed with the patient at length. Side effects discussed included, but were not limited to: Fatigue, irritative urinary side effects, diarrhea, rectal urgency, radiation proctitis, erectile dysfunction, and radiation cystitis. We discussed alternative treatment options. This included continued surveillance, ADT alone, radiation therapy alone, surgery. I did not favor continued surveillance given he has progressed to at least unfavorable intermediate risk disease with likely at least tertiary Melany score 5. Given his excellent performance status and likely life expectancy beyond 5 years I would recommend treatment at this time.     Surgical resection has been offered to the patient and discussed in detail by Dr. Jaclyn Burkett. We discussed ADT alone as the patient notes that one of his friends at the same age with prostate cancer is currently being maintained on finasteride and bicalutamide. I explained that this is a reasonable treatment option and could be done intermittently, typically with injections of medication such as Lupron here at [facility]. This treatment is effective, but not curative and require long-term maintenance. He would likely eventually develop resistance, but second line therapy could be used at that time. We discussed the rationale and potential benefit of additional ADT to radiation therapy. For high risk prostate cancer a minimum of 18 months is recommended, but given his age if he has significant tolerance issues it would be reasonable to consider reducing the ADT duration. We discussed that some ADT in addition to radiation is superior in terms of local regional control of potential survival then no ADT with longer ADT benefiting higher risk patients in terms of distant metastatic to free survival and possible overall survival.    We briefly discussed alternative treatment options of localized treatment including SBRT with androgen deprivation therapy. Again, he has no real social or economic restrictions to treatment or medical issues that would limit his ability to undergo more aggressive pelvic radiation. We discussed trimodality therapy, which I did not favor given the aggressiveness of the treatment and limited data indicating improved overall survival particularly at his age. I did explain that he could be considered for HDR brachytherapy boost and that this would be done at a tertiary center such as [facility], [facility], or [facility]. He could also choose one of his choice for consultation and consideration. He deferred. The patient is currently undecided regarding his treatment.   He is scheduled to follow-up with Dr. Zuly Gates and plans to further discuss his options. Should he wish to decide to pursue radiation treatment with ADT, then I have recommended that he initiate ADT and then return for radiation simulation. He has several trips to [place] scheduled in February and return to the [facility] in March. I feel that it would be reasonable to start his radiation in March if hormone therapy is initiated prior. He does already have fiducial markers per Dr. Huerta Flair operative report. I would not favor SpaceOAR placement given SHELDON noted on MRI at the left base posterolateral region. A pelvic x-ray has been scheduled by urology to confirm as it is difficult to see these markers on MRI. The patient does not have definite bone metastasis, but there is some uptake in the spine which is likely related to degenerative disc disease. The patient denies any new areas of pain or focal numbness or weakness. He is interested in pursuing further evaluation to confirm that this area is not cancerous. We discussed PSMA versus MRI for evaluation. He is interested in pursuing PSMA PET at this time. Our office will plan to schedule for evaluation. Imaging can be followed up in with our office or urology if radiation is not planned. He would likely benefit from repeat PSA in December 2023/January 2024 as his last PSA was from July 2023. The patient was given the opportunity to ask questions and all questions were answered to his satisfaction. I have asked him to contact our office should he decide to pursue concurrent ADT and radiation therapy. We will coordinate with urology going forward if this is planned. Total Time Spent  63 minutes spent reviewing EMR in preparation for visit, with the patient, coordination with other providers, and documentation. Greater than 50% of total time was spent with the patient and/or family for counseling and/or coordination of care.     Vick Corrales, MD  11/27/2023,6:53 PM      Portions of the record may have been created with voice recognition software. Occasional wrong word or "sound a like" substitutions may have occurred due to the inherent limitations of voice recognition software. Read the chart carefully and recognize, using context, where substitutions have occurred.

## 2023-11-28 NOTE — PROGRESS NOTES
Problem List Items Addressed This Visit          Genitourinary    Prostate cancer [facility])     Dr. Maria Thomas note mentioned getting him on some androgen deprivation therapy that requires some clearances. I will not give him medicine for androgen deprivation today. He will get his PSMA scan before he gets on any sort of hormone therapy. If PSMA PET suggests that he has metastatic disease, there is evidence to do radiation therapy in some men with metastatic disease. Surgery would not be advisable, all things considered     Radiation can be curative if his PSMA scan is negative for metastatic disease. The side effects, risks, and benefits of radiation were discussed with the patient. I will set up the process to get approved for these medicines. I recommended radiation therapy as this would have less side effects. Benign localized prostatic hyperplasia with lower urinary tract symptoms (LUTS) - Primary    Chronic renal disease, stage IV ([address])       Other    Encounter to discuss test results     He has some ski trips planned. I think that it is reasonable to start firmagon followed by lupron and then proceed to definitive therapy around March or so when he is done with his trips    I have spent a total time of 40 minutes on 11/30/23 in caring for this patient including Diagnostic results, Prognosis, Risks and benefits of tx options, Instructions for management, Patient and family education, Importance of tx compliance, Risk factor reductions, Impressions, Counseling / Coordination of care, Documenting in the medical record, Reviewing / ordering tests, medicine, procedures  , Obtaining or reviewing history  , and Communicating with other healthcare professionals .        Assessment and plan:       Please see problem oriented charting for the assessment plan of today's urological complaints      Radha Arriaza MD       Chief Complaint     As listed above      History of Present Illness     Ramya Rodriguez is a 51-year-old male who presents to the clinic today with a chief complaint of prostate cancer. He is accompanied by an adult female. The patient has a cough. He consulted with Dr. Carlos Aragon and is leaning towards opting for radiation. He is scheduled for PSMA on 12/15/2023. He had not seen the bone scan images done. He suspects that arthritis might be found as the pain alleviates before and after activities like stretching as per doctor's opinion. He has skiing scheduled in the first week of 03/2024. The patient's  inquired if there was cancer found in another place or prostate metastasis and if there is any connection with radiation and growth of lesions. He also asked how the dose rate was determined as for his insurance coverage in [facility] and how to determine treatment radiology. 66year old man with high risk prostate cancer (low risk CaP on AS with progression to [place] 9 disease). Saw radiation oncology since our last visit. PSMA PET scan pending. The following portions of the patient's history were reviewed and updated as appropriate: allergies, current medications, past family history, past medical history, past social history, past surgical history and problem list.    Detailed Urologic History     - please refer to [facility]    Review of Systems   The pertinent positive and negative findings are as noted in the HPI. Allergies     Not on File    Physical Exam     Physical Exam  Vitals and nursing note reviewed. Constitutional:       General: He is not in acute distress. Appearance: Normal appearance. He is well-developed. He is not ill-appearing, toxic-appearing or diaphoretic. HENT:      Head: Normocephalic and atraumatic. Eyes:      General: No scleral icterus. Pulmonary:      Effort: Pulmonary effort is normal. No respiratory distress. Abdominal:      General: There is no distension. Palpations: Abdomen is soft. Tenderness:  There is no abdominal tenderness. Musculoskeletal:         General: No deformity. Cervical back: Neck supple. Skin:     Coloration: Skin is not jaundiced or pale. Neurological:      Mental Status: He is alert and oriented to person, place, and time. Mental status is at baseline. Cranial Nerves: No cranial nerve deficit. Motor: No weakness. Coordination: Coordination normal.   Psychiatric:         Mood and Affect: Mood normal.         Behavior: Behavior normal.         Thought Content: Thought content normal.         Judgment: Judgment normal.             Vital Signs  Vitals:    11/29/23 1512   BP: 132/76   BP Location: Left arm   Patient Position: Sitting   Cuff Size: Standard   Pulse: 90   SpO2: 100%   Weight: 64.8 kg (142 lb 12.8 oz)         Current Medications       Current Outpatient Medications:     amLODIPine (NORVASC) 5 mg tablet, take 1 tablet by mouth once daily IN THE EARLY MORNING, Disp: 30 tablet, Rfl: 5    Apoaequorin (Prevagen Extra Strength) 20 MG CAPS, Take 20 mg by mouth in the morning Once daily, Disp: , Rfl:     finasteride (PROSCAR) 5 mg tablet, Take 1 tablet (5 mg total) by mouth daily, Disp: 90 tablet, Rfl: 3    Multiple Vitamins-Minerals (CENTRUM SILVER 50+MEN PO), Take by mouth as needed, Disp: , Rfl:     sildenafil (VIAGRA) 50 MG tablet, Take 1 tablet (50 mg total) by mouth as needed for erectile dysfunction, Disp: 30 tablet, Rfl: 2    atovaquone-proguanil (MALARONE) 250-100 mg, take 1 tablet by mouth once daily WITH BREAKFAST BEGIN 1 day PRIO. ..  (REFER TO PRESCRIPTION NOTES).  (Patient not taking: Reported on 4/26/2023), Disp: , Rfl:       Active Problems     Patient Active Problem List   Diagnosis    Organic impotence    Prostate cancer ([address])    Benign localized prostatic hyperplasia with lower urinary tract symptoms (LUTS)    Dyslipidemia    Parenchymal renal hypertension    Nocturia associated with benign prostatic hyperplasia    SHER (acute kidney injury) ([address])    Anemia in stage 3 chronic kidney disease     Bilateral renal cysts    Chronic renal disease, stage IV (HCC)    Meibomian gland dysfunction (MGD)    Deposits (accretions) on teeth    Onychomycosis    Partial loss of teeth due to other specified cause, unspecified class    Posterior subcapsular cataract    Presbyopia    Encounter to discuss test results         Past Medical History     Past Medical History:   Diagnosis Date    Hypertension     Prostate cancer [facility])          Surgical History     Past Surgical History:   Procedure Laterality Date    COLONOSCOPY      HERNIA REPAIR Left     SC BX PROSTATE STRTCTC SATURATION SAMPLING IMG GID N/A 9/26/2023    Procedure: TRANSPERINEAL MRI FUSION BIOPSY PROSTATE;  Surgeon: John Ayala MD;  Location: [facility];  Service: Urology    SC PROSTATE NEEDLE BIOPSY ANY APPROACH N/A 03/02/2021    Procedure: Transperineal MRI Fusion prostate biopsy and gold seed marker insertion;  Surgeon: Silvestre Parker MD;  Location: [facility];  Service: Urology    PROSTATE BIOPSY      1/26/2015, 2/10/2017    PROSTATE BIOPSY      VASECTOMY  ? Family History     Family History   Problem Relation Age of Onset    Hypertension Mother     Prostate cancer Brother     No Known Problems Brother     No Known Problems Sister     No Known Problems Son     No Known Problems Son     No Known Problems Daughter          Social History     Social History     Social History     Tobacco Use   Smoking Status Never    Passive exposure: Past   Smokeless Tobacco Never   Tobacco Comments    tried it once          Pertinent Lab Values     Lab Results   Component Value Date    CREATININE 2.04 (H) 11/27/2023       Lab Results   Component Value Date    PSA 4.76 (H) 07/21/2023    PSA 8.9 (H) 04/20/2023    PSA 3.4 10/19/2022       I have personally reviewed results with the patient. Imaging  Pelvic x-ray was reviewed with the patient.     Pertinent Imaging     Imaging reviewed, non specific spinal findings on bone scan, PSMA pet scan pending      Transcribed for Kiley Clarke MD, by  Sandra Jones on 11/30/23 at 7:50 AM. Powered by [employer].

## 2023-11-29 ENCOUNTER — TELEPHONE (OUTPATIENT)
Dept: UROLOGY | Facility: CLINIC | Age: 78
End: 2023-11-29

## 2023-11-29 ENCOUNTER — OFFICE VISIT (OUTPATIENT)
Dept: UROLOGY | Facility: CLINIC | Age: 78
End: 2023-11-29
Payer: MEDICARE

## 2023-11-29 VITALS
BODY MASS INDEX: 25.3 KG/M2 | SYSTOLIC BLOOD PRESSURE: 132 MMHG | WEIGHT: 142.8 LBS | HEART RATE: 90 BPM | OXYGEN SATURATION: 100 % | DIASTOLIC BLOOD PRESSURE: 76 MMHG

## 2023-11-29 DIAGNOSIS — C61 PROSTATE CANCER (HCC): ICD-10-CM

## 2023-11-29 DIAGNOSIS — N18.4 CHRONIC RENAL DISEASE, STAGE IV (HCC): ICD-10-CM

## 2023-11-29 DIAGNOSIS — Z71.2 ENCOUNTER TO DISCUSS TEST RESULTS: ICD-10-CM

## 2023-11-29 DIAGNOSIS — N40.1 BENIGN LOCALIZED PROSTATIC HYPERPLASIA WITH LOWER URINARY TRACT SYMPTOMS (LUTS): Primary | ICD-10-CM

## 2023-11-29 PROCEDURE — 99214 OFFICE O/P EST MOD 30 MIN: CPT | Performed by: UROLOGY

## 2023-11-29 NOTE — TELEPHONE ENCOUNTER
Please look into approval for firmagon to be followed by lupron in preparation for radiation therapy

## 2023-11-29 NOTE — ASSESSMENT & PLAN NOTE
Dr. Prosper Araujo note mentioned getting him on some androgen deprivation therapy that requires some clearances. I will not give him medicine for androgen deprivation today. He will get his PSMA scan before he gets on any sort of hormone therapy. If PSMA PET suggests that he has metastatic disease, there is evidence to do radiation therapy in some men with metastatic disease. Surgery would not be advisable, all things considered     Radiation can be curative if his PSMA scan is negative for metastatic disease. The side effects, risks, and benefits of radiation were discussed with the patient. I will set up the process to get approved for these medicines. I recommended radiation therapy as this would have less side effects.

## 2023-11-30 ENCOUNTER — APPOINTMENT (OUTPATIENT)
Dept: RADIATION ONCOLOGY | Facility: CLINIC | Age: 78
End: 2023-11-30
Payer: MEDICARE

## 2023-11-30 NOTE — TELEPHONE ENCOUNTER
Already scheduled at check out for firmagon and will ultimately need lupron.  Will get approval closer to appointment per protocol

## 2023-12-01 ENCOUNTER — DOCUMENTATION (OUTPATIENT)
Dept: HEMATOLOGY ONCOLOGY | Facility: CLINIC | Age: 78
End: 2023-12-01

## 2023-12-01 NOTE — PROGRESS NOTES
Patient was seen by [facility]. Patient is considering RT to the prostate. Patient was to undergo RT in 2021 and did receive his Fiducial Markers then. Per Dr Dalton Marte patient is now to get PET scan (12/15/23) and based on those results patient may need ADT prior to RT. I will follow up after PET and proceed as needed.

## 2023-12-07 ENCOUNTER — TELEPHONE (OUTPATIENT)
Dept: HEMATOLOGY ONCOLOGY | Facility: CLINIC | Age: 78
End: 2023-12-07

## 2023-12-07 NOTE — TELEPHONE ENCOUNTER
Appointment Change  Cancel, Reschedule, Change to Virtual      Who are you speaking with? Patient   If it is not the patient, is the caller listed on the communication consent form? N/A   Which provider is the appointment scheduled with? Dr. Trudy Pena   When was the original appointment scheduled? Please list date and time 12/14/23 1040   At which location is the appointment scheduled to take place? [facility]   Was the appointment rescheduled? Was the appointment changed from an in person visit to a virtual visit? If so, please list the details of the change. 12/11/23 1040   What is the reason for the appointment change? provider       Was [facility] transport scheduled? N/A   Does [facility] transport need to be scheduled for the new visit (if applicable) N/A   Does the patient need an infusion appointment rescheduled? N/A   Does the patient have an upcoming infusion appointment scheduled? If so, when? No   Is the patient undergoing chemotherapy? N/A   For appointments cancelled with less than 24 hours:  Was the no-show policy reviewed?  N/A

## 2023-12-08 ENCOUNTER — TELEPHONE (OUTPATIENT)
Dept: RADIATION ONCOLOGY | Facility: CLINIC | Age: 78
End: 2023-12-08

## 2023-12-08 NOTE — TELEPHONE ENCOUNTER
[facility] - Call received from Heber Knight this morning. His PET CT is scheduled for 12/19 and follow up with Dr. Barber Macias 12/20. Heber Knight questioning if he is a candidate for Proton therapy. Advised that this could be discussed with Dr. Barber Macias at follow-up and Heber Knight can self refer for a second opinion at a tertiary center to see if Proton therapy is a option. ..KD

## 2023-12-11 ENCOUNTER — OFFICE VISIT (OUTPATIENT)
Dept: HEMATOLOGY ONCOLOGY | Facility: CLINIC | Age: 78
End: 2023-12-11
Payer: MEDICARE

## 2023-12-11 VITALS
HEART RATE: 61 BPM | OXYGEN SATURATION: 99 % | WEIGHT: 142 LBS | RESPIRATION RATE: 16 BRPM | SYSTOLIC BLOOD PRESSURE: 110 MMHG | TEMPERATURE: 98.8 F | BODY MASS INDEX: 25.16 KG/M2 | DIASTOLIC BLOOD PRESSURE: 60 MMHG | HEIGHT: 63 IN

## 2023-12-11 DIAGNOSIS — D47.2 BICLONAL GAMMOPATHY: ICD-10-CM

## 2023-12-11 DIAGNOSIS — C61 PROSTATE CANCER (HCC): Primary | ICD-10-CM

## 2023-12-11 PROCEDURE — 99214 OFFICE O/P EST MOD 30 MIN: CPT | Performed by: INTERNAL MEDICINE

## 2023-12-11 RX ORDER — AZITHROMYCIN 250 MG/1
TABLET, FILM COATED ORAL
COMMUNITY
Start: 2023-12-04

## 2023-12-11 RX ORDER — BENZONATATE 200 MG/1
200 CAPSULE ORAL EVERY 8 HOURS
COMMUNITY
Start: 2023-12-04

## 2023-12-11 NOTE — PROGRESS NOTES
Hematology Outpatient Office Note    Date of Service: 12/11/2023    [facility] HEMATOLOGY SPECIALISTS     Reason for Consultation:   Chief Complaint   Patient presents with    Follow-up       Referral Physician: No ref. provider found  (Nephrologist)    Primary Care Physician:  Bradley Jin MD     Nickname: Elsy Christianson    Wife: Malachi Vargas  (4 years)    ASSESSMENT & PLAN      Diagnosis ICD-10-CM Associated Orders   1. Prostate cancer ([address])  C61       2. 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 K/lambda ratio 0.8. There is an absence of end-organ damage (his CKD is from HTN), hypercalcemia, or bone pain. CKD can lead to elevations in the free light chain ratio, a recent study observed that patients with a ratio less than 5.05 did not have myeloma, yet myeloma is usually associated with extreme ratios ~200 (Hamida 2015). In this study patients with CKD commonly had elevations of LC ratios beyond the traditional normal range, 33% for nephrotic syndomre, 36% non-nephrotic proteinuria, 47% of CKD of unknown origin. Idiopathic Bence-Solorio Proteinuria (abnormal free light chains in urine only, not serum) is a rare plasma cell dyscrasia. Julio et al Sherell Lesches Hematol 2013) reported that progression to malignancy or amyloidosis was low at 0.4%/yr. , this was much lower than MGUS+BJP at 1.6%/year. While most patients who progressed did so to myeloma or smoldering myeloma, other lymphomas were also noted. Interestingly, BJP was more commonly found in those with neuropathy and anemia. Imaging: generally not recommended for MGUS. If high risk features are present PET-CT can be helpful.  NCCN guidelines recommend skeletal survey for lytic lesions in the assessment of myeloma, however the sensitivity is poor and many false positive findings are noted. Whole body MRI can also be effective. Since monoclonal gammopathy is not treated, extensive evaluation of low level paraprotein levels is not recommended. Please note that in patients with underlying renal disease will have elevated kappa light chains as a result of decreased renal clearance, this can lead to FLC ratios and further investigation is not recommended for FLC ratios <5. Recommendations:   International Myeloma Working Group has recommended that low risk MGUS (M-spike <1.5 g/dl, IgG, FLC ratio <1.65) be observed with SPEP in 6months, and if stable they are followed q2-3 years. Bone marrow biopsy and skeletal survey are not indicated provided there are not high risk features. Recent labs not done. Discussion of decision making    I personally reviewed the following lab results, the image studies, pathology, other specialty/physicians consult notes and recommendations, and outside medical records from [facility]. I had a lengthy discussion with the patient and shared the work-up findings. We discussed the diagnosis and management plan as below. I spent 33 minutes reviewing the records (labs, clinician notes, outside records, medical history, ordering medicine/tests/procedures, interpreting the imaging/labs previously done) and coordination of care as well as direct time with the patient today, of which greater than 50% of the time was spent in counseling and coordination of care with the patient/family. Plan/Labs  MGUS labs including SPEP, CBC, CMP q1-2 years         Follow Up: 12 months (he will follow me in [facility] until we have a permanent Hematologist in [facility])    All questions were answered to the patient's satisfaction during this encounter.  The patient knows the contact information for our office and knows to reach out for any relevant concerns related to this encounter. They are to call for any temperature 100.4 or higher, new symptoms including but not restricted to shaking chills, decreased appetite, nausea, vomiting, diarrhea, increased fatigue, shortness of breath or chest pain, confusion, and not feeling the strength to come to the clinic. For all other listed problems and medical diagnosis in their chart - they are managed by PCP and/or other specialists, which the patient acknowledges. Thank you very much for your consultation and making us a part of this patient's care. We are continuing to follow closely with you. Please do not hesitate to reach out to me with any additional questions or concerns. Geremias Lucero MD  Hematology & Medical Oncology Staff Physician             Disclaimer: This document was prepared using [employer] Direct technology. If a word or phrase is confusing, or does not make sense, this is likely due to recognition error which was not discovered during this clinician's review. If you believe an error has occurred, please contact me through [phone] [facility] line for shaniqua? cation. 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 4/21/22: PSA 3.5, creat 1.86,  biclonal peak IgG   10/19/2022: PSA 3.4  7/21/2023: PSA 4.76    SUBJECTIVE  (INTERVAL HISTORY)        I have reviewed the relevant past medical, surgical, social and family history. I have also reviewed allergies and medications for this patient. Interval events: no acute issues, feeling good. Coughing for several weeks. Brother passed away and mother on hospice and has been a little depressed from that. Review of Systems  Baseline weight: 141 lbs     Denies F/C, bone pain, joints except L shoulder, night sweats, N/V, Sob, CP, LH, HA, falls, gen weakness, hematuria, melena, hematochezia, rash, or itching. 6 months L shoulder discomfort since he started playing pickle-ball. A 10-point review of system was performed, pertinent positive and negative were detailed as above. Otherwise, the 10-point review of system was negative. Past Medical History:   Diagnosis Date    Hypertension     Prostate cancer [facility])        Past Surgical History:   Procedure Laterality Date    COLONOSCOPY      HERNIA REPAIR Left     CT BX PROSTATE STRTCTC SATURATION SAMPLING IMG GID N/A 9/26/2023    Procedure: TRANSPERINEAL MRI FUSION BIOPSY PROSTATE;  Surgeon: Ryne Ashraf MD;  Location: [facility];  Service: Urology    CT PROSTATE NEEDLE BIOPSY ANY APPROACH N/A 03/02/2021    Procedure: Transperineal MRI Fusion prostate biopsy and gold seed marker insertion;  Surgeon: Elizaebth De Paz MD;  Location: [facility];  Service: Urology    PROSTATE BIOPSY      1/26/2015, 2/10/2017    PROSTATE BIOPSY      VASECTOMY  ?        Family History   Problem Relation Age of Onset    Hypertension Mother     Prostate cancer Brother     No Known Problems Brother     No Known Problems Sister     No Known Problems Son     No Known Problems Son     No Known Problems Daughter        Social History     Socioeconomic History    Marital status: /Civil Union     Spouse name: Not on file    Number of children: Not on file    Years of education: Not on file    Highest education level: Not on file   Occupational History    Not on file   Tobacco Use    Smoking status: Never     Passive exposure: Past    Smokeless tobacco: Never    Tobacco comments:     tried it once    Vaping Use    Vaping Use: Never used   Substance and Sexual Activity Alcohol use: Yes     Alcohol/week: 2.0 standard drinks of alcohol     Types: 1 Glasses of wine, 1 Cans of beer per week     Comment: social    Drug use: No    Sexual activity: Yes   Other Topics Concern    Not on file   Social History Narrative    Not on file     Social Determinants of Health     Financial Resource Strain: Not on file   Food Insecurity: Not on file   Transportation Needs: Not on file   Physical Activity: Not on file   Stress: Not on file   Social Connections: Not on file   Intimate Partner Violence: Not on file   Housing Stability: Not on file       No Known Allergies    Current Outpatient Medications   Medication Sig Dispense Refill    amLODIPine (NORVASC) 5 mg tablet take 1 tablet by mouth once daily IN THE EARLY MORNING 30 tablet 5    Apoaequorin (Prevagen Extra Strength) 20 MG CAPS Take 20 mg by mouth in the morning Once daily      azithromycin (ZITHROMAX) 250 mg tablet take 2 tablets by mouth TODAY then take 1 tablet DAILY FOR 4 DAYS      benzonatate (TESSALON) 200 MG capsule Take 200 mg by mouth every 8 (eight) hours      finasteride (PROSCAR) 5 mg tablet Take 1 tablet (5 mg total) by mouth daily 90 tablet 3    Multiple Vitamins-Minerals (CENTRUM SILVER 50+MEN PO) Take by mouth as needed      sildenafil (VIAGRA) 50 MG tablet Take 1 tablet (50 mg total) by mouth as needed for erectile dysfunction 30 tablet 2    atovaquone-proguanil (MALARONE) 250-100 mg take 1 tablet by mouth once daily WITH BREAKFAST BEGIN 1 day PRIO. ..  (REFER TO PRESCRIPTION NOTES). (Patient not taking: Reported on 4/26/2023)       No current facility-administered medications for this visit. (Not in a hospital admission)        Objective:     24 Hour Vitals Assessment:     Vitals:    12/11/23 1042   BP: 110/60   Pulse: 61   Resp: 16   Temp: 98.8 °F (37.1 °C)   SpO2: 99%       PHYSICIAN EXAM:    General: Appearance: alert, cooperative, no distress. HEENT: Normocephalic, atraumatic. No scleral icterus.  conjunctivae clear. EOMI. Chest: No tenderness to palpation. No open wound noted. Lungs: Clear to auscultation bilaterally, Respirations unlabored. Cardiac: Regular rate and rhythm, +S1and S2  Abdomen: Soft, non-tender, non-distended. Bowel sounds are normal.   Extremities:  No edema, cyanosis, clubbing. Skin: Skin color, turgor are normal. No rashes. Neurologic: Awake, Alert, and oriented, no gross focal deficits noted b/l. DATA REVIEW:    Pathology Result:    Final Diagnosis   Date Value Ref Range Status   09/26/2023   Final    A. Prostate, FABIANA #1 L base x 5:  Acinar adenocarcinoma  - Grade group 3 (Chesterfield score: 4+3 =7)  - Involving 70%, 70% and 13% of 3 out of 5 core fragments, tumor lengths are 11 mm, 11 mm and 2 mm  - Percentage of pattern 4: approx. 92%  - Perineural invasion identified    B. Prostate, L post lat x 2:  Benign prostate tissue with basal cell hyperplasia and chronic prostatitis    C. Prostate, L post med x 2:  Benign prostatic hyperplasia and chronic prostatitis    D. Prostate, L base x 2:  Benign prostatic hyperplasia and chronic prostatitis    E. Prostate, L ant lat x 2:  Acinar adenocarcinoma  - Grade group 5 (Chesterfield score: 4+5 =9)  - Involving 30% of 1 out of 2 core fragments, tumor length is 5 mm  - Percentage of pattern 5: approx. 5%    F. Prostate, L ant med x 2:  Benign prostate tissue with basal cell hyperplasia and chronic prostatitis    G. Prostate, FABIANA # 2R apex x 5:  Acinar adenocarcinoma  - Grade group 2 (Melany score: 3+4 =7)  - Involving 50%, 45%, 35%, 15% and 11% of 5 out of 5 core fragments, tumor lengths are 4.5 mm, 4.5 mm, 2 mm, 3 mm and 1 mm  - Percentage of pattern 4: approx.  45%  - High grade prostatic intraepithelial neoplasia    H. Prostate, R post lat x 2:  Atypical glands, suspicious for malignancy  High grade prostatic intraepithelial neoplasia    I. Prostate, R post med x2:  Rare atypical glands  Background of benign prostate tissue with basal cell hyperplasia and chronic prostatitis    J. Prostate, R base x2:  Benign prostate tissue    K. Prostate, R ant latx2:  Benign prostatic hyperplasia and chronic prostatitis    L. Prostate, R ant med x 2:  Benign prostate tissue with basal cell hyperplasia and chronic prostatitis    See note       03/02/2021   Final    A. Prostate, L lat base, Biopsy:  - Benign prostatic tissue. B. Prostate, L base, Biopsy:  - Benign prostatic tissue. C. Prostate, L lat med, Biopsy:  - Benign prostatic tissue. D. Prostate, L med, Biopsy:  - Benign prostatic tissue. E. Prostate, L lat apex, Biopsy:  - Benign prostatic tissue. F. Prostate, L apex, Biopsy:  - Benign prostatic tissue. G. Prostate, R lat base, Biopsy:  - Benign prostatic tissue. H. Prostate, R base, Biopsy:  - Prostatic adenocarcinoma, Cayey score 3+3=6, Prognostic Grade Group 1, involving 1 of 1 core (5% involvement). - Focal high-grade prostatic intraepithelial neoplasia (HGPIN). - Perineural invasion is not identified. I. Prostate, R lat med, Biopsy:  - Benign prostatic tissue. J. Prostate, R med, Biopsy:  - Benign prostatic tissue. K. Prostate, R lat apex, Biopsy:  - No tissue present. L. Prostate, R apex, Biopsy:  - Benign prostatic tissue. M. Prostate, Target L lat med, Biopsy:  - Benign prostatic tissue. 11/24/2020   Final    A. Esophagus, barretts distal:  - Squamocolumnar mucosa with chronic inflammatory and reactive changes. - Negative for intestinal metaplasia, dysplasia, and malignancy. 04/24/2018   Final    A. Prostate, RPZ, needle biopsy:  -  Benign prostate tissue, negative for malignancy. B.  Prostate, RCZ, needle biopsy:  -  Benign prostate tissue, negative for malignancy.   -  Multiplex immunohistochemical stain performed with appropriate control highlights intact basal layer cells staining for p63 and high molecular weight keratin with no significant luminal racemase expression, supporting the diagnosis. C.  Prostate, MADI, needle biopsy:  -  Benign prostate tissue, negative for malignancy. -  Multiplex immunohistochemical stain performed with appropriate control highlights intact basal layer cells staining for p63 and high molecular weight keratin with no significant luminal racemase expression, supporting the diagnosis. D.  Prostate, LCZ, needle biopsy:  -  Benign prostate tissue, negative for malignancy. 02/10/2017   Final    A. Prostate, left peripheral zone, biopsy:  -  Benign prostate tissue with focal features of atrophy, negative for malignancy. -  Multiplex immunohistochemical staining performed with appropriate controls shows intact basal layer cells staining for p63 and molecular weight keratin without luminal expression of racemase, supporting the diagnosis. B.  Prostate, left central zone, biopsy:  -  Benign prostate tissue, negative for malignancy. C.  Prostate, right peripheral zone, biopsy:  -  Benign prostate tissue with focal features of atrophy, negative for malignancy. -  Multiplex immunohistochemical staining performed with appropriate controls shows intact basal layer cells staining for p63 and molecular weight keratin without luminal expression of racemase, supporting the diagnosis. D.  Prostate, right central zone, biopsy:  -  Benign prostate tissue, negative for malignancy. Image Results:   Image result are reviewed and documented in Hematology/Oncology history. I personally reviewed these images. XR pelvis ap only 1 or 2 vw  Narrative: PELVIS    INDICATION:   Malignant neoplasm of prostate. COMPARISON:  There are no previous examinations available for comparison. VIEWS:  XR PELVIS AP ONLY 1 OR 2 VW   Images: 1    FINDINGS:    No acute fracture or hip dislocation. There is moderate joint space narrowing with osteophytosis in the hips bilaterally, left greater than the right.  There is moderate spondylotic changes in the lower lumbar spine. No lytic or sclerotic lesion seen    Soft tissues are unremarkable. The visualized lumbar spine is unremarkable. Impression: Moderate bilateral hip osteoarthritis, left greater than right  No lytic or sclerotic lesion seen    Electronically signed: 11/27/2023 06:43 PM Connie Enrique MD      LABS:  Lab data are reviewed and [facility] in [facility]. Lab Results   Component Value Date    HGB 12.3 10/09/2023    HCT 39.4 10/09/2023    MCV 95 10/09/2023     10/09/2023    WBC 7.25 10/09/2023    NRBC 0 09/12/2023     Lab Results   Component Value Date    K 4.1 11/27/2023     11/27/2023    CO2 28 11/27/2023    BUN 35 (H) 11/27/2023    CREATININE 2.04 (H) 11/27/2023    GLUF 91 11/27/2023    CALCIUM 8.9 11/27/2023    CORRECTEDCA 9.3 04/05/2023    AST 20 11/27/2023    ALT 13 11/27/2023    ALKPHOS 80 11/27/2023    EGFR 30 11/27/2023       Lab Results   Component Value Date    IRON 56 (L) 12/01/2022    TIBC 228 (L) 12/01/2022    FERRITIN 101 12/01/2022    FERRITIN 113 09/27/2022       No results found for: "Danny Navarro"    No results for input(s): "WBC", "CREAT", "PLT" in the last 72 hours.      By:  Prabhu Corona, 12/11/2023, 10:56 AM

## 2023-12-19 ENCOUNTER — HOSPITAL ENCOUNTER (OUTPATIENT)
Dept: NUCLEAR MEDICINE | Facility: HOSPITAL | Age: 78
Discharge: HOME | End: 2023-12-19
Payer: MEDICARE

## 2023-12-19 DIAGNOSIS — R97.20 RISING PSA LEVEL: ICD-10-CM

## 2023-12-19 DIAGNOSIS — C61 PROSTATE CANCER (HCC): ICD-10-CM

## 2023-12-19 PROCEDURE — 78815 PET IMAGE W/CT SKULL-THIGH: CPT

## 2023-12-19 PROCEDURE — G1004 CDSM NDSC: HCPCS

## 2023-12-19 PROCEDURE — A9595 HB PIFLUFOLASTAT F-18, DIAGNOSTIC, 1 MILLICURIE: HCPCS

## 2023-12-20 ENCOUNTER — RADIATION ONCOLOGY FOLLOW-UP (OUTPATIENT)
Dept: RADIATION ONCOLOGY | Facility: CLINIC | Age: 78
End: 2023-12-20
Attending: RADIOLOGY
Payer: MEDICARE

## 2023-12-20 VITALS
OXYGEN SATURATION: 96 % | DIASTOLIC BLOOD PRESSURE: 76 MMHG | SYSTOLIC BLOOD PRESSURE: 160 MMHG | HEART RATE: 62 BPM | RESPIRATION RATE: 16 BRPM | TEMPERATURE: 97 F | BODY MASS INDEX: 25.15 KG/M2 | WEIGHT: 142 LBS

## 2023-12-20 DIAGNOSIS — C61 PROSTATE CANCER (HCC): Primary | ICD-10-CM

## 2023-12-20 PROCEDURE — 99211 OFF/OP EST MAY X REQ PHY/QHP: CPT | Performed by: RADIOLOGY

## 2023-12-20 PROCEDURE — 99214 OFFICE O/P EST MOD 30 MIN: CPT | Performed by: RADIOLOGY

## 2023-12-20 NOTE — LETTER
December 26, 2023     Dylan Yuen MD  [address]  [address]  [place] [id number]    Patient: Sharif Johansen   YOB: 1945   Date of Visit: 12/20/2023       Dear Dr. Yuen:    Thank you for referring Sharif Johansen to me for evaluation. Below are my notes for this consultation.    If you have questions, please do not hesitate to call me. I look forward to following your patient along with you.         Sincerely,        Ifeoma Lozano MD        CC: No Recipients    Ifeoma Lozano MD  12/26/2023 10:16 AM  Sign when Signing Visit  Follow-up - Radiation Oncology   Sharif Johansen 1945 78 y.o. male [id number]      History of Present Illness   Cancer Staging   Prostate cancer (HCC)  Staging form: Prostate, AJCC 7th Edition  - Clinical stage from 9/6/2023: Stage III (T3a, N0, M0, PSA: Less than 10, Melany 8-10) - Signed by Ifeoma Lozano MD on 11/27/2023      Sharif Johansen is a 78 y.o.man initially diagnosed with low risk prostate cancer, [place] score 6 more than 10 years prior.  He elected to undergo active surveillance and underwent at least 4 additional biopsies at an outside facility which were reported as negative.      The patient has had a fluctuating PSA that increased to 10.5ng/mL in December 2020 associated with 12/1/20 prostate MRI showing PIRADS-5 lesion in the left periopheral zone with SHELDON.  This prompted referral for consideration for definitive radiation.  He was seen in consultation on 12/24/2020 with Dr. Jaswant Maria. He had fiducial markers placed. He did not have radiation therapy at that time as repeat biopsy 3/2021 demonstrated continued GS 6 disease.    Repeat prostate MRI 7/26/23 was concerning for progression with two nodules, one in each lobe, PIRAD 5 and PIRAD 4.       MRI fusion biopsy demonstrated mixture of prostatic adenocarcinoma ranging from GS 6 (3+3) to low volume mixture of GS 7 (3+4) and (4+3) and GS 9 (4+5).  PSA from elevated at 4.76ng/mL on 7/21/23 on finasteride, correction is  9.52ng/mL.  Bone scan does not demonstrate definite osseous metastases.   He was seen in consult on 23 and offered definitive radiation to prostate and pelvic lymph nodes with long term ADT.  He was undecided at that time.    Given some nonspecific uptake on bone scan within the spine and high risk disease, I recommended PSMA PET for completion staging.  He returns today for follow-up discussion and review of imaging results.     23 PSMA PET CT  1. Intense PSMA activity associated with known left peripheral prostate tumor, with extra prostatic extension. No PSMA avid pelvic adenopathy.  2. No significant PSMA uptake associated with a second smaller right prostate lesion seen on prior MRI.  3. No PSMA avid metastases in the neck, chest, upper abdomen or osseous structures.     He denies new significant urinary or GI symptoms including dysuria, hematuria, or incontinence.  He denies diarrhea or rectal bleeding.  IPSS score is good at 8.    Upcomin/3/24 Urology- [facility] Information   Oncology History   Prostate cancer (HCC)   3/20/2013 Initial Diagnosis    Prostate cancer (HCC)     3/2/2021 Biopsy    A. Prostate, L lat base, Biopsy:  - Benign prostatic tissue.      B. Prostate, L base, Biopsy:  - Benign prostatic tissue.       C. Prostate, L lat med, Biopsy:  - Benign prostatic tissue.       D. Prostate, L med, Biopsy:  - Benign prostatic tissue.       E. Prostate, L lat apex, Biopsy:  - Benign prostatic tissue.       F. Prostate, L apex, Biopsy:  - Benign prostatic tissue.      G. Prostate, R lat base, Biopsy:  - Benign prostatic tissue.       H. Prostate, R base, Biopsy:  - Prostatic adenocarcinoma, [place] score 3+3=6, Prognostic Grade Group 1, involving 1 of 1 core (5% involvement).  - Focal high-grade prostatic intraepithelial neoplasia (HGPIN).  - Perineural invasion is not identified.     I. Prostate, R lat med, Biopsy:  - Benign prostatic tissue.       J. Prostate, R med,  Biopsy:  - Benign prostatic tissue.       K. Prostate, R lat apex, Biopsy:  - No tissue present.     L. Prostate, R apex, Biopsy:  - Benign prostatic tissue.       M. Prostate, Target L lat med, Biopsy:  - Benign prostatic tissue.       9/6/2023 Biopsy    A. Prostate, FABIANA #1 L base x 5:  Acinar adenocarcinoma  - Grade group 3 (Melany score: 4+3 =7)  - Involving 70%, 70% and 13% of 3 out of 5 core fragments, tumor lengths are 11 mm, 11 mm and 2 mm  - Percentage of pattern 4: approx. 92%  - Perineural invasion identified     B. Prostate, L post lat x 2:  Benign prostate tissue with basal cell hyperplasia and chronic prostatitis     C. Prostate, L post med x 2:  Benign prostatic hyperplasia and chronic prostatitis     D. Prostate, L base x 2:  Benign prostatic hyperplasia and chronic prostatitis     E. Prostate, L ant lat x 2:  Acinar adenocarcinoma  - Grade group 5 ([place] score: 4+5 =9)  - Involving 30% of 1 out of 2 core fragments, tumor length is 5 mm  - Percentage of pattern 5: approx. 5%     F. Prostate, L ant med x 2:  Benign prostate tissue with basal cell hyperplasia and chronic prostatitis     G. Prostate, FABIANA # 2R apex x 5:  Acinar adenocarcinoma  - Grade group 2 ([place] score: 3+4 =7)  - Involving 50%, 45%, 35%, 15% and 11% of 5 out of 5 core fragments, tumor lengths are 4.5 mm, 4.5 mm, 2 mm, 3 mm and 1 mm  - Percentage of pattern 4: approx. 45%  - High grade prostatic intraepithelial neoplasia     H. Prostate, R post lat x 2:  Atypical glands, suspicious for malignancy  High grade prostatic intraepithelial neoplasia     I. Prostate, R post med x2:  Rare atypical glands  Background of benign prostate tissue with basal cell hyperplasia and chronic prostatitis     J. Prostate, R base x2:  Benign prostate tissue     K. Prostate, R ant latx2:  Benign prostatic hyperplasia and chronic prostatitis     L. Prostate, R ant med x 2:  Benign prostate tissue with basal cell hyperplasia and chronic prostatitis         9/6/2023 -  Cancer Staged    Staging form: Prostate, AJCC 7th Edition  - Clinical stage from 9/6/2023: Stage III (T3a, N0, M0, PSA: Less than 10, Melany 8-10) - Signed by Ifeoma Lozano MD on 11/27/2023           Past Medical History:   Diagnosis Date   • Hypertension    • Prostate cancer (HCC)      Past Surgical History:   Procedure Laterality Date   • COLONOSCOPY     • HERNIA REPAIR Left    • AZ BX PROSTATE STRTCTC SATURATION SAMPLING IMG GID N/A 9/26/2023    Procedure: TRANSPERINEAL MRI FUSION BIOPSY PROSTATE;  Surgeon: Sushil Thurston MD;  Location: [facility];  Service: Urology   • AZ PROSTATE NEEDLE BIOPSY ANY APPROACH N/A 03/02/2021    Procedure: Transperineal MRI Fusion prostate biopsy and gold seed marker insertion;  Surgeon: Farhan Jacobo MD;  Location: [facility];  Service: Urology   • PROSTATE BIOPSY      1/26/2015, 2/10/2017   • PROSTATE BIOPSY     • VASECTOMY  ?       Social History   Social History     Substance and Sexual Activity   Alcohol Use Yes   • Alcohol/week: 2.0 standard drinks of alcohol   • Types: 1 Glasses of wine, 1 Cans of beer per week    Comment: social     Social History     Substance and Sexual Activity   Drug Use No     Social History     Tobacco Use   Smoking Status Never   • Passive exposure: Past   Smokeless Tobacco Never   Tobacco Comments    tried it once          Meds/Allergies     Current Outpatient Medications:   •  amLODIPine (NORVASC) 5 mg tablet, take 1 tablet by mouth once daily IN THE EARLY MORNING, Disp: 30 tablet, Rfl: 5  •  Apoaequorin (Prevagen Extra Strength) 20 MG CAPS, Take 20 mg by mouth in the morning Once daily, Disp: , Rfl:   •  benzonatate (TESSALON) 200 MG capsule, Take 200 mg by mouth every 8 (eight) hours, Disp: , Rfl:   •  finasteride (PROSCAR) 5 mg tablet, Take 1 tablet (5 mg total) by mouth daily, Disp: 90 tablet, Rfl: 3  •  Multiple Vitamins-Minerals (CENTRUM SILVER 50+MEN PO), Take by mouth as needed, Disp: , Rfl:   •  sildenafil (VIAGRA) 50 MG  tablet, Take 1 tablet (50 mg total) by mouth as needed for erectile dysfunction, Disp: 30 tablet, Rfl: 2  •  atovaquone-proguanil (MALARONE) 250-100 mg, take 1 tablet by mouth once daily WITH BREAKFAST BEGIN 1 day PRIO...  (REFER TO PRESCRIPTION NOTES). (Patient not taking: Reported on 4/26/2023), Disp: , Rfl:   •  azithromycin (ZITHROMAX) 250 mg tablet, take 2 tablets by mouth TODAY then take 1 tablet DAILY FOR 4 DAYS, Disp: , Rfl:   No Known Allergies      Review of Systems   Constitutional: Negative.    HENT: Negative.     Eyes:         Wears glasses   Respiratory: Negative.     Cardiovascular: Negative.    Gastrointestinal: Negative.    Genitourinary:  Positive for frequency (occasional).   Musculoskeletal:  Positive for arthralgias (right wrist).   Skin: Negative.    Allergic/Immunologic: Negative.    Neurological:  Positive for numbness (left index finger).   Hematological: Negative.    Psychiatric/Behavioral: Negative.          IPSS Questionnaire (AUA-7):  Over the past month…     1)  How often have you had a sensation of not emptying your bladder completely after you finish urinating?  1 - Less than 1 time in 5   2)  How often have you had to urinate again less than two hours after you finished urinating? 1 - Less than 1 time in 5   3)  How often have you found you stopped and started again several times when you urinated?  1 - Less than 1 time in 5   4) How difficult have you found it to postpone urination?  0 - Not at all   5) How often have you had a weak urinary stream?  4 - More than half the time   6) How often have you had to push or strain to begin urination?  0 - Not at all   7) How many times did you most typically get up to urinate from the time you went to bed until the time you got up in the morning?  1 - 1 time   Total Score:  8         OBJECTIVE:   /76   Pulse 62   Temp (!) 97 °F (36.1 °C)   Resp 16   Wt 64.4 kg (142 lb)   SpO2 96%   BMI 25.15 kg/m²   [place]: 90 - Able to carry  on normal activity; minor signs or symptoms of disease     Physical Exam  Vitals and nursing note reviewed.   Constitutional:       General: He is not in acute distress.     Appearance: He is well-developed.   Lymphadenopathy:      Upper Body:      Right upper body: No supraclavicular adenopathy.      Left upper body: No supraclavicular adenopathy.   Neurological:      Mental Status: He is alert and oriented to person, place, and time.      Gait: Gait normal.            RESULTS  Imaging Studies:NM PET CT skull base to mid thigh    Result Date: 12/19/2023  Narrative: PYLARIFY PSMA PET/CT SCAN INDICATION:  C61: Malignant neoplasm of prostate R97.20: Elevated prostate specific antigen (PSA) MODIFIER: PI PS COMPARISON: Bone scan 11/8/2023, prostate MRI 7/26/2023 CELL TYPE:  prostatic adenocarcinoma TECHNIQUE:   9.5 mCi F-18 Pylarify PSMA administered IV. Multiplanar attenuation corrected and non attenuation corrected PET images were acquired 60 minutes post injection. Contiguous, low dose, axial CT sections were obtained from the vertex through the  femurs .   Intravenous or oral contrast was not utilized.  This examination, like all CT scans performed in the [facility], was performed utilizing techniques to minimize radiation dose exposure, including the use of iterative reconstruction and automated exposure control. FINDINGS: VISUALIZED BRAIN: No acute abnormalities are seen. HEAD/NECK: There is a physiologic distribution of the radiotracer. No PSMA avid cervical adenopathy is seen. CT images: Unremarkable. CHEST: No PSMA avid soft tissue lesions are seen. CT images: Coronary atherosclerosis. ABDOMEN: No PSMA avid soft tissue lesions are seen. CT images: Bilateral renal cysts. Splenic granulomas. PELVIS: There is focally intense PSMA activity in the left peripheral prostate, SUV 29, compatible with tumor. This corresponds with the lesion seen on prior MRI. This measures approximately 1 x 1.5 x 1.7 cm  based on the PET images. There is extra prostatic extension of tumor as seen on prior MRI. The second smaller right prostate lesion seen on prior MRI does not demonstrate significant PSMA activity. No additional PSMA avid soft tissue lesions are seen. No PSMA avid pelvic adenopathy. CT images: Bilateral hydroceles. Prostatomegaly. OSSEOUS STRUCTURES: No PSMA avid lesions are seen. CT images: Spine degenerative change. Thoracolumbar dextroscoliosis.     Impression: 1. Intense PSMA activity associated with known left peripheral prostate tumor, with extra prostatic extension. No PSMA avid pelvic adenopathy. 2. No significant PSMA uptake associated with a second smaller right prostate lesion seen on prior MRI. 3. No PSMA avid metastases in the neck, chest, upper abdomen or osseous structures. Workstation performed: [id number]       Assessment/Plan:  Sharif Johansen is a 78 y.o. man with excellent KPS on surveillance for low risk prostate cancer who was recently diagnosed with local progression of disease with small volume Mel[place] score 7 (4+3) and 9 (4+5) on most recent biopsy from 9/2023.  He is asymptomatic.  PSMA PET scan confirms SHELDON, but shows no evidence of distant metastases.    I again offered the patient definitive radiation to the prostate and pelvic lymph nodes with concurrent long term ADT.  The total dose planned to the prostate would be 79.2Gy.  This would be in accordance with NCCN guidelines.    The rationale and potential benefits, as well as the risks and acute and late side effects and potential toxicities of radiation were discussed with the patient at length. Side effects discussed included, but were not limited to: Fatigue, irritative urinary side effects, diarrhea, rectal urgency, radiation proctitis, erectile dysfunction, and radiation cystitis.     The patient is interested in proton therapy and has a consultation scheduled with [facility].  We discussed that proton therapy would be a reasonable  "alternative to photon therapy, but is still considered a form of EBRT.  We briefly discussed the pros and cons of this approach, but I deferred detail discussion to the Radiation Oncologist at [facility] for their proton expertise.  We also discussed that they may have a moderately hypofractionated option available to the patient.  I encouraged them to continue with ADT as this would be offered regardless of proton v photon therapy and allow the patient to initiate therapy.  He has plans to go skiing in the next few months.    They were given the chance to ask questions that were answered at length.  At this time they will be planning to transfer care to [facility].  We ill be happy to see him again should the need arise.    Total Time Spent  35 minutes spent reviewing EMR in preparation for visit, with the patient, coordination with other providers, and documentation. Greater than 50% of total time was spent with the patient and/or family for counseling and/or coordination of care.    Ifeoma Lozano MD  12/22/2023,12:34 PM    Portions of the record may have been created with voice recognition software.  Occasional wrong word or \"sound a like\" substitutions may have occurred due to the inherent limitations of voice recognition software.  Read the chart carefully and recognize, using context, where substitutions have occurred.           "

## 2023-12-20 NOTE — PROGRESS NOTES
Sharif Johansen 1945 is a 78 y.o. male initially diagnosed with low risk prostate cancer, Melany score 6 in  per medical records.  He elected to undergo active surveillance and her medical records underwent at least 4 additional biopsies at an outside facility which were reported as negative.  The patient has had a fluctuating PSA that increased to 10.5ng/mL in 2020 associated with 20 prostate MRI  showing PIRADS-5 lesion in the left periopheral zone with SHELDON.  This prompted referral for consideration for definitive radiation.  He was seen in consultation on 2020 with Dr. Jaswant Maria. He had fiducial markers placed. He did not have radiation therapy at that time. He underwent a prostate biopsy in 2023 which revealed prostate cancer, Sharon 4+5=9. He was seen in consult on 23 and returns today for follow up.        23 Dr. 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 Intense PSMA activity associated with known left peripheral prostate tumor, with extra prostatic extension. No PSMA avid pelvic adenopathy.  2. No significant PSMA uptake associated with a second smaller right prostate lesion seen on prior MRI.  3. No PSMA avid metastases in the neck, chest, upper abdomen or osseous structures.    Upcomin/3/24 Urology- [facility]    Follow up visit     Oncology History   Prostate cancer (HCC)   3/20/2013 Initial Diagnosis    Prostate cancer (HCC)     3/2/2021 Biopsy    A. Prostate, L lat base, Biopsy:  - Benign prostatic tissue.      B. Prostate, L base, Biopsy:  - Benign prostatic tissue.       C. Prostate, L lat med, Biopsy:  - Benign prostatic tissue.       D. Prostate, L med, Biopsy:  - Benign prostatic tissue.       E. Prostate, L lat apex, Biopsy:  - Benign prostatic tissue.       F. Prostate, L apex, Biopsy:  - Benign prostatic tissue.      G. Prostate, R lat base, Biopsy:  - Benign prostatic tissue.       H. Prostate, R base, Biopsy:  -  Prostatic adenocarcinoma, Melany score 3+3=6, Prognostic Grade Group 1, involving 1 of 1 core (5% involvement).  - Focal high-grade prostatic intraepithelial neoplasia (HGPIN).  - Perineural invasion is not identified.     I. Prostate, R lat med, Biopsy:  - Benign prostatic tissue.       J. Prostate, R med, Biopsy:  - Benign prostatic tissue.       K. Prostate, R lat apex, Biopsy:  - No tissue present.     L. Prostate, R apex, Biopsy:  - Benign prostatic tissue.       M. Prostate, Target L lat med, Biopsy:  - Benign prostatic tissue.       9/6/2023 Biopsy    A. Prostate, FABIANA #1 L base x 5:  Acinar adenocarcinoma  - Grade group 3 ([place] score: 4+3 =7)  - Involving 70%, 70% and 13% of 3 out of 5 core fragments, tumor lengths are 11 mm, 11 mm and 2 mm  - Percentage of pattern 4: approx. 92%  - Perineural invasion identified     B. Prostate, L post lat x 2:  Benign prostate tissue with basal cell hyperplasia and chronic prostatitis     C. Prostate, L post med x 2:  Benign prostatic hyperplasia and chronic prostatitis     D. Prostate, L base x 2:  Benign prostatic hyperplasia and chronic prostatitis     E. Prostate, L ant lat x 2:  Acinar adenocarcinoma  - Grade group 5 ([place] score: 4+5 =9)  - Involving 30% of 1 out of 2 core fragments, tumor length is 5 mm  - Percentage of pattern 5: approx. 5%     F. Prostate, L ant med x 2:  Benign prostate tissue with basal cell hyperplasia and chronic prostatitis     G. Prostate, FABIANA # 2R apex x 5:  Acinar adenocarcinoma  - Grade group 2 ([place] score: 3+4 =7)  - Involving 50%, 45%, 35%, 15% and 11% of 5 out of 5 core fragments, tumor lengths are 4.5 mm, 4.5 mm, 2 mm, 3 mm and 1 mm  - Percentage of pattern 4: approx. 45%  - High grade prostatic intraepithelial neoplasia     H. Prostate, R post lat x 2:  Atypical glands, suspicious for malignancy  High grade prostatic intraepithelial neoplasia     I. Prostate, R post med x2:  Rare atypical glands  Background of benign prostate  tissue with basal cell hyperplasia and chronic prostatitis     J. Prostate, R base x2:  Benign prostate tissue     K. Prostate, R ant latx2:  Benign prostatic hyperplasia and chronic prostatitis     L. Prostate, R ant med x 2:  Benign prostate tissue with basal cell hyperplasia and chronic prostatitis        9/6/2023 -  Cancer Staged    Staging form: Prostate, AJCC 7th Edition  - Clinical stage from 9/6/2023: Stage III (T3a, N0, M0, PSA: Less than 10, Melany 8-10) - Signed by Ifeoma Lozano MD on 11/27/2023           Review of Systems:  Review of Systems   Constitutional: Negative.    HENT: Negative.     Eyes:         Wears glasses   Respiratory: Negative.     Cardiovascular: Negative.    Gastrointestinal: Negative.    Genitourinary:  Positive for frequency (occasional).   Musculoskeletal:  Positive for arthralgias (right wrist).   Skin: Negative.    Allergic/Immunologic: Negative.    Neurological:  Positive for numbness (left index finger).   Hematological: Negative.    Psychiatric/Behavioral: Negative.         Clinical Trial: no    IPSS Questionnaire (AUA-7):  Over the past month…    1)  How often have you had a sensation of not emptying your bladder completely after you finish urinating?  1 - Less than 1 time in 5   2)  How often have you had to urinate again less than two hours after you finished urinating? 1 - Less than 1 time in 5   3)  How often have you found you stopped and started again several times when you urinated?  1 - Less than 1 time in 5   4) How difficult have you found it to postpone urination?  0 - Not at all   5) How often have you had a weak urinary stream?  4 - More than half the time   6) How often have you had to push or strain to begin urination?  0 - Not at all   7) How many times did you most typically get up to urinate from the time you went to bed until the time you got up in the morning?  1 - 1 time   Total Score:  8       Health Maintenance   Topic Date Due    Hepatitis C Screening   Never done    Medicare Annual Wellness Visit (AWV)  Never done    BMI: Followup Plan  Never done    Fall Risk  Never done    Influenza Vaccine (1) 09/01/2023    COVID-19 Vaccine (4 - 2023-24 season) 09/01/2023    Depression Screening  11/27/2024    BMI: Adult  12/11/2024    Pneumococcal Vaccine: 65+ Years  Completed    HIB Vaccine  Aged Out    IPV Vaccine  Aged Out    Hepatitis A Vaccine  Aged Out    Meningococcal ACWY Vaccine  Aged Out    HPV Vaccine  Aged Out    Colorectal Cancer Screening  Discontinued     Patient Active Problem List   Diagnosis    Organic impotence    Prostate cancer (HCC)    Benign localized prostatic hyperplasia with lower urinary tract symptoms (LUTS)    Dyslipidemia    Parenchymal renal hypertension    Nocturia associated with benign prostatic hyperplasia    SHER (acute kidney injury) (HCC)    Anemia in stage 3 chronic kidney disease     Bilateral renal cysts    Chronic renal disease, stage IV (HCC)    Meibomian gland dysfunction (MGD)    Deposits (accretions) on teeth    Onychomycosis    Partial loss of teeth due to other specified cause, unspecified class    Posterior subcapsular cataract    Presbyopia    Encounter to discuss test results     Past Medical History:   Diagnosis Date    Hypertension     Prostate cancer (HCC)      Past Surgical History:   Procedure Laterality Date    COLONOSCOPY      HERNIA REPAIR Left     PA BX PROSTATE STRTCTC SATURATION SAMPLING IMG GID N/A 9/26/2023    Procedure: TRANSPERINEAL MRI FUSION BIOPSY PROSTATE;  Surgeon: Sushil Thurston MD;  Location: [facility];  Service: Urology    PA PROSTATE NEEDLE BIOPSY ANY APPROACH N/A 03/02/2021    Procedure: Transperineal MRI Fusion prostate biopsy and gold seed marker insertion;  Surgeon: Farhan Jacobo MD;  Location: [facility];  Service: Urology    PROSTATE BIOPSY      1/26/2015, 2/10/2017    PROSTATE BIOPSY      VASECTOMY  ?     Family History   Problem Relation Age of Onset    Hypertension Mother     Prostate cancer  Brother     No Known Problems Brother     No Known Problems Sister     No Known Problems Son     No Known Problems Son     No Known Problems Daughter      Social History     Socioeconomic History    Marital status: /Civil Union     Spouse name: Not on file    Number of children: Not on file    Years of education: Not on file    Highest education level: Not on file   Occupational History    Not on file   Tobacco Use    Smoking status: Never     Passive exposure: Past    Smokeless tobacco: Never    Tobacco comments:     tried it once    Vaping Use    Vaping status: Never Used   Substance and Sexual Activity    Alcohol use: Yes     Alcohol/week: 2.0 standard drinks of alcohol     Types: 1 Glasses of wine, 1 Cans of beer per week     Comment: social    Drug use: No    Sexual activity: Yes   Other Topics Concern    Not on file   Social History Narrative    Not on file     Social Determinants of Health     Financial Resource Strain: Not on file   Food Insecurity: Not on file   Transportation Needs: Not on file   Physical Activity: Not on file   Stress: Not on file   Social Connections: Not on file   Intimate Partner Violence: Not on file   Housing Stability: Not on file       Current Outpatient Medications:     amLODIPine (NORVASC) 5 mg tablet, take 1 tablet by mouth once daily IN THE EARLY MORNING, Disp: 30 tablet, Rfl: 5    Apoaequorin (Prevagen Extra Strength) 20 MG CAPS, Take 20 mg by mouth in the morning Once daily, Disp: , Rfl:     atovaquone-proguanil (MALARONE) 250-100 mg, take 1 tablet by mouth once daily WITH BREAKFAST BEGIN 1 day PRIO...  (REFER TO PRESCRIPTION NOTES). (Patient not taking: Reported on 4/26/2023), Disp: , Rfl:     azithromycin (ZITHROMAX) 250 mg tablet, take 2 tablets by mouth TODAY then take 1 tablet DAILY FOR 4 DAYS, Disp: , Rfl:     benzonatate (TESSALON) 200 MG capsule, Take 200 mg by mouth every 8 (eight) hours, Disp: , Rfl:     finasteride (PROSCAR) 5 mg tablet, Take 1 tablet (5 mg  total) by mouth daily, Disp: 90 tablet, Rfl: 3    Multiple Vitamins-Minerals (CENTRUM SILVER 50+MEN PO), Take by mouth as needed, Disp: , Rfl:     sildenafil (VIAGRA) 50 MG tablet, Take 1 tablet (50 mg total) by mouth as needed for erectile dysfunction, Disp: 30 tablet, Rfl: 2  No Known Allergies  There were no vitals filed for this visit.

## 2023-12-21 ENCOUNTER — DOCUMENTATION (OUTPATIENT)
Dept: HEMATOLOGY ONCOLOGY | Facility: CLINIC | Age: 78
End: 2023-12-21

## 2023-12-21 NOTE — PROGRESS NOTES
Spoke with [facility] RN about patients plan for treatment. Patient will be going to [facility] for second opinion for proton therapy discussion. Patient is leaning towards proton therapy at this point. Patient will be calling [facility] office if he is not a candidate for proton therapy at [facility].   No further care coordination needed moving forward.

## 2023-12-22 NOTE — PROGRESS NOTES
Follow-up - Radiation Oncology   Sharif Johansen 1945 78 y.o. male [id number]      History of Present Illness   Cancer Staging   Prostate cancer (HCC)  Staging form: Prostate, AJCC 7th Edition  - Clinical stage from 9/6/2023: Stage III (T3a, N0, M0, PSA: Less than 10, Mccurtain 8-10) - Signed by Ifeoma Lozano MD on 11/27/2023      Sharif Johansen is a 78 y.o.man initially diagnosed with low risk prostate cancer, Mccurtain score 6 more than 10 years prior.  He elected to undergo active surveillance and underwent at least 4 additional biopsies at an outside facility which were reported as negative.      The patient has had a fluctuating PSA that increased to 10.5ng/mL in December 2020 associated with 12/1/20 prostate MRI showing PIRADS-5 lesion in the left periopheral zone with SHELDON.  This prompted referral for consideration for definitive radiation.  He was seen in consultation on 12/24/2020 with Dr. Jaswant Maria. He had fiducial markers placed. He did not have radiation therapy at that time as repeat biopsy 3/2021 demonstrated continued GS 6 disease.    Repeat prostate MRI 7/26/23 was concerning for progression with two nodules, one in each lobe, PIRAD 5 and PIRAD 4.       MRI fusion biopsy demonstrated mixture of prostatic adenocarcinoma ranging from GS 6 (3+3) to low volume mixture of GS 7 (3+4) and (4+3) and GS 9 (4+5).  PSA from elevated at 4.76ng/mL on 7/21/23 on finasteride, correction is 9.52ng/mL.  Bone scan does not demonstrate definite osseous metastases.   He was seen in consult on 11/27/23 and offered definitive radiation to prostate and pelvic lymph nodes with long term ADT.  He was undecided at that time.    Given some nonspecific uptake on bone scan within the spine and high risk disease, I recommended PSMA PET for completion staging.  He returns today for follow-up discussion and review of imaging results.     12/19/23 PSMA PET CT  1. Intense PSMA activity associated with known left peripheral prostate tumor,  with extra prostatic extension. No PSMA avid pelvic adenopathy.  2. No significant PSMA uptake associated with a second smaller right prostate lesion seen on prior MRI.  3. No PSMA avid metastases in the neck, chest, upper abdomen or osseous structures.     He denies new significant urinary or GI symptoms including dysuria, hematuria, or incontinence.  He denies diarrhea or rectal bleeding.  IPSS score is good at 8.    Upcomin/3/24 Urology- [facility] Information   Oncology History   Prostate cancer (HCC)   3/20/2013 Initial Diagnosis    Prostate cancer (HCC)     3/2/2021 Biopsy    A. Prostate, L lat base, Biopsy:  - Benign prostatic tissue.      B. Prostate, L base, Biopsy:  - Benign prostatic tissue.       C. Prostate, L lat med, Biopsy:  - Benign prostatic tissue.       D. Prostate, L med, Biopsy:  - Benign prostatic tissue.       E. Prostate, L lat apex, Biopsy:  - Benign prostatic tissue.       F. Prostate, L apex, Biopsy:  - Benign prostatic tissue.      G. Prostate, R lat base, Biopsy:  - Benign prostatic tissue.       H. Prostate, R base, Biopsy:  - Prostatic adenocarcinoma, [place] score 3+3=6, Prognostic Grade Group 1, involving 1 of 1 core (5% involvement).  - Focal high-grade prostatic intraepithelial neoplasia (HGPIN).  - Perineural invasion is not identified.     I. Prostate, R lat med, Biopsy:  - Benign prostatic tissue.       J. Prostate, R med, Biopsy:  - Benign prostatic tissue.       K. Prostate, R lat apex, Biopsy:  - No tissue present.     L. Prostate, R apex, Biopsy:  - Benign prostatic tissue.       M. Prostate, Target L lat med, Biopsy:  - Benign prostatic tissue.       2023 Biopsy    A. Prostate, FABIANA #1 L base x 5:  Acinar adenocarcinoma  - Grade group 3 (Melany score: 4+3 =7)  - Involving 70%, 70% and 13% of 3 out of 5 core fragments, tumor lengths are 11 mm, 11 mm and 2 mm  - Percentage of pattern 4: approx. 92%  - Perineural invasion identified     B. Prostate, L  post lat x 2:  Benign prostate tissue with basal cell hyperplasia and chronic prostatitis     C. Prostate, L post med x 2:  Benign prostatic hyperplasia and chronic prostatitis     D. Prostate, L base x 2:  Benign prostatic hyperplasia and chronic prostatitis     E. Prostate, L ant lat x 2:  Acinar adenocarcinoma  - Grade group 5 (Melany score: 4+5 =9)  - Involving 30% of 1 out of 2 core fragments, tumor length is 5 mm  - Percentage of pattern 5: approx. 5%     F. Prostate, L ant med x 2:  Benign prostate tissue with basal cell hyperplasia and chronic prostatitis     G. Prostate, FABIANA # 2R apex x 5:  Acinar adenocarcinoma  - Grade group 2 ([place] score: 3+4 =7)  - Involving 50%, 45%, 35%, 15% and 11% of 5 out of 5 core fragments, tumor lengths are 4.5 mm, 4.5 mm, 2 mm, 3 mm and 1 mm  - Percentage of pattern 4: approx. 45%  - High grade prostatic intraepithelial neoplasia     H. Prostate, R post lat x 2:  Atypical glands, suspicious for malignancy  High grade prostatic intraepithelial neoplasia     I. Prostate, R post med x2:  Rare atypical glands  Background of benign prostate tissue with basal cell hyperplasia and chronic prostatitis     J. Prostate, R base x2:  Benign prostate tissue     K. Prostate, R ant latx2:  Benign prostatic hyperplasia and chronic prostatitis     L. Prostate, R ant med x 2:  Benign prostate tissue with basal cell hyperplasia and chronic prostatitis        9/6/2023 -  Cancer Staged    Staging form: Prostate, AJCC 7th Edition  - Clinical stage from 9/6/2023: Stage III (T3a, N0, M0, PSA: Less than 10, [place] 8-10) - Signed by Ifeoma Lozano MD on 11/27/2023           Past Medical History:   Diagnosis Date    Hypertension     Prostate cancer (HCC)      Past Surgical History:   Procedure Laterality Date    COLONOSCOPY      HERNIA REPAIR Left     WI BX PROSTATE STRTCTC SATURATION SAMPLING IMG GID N/A 9/26/2023    Procedure: TRANSPERINEAL MRI FUSION BIOPSY PROSTATE;  Surgeon: Sushil Thurston,  MD;  Location: [facility];  Service: Urology    UT PROSTATE NEEDLE BIOPSY ANY APPROACH N/A 03/02/2021    Procedure: Transperineal MRI Fusion prostate biopsy and gold seed marker insertion;  Surgeon: Farhan Jacobo MD;  Location: [facility];  Service: Urology    PROSTATE BIOPSY      1/26/2015, 2/10/2017    PROSTATE BIOPSY      VASECTOMY  ?       Social History   Social History     Substance and Sexual Activity   Alcohol Use Yes    Alcohol/week: 2.0 standard drinks of alcohol    Types: 1 Glasses of wine, 1 Cans of beer per week    Comment: social     Social History     Substance and Sexual Activity   Drug Use No     Social History     Tobacco Use   Smoking Status Never    Passive exposure: Past   Smokeless Tobacco Never   Tobacco Comments    tried it once          Meds/Allergies     Current Outpatient Medications:     amLODIPine (NORVASC) 5 mg tablet, take 1 tablet by mouth once daily IN THE EARLY MORNING, Disp: 30 tablet, Rfl: 5    Apoaequorin (Prevagen Extra Strength) 20 MG CAPS, Take 20 mg by mouth in the morning Once daily, Disp: , Rfl:     benzonatate (TESSALON) 200 MG capsule, Take 200 mg by mouth every 8 (eight) hours, Disp: , Rfl:     finasteride (PROSCAR) 5 mg tablet, Take 1 tablet (5 mg total) by mouth daily, Disp: 90 tablet, Rfl: 3    Multiple Vitamins-Minerals (CENTRUM SILVER 50+MEN PO), Take by mouth as needed, Disp: , Rfl:     sildenafil (VIAGRA) 50 MG tablet, Take 1 tablet (50 mg total) by mouth as needed for erectile dysfunction, Disp: 30 tablet, Rfl: 2    atovaquone-proguanil (MALARONE) 250-100 mg, take 1 tablet by mouth once daily WITH BREAKFAST BEGIN 1 day PRIO...  (REFER TO PRESCRIPTION NOTES). (Patient not taking: Reported on 4/26/2023), Disp: , Rfl:     azithromycin (ZITHROMAX) 250 mg tablet, take 2 tablets by mouth TODAY then take 1 tablet DAILY FOR 4 DAYS, Disp: , Rfl:   No Known Allergies      Review of Systems   Constitutional: Negative.    HENT: Negative.     Eyes:         Wears glasses    Respiratory: Negative.     Cardiovascular: Negative.    Gastrointestinal: Negative.    Genitourinary:  Positive for frequency (occasional).   Musculoskeletal:  Positive for arthralgias (right wrist).   Skin: Negative.    Allergic/Immunologic: Negative.    Neurological:  Positive for numbness (left index finger).   Hematological: Negative.    Psychiatric/Behavioral: Negative.          IPSS Questionnaire (AUA-7):  Over the past month…     1)  How often have you had a sensation of not emptying your bladder completely after you finish urinating?  1 - Less than 1 time in 5   2)  How often have you had to urinate again less than two hours after you finished urinating? 1 - Less than 1 time in 5   3)  How often have you found you stopped and started again several times when you urinated?  1 - Less than 1 time in 5   4) How difficult have you found it to postpone urination?  0 - Not at all   5) How often have you had a weak urinary stream?  4 - More than half the time   6) How often have you had to push or strain to begin urination?  0 - Not at all   7) How many times did you most typically get up to urinate from the time you went to bed until the time you got up in the morning?  1 - 1 time   Total Score:  8         OBJECTIVE:   /76   Pulse 62   Temp (!) 97 °F (36.1 °C)   Resp 16   Wt 64.4 kg (142 lb)   SpO2 96%   BMI 25.15 kg/m²   Karnofsky: 90 - Able to carry on normal activity; minor signs or symptoms of disease     Physical Exam  Vitals and nursing note reviewed.   Constitutional:       General: He is not in acute distress.     Appearance: He is well-developed.   Lymphadenopathy:      Upper Body:      Right upper body: No supraclavicular adenopathy.      Left upper body: No supraclavicular adenopathy.   Neurological:      Mental Status: He is alert and oriented to person, place, and time.      Gait: Gait normal.            RESULTS  Imaging Studies:NM PET CT skull base to mid thigh    Result Date:  12/19/2023  Narrative: PYLARIFY PSMA PET/CT SCAN INDICATION:  C61: Malignant neoplasm of prostate R97.20: Elevated prostate specific antigen (PSA) MODIFIER: PI PS COMPARISON: Bone scan 11/8/2023, prostate MRI 7/26/2023 CELL TYPE:  prostatic adenocarcinoma TECHNIQUE:   9.5 mCi F-18 Pylarify PSMA administered IV. Multiplanar attenuation corrected and non attenuation corrected PET images were acquired 60 minutes post injection. Contiguous, low dose, axial CT sections were obtained from the vertex through the  femurs .   Intravenous or oral contrast was not utilized.  This examination, like all CT scans performed in the [facility], was performed utilizing techniques to minimize radiation dose exposure, including the use of iterative reconstruction and automated exposure control. FINDINGS: VISUALIZED BRAIN: No acute abnormalities are seen. HEAD/NECK: There is a physiologic distribution of the radiotracer. No PSMA avid cervical adenopathy is seen. CT images: Unremarkable. CHEST: No PSMA avid soft tissue lesions are seen. CT images: Coronary atherosclerosis. ABDOMEN: No PSMA avid soft tissue lesions are seen. CT images: Bilateral renal cysts. Splenic granulomas. PELVIS: There is focally intense PSMA activity in the left peripheral prostate, SUV 29, compatible with tumor. This corresponds with the lesion seen on prior MRI. This measures approximately 1 x 1.5 x 1.7 cm based on the PET images. There is extra prostatic extension of tumor as seen on prior MRI. The second smaller right prostate lesion seen on prior MRI does not demonstrate significant PSMA activity. No additional PSMA avid soft tissue lesions are seen. No PSMA avid pelvic adenopathy. CT images: Bilateral hydroceles. Prostatomegaly. OSSEOUS STRUCTURES: No PSMA avid lesions are seen. CT images: Spine degenerative change. Thoracolumbar dextroscoliosis.     Impression: 1. Intense PSMA activity associated with known left peripheral prostate tumor,  with extra prostatic extension. No PSMA avid pelvic adenopathy. 2. No significant PSMA uptake associated with a second smaller right prostate lesion seen on prior MRI. 3. No PSMA avid metastases in the neck, chest, upper abdomen or osseous structures. Workstation performed: [id number]       Assessment/Plan:  Sharif Johansen is a 78 y.o. man with excellent KPS on surveillance for low risk prostate cancer who was recently diagnosed with local progression of disease with small volume [place] score 7 (4+3) and 9 (4+5) on most recent biopsy from 9/2023.  He is asymptomatic.  PSMA PET scan confirms SHELDON, but shows no evidence of distant metastases.    I again offered the patient definitive radiation to the prostate and pelvic lymph nodes with concurrent long term ADT.  The total dose planned to the prostate would be 79.2Gy.  This would be in accordance with NCCN guidelines.    The rationale and potential benefits, as well as the risks and acute and late side effects and potential toxicities of radiation were discussed with the patient at length. Side effects discussed included, but were not limited to: Fatigue, irritative urinary side effects, diarrhea, rectal urgency, radiation proctitis, erectile dysfunction, and radiation cystitis.     The patient is interested in proton therapy and has a consultation scheduled with [facility].  We discussed that proton therapy would be a reasonable alternative to photon therapy, but is still considered a form of EBRT.  We briefly discussed the pros and cons of this approach, but I deferred detail discussion to the Radiation Oncologist at [facility] for their proton expertise.  We also discussed that they may have a moderately hypofractionated option available to the patient.  I encouraged them to continue with ADT as this would be offered regardless of proton v photon therapy and allow the patient to initiate therapy.  He has plans to go skiing in the next few months.    They were given the chance  "to ask questions that were answered at length.  At this time they will be planning to transfer care to [facility].  We have instructed him to contact us if he wishes to return for treatment here after consultation.  We will be happy to see him again should the need arise.    Total Time Spent  35 minutes spent reviewing EMR in preparation for visit, with the patient, coordination with other providers, and documentation. Greater than 50% of total time was spent with the patient and/or family for counseling and/or coordination of care.    Ifeoma Lozano MD  12/22/2023,12:34 PM    Portions of the record may have been created with voice recognition software.  Occasional wrong word or \"sound a like\" substitutions may have occurred due to the inherent limitations of voice recognition software.  Read the chart carefully and recognize, using context, where substitutions have occurred.        "

## 2024-01-03 ENCOUNTER — PROCEDURE VISIT (OUTPATIENT)
Dept: UROLOGY | Facility: CLINIC | Age: 79
End: 2024-01-03
Payer: MEDICARE

## 2024-01-03 VITALS
OXYGEN SATURATION: 98 % | BODY MASS INDEX: 25.15 KG/M2 | SYSTOLIC BLOOD PRESSURE: 138 MMHG | WEIGHT: 142 LBS | DIASTOLIC BLOOD PRESSURE: 78 MMHG | HEART RATE: 65 BPM

## 2024-01-03 DIAGNOSIS — C61 PROSTATE CANCER (HCC): Primary | ICD-10-CM

## 2024-01-03 PROCEDURE — 96402 CHEMO HORMON ANTINEOPL SQ/IM: CPT

## 2024-01-03 NOTE — PROGRESS NOTES
1/3/2024  Sharif Johansen is a 78 y.o. male  [id number]    Diagnosis:      Patient presents for firmagon loading dose managed by Dr. Yuen    Plan:  Patient will follow up as scheduled for lupron      Medication administration:  Went over information regarding firmagon and the lupron. Explained what it is and why he is getting it. Went over potential side effects. Explained he would transition to lupron in 4 weeks. All questions answered for patient.     Patient will be going out of town and will be out of town at the 4 week jenifer for his next dose of ADT and wont be available until after February 10th.  Spoke with providers and ok for patient to get ADT after he returns. Appt scheduled.    Medication prepared per manufacturers instructions. Site cleaned with alcohol swab on B/L abdomen. Medication administered per protocol with LOPEZ Montelongo. B/l bandaids applied     Administrations This Visit       degarelix acetate (FIRMAGON) injection 240 mg       Admin Date  01/03/2024 Action  Given Dose  240 mg Route  Subcutaneous Documented By  Esperanza Page RN                    Vitals:    01/03/24 1056   BP: 138/78   Pulse: 65   SpO2: 98%   Weight: 64.4 kg (142 lb)         Esperanza Page RN

## 2024-01-23 ENCOUNTER — TELEPHONE (OUTPATIENT)
Dept: RADIATION ONCOLOGY | Facility: CLINIC | Age: 79
End: 2024-01-23

## 2024-01-23 NOTE — TELEPHONE ENCOUNTER
Dr. Panda contacted Dr. Lozano via email. Dr. Panda will schedule patient for HDR brachytherapy at [facility] in mid March after Sharif returns from a ski trip. Plan is for 5 weeks of EBRT to be done at [facility] after brachytherapy. Dr. Lozano requested Sharif be scheduled for follow up and SIM 3 weeks after Brachytherapy.

## 2024-02-12 ENCOUNTER — PROCEDURE VISIT (OUTPATIENT)
Dept: UROLOGY | Facility: CLINIC | Age: 79
End: 2024-02-12
Payer: MEDICARE

## 2024-02-12 VITALS
OXYGEN SATURATION: 99 % | WEIGHT: 141 LBS | HEART RATE: 68 BPM | DIASTOLIC BLOOD PRESSURE: 92 MMHG | BODY MASS INDEX: 24.98 KG/M2 | SYSTOLIC BLOOD PRESSURE: 140 MMHG

## 2024-02-12 DIAGNOSIS — C61 PROSTATE CANCER (HCC): Primary | ICD-10-CM

## 2024-02-12 PROCEDURE — 96402 CHEMO HORMON ANTINEOPL SQ/IM: CPT

## 2024-02-12 NOTE — PROGRESS NOTES
2/12/2024  Sharif Johansen is a 78 y.o. male  [id number]    Diagnosis:      Patient presents for Lupron injection managed by Dr. Yuen    Plan:  Patient is following up in [facility] With Dr. Panda for HRD brachytherapy.      Medication administration:    Site was prepped with alcohol. Medication administered in the R upper outer quadrant. Tolerated well. No immediate reaction noted. Band-aid applied.     Administrations This Visit       leuprolide (LUPRON DEPOT 6 MONTH KIT) IM injection kit 45 mg       Admin Date  02/12/2024 Action  Given Dose  45 mg Route  Intramuscular Documented By  Vickie Arndt RN                    Vitals:    02/12/24 1002   BP: 140/92   BP Location: Left arm   Patient Position: Sitting   Cuff Size: Adult   Pulse: 68   SpO2: 99%   Weight: 64 kg (141 lb)         Vickie Arndt RN

## 2024-03-13 ENCOUNTER — TELEPHONE (OUTPATIENT)
Dept: NEPHROLOGY | Facility: CLINIC | Age: 79
End: 2024-03-13

## 2024-03-13 NOTE — TELEPHONE ENCOUNTER
Left message to schedule May follow up appointment with Dr. Yuan in [place].  This is the 1st attempt.     If no appt available with Dr. Yuan in May, patient is okay to see advanced practitioner, with appointment placed on wait list as well if outside of recall time frame.

## 2024-04-01 DIAGNOSIS — N18.32 STAGE 3B CHRONIC KIDNEY DISEASE (HCC): ICD-10-CM

## 2024-04-01 DIAGNOSIS — N28.1 BILATERAL RENAL CYSTS: ICD-10-CM

## 2024-04-01 DIAGNOSIS — N18.32 ANEMIA IN STAGE 3B CHRONIC KIDNEY DISEASE  (HCC): ICD-10-CM

## 2024-04-01 DIAGNOSIS — I12.9 PARENCHYMAL RENAL HYPERTENSION, STAGE 1 THROUGH STAGE 4 OR UNSPECIFIED CHRONIC KIDNEY DISEASE: ICD-10-CM

## 2024-04-01 DIAGNOSIS — E78.5 DYSLIPIDEMIA: ICD-10-CM

## 2024-04-01 DIAGNOSIS — D63.1 ANEMIA IN STAGE 3B CHRONIC KIDNEY DISEASE  (HCC): ICD-10-CM

## 2024-04-01 RX ORDER — AMLODIPINE BESYLATE 5 MG/1
TABLET ORAL
Qty: 30 TABLET | Refills: 5 | Status: SHIPPED | OUTPATIENT
Start: 2024-04-01

## 2024-04-03 ENCOUNTER — APPOINTMENT (OUTPATIENT)
Dept: RADIATION ONCOLOGY | Facility: CLINIC | Age: 79
End: 2024-04-03
Payer: MEDICARE

## 2024-04-10 ENCOUNTER — RADIATION ONCOLOGY FOLLOW-UP (OUTPATIENT)
Dept: RADIATION ONCOLOGY | Facility: CLINIC | Age: 79
End: 2024-04-10
Attending: RADIOLOGY

## 2024-04-10 ENCOUNTER — TELEPHONE (OUTPATIENT)
Dept: UROLOGY | Facility: CLINIC | Age: 79
End: 2024-04-10

## 2024-04-10 ENCOUNTER — APPOINTMENT (OUTPATIENT)
Dept: RADIATION ONCOLOGY | Facility: CLINIC | Age: 79
End: 2024-04-10
Attending: RADIOLOGY
Payer: MEDICARE

## 2024-04-10 VITALS
BODY MASS INDEX: 25.15 KG/M2 | OXYGEN SATURATION: 98 % | HEART RATE: 73 BPM | DIASTOLIC BLOOD PRESSURE: 80 MMHG | RESPIRATION RATE: 16 BRPM | SYSTOLIC BLOOD PRESSURE: 156 MMHG | TEMPERATURE: 97.3 F | WEIGHT: 142 LBS

## 2024-04-10 DIAGNOSIS — C61 PROSTATE CANCER (HCC): Primary | ICD-10-CM

## 2024-04-10 PROCEDURE — 77334 RADIATION TREATMENT AID(S): CPT | Performed by: RADIOLOGY

## 2024-04-10 RX ORDER — TAMSULOSIN HYDROCHLORIDE 0.4 MG/1
0.4 CAPSULE ORAL
COMMUNITY

## 2024-04-10 NOTE — PROGRESS NOTES
Follow-up - Radiation Oncology   Sharif Johansen 1945 79 y.o. male [id number]      History of Present Illness   Cancer Staging   Prostate cancer (HCC)  Staging form: Prostate, AJCC 7th Edition  - Clinical stage from 9/6/2023: Stage III (T3a, N0, M0, PSA: Less than 10, [place] 8-10) - Signed by Ifeoma Lozano MD on 11/27/2023      Sharif Johansen is a 79 y.o. man with excellent KPS on surveillance for low risk prostate cancer who was recently diagnosed with local progression of disease with small volume Melany score 7 (4+3) and 9 (4+5) on most recent biopsy from 9/2023. He is asymptomatic. PSMA PET scan confirms SHELDON, but shows no evidence of distant metastases. He elected for ADT, HDR brachytherapy done at [facility] on 3/19/24, and EBRT. He presents today for follow up.     1/3/24 Urology- Firmagon 240 mg  2/12/24 Urology- Lupron 45 mg     2/22/24 Dr. Panda  He has elected ADT + HDR brachytherapy + EBRT.   Follow-up with Dr. Ifeoma Lozano after procedure.      3/19/24 TRANSPERINEAL PLACEMENT OF BIODEGRADABLE MATERIAL, CED-PROSTATIC, SINGLE OR MULTIPLE INJECTION(S), INCLUDING IMAGE GUIDANCE, WHEN PERFORMED     The patient suffered obstruction requiring Boland after procedure.  He was eventually successfully weaned from cathter.  He is maintained on finasteride and tamsulosin bid at this time.  The patient notes he is now emptying his bladder fully.  He continues to have significant urgency and frequency, although this has been improving.  He has mild urge incontinence, but does not use pads.  IPSS is 19.  He denies dysuria or hematuria at this time.  He denies diarrhea or rectal bleeding at this time.  He had some bleeding post procedure.  He does have a history of hemorrhoids.        Historical Information   Oncology History   Prostate cancer ([facility])   3/20/2013 Initial Diagnosis    Prostate cancer ([facility])     3/2/2021 Biopsy    A. Prostate, L lat base, Biopsy:  - Benign prostatic tissue.      B. Prostate, L base,  Biopsy:  - Benign prostatic tissue.       C. Prostate, L lat med, Biopsy:  - Benign prostatic tissue.       D. Prostate, L med, Biopsy:  - Benign prostatic tissue.       E. Prostate, L lat apex, Biopsy:  - Benign prostatic tissue.       F. Prostate, L apex, Biopsy:  - Benign prostatic tissue.      G. Prostate, R lat base, Biopsy:  - Benign prostatic tissue.       H. Prostate, R base, Biopsy:  - Prostatic adenocarcinoma, Willow score 3+3=6, Prognostic Grade Group 1, involving 1 of 1 core (5% involvement).  - Focal high-grade prostatic intraepithelial neoplasia (HGPIN).  - Perineural invasion is not identified.     I. Prostate, R lat med, Biopsy:  - Benign prostatic tissue.       J. Prostate, R med, Biopsy:  - Benign prostatic tissue.       K. Prostate, R lat apex, Biopsy:  - No tissue present.     L. Prostate, R apex, Biopsy:  - Benign prostatic tissue.       M. Prostate, Target L lat med, Biopsy:  - Benign prostatic tissue.       9/6/2023 Biopsy    A. Prostate, FABIANA #1 L base x 5:  Acinar adenocarcinoma  - Grade group 3 (Melany score: 4+3 =7)  - Involving 70%, 70% and 13% of 3 out of 5 core fragments, tumor lengths are 11 mm, 11 mm and 2 mm  - Percentage of pattern 4: approx. 92%  - Perineural invasion identified     B. Prostate, L post lat x 2:  Benign prostate tissue with basal cell hyperplasia and chronic prostatitis     C. Prostate, L post med x 2:  Benign prostatic hyperplasia and chronic prostatitis     D. Prostate, L base x 2:  Benign prostatic hyperplasia and chronic prostatitis     E. Prostate, L ant lat x 2:  Acinar adenocarcinoma  - Grade group 5 (Melany score: 4+5 =9)  - Involving 30% of 1 out of 2 core fragments, tumor length is 5 mm  - Percentage of pattern 5: approx. 5%     F. Prostate, L ant med x 2:  Benign prostate tissue with basal cell hyperplasia and chronic prostatitis     G. Prostate, FABIANA # 2R apex x 5:  Acinar adenocarcinoma  - Grade group 2 (Willow score: 3+4 =7)  - Involving 50%, 45%,  35%, 15% and 11% of 5 out of 5 core fragments, tumor lengths are 4.5 mm, 4.5 mm, 2 mm, 3 mm and 1 mm  - Percentage of pattern 4: approx. 45%  - High grade prostatic intraepithelial neoplasia     H. Prostate, R post lat x 2:  Atypical glands, suspicious for malignancy  High grade prostatic intraepithelial neoplasia     I. Prostate, R post med x2:  Rare atypical glands  Background of benign prostate tissue with basal cell hyperplasia and chronic prostatitis     J. Prostate, R base x2:  Benign prostate tissue     K. Prostate, R ant latx2:  Benign prostatic hyperplasia and chronic prostatitis     L. Prostate, R ant med x 2:  Benign prostate tissue with basal cell hyperplasia and chronic prostatitis        9/6/2023 -  Cancer Staged    Staging form: Prostate, AJCC 7th Edition  - Clinical stage from 9/6/2023: Stage III (T3a, N0, M0, PSA: Less than 10, Melany 8-10) - Signed by Ifeoma Lozano MD on 11/27/2023       1/3/2024 -  Hormone Therapy    Firmagon 240 mg     2/12/2024 -  Hormone Therapy    Lupron 45 mg     3/19/2024 -  Radiation    [facility]- HDR brachytherapy    TRANSPERINEAL PLACEMENT OF BIODEGRADABLE MATERIAL, CED-PROSTATIC, SINGLE OR MULTIPLE INJECTION(S), INCLUDING IMAGE GUIDANCE, WHEN PERFORMED          Past Medical History:   Diagnosis Date    Hypertension     Prostate cancer (HCC)      Past Surgical History:   Procedure Laterality Date    COLONOSCOPY      HERNIA REPAIR Left     INSERTION PROSTATE RADIATION SEED  03/19/2024    PA BX PROSTATE STRTCTC SATURATION SAMPLING IMG GID N/A 09/26/2023    Procedure: TRANSPERINEAL MRI FUSION BIOPSY PROSTATE;  Surgeon: Sushil Thurston MD;  Location: [facility];  Service: Urology    PA PROSTATE NEEDLE BIOPSY ANY APPROACH N/A 03/02/2021    Procedure: Transperineal MRI Fusion prostate biopsy and gold seed marker insertion;  Surgeon: Farhan Jacobo MD;  Location: [facility];  Service: Urology    PROSTATE BIOPSY      1/26/2015, 2/10/2017    PROSTATE BIOPSY       VASECTOMY  ?       Social History   Social History     Substance and Sexual Activity   Alcohol Use Yes    Alcohol/week: 2.0 standard drinks of alcohol    Types: 1 Glasses of wine, 1 Cans of beer per week    Comment: social     Social History     Substance and Sexual Activity   Drug Use No     Social History     Tobacco Use   Smoking Status Never    Passive exposure: Past   Smokeless Tobacco Never   Tobacco Comments    tried it once          Meds/Allergies     Current Outpatient Medications:     amLODIPine (NORVASC) 5 mg tablet, take 1 tablet by mouth once daily IN THE EARLY MORNING, Disp: 30 tablet, Rfl: 5    Apoaequorin (Prevagen Extra Strength) 20 MG CAPS, Take 20 mg by mouth in the morning Once daily, Disp: , Rfl:     finasteride (PROSCAR) 5 mg tablet, Take 1 tablet (5 mg total) by mouth daily, Disp: 90 tablet, Rfl: 3    Multiple Vitamins-Minerals (CENTRUM SILVER 50+MEN PO), Take by mouth as needed, Disp: , Rfl:     sildenafil (VIAGRA) 50 MG tablet, Take 1 tablet (50 mg total) by mouth as needed for erectile dysfunction, Disp: 30 tablet, Rfl: 2    tamsulosin (FLOMAX) 0.4 mg, Take 0.4 mg by mouth daily with dinner Taking BID, Disp: , Rfl:     atovaquone-proguanil (MALARONE) 250-100 mg, take 1 tablet by mouth once daily WITH BREAKFAST BEGIN 1 day PRIO...  (REFER TO PRESCRIPTION NOTES). (Patient not taking: Reported on 4/26/2023), Disp: , Rfl:     azithromycin (ZITHROMAX) 250 mg tablet, take 2 tablets by mouth TODAY then take 1 tablet DAILY FOR 4 DAYS, Disp: , Rfl:     benzonatate (TESSALON) 200 MG capsule, Take 200 mg by mouth every 8 (eight) hours, Disp: , Rfl:   No Known Allergies      Review of Systems   Constitutional: Negative.    HENT:  Positive for dental problem.    Eyes:         Wears glasses   Respiratory:  Positive for cough.    Cardiovascular: Negative.    Gastrointestinal:  Positive for blood in stool (occasional).   Genitourinary:  Positive for frequency (occasional).   Musculoskeletal:  Positive for  arthralgias (right wrist) and neck stiffness.   Skin: Negative.    Allergic/Immunologic: Negative.    Neurological:  Positive for numbness (left index finger improving).   Hematological: Negative.    Psychiatric/Behavioral: Negative.         IPSS Questionnaire (AUA-7):  Over the past month…     1)  How often have you had a sensation of not emptying your bladder completely after you finish urinating?  5 - Almost always   2)  How often have you had to urinate again less than two hours after you finished urinating? 3 - About half the time   3)  How often have you found you stopped and started again several times when you urinated?  4 - More than half the time   4) How difficult have you found it to postpone urination?  1 - Less than 1 time in 5   5) How often have you had a weak urinary stream?  4 - More than half the time   6) How often have you had to push or strain to begin urination?  0 - Not at all   7) How many times did you most typically get up to urinate from the time you went to bed until the time you got up in the morning?  2 - 2 times   Total Score:  19          OBJECTIVE:   /80   Pulse 73   Temp (!) 97.3 °F (36.3 °C)   Resp 16   Wt 64.4 kg (142 lb)   SpO2 98%   BMI 25.15 kg/m²   Karnofsky: 80 - Normal activity with effort; some signs or symptoms of disease    Physical Exam  Vitals and nursing note reviewed.   Constitutional:       General: He is not in acute distress.     Appearance: He is well-developed.   Cardiovascular:      Rate and Rhythm: Normal rate and regular rhythm.   Pulmonary:      Breath sounds: No wheezing, rhonchi or rales.   Abdominal:      General: There is no distension.      Palpations: Abdomen is soft.      Tenderness: There is no abdominal tenderness. There is no right CVA tenderness or left CVA tenderness.   Musculoskeletal:      Right lower leg: No edema.      Left lower leg: No edema.   Lymphadenopathy:      Cervical: No cervical adenopathy.      Upper Body:      Right  "upper body: No supraclavicular adenopathy.      Left upper body: No supraclavicular adenopathy.   Neurological:      Mental Status: He is alert and oriented to person, place, and time.         Assessment/Plan:  Sharif Johansen is a 79 y.o. man with excellent KPS diagnosed with high risk prostate cancer, [id number] mixed small volume Melany score 7 (4+3) and 9 (4+5) with PSA if 4.76 (9.52 corrected for finasteride). He is undergoing trimodality therapy with ADT and EBRT with brachytherapy boost.  He completed HDR brachy on 3/19/24 and  presents today for next step with prostate and pelvic radiation.    I offered completion radiation to prostate and seminal vesicles with inclusion of the pelvic lymph nodes to 45Gy in 25 fractions.  This would be done in an effort to improve local control and possible cure.  This is in accordance with NCCN guidelines.    We reviewed the risks and acute and late side effects and potential toxicities of radiation. Side effects discussed included, but were not limited to: Fatigue, irritative urinary side effects, diarrhea, rectal urgency, radiation proctitis, erectile dysfunction, urinary obstruction requiring catheter, and radiation cystitis.      They were given the chance to ask questions that were answered at length.  They wished to proceed with the recommended treatment plan.    They had questions regarding duration of ADT.  I explained that the duration of ADT with trimodality therapy is an area of active investigation.  At this time, we are using the [facility] regimen of long ADT and in general our patients have continued ADT for 18 months.      Given his obstructive symptoms, I recommended continuing tamsulosin bid at this time and finasteride.  CT simulation today.      Ifeoma Lozano MD  4/10/2024,12:24 PM    Portions of the record may have been created with voice recognition software.  Occasional wrong word or \"sound a like\" substitutions may have occurred due to the inherent limitations of " voice recognition software.  Read the chart carefully and recognize, using context, where substitutions have occurred.

## 2024-04-10 NOTE — PROGRESS NOTES
Sharif Johansen 1945 is a 79 y.o. male with excellent KPS on surveillance for low risk prostate cancer who was recently diagnosed with local progression of disease with small volume [place] score 7 (4+3) and 9 (4+5) on most recent biopsy from 9/2023. He is asymptomatic. PSMA PET scan confirms SHELDON, but shows no evidence of distant metastases. He elected for ADT, HDR brachytherapy done at [facility] on 3/19/24, and EBRT. He presents today for follow up.    1/3/24 Urology- Firmagon 240 mg    2/12/24 Urology- Lupron 45 mg      2/22/24 Dr. Panda  He has elected ADT + HDR brachytherapy + EBRT.   Follow-up with Dr. Ifeoma Lozano after procedure.       3/19/24 TRANSPERINEAL PLACEMENT OF BIODEGRADABLE MATERIAL, CED-PROSTATIC, SINGLE OR MULTIPLE INJECTION(S), INCLUDING IMAGE GUIDANCE, WHEN PERFORMED        Upcoming:  Does not have Urology follow up    Follow up visit     Oncology History   Prostate cancer (HCC)   3/20/2013 Initial Diagnosis    Prostate cancer (HCC)     3/2/2021 Biopsy    A. Prostate, L lat base, Biopsy:  - Benign prostatic tissue.      B. Prostate, L base, Biopsy:  - Benign prostatic tissue.       C. Prostate, L lat med, Biopsy:  - Benign prostatic tissue.       D. Prostate, L med, Biopsy:  - Benign prostatic tissue.       E. Prostate, L lat apex, Biopsy:  - Benign prostatic tissue.       F. Prostate, L apex, Biopsy:  - Benign prostatic tissue.      G. Prostate, R lat base, Biopsy:  - Benign prostatic tissue.       H. Prostate, R base, Biopsy:  - Prostatic adenocarcinoma, [place] score 3+3=6, Prognostic Grade Group 1, involving 1 of 1 core (5% involvement).  - Focal high-grade prostatic intraepithelial neoplasia (HGPIN).  - Perineural invasion is not identified.     I. Prostate, R lat med, Biopsy:  - Benign prostatic tissue.       J. Prostate, R med, Biopsy:  - Benign prostatic tissue.       K. Prostate, R lat apex, Biopsy:  - No tissue present.     L. Prostate, R apex, Biopsy:  - Benign prostatic  tissue.       M. Prostate, Target L lat med, Biopsy:  - Benign prostatic tissue.       9/6/2023 Biopsy    A. Prostate, FABIANA #1 L base x 5:  Acinar adenocarcinoma  - Grade group 3 (Abell score: 4+3 =7)  - Involving 70%, 70% and 13% of 3 out of 5 core fragments, tumor lengths are 11 mm, 11 mm and 2 mm  - Percentage of pattern 4: approx. 92%  - Perineural invasion identified     B. Prostate, L post lat x 2:  Benign prostate tissue with basal cell hyperplasia and chronic prostatitis     C. Prostate, L post med x 2:  Benign prostatic hyperplasia and chronic prostatitis     D. Prostate, L base x 2:  Benign prostatic hyperplasia and chronic prostatitis     E. Prostate, L ant lat x 2:  Acinar adenocarcinoma  - Grade group 5 (Abell score: 4+5 =9)  - Involving 30% of 1 out of 2 core fragments, tumor length is 5 mm  - Percentage of pattern 5: approx. 5%     F. Prostate, L ant med x 2:  Benign prostate tissue with basal cell hyperplasia and chronic prostatitis     G. Prostate, FABIANA # 2R apex x 5:  Acinar adenocarcinoma  - Grade group 2 (Melany score: 3+4 =7)  - Involving 50%, 45%, 35%, 15% and 11% of 5 out of 5 core fragments, tumor lengths are 4.5 mm, 4.5 mm, 2 mm, 3 mm and 1 mm  - Percentage of pattern 4: approx. 45%  - High grade prostatic intraepithelial neoplasia     H. Prostate, R post lat x 2:  Atypical glands, suspicious for malignancy  High grade prostatic intraepithelial neoplasia     I. Prostate, R post med x2:  Rare atypical glands  Background of benign prostate tissue with basal cell hyperplasia and chronic prostatitis     J. Prostate, R base x2:  Benign prostate tissue     K. Prostate, R ant latx2:  Benign prostatic hyperplasia and chronic prostatitis     L. Prostate, R ant med x 2:  Benign prostate tissue with basal cell hyperplasia and chronic prostatitis        9/6/2023 -  Cancer Staged    Staging form: Prostate, AJCC 7th Edition  - Clinical stage from 9/6/2023: Stage III (T3a, N0, M0, PSA: Less than 10,  Melany 8-10) - Signed by Ifeoma Lozano MD on 11/27/2023       1/3/2024 -  Hormone Therapy    Firmagon 240 mg     2/12/2024 -  Hormone Therapy    Lupron 45 mg     3/19/2024 -  Radiation    [facility]- HDR brachytherapy    TRANSPERINEAL PLACEMENT OF BIODEGRADABLE MATERIAL, CED-PROSTATIC, SINGLE OR MULTIPLE INJECTION(S), INCLUDING IMAGE GUIDANCE, WHEN PERFORMED          Review of Systems:  Review of Systems   Constitutional: Negative.    HENT:  Positive for dental problem.    Eyes:         Wears glasses   Respiratory:  Positive for cough.    Cardiovascular: Negative.    Gastrointestinal:  Positive for blood in stool (occasional).   Genitourinary:  Positive for frequency (occasional).   Musculoskeletal:  Positive for arthralgias (right wrist) and neck stiffness.   Skin: Negative.    Allergic/Immunologic: Negative.    Neurological:  Positive for numbness (left index finger improving).   Hematological: Negative.    Psychiatric/Behavioral: Negative.         Clinical Trial: no    IPSS Questionnaire (AUA-7):  Over the past month…    1)  How often have you had a sensation of not emptying your bladder completely after you finish urinating?  5 - Almost always   2)  How often have you had to urinate again less than two hours after you finished urinating? 3 - About half the time   3)  How often have you found you stopped and started again several times when you urinated?  4 - More than half the time   4) How difficult have you found it to postpone urination?  1 - Less than 1 time in 5   5) How often have you had a weak urinary stream?  4 - More than half the time   6) How often have you had to push or strain to begin urination?  0 - Not at all   7) How many times did you most typically get up to urinate from the time you went to bed until the time you got up in the morning?  2 - 2 times   Total Score:  19       Teaching: reviewed SIM and side effects    Health Maintenance   Topic Date Due    Hepatitis C Screening   Never done    Medicare Annual Wellness Visit (AWV)  Never done    Fall Risk  Never done    Influenza Vaccine (1) 09/01/2023    COVID-19 Vaccine (4 - 2023-24 season) 09/01/2023    Depression Screening  12/20/2024    BMI: Adult  02/12/2025    Zoster Vaccine  Completed    Pneumococcal Vaccine: 65+ Years  Completed    HIB Vaccine  Aged Out    IPV Vaccine  Aged Out    Hepatitis A Vaccine  Aged Out    Meningococcal ACWY Vaccine  Aged Out    HPV Vaccine  Aged Out    Colorectal Cancer Screening  Discontinued     Patient Active Problem List   Diagnosis    Organic impotence    Prostate cancer (HCC)    Benign localized prostatic hyperplasia with lower urinary tract symptoms (LUTS)    Dyslipidemia    Parenchymal renal hypertension    Nocturia associated with benign prostatic hyperplasia    SHER (acute kidney injury) (HCC)    Anemia in stage 3 chronic kidney disease  (HCC)    Bilateral renal cysts    Chronic renal disease, stage IV (HCC)    Meibomian gland dysfunction (MGD)    Deposits (accretions) on teeth    Onychomycosis    Partial loss of teeth due to other specified cause, unspecified class    Posterior subcapsular cataract    Presbyopia    Encounter to discuss test results     Past Medical History:   Diagnosis Date    Hypertension     Prostate cancer (HCC)      Past Surgical History:   Procedure Laterality Date    COLONOSCOPY      HERNIA REPAIR Left     NV BX PROSTATE STRTCTC SATURATION SAMPLING IMG GID N/A 9/26/2023    Procedure: TRANSPERINEAL MRI FUSION BIOPSY PROSTATE;  Surgeon: Sushil Thurston MD;  Location[facility];  Service: Urology    NV PROSTATE NEEDLE BIOPSY ANY APPROACH N/A 03/02/2021    Procedure: Transperineal MRI Fusion prostate biopsy and gold seed marker insertion;  Surgeon: Farhan Jacobo MD;  Location: [facility];  Service: Urology    PROSTATE BIOPSY      1/26/2015, 2/10/2017    PROSTATE BIOPSY      VASECTOMY  ?     Family History   Problem Relation Age of Onset    Hypertension Mother     Prostate cancer  Brother     No Known Problems Brother     No Known Problems Sister     No Known Problems Son     No Known Problems Son     No Known Problems Daughter      Social History     Socioeconomic History    Marital status: /Civil Union     Spouse name: Not on file    Number of children: Not on file    Years of education: Not on file    Highest education level: Not on file   Occupational History    Not on file   Tobacco Use    Smoking status: Never     Passive exposure: Past    Smokeless tobacco: Never    Tobacco comments:     tried it once    Vaping Use    Vaping status: Never Used   Substance and Sexual Activity    Alcohol use: Yes     Alcohol/week: 2.0 standard drinks of alcohol     Types: 1 Glasses of wine, 1 Cans of beer per week     Comment: social    Drug use: No    Sexual activity: Yes   Other Topics Concern    Not on file   Social History Narrative    Not on file     Social Determinants of Health     Financial Resource Strain: Not on file   Food Insecurity: Not on file   Transportation Needs: Not on file   Physical Activity: Not on file   Stress: Not on file   Social Connections: Not on file   Intimate Partner Violence: Not on file   Housing Stability: Not on file       Current Outpatient Medications:     amLODIPine (NORVASC) 5 mg tablet, take 1 tablet by mouth once daily IN THE EARLY MORNING, Disp: 30 tablet, Rfl: 5    Apoaequorin (Prevagen Extra Strength) 20 MG CAPS, Take 20 mg by mouth in the morning Once daily, Disp: , Rfl:     atovaquone-proguanil (MALARONE) 250-100 mg, take 1 tablet by mouth once daily WITH BREAKFAST BEGIN 1 day PRIO...  (REFER TO PRESCRIPTION NOTES). (Patient not taking: Reported on 4/26/2023), Disp: , Rfl:     azithromycin (ZITHROMAX) 250 mg tablet, take 2 tablets by mouth TODAY then take 1 tablet DAILY FOR 4 DAYS, Disp: , Rfl:     benzonatate (TESSALON) 200 MG capsule, Take 200 mg by mouth every 8 (eight) hours, Disp: , Rfl:     finasteride (PROSCAR) 5 mg tablet, Take 1 tablet (5 mg  total) by mouth daily, Disp: 90 tablet, Rfl: 3    Multiple Vitamins-Minerals (CENTRUM SILVER 50+MEN PO), Take by mouth as needed, Disp: , Rfl:     sildenafil (VIAGRA) 50 MG tablet, Take 1 tablet (50 mg total) by mouth as needed for erectile dysfunction, Disp: 30 tablet, Rfl: 2  No Known Allergies  There were no vitals filed for this visit.

## 2024-04-15 DIAGNOSIS — R35.1 NOCTURIA ASSOCIATED WITH BENIGN PROSTATIC HYPERPLASIA: ICD-10-CM

## 2024-04-15 DIAGNOSIS — N40.1 NOCTURIA ASSOCIATED WITH BENIGN PROSTATIC HYPERPLASIA: ICD-10-CM

## 2024-04-15 RX ORDER — FINASTERIDE 5 MG/1
5 TABLET, FILM COATED ORAL DAILY
Qty: 90 TABLET | Refills: 1 | Status: SHIPPED | OUTPATIENT
Start: 2024-04-15

## 2024-04-23 ENCOUNTER — APPOINTMENT (OUTPATIENT)
Dept: RADIATION ONCOLOGY | Facility: CLINIC | Age: 79
End: 2024-04-23
Attending: RADIOLOGY
Payer: MEDICARE

## 2024-04-23 ENCOUNTER — TELEPHONE (OUTPATIENT)
Dept: UROLOGY | Facility: CLINIC | Age: 79
End: 2024-04-23

## 2024-04-23 PROCEDURE — 77385 HB NTSTY MODUL RAD TX DLVR SMPL: CPT | Performed by: RADIOLOGY

## 2024-04-23 PROCEDURE — 77301 RADIOTHERAPY DOSE PLAN IMRT: CPT | Performed by: RADIOLOGY

## 2024-04-23 PROCEDURE — 77427 RADIATION TX MANAGEMENT X5: CPT | Performed by: RADIOLOGY

## 2024-04-23 PROCEDURE — 77300 RADIATION THERAPY DOSE PLAN: CPT | Performed by: RADIOLOGY

## 2024-04-23 PROCEDURE — 77338 DESIGN MLC DEVICE FOR IMRT: CPT | Performed by: RADIOLOGY

## 2024-04-23 NOTE — TELEPHONE ENCOUNTER
----- Message from Sharif Johansen sent at 4/22/2024  8:58 PM EDT -----  Regarding: Urology update  Contact: [phone]  Yes, I could do 11:45.   Yes - the patient is able to be screened

## 2024-04-23 NOTE — TELEPHONE ENCOUNTER
Please schedule patient with Danis on 8/14 at 1145 for Lupron per message in chart from patient and Esperanza. Thank you!

## 2024-04-24 ENCOUNTER — APPOINTMENT (OUTPATIENT)
Dept: RADIATION ONCOLOGY | Facility: CLINIC | Age: 79
End: 2024-04-24
Attending: RADIOLOGY
Payer: MEDICARE

## 2024-04-24 DIAGNOSIS — C61 PROSTATE CANCER (HCC): Primary | ICD-10-CM

## 2024-04-24 PROCEDURE — 77385 HB NTSTY MODUL RAD TX DLVR SMPL: CPT | Performed by: RADIOLOGY

## 2024-04-24 PROCEDURE — 77014 CHG CT GUIDANCE RADIATION THERAPY FLDS PLACEMENT: CPT | Performed by: RADIOLOGY

## 2024-04-25 ENCOUNTER — APPOINTMENT (OUTPATIENT)
Dept: RADIATION ONCOLOGY | Facility: CLINIC | Age: 79
End: 2024-04-25
Attending: RADIOLOGY
Payer: MEDICARE

## 2024-04-25 PROCEDURE — 77385 HB NTSTY MODUL RAD TX DLVR SMPL: CPT | Performed by: STUDENT IN AN ORGANIZED HEALTH CARE EDUCATION/TRAINING PROGRAM

## 2024-04-25 PROCEDURE — 77014 CHG CT GUIDANCE RADIATION THERAPY FLDS PLACEMENT: CPT | Performed by: STUDENT IN AN ORGANIZED HEALTH CARE EDUCATION/TRAINING PROGRAM

## 2024-04-26 ENCOUNTER — APPOINTMENT (OUTPATIENT)
Dept: LAB | Facility: MEDICAL CENTER | Age: 79
End: 2024-04-26
Payer: MEDICARE

## 2024-04-26 ENCOUNTER — APPOINTMENT (OUTPATIENT)
Dept: RADIATION ONCOLOGY | Facility: CLINIC | Age: 79
End: 2024-04-26
Attending: RADIOLOGY
Payer: MEDICARE

## 2024-04-26 DIAGNOSIS — D63.1 ANEMIA IN STAGE 3B CHRONIC KIDNEY DISEASE  (HCC): ICD-10-CM

## 2024-04-26 DIAGNOSIS — E78.5 DYSLIPIDEMIA: ICD-10-CM

## 2024-04-26 DIAGNOSIS — C61 PROSTATE CANCER (HCC): ICD-10-CM

## 2024-04-26 DIAGNOSIS — N18.4 CHRONIC RENAL DISEASE, STAGE IV (HCC): ICD-10-CM

## 2024-04-26 DIAGNOSIS — N18.32 ANEMIA IN STAGE 3B CHRONIC KIDNEY DISEASE  (HCC): ICD-10-CM

## 2024-04-26 DIAGNOSIS — I12.9 PARENCHYMAL RENAL HYPERTENSION, STAGE 1 THROUGH STAGE 4 OR UNSPECIFIED CHRONIC KIDNEY DISEASE: ICD-10-CM

## 2024-04-26 DIAGNOSIS — N28.1 BILATERAL RENAL CYSTS: ICD-10-CM

## 2024-04-26 DIAGNOSIS — N18.32 STAGE 3B CHRONIC KIDNEY DISEASE (HCC): ICD-10-CM

## 2024-04-26 LAB
BASOPHILS # BLD AUTO: 0.09 THOUSANDS/ÂΜL (ref 0–0.1)
BASOPHILS NFR BLD AUTO: 1 % (ref 0–1)
CREAT UR-MCNC: 45.6 MG/DL
EOSINOPHIL # BLD AUTO: 0.49 THOUSAND/ÂΜL (ref 0–0.61)
EOSINOPHIL NFR BLD AUTO: 8 % (ref 0–6)
ERYTHROCYTE [DISTWIDTH] IN BLOOD BY AUTOMATED COUNT: 12.7 % (ref 11.6–15.1)
HCT VFR BLD AUTO: 36 % (ref 36.5–49.3)
HGB BLD-MCNC: 11.2 G/DL (ref 12–17)
IMM GRANULOCYTES # BLD AUTO: 0.01 THOUSAND/UL (ref 0–0.2)
IMM GRANULOCYTES NFR BLD AUTO: 0 % (ref 0–2)
LYMPHOCYTES # BLD AUTO: 1.5 THOUSANDS/ÂΜL (ref 0.6–4.47)
LYMPHOCYTES NFR BLD AUTO: 23 % (ref 14–44)
MCH RBC QN AUTO: 29.4 PG (ref 26.8–34.3)
MCHC RBC AUTO-ENTMCNC: 31.1 G/DL (ref 31.4–37.4)
MCV RBC AUTO: 95 FL (ref 82–98)
MONOCYTES # BLD AUTO: 0.76 THOUSAND/ÂΜL (ref 0.17–1.22)
MONOCYTES NFR BLD AUTO: 12 % (ref 4–12)
NEUTROPHILS # BLD AUTO: 3.56 THOUSANDS/ÂΜL (ref 1.85–7.62)
NEUTS SEG NFR BLD AUTO: 56 % (ref 43–75)
NRBC BLD AUTO-RTO: 0 /100 WBCS
PLATELET # BLD AUTO: 218 THOUSANDS/UL (ref 149–390)
PMV BLD AUTO: 9.6 FL (ref 8.9–12.7)
PROT UR-MCNC: 12 MG/DL
PROT/CREAT UR: 0.26 MG/G{CREAT} (ref 0–0.1)
PTH-INTACT SERPL-MCNC: 56.2 PG/ML (ref 12–88)
RBC # BLD AUTO: 3.81 MILLION/UL (ref 3.88–5.62)
WBC # BLD AUTO: 6.41 THOUSAND/UL (ref 4.31–10.16)

## 2024-04-26 PROCEDURE — 85025 COMPLETE CBC W/AUTO DIFF WBC: CPT

## 2024-04-26 PROCEDURE — 80061 LIPID PANEL: CPT

## 2024-04-26 PROCEDURE — 77014 CHG CT GUIDANCE RADIATION THERAPY FLDS PLACEMENT: CPT | Performed by: RADIOLOGY

## 2024-04-26 PROCEDURE — 83735 ASSAY OF MAGNESIUM: CPT

## 2024-04-26 PROCEDURE — 77385 HB NTSTY MODUL RAD TX DLVR SMPL: CPT | Performed by: RADIOLOGY

## 2024-04-26 PROCEDURE — 84156 ASSAY OF PROTEIN URINE: CPT

## 2024-04-26 PROCEDURE — 36415 COLL VENOUS BLD VENIPUNCTURE: CPT

## 2024-04-26 PROCEDURE — 82570 ASSAY OF URINE CREATININE: CPT

## 2024-04-26 PROCEDURE — 80053 COMPREHEN METABOLIC PANEL: CPT

## 2024-04-26 PROCEDURE — 83970 ASSAY OF PARATHORMONE: CPT

## 2024-04-26 PROCEDURE — 84100 ASSAY OF PHOSPHORUS: CPT

## 2024-04-27 LAB
ALBUMIN SERPL BCP-MCNC: 4.2 G/DL (ref 3.5–5)
ALP SERPL-CCNC: 62 U/L (ref 34–104)
ALT SERPL W P-5'-P-CCNC: 19 U/L (ref 7–52)
ANION GAP SERPL CALCULATED.3IONS-SCNC: 8 MMOL/L (ref 4–13)
AST SERPL W P-5'-P-CCNC: 24 U/L (ref 13–39)
BILIRUB SERPL-MCNC: 1.07 MG/DL (ref 0.2–1)
BUN SERPL-MCNC: 35 MG/DL (ref 5–25)
CALCIUM SERPL-MCNC: 9.5 MG/DL (ref 8.4–10.2)
CHLORIDE SERPL-SCNC: 105 MMOL/L (ref 96–108)
CHOLEST SERPL-MCNC: 185 MG/DL
CO2 SERPL-SCNC: 29 MMOL/L (ref 21–32)
CREAT SERPL-MCNC: 2.18 MG/DL (ref 0.6–1.3)
GFR SERPL CREATININE-BSD FRML MDRD: 27 ML/MIN/1.73SQ M
GLUCOSE P FAST SERPL-MCNC: 82 MG/DL (ref 65–99)
HDLC SERPL-MCNC: 70 MG/DL
LDLC SERPL CALC-MCNC: 107 MG/DL (ref 0–100)
MAGNESIUM SERPL-MCNC: 2.3 MG/DL (ref 1.9–2.7)
PHOSPHATE SERPL-MCNC: 4.5 MG/DL (ref 2.3–4.1)
POTASSIUM SERPL-SCNC: 4.7 MMOL/L (ref 3.5–5.3)
PROT SERPL-MCNC: 7.1 G/DL (ref 6.4–8.4)
SODIUM SERPL-SCNC: 142 MMOL/L (ref 135–147)
TRIGL SERPL-MCNC: 41 MG/DL

## 2024-04-29 ENCOUNTER — TELEPHONE (OUTPATIENT)
Dept: NEPHROLOGY | Facility: CLINIC | Age: 79
End: 2024-04-29

## 2024-04-29 ENCOUNTER — APPOINTMENT (OUTPATIENT)
Dept: RADIATION ONCOLOGY | Facility: CLINIC | Age: 79
End: 2024-04-29
Attending: RADIOLOGY
Payer: MEDICARE

## 2024-04-29 DIAGNOSIS — N18.4 CHRONIC RENAL DISEASE, STAGE IV (HCC): Primary | ICD-10-CM

## 2024-04-29 PROCEDURE — 77014 CHG CT GUIDANCE RADIATION THERAPY FLDS PLACEMENT: CPT | Performed by: RADIOLOGY

## 2024-04-29 PROCEDURE — 77336 RADIATION PHYSICS CONSULT: CPT | Performed by: RADIOLOGY

## 2024-04-29 PROCEDURE — 77385 HB NTSTY MODUL RAD TX DLVR SMPL: CPT | Performed by: RADIOLOGY

## 2024-04-29 NOTE — TELEPHONE ENCOUNTER
----- Message from Duc Yuan MD sent at 4/27/2024  9:20 AM EDT -----  Hemoglobin slightly lower  Recommend follow-up CBC iron studies and fecal immunochemical testing over the next week or 2 Malini will be seeing soon  ----- Message -----  From: Lab, Background User  Sent: 4/26/2024  12:44 PM EDT  To: Duc Yuan MD

## 2024-04-30 ENCOUNTER — APPOINTMENT (OUTPATIENT)
Dept: RADIATION ONCOLOGY | Facility: CLINIC | Age: 79
End: 2024-04-30
Attending: RADIOLOGY
Payer: MEDICARE

## 2024-04-30 PROCEDURE — 77014 CHG CT GUIDANCE RADIATION THERAPY FLDS PLACEMENT: CPT | Performed by: STUDENT IN AN ORGANIZED HEALTH CARE EDUCATION/TRAINING PROGRAM

## 2024-04-30 PROCEDURE — 77427 RADIATION TX MANAGEMENT X5: CPT | Performed by: RADIOLOGY

## 2024-04-30 PROCEDURE — 77385 HB NTSTY MODUL RAD TX DLVR SMPL: CPT | Performed by: STUDENT IN AN ORGANIZED HEALTH CARE EDUCATION/TRAINING PROGRAM

## 2024-05-01 ENCOUNTER — APPOINTMENT (OUTPATIENT)
Dept: RADIATION ONCOLOGY | Facility: CLINIC | Age: 79
End: 2024-05-01
Attending: RADIOLOGY
Payer: MEDICARE

## 2024-05-01 PROCEDURE — 77385 HB NTSTY MODUL RAD TX DLVR SMPL: CPT | Performed by: RADIOLOGY

## 2024-05-01 PROCEDURE — 77014 CHG CT GUIDANCE RADIATION THERAPY FLDS PLACEMENT: CPT | Performed by: RADIOLOGY

## 2024-05-02 ENCOUNTER — APPOINTMENT (OUTPATIENT)
Dept: RADIATION ONCOLOGY | Facility: CLINIC | Age: 79
End: 2024-05-02
Attending: RADIOLOGY
Payer: MEDICARE

## 2024-05-02 PROCEDURE — 77385 HB NTSTY MODUL RAD TX DLVR SMPL: CPT | Performed by: RADIOLOGY

## 2024-05-02 PROCEDURE — 77014 CHG CT GUIDANCE RADIATION THERAPY FLDS PLACEMENT: CPT | Performed by: RADIOLOGY

## 2024-05-03 ENCOUNTER — APPOINTMENT (OUTPATIENT)
Dept: RADIATION ONCOLOGY | Facility: CLINIC | Age: 79
End: 2024-05-03
Attending: RADIOLOGY
Payer: MEDICARE

## 2024-05-03 PROCEDURE — 77014 CHG CT GUIDANCE RADIATION THERAPY FLDS PLACEMENT: CPT | Performed by: RADIOLOGY

## 2024-05-03 PROCEDURE — 77385 HB NTSTY MODUL RAD TX DLVR SMPL: CPT | Performed by: RADIOLOGY

## 2024-05-06 ENCOUNTER — APPOINTMENT (OUTPATIENT)
Dept: RADIATION ONCOLOGY | Facility: CLINIC | Age: 79
End: 2024-05-06
Attending: RADIOLOGY
Payer: MEDICARE

## 2024-05-07 ENCOUNTER — APPOINTMENT (OUTPATIENT)
Dept: RADIATION ONCOLOGY | Facility: CLINIC | Age: 79
End: 2024-05-07
Attending: RADIOLOGY
Payer: MEDICARE

## 2024-05-07 PROCEDURE — 77014 CHG CT GUIDANCE RADIATION THERAPY FLDS PLACEMENT: CPT | Performed by: RADIOLOGY

## 2024-05-07 PROCEDURE — 77336 RADIATION PHYSICS CONSULT: CPT | Performed by: RADIOLOGY

## 2024-05-07 PROCEDURE — 77385 HB NTSTY MODUL RAD TX DLVR SMPL: CPT | Performed by: RADIOLOGY

## 2024-05-08 ENCOUNTER — HOSPITAL ENCOUNTER (OUTPATIENT)
Dept: ULTRASOUND IMAGING | Facility: CLINIC | Age: 79
Discharge: HOME | End: 2024-05-08
Payer: MEDICARE

## 2024-05-08 ENCOUNTER — APPOINTMENT (OUTPATIENT)
Dept: RADIATION ONCOLOGY | Facility: CLINIC | Age: 79
End: 2024-05-08
Attending: RADIOLOGY
Payer: MEDICARE

## 2024-05-08 DIAGNOSIS — N18.32 STAGE 3B CHRONIC KIDNEY DISEASE (HCC): ICD-10-CM

## 2024-05-08 DIAGNOSIS — C61 PROSTATE CANCER (HCC): Primary | ICD-10-CM

## 2024-05-08 PROCEDURE — 76775 US EXAM ABDO BACK WALL LIM: CPT

## 2024-05-08 PROCEDURE — 77427 RADIATION TX MANAGEMENT X5: CPT | Performed by: RADIOLOGY

## 2024-05-08 PROCEDURE — 77014 CHG CT GUIDANCE RADIATION THERAPY FLDS PLACEMENT: CPT | Performed by: RADIOLOGY

## 2024-05-08 PROCEDURE — 77385 HB NTSTY MODUL RAD TX DLVR SMPL: CPT | Performed by: RADIOLOGY

## 2024-05-08 RX ORDER — TAMSULOSIN HYDROCHLORIDE 0.4 MG/1
0.4 CAPSULE ORAL 2 TIMES DAILY WITH MEALS
Qty: 180 CAPSULE | Refills: 0 | Status: SHIPPED | OUTPATIENT
Start: 2024-05-08

## 2024-05-09 ENCOUNTER — APPOINTMENT (OUTPATIENT)
Dept: RADIATION ONCOLOGY | Facility: CLINIC | Age: 79
End: 2024-05-09
Attending: RADIOLOGY
Payer: MEDICARE

## 2024-05-09 PROCEDURE — 77385 HB NTSTY MODUL RAD TX DLVR SMPL: CPT | Performed by: STUDENT IN AN ORGANIZED HEALTH CARE EDUCATION/TRAINING PROGRAM

## 2024-05-09 PROCEDURE — 77014 CHG CT GUIDANCE RADIATION THERAPY FLDS PLACEMENT: CPT | Performed by: STUDENT IN AN ORGANIZED HEALTH CARE EDUCATION/TRAINING PROGRAM

## 2024-05-10 ENCOUNTER — APPOINTMENT (OUTPATIENT)
Dept: RADIATION ONCOLOGY | Facility: CLINIC | Age: 79
End: 2024-05-10
Attending: RADIOLOGY
Payer: MEDICARE

## 2024-05-10 PROCEDURE — 77385 HB NTSTY MODUL RAD TX DLVR SMPL: CPT | Performed by: RADIOLOGY

## 2024-05-10 PROCEDURE — 77014 CHG CT GUIDANCE RADIATION THERAPY FLDS PLACEMENT: CPT | Performed by: RADIOLOGY

## 2024-05-13 ENCOUNTER — APPOINTMENT (OUTPATIENT)
Dept: RADIATION ONCOLOGY | Facility: CLINIC | Age: 79
End: 2024-05-13
Attending: RADIOLOGY
Payer: MEDICARE

## 2024-05-13 PROCEDURE — 77014 CHG CT GUIDANCE RADIATION THERAPY FLDS PLACEMENT: CPT | Performed by: RADIOLOGY

## 2024-05-13 PROCEDURE — 77385 HB NTSTY MODUL RAD TX DLVR SMPL: CPT | Performed by: RADIOLOGY

## 2024-05-14 ENCOUNTER — APPOINTMENT (OUTPATIENT)
Dept: RADIATION ONCOLOGY | Facility: CLINIC | Age: 79
End: 2024-05-14
Attending: STUDENT IN AN ORGANIZED HEALTH CARE EDUCATION/TRAINING PROGRAM
Payer: MEDICARE

## 2024-05-14 PROCEDURE — 77014 CHG CT GUIDANCE RADIATION THERAPY FLDS PLACEMENT: CPT | Performed by: RADIOLOGY

## 2024-05-14 PROCEDURE — 77385 HB NTSTY MODUL RAD TX DLVR SMPL: CPT | Performed by: RADIOLOGY

## 2024-05-14 PROCEDURE — 77336 RADIATION PHYSICS CONSULT: CPT | Performed by: RADIOLOGY

## 2024-05-15 ENCOUNTER — APPOINTMENT (OUTPATIENT)
Dept: RADIATION ONCOLOGY | Facility: CLINIC | Age: 79
End: 2024-05-15
Attending: RADIOLOGY
Payer: MEDICARE

## 2024-05-15 PROCEDURE — 77014 CHG CT GUIDANCE RADIATION THERAPY FLDS PLACEMENT: CPT | Performed by: RADIOLOGY

## 2024-05-15 PROCEDURE — 77427 RADIATION TX MANAGEMENT X5: CPT | Performed by: RADIOLOGY

## 2024-05-15 PROCEDURE — 77385 HB NTSTY MODUL RAD TX DLVR SMPL: CPT | Performed by: RADIOLOGY

## 2024-05-16 ENCOUNTER — APPOINTMENT (OUTPATIENT)
Dept: RADIATION ONCOLOGY | Facility: CLINIC | Age: 79
End: 2024-05-16
Attending: RADIOLOGY
Payer: MEDICARE

## 2024-05-16 PROCEDURE — 77385 HB NTSTY MODUL RAD TX DLVR SMPL: CPT | Performed by: STUDENT IN AN ORGANIZED HEALTH CARE EDUCATION/TRAINING PROGRAM

## 2024-05-17 ENCOUNTER — APPOINTMENT (OUTPATIENT)
Dept: RADIATION ONCOLOGY | Facility: CLINIC | Age: 79
End: 2024-05-17
Attending: RADIOLOGY
Payer: MEDICARE

## 2024-05-17 PROCEDURE — 77385 HB NTSTY MODUL RAD TX DLVR SMPL: CPT | Performed by: RADIOLOGY

## 2024-05-17 PROCEDURE — 77014 CHG CT GUIDANCE RADIATION THERAPY FLDS PLACEMENT: CPT | Performed by: RADIOLOGY

## 2024-05-20 ENCOUNTER — APPOINTMENT (OUTPATIENT)
Dept: RADIATION ONCOLOGY | Facility: CLINIC | Age: 79
End: 2024-05-20
Attending: RADIOLOGY
Payer: MEDICARE

## 2024-05-20 PROCEDURE — 77385 HB NTSTY MODUL RAD TX DLVR SMPL: CPT | Performed by: RADIOLOGY

## 2024-05-20 PROCEDURE — 77014 CHG CT GUIDANCE RADIATION THERAPY FLDS PLACEMENT: CPT | Performed by: RADIOLOGY

## 2024-05-21 ENCOUNTER — DOCUMENTATION (OUTPATIENT)
Dept: NEPHROLOGY | Facility: CLINIC | Age: 79
End: 2024-05-21

## 2024-05-21 ENCOUNTER — APPOINTMENT (OUTPATIENT)
Dept: RADIATION ONCOLOGY | Facility: CLINIC | Age: 79
End: 2024-05-21
Attending: RADIOLOGY
Payer: MEDICARE

## 2024-05-21 ENCOUNTER — TELEPHONE (OUTPATIENT)
Dept: NEPHROLOGY | Facility: CLINIC | Age: 79
End: 2024-05-21

## 2024-05-21 PROCEDURE — 77385 HB NTSTY MODUL RAD TX DLVR SMPL: CPT | Performed by: STUDENT IN AN ORGANIZED HEALTH CARE EDUCATION/TRAINING PROGRAM

## 2024-05-21 PROCEDURE — 77336 RADIATION PHYSICS CONSULT: CPT | Performed by: RADIOLOGY

## 2024-05-21 PROCEDURE — 77014 CHG CT GUIDANCE RADIATION THERAPY FLDS PLACEMENT: CPT | Performed by: STUDENT IN AN ORGANIZED HEALTH CARE EDUCATION/TRAINING PROGRAM

## 2024-05-21 NOTE — PROGRESS NOTES
Dr. Gordon reviewed the ultrasound these are just simple scattered cysts no follow-up required.  2 with thin septations but again no follow-up is required at this time per Dr. Gordon the radiologist

## 2024-05-21 NOTE — TELEPHONE ENCOUNTER
Talked with Dr. Gordon in radiology.  There are scattered cysts, mostly simple and no followup is needed.  (One in each kidney have thin septation)    ----- Message from Duc Yuan MD sent at 5/19/2024  6:53 AM EDT -----  Look stable there are some small cysts can you check with radiology whether or not we need to follow-up on these or they considered simple cyst!  ----- Message -----  From: Interface, Radiology Results In  Sent: 5/18/2024  10:34 AM EDT  To: Duc Yuan MD

## 2024-05-22 ENCOUNTER — APPOINTMENT (OUTPATIENT)
Dept: RADIATION ONCOLOGY | Facility: CLINIC | Age: 79
End: 2024-05-22
Attending: RADIOLOGY
Payer: MEDICARE

## 2024-05-22 PROCEDURE — 77014 CHG CT GUIDANCE RADIATION THERAPY FLDS PLACEMENT: CPT | Performed by: RADIOLOGY

## 2024-05-22 PROCEDURE — 77427 RADIATION TX MANAGEMENT X5: CPT | Performed by: RADIOLOGY

## 2024-05-22 PROCEDURE — 77385 HB NTSTY MODUL RAD TX DLVR SMPL: CPT | Performed by: RADIOLOGY

## 2024-05-23 ENCOUNTER — APPOINTMENT (OUTPATIENT)
Dept: RADIATION ONCOLOGY | Facility: CLINIC | Age: 79
End: 2024-05-23
Attending: STUDENT IN AN ORGANIZED HEALTH CARE EDUCATION/TRAINING PROGRAM
Payer: MEDICARE

## 2024-05-23 PROCEDURE — 77385 HB NTSTY MODUL RAD TX DLVR SMPL: CPT | Performed by: RADIOLOGY

## 2024-05-23 PROCEDURE — 77014 CHG CT GUIDANCE RADIATION THERAPY FLDS PLACEMENT: CPT | Performed by: RADIOLOGY

## 2024-05-24 ENCOUNTER — APPOINTMENT (OUTPATIENT)
Dept: RADIATION ONCOLOGY | Facility: CLINIC | Age: 79
End: 2024-05-24
Attending: RADIOLOGY
Payer: MEDICARE

## 2024-05-24 PROCEDURE — 77014 CHG CT GUIDANCE RADIATION THERAPY FLDS PLACEMENT: CPT | Performed by: RADIOLOGY

## 2024-05-24 PROCEDURE — 77385 HB NTSTY MODUL RAD TX DLVR SMPL: CPT | Performed by: RADIOLOGY

## 2024-05-28 ENCOUNTER — APPOINTMENT (OUTPATIENT)
Dept: RADIATION ONCOLOGY | Facility: CLINIC | Age: 79
End: 2024-05-28
Attending: RADIOLOGY
Payer: MEDICARE

## 2024-05-28 PROCEDURE — 77014 CHG CT GUIDANCE RADIATION THERAPY FLDS PLACEMENT: CPT | Performed by: RADIOLOGY

## 2024-05-28 PROCEDURE — 77385 HB NTSTY MODUL RAD TX DLVR SMPL: CPT | Performed by: RADIOLOGY

## 2024-05-29 ENCOUNTER — APPOINTMENT (OUTPATIENT)
Dept: RADIATION ONCOLOGY | Facility: CLINIC | Age: 79
End: 2024-05-29
Attending: RADIOLOGY
Payer: MEDICARE

## 2024-05-29 PROCEDURE — 77014 CHG CT GUIDANCE RADIATION THERAPY FLDS PLACEMENT: CPT | Performed by: RADIOLOGY

## 2024-05-29 PROCEDURE — 77336 RADIATION PHYSICS CONSULT: CPT | Performed by: RADIOLOGY

## 2024-05-29 PROCEDURE — 77385 HB NTSTY MODUL RAD TX DLVR SMPL: CPT | Performed by: RADIOLOGY

## 2024-05-30 ENCOUNTER — APPOINTMENT (OUTPATIENT)
Dept: LAB | Facility: CLINIC | Age: 79
End: 2024-05-30
Payer: MEDICARE

## 2024-05-30 DIAGNOSIS — N18.4 CHRONIC RENAL DISEASE, STAGE IV (HCC): ICD-10-CM

## 2024-05-30 LAB
BASOPHILS # BLD AUTO: 0.07 THOUSANDS/ÂΜL (ref 0–0.1)
BASOPHILS NFR BLD AUTO: 1 % (ref 0–1)
EOSINOPHIL # BLD AUTO: 0.69 THOUSAND/ÂΜL (ref 0–0.61)
EOSINOPHIL NFR BLD AUTO: 13 % (ref 0–6)
ERYTHROCYTE [DISTWIDTH] IN BLOOD BY AUTOMATED COUNT: 12.3 % (ref 11.6–15.1)
FERRITIN SERPL-MCNC: 169 NG/ML (ref 24–336)
HCT VFR BLD AUTO: 34.8 % (ref 36.5–49.3)
HGB BLD-MCNC: 11.2 G/DL (ref 12–17)
IMM GRANULOCYTES # BLD AUTO: 0.01 THOUSAND/UL (ref 0–0.2)
IMM GRANULOCYTES NFR BLD AUTO: 0 % (ref 0–2)
IRON SATN MFR SERPL: 33 % (ref 15–50)
IRON SERPL-MCNC: 71 UG/DL (ref 50–212)
LYMPHOCYTES # BLD AUTO: 0.53 THOUSANDS/ÂΜL (ref 0.6–4.47)
LYMPHOCYTES NFR BLD AUTO: 10 % (ref 14–44)
MCH RBC QN AUTO: 30 PG (ref 26.8–34.3)
MCHC RBC AUTO-ENTMCNC: 32.2 G/DL (ref 31.4–37.4)
MCV RBC AUTO: 93 FL (ref 82–98)
MONOCYTES # BLD AUTO: 0.69 THOUSAND/ÂΜL (ref 0.17–1.22)
MONOCYTES NFR BLD AUTO: 13 % (ref 4–12)
NEUTROPHILS # BLD AUTO: 3.19 THOUSANDS/ÂΜL (ref 1.85–7.62)
NEUTS SEG NFR BLD AUTO: 63 % (ref 43–75)
NRBC BLD AUTO-RTO: 0 /100 WBCS
PLATELET # BLD AUTO: 201 THOUSANDS/UL (ref 149–390)
PMV BLD AUTO: 9.5 FL (ref 8.9–12.7)
RBC # BLD AUTO: 3.73 MILLION/UL (ref 3.88–5.62)
TIBC SERPL-MCNC: 214 UG/DL (ref 250–450)
UIBC SERPL-MCNC: 143 UG/DL (ref 155–355)
WBC # BLD AUTO: 5.18 THOUSAND/UL (ref 4.31–10.16)

## 2024-05-30 PROCEDURE — 83540 ASSAY OF IRON: CPT

## 2024-05-30 PROCEDURE — 36415 COLL VENOUS BLD VENIPUNCTURE: CPT

## 2024-05-30 PROCEDURE — 82728 ASSAY OF FERRITIN: CPT

## 2024-05-30 PROCEDURE — 83550 IRON BINDING TEST: CPT

## 2024-05-30 PROCEDURE — 85025 COMPLETE CBC W/AUTO DIFF WBC: CPT

## 2024-05-31 ENCOUNTER — APPOINTMENT (OUTPATIENT)
Dept: LAB | Facility: CLINIC | Age: 79
End: 2024-05-31
Payer: MEDICARE

## 2024-05-31 ENCOUNTER — TELEPHONE (OUTPATIENT)
Age: 79
End: 2024-05-31

## 2024-05-31 LAB — HEMOCCULT STL QL IA: NEGATIVE

## 2024-05-31 PROCEDURE — G0328 FECAL BLOOD SCRN IMMUNOASSAY: HCPCS

## 2024-05-31 NOTE — TELEPHONE ENCOUNTER
Patient called- should he bring a BP log in for his appt? He said he started one just in case.     He gave me his BP's now because he isn't sure if he should stop taking the BP medication.    5/29: 118/72 in the AM, 111/63 PM  5/30: 94/60 AM, 108/68 PM  5/31: 106/69 AM

## 2024-06-03 PROBLEM — N18.4 ANEMIA IN STAGE 4 CHRONIC KIDNEY DISEASE  (HCC): Status: ACTIVE | Noted: 2022-09-06

## 2024-06-03 PROBLEM — N17.9 AKI (ACUTE KIDNEY INJURY) (HCC): Status: RESOLVED | Noted: 2022-09-06 | Resolved: 2024-06-03

## 2024-06-03 NOTE — PATIENT INSTRUCTIONS
Your kidney function remains stable.  Most recent creatinine 2.18, at baseline.  We will continue routine surveillance as outlined below.  Based on your lipid panel with high LDL, recommend starting statin therapy.  Will hold off for now and repeat lipid panel prior to next appointment.  Work on diet as discussed  Avoid all NSAIDs to include ibuprofen, Motrin, Aleve, Advil, Naproxen, Celebrex, Indomethacin, Toradol.  Stay well hydrated.   Continue all prescribed medications.  Call if blood pressure consistently more than 140/90 or less than 110/50s.  High and low blood pressures may affect your kidney function.  Recommend low salt diet (2 gm sodium diet).  Please let us know if you are scheduled for any studies with IV contrast (ex: CT scan, arteriogram or cardiac catheterization)   Follow up in 4 months with Dr. Yuan with repeat labs prior to appointment.  Please contact the office with new symptoms or concerns.

## 2024-06-03 NOTE — PROGRESS NOTES
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 Yuan  -Imaging: Renal ultrasound 5/8/2024 with normal echogenicity and contour without hydronephrosis.  Bilateral stable simple cyst with thin septations not requiring further workup per urology.  -recent creatinine 2.18, at baseline  -UPCr 0.26 g, at goal  -electrolytes and acid-base stable  -Renal function stable and at baseline  -Treat modifiable risk factors  -avoid nephrotoxins, NSAIDs, hypotension and IV contrast if possible.  -stay well hydrated  -Kidney Smart/CKD education:  completed 1/18/2023  -follow-up with Dr. Yuan in 4 months with repeat blood and urine studies.    Hypertension:  -BP normotensive and at goal  -Home BP log reviewed with average a.m. sitting /64 and sitting p.m. 116/66.  Few episodes of symptomatic systolic BP in the high 90s recently.  Suspect due to volume depletion, poor oral intake while going through radiation therapy treatments for prostate cancer  -volume status euvolemic  -current medications: Amlodipine 5 mg daily, finasteride 5 mg daily  -changes: None for now.  Continue to monitor BP and contact the office with systolic BPs in the 90s  -Stay well-hydrated  -Avoid NSAIDs  -Avoid hypotension or fluctuations in blood pressure.   -low sodium (2 gm) diet.  Encourage regular exercise  -continue to monitor BP at home  -Home BP cuff correlation -3/-1    Anemia of CKD:  -Hgb 11.2, at goal.  Goal >10  -Iron studies 5/30/2024: Iron saturation 33% ferritin 169  -FOBT negative  -Followed by hematology  -continue to monitor    MGUS:  -Immunofixation shows biclonal IgG kappa gammopathy  -UPEP and FLCr negative  -Followed by hematology.    Bone Mineral Disease of CKD:  -Calcium and magnesium stable  -Phosphorus 4.5, slightly above goal.  Recommend low phosphorus diet  -PTH 56, at goal  -Vitamin D 25  73 in Oct '23, at goal  -continue to monitor    Bilateral renal cysts:  -Considered to be simple cyst not requiring further intervention or workup  -Most recent renal ultrasound with stable bilateral renal cysts with thin septation.  Ultrasound again reviewed with radiology who states findings represent simple cysts and do not require further workup  -Followed by urology as outpatient  -Management per urology    Prostate cancer:  -Diagnosed 2011 with recent recurrence  -s/p XRT, completed 5/29/2024  -Followed by urology as outpatient    BPH:  -Continuing finasteride and Flomax.  Possible plan for discontinuation of finasteride if symptoms and scans stable in the fall  -Management per urology    Dyslipidemia:  -stable  -lipid panel: not at goal,  elevated, but HDL 70, well above goal  -Initiation of statin therapy has been recommended on our last 2 appointments but patient has been resistant to start any new medication.  Discussed again today.  Patient prefers to attempt diet and lifestyle modifications and repeat lipid panel prior to next appointment.  Patient has agreed to start statin therapy at next appointment if lipid panel remains not at goal.  -Recommend low-cholesterol and low-fat diet, aerobic exercise  -management per PCP      Age related screening:   Your primary caregiver may do yearly screening for colorectal cancer. It is recommended in all men and women over 50 years old. You may have screening earlier if you have colon disease or a family history of colorectal cancer.  Last colonoscopy 11/4/2020.  No further colonoscopies recommended per Dr. Mcclelland.      I have spent a total time of 35 minutes on 06/05/24 in caring for this patient including Diagnostic results, Instructions for management, Patient and family education, Importance of tx compliance, Risk factor reductions, Impressions, Counseling / Coordination of care, Documenting in the medical record, Reviewing / ordering tests, medicine,  procedures  , and Obtaining or reviewing history  .        Keaton was seen today for follow-up and ckd iv.    Diagnoses and all orders for this visit:    Parenchymal renal hypertension, stage 1 through stage 4 or unspecified chronic kidney disease  -     Protein / creatinine ratio, urine; Future  -     Comprehensive metabolic panel; Future    Chronic renal disease, stage IV (HCC)  -     Protein / creatinine ratio, urine; Future  -     CBC; Future  -     Comprehensive metabolic panel; Future  -     Magnesium; Future  -     PTH, intact; Future  -     Phosphorus; Future  -     Vitamin D 25 hydroxy; Future    Anemia in stage 4 chronic kidney disease  (HCC)  -     CBC; Future    Bilateral renal cysts  -     Comprehensive metabolic panel; Future    Dyslipidemia  -     Lipid panel; Future    Benign localized prostatic hyperplasia with lower urinary tract symptoms (LUTS)    Prostate cancer (HCC)        Follow up with Dr. Yuan in 4 months with repeat blood and urine studies.  Please call the office with any questions or concerns.      Reason for Visit: Follow-up and CKD IV    HPI: Sharif Johansen is a 79 y.o. male with CKD 4, HTN, HLD, bilateral simple renal cyst, prostate cancer on active surveillance, BPH hx who presents for follow up of CKD. Patient followed by Dr. Yuan, last seen 11/7/2023.  Most recent creatinine 2.18, eGFR 27, stable at baseline.  Patient denies recent hospitalizations or ER visits.  Patient denies NSAID use.  Patient denies nausea, vomiting, diarrhea, dyspnea, orthopnea, edema, hematuria or foamy urine.      ROS: A complete review of systems was performed and was negative unless otherwise noted in the history of present illness.    Allergies:   Patient has no known allergies.    Medications:     Current Outpatient Medications:     amLODIPine (NORVASC) 5 mg tablet, take 1 tablet by mouth once daily IN THE EARLY MORNING, Disp: 30 tablet, Rfl: 5    Apoaequorin (Prevagen Extra Strength) 20 MG CAPS, Take 20  "mg by mouth in the morning Once daily, Disp: , Rfl:     finasteride (PROSCAR) 5 mg tablet, take 1 tablet by mouth once daily, Disp: 90 tablet, Rfl: 1    Multiple Vitamins-Minerals (CENTRUM SILVER 50+MEN PO), Take by mouth as needed, Disp: , Rfl:     sildenafil (VIAGRA) 50 MG tablet, Take 1 tablet (50 mg total) by mouth as needed for erectile dysfunction, Disp: 30 tablet, Rfl: 2    tamsulosin (FLOMAX) 0.4 mg, Take 1 capsule (0.4 mg total) by mouth 2 (two) times a day with meals, Disp: 180 capsule, Rfl: 0    Past Medical History:   Diagnosis Date    Hypertension     Prostate cancer (HCC)      Past Surgical History:   Procedure Laterality Date    COLONOSCOPY      HERNIA REPAIR Left     INSERTION PROSTATE RADIATION SEED  03/19/2024    VT BX PROSTATE STRTCTC SATURATION SAMPLING IMG GID N/A 09/26/2023    Procedure: TRANSPERINEAL MRI FUSION BIOPSY PROSTATE;  Surgeon: Sushil Thurston MD;  Location: [facility];  Service: Urology    VT PROSTATE NEEDLE BIOPSY ANY APPROACH N/A 03/02/2021    Procedure: Transperineal MRI Fusion prostate biopsy and gold seed marker insertion;  Surgeon: Farhan Jacobo MD;  Location: [facility];  Service: Urology    PROSTATE BIOPSY      1/26/2015, 2/10/2017    PROSTATE BIOPSY      VASECTOMY  ?     Family History   Problem Relation Age of Onset    Hypertension Mother     Prostate cancer Brother     No Known Problems Brother     No Known Problems Sister     No Known Problems Son     No Known Problems Son     No Known Problems Daughter       reports that he has never smoked. He has been exposed to tobacco smoke. He has never used smokeless tobacco. He reports current alcohol use of about 2.0 standard drinks of alcohol per week. He reports that he does not use drugs.    Physical Exam:   Vitals:    06/05/24 1358   Weight: 62.1 kg (137 lb)   Height: 5' 3\" (1.6 m)     Body mass index is 24.27 kg/m².    General:  Awake, alert, appears comfortable and in no acute distress.  Nontoxic.  Skin:  No rash, warm, " good skin turgor   Eyes:  PERRL, EOMI, sclerae nonicteric.  no periorbital edema   ENT:  Moist mucous membranes  Neck:  Trachea midline, symmetric.  No JVD.  No carotid bruits.  Chest:  Clear to auscultation bilaterally without wheezes, crackles or rhonchi  CVS:  Regular rate and rhythm without murmur, gallop or rub.  S1 and S2 identified and normal.  No S3, S4.   Abdomen:  Soft, nontender, nondistended without masses.  Normal bowel sounds x 4 quadrants.  No bruit.  Extremities:  Warm, pink, motor and sensory intact and well perfused.  No cyanosis, pallor.  No BLE edema.  Neuro:  Awake, alert, oriented x3.  Grossly intact  Psych:  Appropriate affect.  Mentating appropriately.  Normal mental status exam      Procedure:  No results found for this or any previous visit.    Lab Results   Component Value Date    CALCIUM 9.5 04/26/2024    K 4.7 04/26/2024    CO2 29 04/26/2024     04/26/2024    BUN 35 (H) 04/26/2024    CREATININE 2.18 (H) 04/26/2024       Results from last 7 days   Lab Units 05/30/24  0753   WBC Thousand/uL 5.18   HEMOGLOBIN g/dL 11.2*   HEMATOCRIT % 34.8*   PLATELETS Thousands/uL 201   Imaging studies:  Renal ultrasound.  5/8/2024:  Imaging study and images reviewed.  Normal echogenicity and contour bilaterally without hydronephrosis or calculi.  Stable right lower pole cyst with simple thin septation measuring 4.5 cm.  Stable scattered left-sided cyst measuring up to 3.6 cm 1 with a single thin septation.    EMR, including Epic, [facility] [place] Everywhere and outside scanned documents reviewed.  I have personally reviewed the blood work as stated above and in my note.  I have personally reviewed renal ultrasound imaging studies.  I have personally reviewed PCP, consultants and prior nephrology notes.

## 2024-06-05 ENCOUNTER — OFFICE VISIT (OUTPATIENT)
Dept: NEPHROLOGY | Facility: CLINIC | Age: 79
End: 2024-06-05
Payer: MEDICARE

## 2024-06-05 VITALS — HEIGHT: 63 IN | WEIGHT: 137 LBS | BODY MASS INDEX: 24.27 KG/M2

## 2024-06-05 DIAGNOSIS — I12.9 PARENCHYMAL RENAL HYPERTENSION, STAGE 1 THROUGH STAGE 4 OR UNSPECIFIED CHRONIC KIDNEY DISEASE: Primary | ICD-10-CM

## 2024-06-05 DIAGNOSIS — C61 PROSTATE CANCER (HCC): ICD-10-CM

## 2024-06-05 DIAGNOSIS — E78.5 DYSLIPIDEMIA: ICD-10-CM

## 2024-06-05 DIAGNOSIS — N18.4 ANEMIA IN STAGE 4 CHRONIC KIDNEY DISEASE  (HCC): ICD-10-CM

## 2024-06-05 DIAGNOSIS — N18.4 CHRONIC RENAL DISEASE, STAGE IV (HCC): ICD-10-CM

## 2024-06-05 DIAGNOSIS — N28.1 BILATERAL RENAL CYSTS: ICD-10-CM

## 2024-06-05 DIAGNOSIS — N40.1 BENIGN LOCALIZED PROSTATIC HYPERPLASIA WITH LOWER URINARY TRACT SYMPTOMS (LUTS): ICD-10-CM

## 2024-06-05 DIAGNOSIS — D63.1 ANEMIA IN STAGE 4 CHRONIC KIDNEY DISEASE  (HCC): ICD-10-CM

## 2024-06-05 PROCEDURE — 99214 OFFICE O/P EST MOD 30 MIN: CPT | Performed by: PHYSICIAN ASSISTANT

## 2024-07-01 ENCOUNTER — TELEPHONE (OUTPATIENT)
Dept: RADIATION ONCOLOGY | Facility: CLINIC | Age: 79
End: 2024-07-01

## 2024-07-02 ENCOUNTER — TELEMEDICINE (OUTPATIENT)
Dept: RADIATION ONCOLOGY | Facility: CLINIC | Age: 79
End: 2024-07-02

## 2024-07-02 DIAGNOSIS — C61 PROSTATE CANCER (HCC): Primary | ICD-10-CM

## 2024-07-02 PROCEDURE — 99024 POSTOP FOLLOW-UP VISIT: CPT | Performed by: RADIOLOGY

## 2024-07-02 NOTE — PROGRESS NOTES
Telephone Follow-up    Subjective:  Sharif Johansen is a 78 y.o. man with a history of low risk prostate cancer diagnosed in 2011   on active surveillance noted with progression to high risk disease with gross SHELDON on MRI, [place] 9 (4+5) pathology and PSA from elevated at 4.76ng/mL on 7/21/23 on finasteride, correction is 9.52ng/mL.  He underwent ADT long course, brachytherapy boost, and now pelvic radiation to prostate, sv, and lymph nodes completed on 5/29/2024.     We contacted the patient by phone today for follow-up evaluation.    He states that he is feeling well.  He denies any dysuria, hematuria, or urinary incontinence.  His urinary obstructive symptoms have significantly improved.  He denies significant urgency with decreased frequency and nocturia 0-1 times per night.  He empties his bladder without difficulty.  He is still taking finasteride and tamsulosin twice a day.  He would like to reduce tamsulosin.      He states that his energy is returning and he generally feels well.  He is currently hiking on vacation with his family.    He denies any persistent diarrhea or rectal bleeding.  He does note some sensitivity to foods.  He controls bowel movement frequency with diet modification as needed.      Urology follow-up as scheduled in August 2024 with PSA prior.      Assessment/PLAN:  Shraif Johansen is a 78 y.o. with a history of high risk disease with gross SHELDON on MRI, GS 9 (4+5) pathology and PSA from elevated at 4.76ng/mL on 7/21/23 on finasteride, correction is 9.52ng/mL.  He underwent ADT long course, brachytherapy boost, and now pelvic radiation to prostate, sv, and lymph nodes completed on 5/29/2024.     He is recovering well from radiation therapy.  Posttreatment PSA check has been scheduled for next month with urology follow-up afterwards.    His urinary symptoms have improved significantly.  I recommended reducing tamsulosin to once a day.  I recommended discussing with urology discontinuing finasteride at  this time.  He may also consider discontinuing tamsulosin if his urinary symptoms remain stable or continue to improve by the time he sees urology as he was not on tamsulosin prior.    I have asked to see the patient for follow-up in 6 months.  Would be happy to see him sooner should the need arise.    Ifeoma Lozano  7/2/2024; 11:52AM

## 2024-08-14 ENCOUNTER — OFFICE VISIT (OUTPATIENT)
Dept: UROLOGY | Facility: CLINIC | Age: 79
End: 2024-08-14
Payer: MEDICARE

## 2024-08-14 VITALS
SYSTOLIC BLOOD PRESSURE: 110 MMHG | DIASTOLIC BLOOD PRESSURE: 70 MMHG | HEIGHT: 63 IN | BODY MASS INDEX: 23.92 KG/M2 | HEART RATE: 86 BPM | WEIGHT: 135 LBS | OXYGEN SATURATION: 96 %

## 2024-08-14 DIAGNOSIS — C61 PROSTATE CANCER (HCC): Primary | ICD-10-CM

## 2024-08-14 PROCEDURE — 99213 OFFICE O/P EST LOW 20 MIN: CPT | Performed by: PHYSICIAN ASSISTANT

## 2024-08-14 PROCEDURE — 96402 CHEMO HORMON ANTINEOPL SQ/IM: CPT

## 2024-08-14 NOTE — PROGRESS NOTES
UROLOGY PROGRESS NOTE   Patient Identifiers: Sharif Johansen (MRN [id number])  Date of Service: 8/14/2024    Subjective:   79-year-old man with history of [place] 9 prostate cancer.  He completed definitive radiation in May and was recommended for long-term ADT(18 months).  He reports no problems postradiation.  He has not had a repeat PSA yet.  Lupron 45 mg given today.    Reason for visit: Prostate cancer follow-up    Objective:     VITALS:    Vitals:    08/14/24 1138   BP: 110/70   Pulse: 86   SpO2: 96%     AUA SYMPTOM SCORE      Flowsheet Row Most Recent Value   AUA SYMPTOM SCORE    How often have you had a sensation of not emptying your bladder completely after you finished urinating? 2 (P)     How often have you had to urinate again less than two hours after you finished urinating? 1 (P)     How often have you found you stopped and started again several times when you urinate? 1 (P)     How often have you found it difficult to postpone urination? 1 (P)     How often have you had a weak urinary stream? 1 (P)     How often have you had to push or strain to begin urination? 0 (P)     How many times did you most typically get up to urinate from the time you went to bed at night until the time you got up in the morning? 1 (P)     Quality of Life: If you were to spend the rest of your life with your urinary condition just the way it is now, how would you feel about that? 2 (P)     AUA SYMPTOM SCORE 7 (P)                LABS:  Lab Results   Component Value Date    HGB 11.2 (L) 05/30/2024    HCT 34.8 (L) 05/30/2024    WBC 5.18 05/30/2024     05/30/2024   ]    Lab Results   Component Value Date    K 4.7 04/26/2024     04/26/2024    CO2 29 04/26/2024    BUN 35 (H) 04/26/2024    CREATININE 2.18 (H) 04/26/2024    CALCIUM 9.5 04/26/2024   ]        INPATIENT MEDS:    Current Outpatient Medications:     amLODIPine (NORVASC) 5 mg tablet, take 1 tablet by mouth once daily IN THE EARLY MORNING, Disp: 30 tablet, Rfl:  "5    Apoaequorin (Prevagen Extra Strength) 20 MG CAPS, Take 20 mg by mouth in the morning Once daily, Disp: , Rfl:     finasteride (PROSCAR) 5 mg tablet, take 1 tablet by mouth once daily, Disp: 90 tablet, Rfl: 1    Multiple Vitamins-Minerals (CENTRUM SILVER 50+MEN PO), Take by mouth as needed, Disp: , Rfl:     sildenafil (VIAGRA) 50 MG tablet, Take 1 tablet (50 mg total) by mouth as needed for erectile dysfunction, Disp: 30 tablet, Rfl: 2    tamsulosin (FLOMAX) 0.4 mg, Take 1 capsule (0.4 mg total) by mouth 2 (two) times a day with meals, Disp: 180 capsule, Rfl: 0  No current facility-administered medications for this visit.      Physical Exam:   /70 (BP Location: Left arm, Patient Position: Sitting, Cuff Size: Adult)   Pulse 86   Ht 5' 3\" (1.6 m)   Wt 61.2 kg (135 lb)   SpO2 96%   BMI 23.91 kg/m²   GEN: no acute distress    RESP: breathing comfortably with no accessory muscle use    ABD: soft, non-tender, non-distended   INCISION:    EXT: no significant peripheral edema       RADIOLOGY:   IMPRESSION:  1. Intense PSMA activity associated with known left peripheral prostate tumor, with extra prostatic extension. No PSMA avid pelvic adenopathy.  2. No significant PSMA uptake associated with a second smaller right prostate lesion seen on prior MRI.  3. No PSMA avid metastases in the neck, chest, upper abdomen or osseous structures.       Assessment:   #1.  [place] 9 prostate cancer    Plan:   -Lupron 45 mg given today  -Follow-up in 6 months  -He can get a PSA in September and prior to visit  -          "

## 2024-08-30 ENCOUNTER — TELEPHONE (OUTPATIENT)
Age: 79
End: 2024-08-30

## 2024-08-30 DIAGNOSIS — Z71.84 TRAVEL ADVICE ENCOUNTER: Primary | ICD-10-CM

## 2024-08-30 NOTE — TELEPHONE ENCOUNTER
Pt and wife calling with questions on their vaccinations. They have postponed their trip to [facility] a few times and would like to know if their vaccinations are still good. Advised them yellow fever is life long, and we can send a script for malaria to her pharmacy. Also advised them to check cdc website for further recommendations. They will hold off scheduling a consult until they hear back from the office     Preferred pharmacy: [facility] #[id number] - [place] - [address]

## 2024-09-02 NOTE — PROGRESS NOTES
Hematology Outpatient Office Note    Date of Service: 9/11/2024    [facility] HEMATOLOGY SPECIALISTS     Reason for Consultation:   Chief Complaint   Patient presents with    Follow-up       Referral Physician: No ref. provider found  (Nephrologist)    Primary Care Physician:  Alberto Graff MD     Nickname: Keaton    Wife: Екатерина  (4 years)    ASSESSMENT & PLAN      Diagnosis ICD-10-CM Associated Orders   1. Prostate cancer (HCC)  C61       2. 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 K/lambda ratio 0.8. There is an absence of end-organ damage (his CKD is from HTN), hypercalcemia, or bone pain.      CKD can lead to elevations in the free light chain ratio, a recent study observed that patients with a ratio less than 5.05 did not have myeloma, yet myeloma is usually associated with extreme ratios ~200 (Hamida 2015). In this study patients with CKD commonly had elevations of LC ratios beyond the traditional normal range, 33% for nephrotic syndomre, 36% non-nephrotic proteinuria, 47% of CKD of unknown origin.     Idiopathic Bence-Solorio Proteinuria (abnormal free light chains in urine only, not serum) is a rare plasma cell dyscrasia. Julio et al (Kika Hematol 2013) reported that progression to malignancy or amyloidosis was low at 0.4%/yr., this was much lower than MGUS+BJP at 1.6%/year. While most patients who progressed did so to myeloma or smoldering myeloma, other lymphomas were also noted.  Interestingly, BJP was more commonly found in those with neuropathy and anemia.    Imaging: generally not recommended for MGUS. If high risk features are present PET-CT can be helpful. NCCN guidelines recommend skeletal survey for lytic lesions in the assessment of myeloma, however the sensitivity is poor and many false positive findings are noted. Whole body MRI can also be effective.  Since monoclonal gammopathy is not treated, extensive evaluation of low level paraprotein levels is not recommended. Please note that in patients with underlying renal disease will have elevated kappa light chains as a result of decreased renal clearance, this can lead to FLC ratios and further investigation is not recommended for FLC ratios <5.    Recommendations:   International Myeloma Working Group has recommended that low risk MGUS (M-spike <1.5 g/dl, IgG, FLC ratio <1.65) be observed with SPEP in 6months, and if stable they are followed q2-3 years.  Bone marrow biopsy and skeletal survey are not indicated provided there are not high risk features.    Recent labs not done.    Discussion of decision making    I personally reviewed the following lab results, the image studies, pathology, other specialty/physicians consult notes and recommendations, and outside medical records from [facility]/other institutions. I had a lengthy discussion with the patient and shared the work-up findings. We discussed the diagnosis and management plan as below. I spent 32 minutes reviewing the records (labs, clinician notes, outside records, medical history, ordering medicine/tests/procedures, interpreting the imaging/labs previously done) and coordination of care as well as direct time with the patient today, of which greater than 50% of the time was spent in counseling and coordination of care with the patient/family.    Plan/Labs  MGUS labs including SPEP, CBC, CMP q1-2 years   F/u Rad Onc for RT  F/u Urology for ADT planned        Follow Up: 12 months  at [facility]    All questions were answered to the patient's satisfaction during this encounter. The patient knows the contact information for our office and knows to reach out for any relevant concerns related to this encounter. They are to call for any temperature 100.4 or higher, new symptoms including but not restricted to shaking chills, decreased appetite, nausea, vomiting, diarrhea, increased fatigue, shortness of breath or chest pain, confusion, and not feeling the strength to come to the clinic. For all other listed problems and medical diagnosis in their chart - they are managed by PCP and/or other specialists, which the patient acknowledges. Thank you very much for your consultation and making us a part of this patient's care. We are continuing to follow closely with you. Please do not hesitate to reach out to me with any additional questions or concerns.    Geremias Chow MD  Hematology & Medical Oncology Staff Physician             Disclaimer: This document was prepared using [employer] technology. If a word or phrase is confusing, or does not make sense, this is likely due to recognition error which was not discovered during this clinician's review. If you believe an error has occurred, please contact me through [employer] service line for shaniqua?cation.      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   4/21/22: PSA 3.5, creat 1.86,  biclonal peak IgG SPEP M-spike 0.23 and 0.25 g/dL  10/19/2022: PSA 3.4  7/21/2023: PSA 4.76  9/3/2024: SPEP not  quantifiable 9/3/2024, 24 hour urine protein: 250 mg/24 hours, K/L 0.50    SUBJECTIVE  (INTERVAL HISTORY)        I have reviewed the relevant past medical, surgical, social and family history. I have also reviewed allergies and medications for this patient.    Interval events: no acute issues, feeling good.  On ADT and RT    Brother passed away and mother on hospice and has been a little depressed from that.    Review of Systems  Baseline weight: 141 lbs     Denies F/C, bone pain, joint pain, night sweats, N/V, Sob, CP, LH, HA, falls, gen weakness, hematuria, melena, hematochezia, rash, or itching.  Resolved L shoulder discomfort.      A 10-point review of system was performed, pertinent positive and negative were detailed as above. Otherwise, the 10-point review of system was negative.      Past Medical History:   Diagnosis Date    Hypertension     Prostate cancer (HCC)        Past Surgical History:   Procedure Laterality Date    COLONOSCOPY      HERNIA REPAIR Left     INSERTION PROSTATE RADIATION SEED  03/19/2024    KS BX PROSTATE STRTCTC SATURATION SAMPLING IMG GID N/A 09/26/2023    Procedure: TRANSPERINEAL MRI FUSION BIOPSY PROSTATE;  Surgeon: Sushil Thurston MD;  Location: [facility];  Service: Urology    KS PROSTATE NEEDLE BIOPSY ANY APPROACH N/A 03/02/2021    Procedure: Transperineal MRI Fusion prostate biopsy and gold seed marker insertion;  Surgeon: Farhan Jacobo MD;  Location: [facility];  Service: Urology    PROSTATE BIOPSY      1/26/2015, 2/10/2017    PROSTATE BIOPSY      VASECTOMY  ?       Family History   Problem Relation Age of Onset    Hypertension Mother     Prostate cancer Brother     No Known Problems Brother     No Known Problems Sister     No Known Problems Son     No Known Problems Son     No Known Problems Daughter        Social History     Socioeconomic History    Marital status: /Civil Union     Spouse name: Not on file    Number of children: Not on file    Years of education: Not on  file    Highest education level: Not on file   Occupational History    Not on file   Tobacco Use    Smoking status: Never     Passive exposure: Past    Smokeless tobacco: Never    Tobacco comments:     tried it once    Vaping Use    Vaping status: Never Used   Substance and Sexual Activity    Alcohol use: Yes     Alcohol/week: 2.0 standard drinks of alcohol     Types: 1 Glasses of wine, 1 Cans of beer per week     Comment: social    Drug use: No    Sexual activity: Yes   Other Topics Concern    Not on file   Social History Narrative    Not on file     Social Determinants of Health     Financial Resource Strain: Not on file   Food Insecurity: Not on file   Transportation Needs: Not on file   Physical Activity: Not on file   Stress: Not on file   Social Connections: Not on file   Intimate Partner Violence: Not on file   Housing Stability: Not on file       No Known Allergies    Current Outpatient Medications   Medication Sig Dispense Refill    amLODIPine (NORVASC) 5 mg tablet take 1 tablet by mouth once daily IN THE EARLY MORNING 30 tablet 5    tamsulosin (FLOMAX) 0.4 mg Take 1 capsule (0.4 mg total) by mouth 2 (two) times a day with meals 180 capsule 0    Apoaequorin (Prevagen Extra Strength) 20 MG CAPS Take 20 mg by mouth in the morning Once daily (Patient not taking: Reported on 9/11/2024)      [START ON 9/27/2024] atovaquone-proguanil (MALARONE) 250-100 mg Take 1 tablet by mouth daily with breakfast for 18 days Do not start before September 27, 2024. (Patient not taking: Reported on 9/11/2024 Do not start before September 27, 2024.) 18 tablet 1    finasteride (PROSCAR) 5 mg tablet take 1 tablet by mouth once daily (Patient not taking: Reported on 9/11/2024) 90 tablet 1    Multiple Vitamins-Minerals (CENTRUM SILVER 50+MEN PO) Take by mouth as needed (Patient not taking: Reported on 9/11/2024)      sildenafil (VIAGRA) 50 MG tablet Take 1 tablet (50 mg total) by mouth as needed for erectile dysfunction (Patient not  taking: Reported on 9/11/2024) 30 tablet 2    sulfamethoxazole-trimethoprim (BACTRIM DS) 800-160 mg per tablet  (Patient not taking: Reported on 9/11/2024)       No current facility-administered medications for this visit.       (Not in a hospital admission)        Objective:     24 Hour Vitals Assessment:     Vitals:    09/11/24 1103   BP: 122/68   Pulse: 81   Resp: 18   Temp: (!) 96.9 °F (36.1 °C)   SpO2: 97%         PHYSICIAN EXAM:    General: Appearance: alert, cooperative, no distress.  HEENT: Normocephalic, atraumatic. No scleral icterus. conjunctivae clear. EOMI.  Chest: No tenderness to palpation. No open wound noted.  Lungs: Clear to auscultation bilaterally, Respirations unlabored.  Cardiac: Regular rate and rhythm, +S1and S2  Abdomen: Soft, non-tender, non-distended. Bowel sounds are normal.   Extremities:  No edema, cyanosis, clubbing.  Skin: Skin color, turgor are normal. No rashes.  Neurologic: Awake, Alert, and oriented, no gross focal deficits noted b/l.       DATA REVIEW:    Pathology Result:    Final Diagnosis   Date Value Ref Range Status   09/26/2023   Final    A. Prostate, FABIANA #1 L base x 5:  Acinar adenocarcinoma  - Grade group 3 ([place] score: 4+3 =7)  - Involving 70%, 70% and 13% of 3 out of 5 core fragments, tumor lengths are 11 mm, 11 mm and 2 mm  - Percentage of pattern 4: approx. 92%  - Perineural invasion identified    B. Prostate, L post lat x 2:  Benign prostate tissue with basal cell hyperplasia and chronic prostatitis    C. Prostate, L post med x 2:  Benign prostatic hyperplasia and chronic prostatitis    D. Prostate, L base x 2:  Benign prostatic hyperplasia and chronic prostatitis    E. Prostate, L ant lat x 2:  Acinar adenocarcinoma  - Grade group 5 ([place] score: 4+5 =9)  - Involving 30% of 1 out of 2 core fragments, tumor length is 5 mm  - Percentage of pattern 5: approx. 5%    F. Prostate, L ant med x 2:  Benign prostate tissue with basal cell hyperplasia and chronic  prostatitis    G. Prostate, FABIANA # 2R apex x 5:  Acinar adenocarcinoma  - Grade group 2 ([place] score: 3+4 =7)  - Involving 50%, 45%, 35%, 15% and 11% of 5 out of 5 core fragments, tumor lengths are 4.5 mm, 4.5 mm, 2 mm, 3 mm and 1 mm  - Percentage of pattern 4: approx. 45%  - High grade prostatic intraepithelial neoplasia    H. Prostate, R post lat x 2:  Atypical glands, suspicious for malignancy  High grade prostatic intraepithelial neoplasia    I. Prostate, R post med x2:  Rare atypical glands  Background of benign prostate tissue with basal cell hyperplasia and chronic prostatitis    J. Prostate, R base x2:  Benign prostate tissue    K. Prostate, R ant latx2:  Benign prostatic hyperplasia and chronic prostatitis    L. Prostate, R ant med x 2:  Benign prostate tissue with basal cell hyperplasia and chronic prostatitis    See note       03/02/2021   Final    A. Prostate, L lat base, Biopsy:  - Benign prostatic tissue.     B. Prostate, L base, Biopsy:  - Benign prostatic tissue.      C. Prostate, L lat med, Biopsy:  - Benign prostatic tissue.      D. Prostate, L med, Biopsy:  - Benign prostatic tissue.      E. Prostate, L lat apex, Biopsy:  - Benign prostatic tissue.      F. Prostate, L apex, Biopsy:  - Benign prostatic tissue.     G. Prostate, R lat base, Biopsy:  - Benign prostatic tissue.      H. Prostate, R base, Biopsy:  - Prostatic adenocarcinoma, [place] score 3+3=6, Prognostic Grade Group 1, involving 1 of 1 core (5% involvement).  - Focal high-grade prostatic intraepithelial neoplasia (HGPIN).  - Perineural invasion is not identified.    I. Prostate, R lat med, Biopsy:  - Benign prostatic tissue.      J. Prostate, R med, Biopsy:  - Benign prostatic tissue.      K. Prostate, R lat apex, Biopsy:  - No tissue present.    L. Prostate, R apex, Biopsy:  - Benign prostatic tissue.      M. Prostate, Target L lat med, Biopsy:  - Benign prostatic tissue.       11/24/2020   Final    A. Esophagus, barretts distal:  -  Squamocolumnar mucosa with chronic inflammatory and reactive changes.  - Negative for intestinal metaplasia, dysplasia, and malignancy.     04/24/2018   Final    A.  Prostate, RPZ, needle biopsy:  -  Benign prostate tissue, negative for malignancy.    B.  Prostate, RCZ, needle biopsy:  -  Benign prostate tissue, negative for malignancy.  -  Multiplex immunohistochemical stain performed with appropriate control highlights intact basal layer cells staining for p63 and high molecular weight keratin with no significant luminal racemase expression, supporting the diagnosis.    C.  Prostate, MADI, needle biopsy:  -  Benign prostate tissue, negative for malignancy.  -  Multiplex immunohistochemical stain performed with appropriate control highlights intact basal layer cells staining for p63 and high molecular weight keratin with no significant luminal racemase expression, supporting the diagnosis.    D.  Prostate, LCZ, needle biopsy:  -  Benign prostate tissue, negative for malignancy.       02/10/2017   Final    A.  Prostate, left peripheral zone, biopsy:  -  Benign prostate tissue with focal features of atrophy, negative for malignancy.  -  Multiplex immunohistochemical staining performed with appropriate controls shows intact basal layer cells staining for p63 and molecular weight keratin without luminal expression of racemase, supporting the diagnosis.    B.  Prostate, left central zone, biopsy:  -  Benign prostate tissue, negative for malignancy.    C.  Prostate, right peripheral zone, biopsy:  -  Benign prostate tissue with focal features of atrophy, negative for malignancy.  -  Multiplex immunohistochemical staining performed with appropriate controls shows intact basal layer cells staining for p63 and molecular weight keratin without luminal expression of racemase, supporting the diagnosis.    D.  Prostate, right central zone, biopsy:  -  Benign prostate tissue, negative for malignancy.              Image Results:    Image result are reviewed and documented in Hematology/Oncology history. I personally reviewed these images.    US kidney and bladder  Narrative: RENAL ULTRASOUND    INDICATION: N18.32: Chronic kidney disease, stage 3b.    COMPARISON: Renal ultrasound 4/27/2023.    TECHNIQUE: Ultrasound of the retroperitoneum was performed with a curvilinear transducer utilizing volumetric sweeps and still imaging techniques.    FINDINGS:    KIDNEYS:  Symmetric and normal size.  Right kidney: 9.2 x 5.1 x 4.4 cm. Volume 108.2 mL  Left kidney: 9.4 x 4.8 x 4.2 cm. Volume 99.3 mL    Right kidney  Normal echogenicity and contour.  No mass is identified. Stable lower pole pole cyst with single thin septation measuring 4.5 cm.  No hydronephrosis.  No shadowing calculi.  No perinephric fluid collections.    Left kidney  Normal echogenicity and contour.  No suspicious mass is identified. Stable scattered cysts measuring up to 3.6 cm, 1 of which may have a single thin septation.  No hydronephrosis.  No shadowing calculi.  No perinephric fluid collections.    URETERS:  Nonvisualized.    BLADDER:  Normally distended.  No focal thickening or mass lesions.  Right ureteral jet detected.    Prostate indents the bladder base.  Impression: Stable bilateral renal cysts. No hydronephrosis.    Workstation performed: [id number]      LABS:  Lab data are reviewed and documented in [facility] history.       Lab Results   Component Value Date    HGB 11.2 (L) 05/30/2024    HCT 34.8 (L) 05/30/2024    MCV 93 05/30/2024     05/30/2024    WBC 5.18 05/30/2024    NRBC 0 05/30/2024     Lab Results   Component Value Date    K 4.7 04/26/2024     04/26/2024    CO2 29 04/26/2024    BUN 35 (H) 04/26/2024    CREATININE 2.18 (H) 04/26/2024    GLUF 82 04/26/2024    CALCIUM 9.5 04/26/2024    CORRECTEDCA 9.3 04/05/2023    AST 24 04/26/2024    ALT 19 04/26/2024    ALKPHOS 62 04/26/2024    EGFR 27 04/26/2024       Lab Results   Component Value Date    IRON 71 05/30/2024  "   TIBC 214 (L) 05/30/2024    FERRITIN 169 05/30/2024    FERRITIN 101 12/01/2022    FERRITIN 113 09/27/2022       No results found for: \"DOMORHTR21\"    No results for input(s): \"WBC\", \"CREAT\", \"PLT\" in the last 72 hours.     By:  Geremias Chow, 9/11/2024, 11:20 AM                                  "

## 2024-09-03 ENCOUNTER — APPOINTMENT (OUTPATIENT)
Dept: LAB | Facility: CLINIC | Age: 79
End: 2024-09-03
Payer: MEDICARE

## 2024-09-03 DIAGNOSIS — D47.2 BICLONAL GAMMOPATHY: ICD-10-CM

## 2024-09-03 DIAGNOSIS — C61 PROSTATE CANCER (HCC): ICD-10-CM

## 2024-09-03 LAB
IGA SERPL-MCNC: 214 MG/DL (ref 66–433)
IGG SERPL-MCNC: 1532 MG/DL (ref 635–1741)
IGM SERPL-MCNC: 281 MG/DL (ref 45–281)
PROT SERPL-MCNC: 6.9 G/DL (ref 6.4–8.4)
PSA SERPL-MCNC: <0.008 NG/ML (ref 0–4)

## 2024-09-03 PROCEDURE — 36415 COLL VENOUS BLD VENIPUNCTURE: CPT

## 2024-09-03 PROCEDURE — 84165 PROTEIN E-PHORESIS SERUM: CPT

## 2024-09-03 PROCEDURE — 82784 ASSAY IGA/IGD/IGG/IGM EACH: CPT

## 2024-09-03 PROCEDURE — 84155 ASSAY OF PROTEIN SERUM: CPT

## 2024-09-03 PROCEDURE — 86334 IMMUNOFIX E-PHORESIS SERUM: CPT

## 2024-09-03 PROCEDURE — 84153 ASSAY OF PSA TOTAL: CPT

## 2024-09-03 PROCEDURE — 83521 IG LIGHT CHAINS FREE EACH: CPT

## 2024-09-03 RX ORDER — ATOVAQUONE AND PROGUANIL HYDROCHLORIDE 250; 100 MG/1; MG/1
1 TABLET, FILM COATED ORAL
Qty: 18 TABLET | Refills: 1 | Status: SHIPPED | OUTPATIENT
Start: 2024-09-27 | End: 2024-10-15

## 2024-09-03 NOTE — TELEPHONE ENCOUNTER
Contacted pt wife back. Need to know how long patient will be on her trip, so we know how much malarone to send. They are leaving Sept 28th, entering the [place]th. They will be there for 10 days. Date of return is 10/7.    They would like script send to [facility].    Patient would require typhoid also. She is first going to see if her PCP will call this over to her pharmacy. If not, she will call back and get an appointment for self and pt. If she calls back, please send her in to this office.

## 2024-09-03 NOTE — TELEPHONE ENCOUNTER
Spouse states was seen at  had Yellow Fever vaccine and prescribed medication for malaria. Trip was cancelled and needs new anti-malaria prescription. [facility] as per EMR    Please advise.

## 2024-09-04 ENCOUNTER — APPOINTMENT (OUTPATIENT)
Dept: LAB | Facility: CLINIC | Age: 79
End: 2024-09-04
Payer: MEDICARE

## 2024-09-04 LAB
ALBUMIN SERPL ELPH-MCNC: 3.47 G/DL (ref 3.2–5.1)
ALBUMIN SERPL ELPH-MCNC: 52.6 % (ref 48–70)
ALPHA1 GLOB SERPL ELPH-MCNC: 0.26 G/DL (ref 0.15–0.47)
ALPHA1 GLOB SERPL ELPH-MCNC: 3.9 % (ref 1.8–7)
ALPHA2 GLOB SERPL ELPH-MCNC: 0.58 G/DL (ref 0.42–1.04)
ALPHA2 GLOB SERPL ELPH-MCNC: 8.8 % (ref 5.9–14.9)
BETA GLOB ABNORMAL SERPL ELPH-MCNC: 0.38 G/DL (ref 0.31–0.57)
BETA1 GLOB SERPL ELPH-MCNC: 5.7 % (ref 4.7–7.7)
BETA2 GLOB SERPL ELPH-MCNC: 5 % (ref 3.1–7.9)
BETA2+GAMMA GLOB SERPL ELPH-MCNC: 0.33 G/DL (ref 0.2–0.58)
GAMMA GLOB ABNORMAL SERPL ELPH-MCNC: 1.58 G/DL (ref 0.4–1.66)
GAMMA GLOB SERPL ELPH-MCNC: 24 % (ref 6.9–22.3)
IGG/ALB SER: 1.11 {RATIO} (ref 1.1–1.8)
INTERPRETATION UR IFE-IMP: NORMAL
KAPPA LC FREE SER-MCNC: 60.7 MG/L (ref 3.3–19.4)
KAPPA LC FREE/LAMBDA FREE SER: 0.5 {RATIO} (ref 0.26–1.65)
LAMBDA LC FREE SERPL-MCNC: 122 MG/L (ref 5.7–26.3)
PROT 24H UR-MCNC: 250.8 MG/24 HRS (ref 40–150)
PROT PATTERN SERPL ELPH-IMP: ABNORMAL
PROT SERPL-MCNC: 6.6 G/DL (ref 6.4–8.2)
SPECIMEN VOL UR: 1200 ML

## 2024-09-04 PROCEDURE — 84165 PROTEIN E-PHORESIS SERUM: CPT | Performed by: PATHOLOGY

## 2024-09-04 PROCEDURE — 86334 IMMUNOFIX E-PHORESIS SERUM: CPT | Performed by: PATHOLOGY

## 2024-09-04 PROCEDURE — 84156 ASSAY OF PROTEIN URINE: CPT

## 2024-09-06 LAB
ALBUMIN UR ELPH-MCNC: 100 %
ALPHA1 GLOB MFR UR ELPH: 0 %
ALPHA2 GLOB MFR UR ELPH: 0 %
B-GLOBULIN MFR UR ELPH: 0 %
GAMMA GLOB MFR UR ELPH: 0 %
INTERPRETATION UR IFE-IMP: NORMAL
PROT PATTERN UR ELPH-IMP: NORMAL
PROT UR-MCNC: 19.1 MG/DL

## 2024-09-06 PROCEDURE — 84166 PROTEIN E-PHORESIS/URINE/CSF: CPT | Performed by: PATHOLOGY

## 2024-09-06 PROCEDURE — 86335 IMMUNFIX E-PHORSIS/URINE/CSF: CPT | Performed by: PATHOLOGY

## 2024-09-11 ENCOUNTER — TELEPHONE (OUTPATIENT)
Age: 79
End: 2024-09-11

## 2024-09-11 ENCOUNTER — OFFICE VISIT (OUTPATIENT)
Dept: HEMATOLOGY ONCOLOGY | Facility: CLINIC | Age: 79
End: 2024-09-11
Payer: MEDICARE

## 2024-09-11 VITALS
OXYGEN SATURATION: 97 % | DIASTOLIC BLOOD PRESSURE: 68 MMHG | BODY MASS INDEX: 24.45 KG/M2 | SYSTOLIC BLOOD PRESSURE: 122 MMHG | RESPIRATION RATE: 18 BRPM | HEIGHT: 63 IN | HEART RATE: 81 BPM | WEIGHT: 138 LBS | TEMPERATURE: 96.9 F

## 2024-09-11 DIAGNOSIS — C61 PROSTATE CANCER (HCC): Primary | ICD-10-CM

## 2024-09-11 DIAGNOSIS — D47.2 BICLONAL GAMMOPATHY: ICD-10-CM

## 2024-09-11 PROCEDURE — 99214 OFFICE O/P EST MOD 30 MIN: CPT | Performed by: INTERNAL MEDICINE

## 2024-09-11 RX ORDER — SULFAMETHOXAZOLE/TRIMETHOPRIM 800-160 MG
TABLET ORAL
COMMUNITY
Start: 2024-09-10

## 2024-09-18 DIAGNOSIS — E78.5 DYSLIPIDEMIA: ICD-10-CM

## 2024-09-18 DIAGNOSIS — N18.32 ANEMIA IN STAGE 3B CHRONIC KIDNEY DISEASE  (HCC): ICD-10-CM

## 2024-09-18 DIAGNOSIS — I12.9 PARENCHYMAL RENAL HYPERTENSION, STAGE 1 THROUGH STAGE 4 OR UNSPECIFIED CHRONIC KIDNEY DISEASE: ICD-10-CM

## 2024-09-18 DIAGNOSIS — N28.1 BILATERAL RENAL CYSTS: ICD-10-CM

## 2024-09-18 DIAGNOSIS — N18.32 STAGE 3B CHRONIC KIDNEY DISEASE (HCC): ICD-10-CM

## 2024-09-18 DIAGNOSIS — D63.1 ANEMIA IN STAGE 3B CHRONIC KIDNEY DISEASE  (HCC): ICD-10-CM

## 2024-09-18 RX ORDER — AMLODIPINE BESYLATE 5 MG/1
TABLET ORAL
Qty: 90 TABLET | Refills: 1 | Status: SHIPPED | OUTPATIENT
Start: 2024-09-18

## 2024-09-23 ENCOUNTER — TELEPHONE (OUTPATIENT)
Age: 79
End: 2024-09-23

## 2024-09-23 ENCOUNTER — TELEPHONE (OUTPATIENT)
Dept: NEPHROLOGY | Facility: CLINIC | Age: 79
End: 2024-09-23

## 2024-09-23 NOTE — TELEPHONE ENCOUNTER
Patient was unable to accept the two dates that  was provide.  He is schedule in march  he already had his  labs done.

## 2024-09-23 NOTE — TELEPHONE ENCOUNTER
LM with pt to re-schedule appt with Dr Yuan 10/18 due to a change in his schedule. Can schedule 09/30 or 10/01.

## 2024-10-02 ENCOUNTER — TELEPHONE (OUTPATIENT)
Dept: NEPHROLOGY | Facility: CLINIC | Age: 79
End: 2024-10-02

## 2024-10-02 NOTE — TELEPHONE ENCOUNTER
Italo for pt offering sooner appt 10/15 w/Dr. Yuan at 8:30 or on 10/29 in EO. Spots on hold for wait list pts. Please transfer call to EO for assistance scheduling

## 2024-10-10 ENCOUNTER — TELEPHONE (OUTPATIENT)
Dept: NEPHROLOGY | Facility: CLINIC | Age: 79
End: 2024-10-10

## 2024-10-10 NOTE — TELEPHONE ENCOUNTER
SAMINA for pt offering sooner appts w/Dr. Yuan throughout Nov. Please transfer to EO for assistance with scheduling over holds

## 2024-10-15 NOTE — PROGRESS NOTES
RENAL FOLLOW UP NOTE:.td    ASSESSMENT AND PLAN:  79-year-old male with a history of prostate CA who we are seeing for CKD stage 3:     1.  CKD stage 4  Etiology: Arteriolar nephrosclerosis,?  Hypertensive nephrosclerosis;  no evidence of primary glomerular process with a bland urinalysis without proteinuria or hematuria; no evidence of obstructive uropathy   Baseline creatinine: 1.8-2.2  Current creatinine: 2.18 at baseline from 4/26/2024  Urine protein creatinine ratio:  0.10 g at goal   UA:  No proteinuria and no hematuria or pyuria   PVR with bladder scan:   renal artery duplex: negative   renal ultrasound demonstrated cysts which are benign and simple no further evaluation required per the [facility] radiologist Dr. Jay Dia  Recommendations:  Treat hypertension-please see below  Treat dyslipidemia-please see below  Maintain proteinuria less than 1 g or as low as possible  Avoid nephrotoxic agents such as NSAIDs, and proton pump inhibitors if possible; patient counseled as such  Follow-up renal ultrasound 4/27/2023 was negative with bilateral renal cysts most simple  Kidney smart completed        2.  Volume:  Euvolemic     3.  Hypertension:       Current blood pressure averages:   Blood pressures based on just a few readings  AM: 116/69  P.m.: 128/71  Heart rate 50-60     Goal blood pressure: less than 130/80 given CKD     Recommendations:  Push nonmedical regimen including weight loss, isotonic exercise and a low sodium diet.  Patient has been counseled the such.  MedicationChanges today:    No changes he will complete a full week of readings    4.  Electrolytes:  All acceptable        5.  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  Dyslipidemia:  Goal LDL: less than 100 given CKD  Current lipid profile:  /HDL  56/triglycerides 83  Recommendations:   Low-cholesterol/low-fat diet / weight loss as appropriate and isotonic exercise   Medication changes today: I am recommending treatment with a statin he will consider it he is hesitant to take medications but we discussed the benefits would outweigh the risk as his risk at this juncture given CKD is higher for a stroke/heart attack/heart disease given his chronic kidney disease.  He will again consider it and contact me with his decision: Will order lipid profile at this time as of 10/16/2024: Potentially rosuvastatin 5 mg 3 days a week     7.  Anemia:  Current hemoglobin: 11.2 from 5/3/2024 followed by hematology oncology     8.  Other problems:  Prostate CA followed by Urology with negative biopsies of the last several years.  Last biopsy was 03/02/2021.:  Status post recent biopsy by Dr. Thurston 9/26/2023: Positive for prostate cancer potential radiation therapy  Needs follow-up renal ultrasound 12 months after 4/27/2023 for some mildly complex cysts: Discussed with patient to follow-up in April 2024 to follow the cysts  Avascular necrosis of the left knee    MGUS followed by Hematology      GI health maintenance:  11/04/2020:  By Dr. Mcclelland no polyps normal colonoscopy no further procedure require          PATIENT INSTRUCTIONS:    Patient Instructions   Visit summary:  - Overall labs from April look good were going to get labs now including a cholesterol.  As you and I have discussed if your LDL bad cholesterol is above 100 I would definitely recommend even just a small dose of a statin such as rosuvastatin or Crestor 5 mg 3 days a week may be enough to get you to goal.  I will certainly let you know once we get your results back!  - I believe your blood pressure is excellent I will just have you complete the full week of blood pressure readings and send those into the office        1. Medication changes today:  None today pending above labs and blood pressures    2.   General instructions:  Continue to avoid salt  Continue your excellent health measures: 5 days a week of at least 30 minutes of aerobic exercise is recommended    3.  Please go for fasting lab work at this time    4.  Please take 1 week a blood pressure readings at this time and just sitting    AS FOLLOWS  MORNING AND EVENING, SITTING AND STANDING as follows:  TAKE THE MORNING READINGS BEFORE ANY MEDICATIONS AND WHEN YOU ARE RELAXED FOR SEVERAL MINUTES  TAKE THE EVENING READINGS:  BETWEEN 7-10 P.M.; PRIOR TO ANY MEDICATIONS; AT LEAST IN OUR  FROM DINNER; AND CERTAINLY AFTER RELAXING FOR A FEW MINUTES  PLEASE INCLUDE HEART RATE WITH YOUR BLOOD PRESSURE READINGS  When taking standing readings, keep your arm supported at heart level and not dangling  Make sure you are sitting with your back supported and feet on the ground and do not cross your legs or feet  Make sure you have not taken any coffee or caffeine products or exercised or smoke cigarettes at least 30 minutes before taking your blood pressure  Then please mail these readings into the office            5.  Follow-up in 6 months  Please bring in 1 week a blood pressure readings morning evening, sitting and standing is outlined above  PLEASE BRING AN YOUR BLOOD PRESSURE MACHINE TO CORRELATE WITH THE OFFICE MACHINE AT THIS NEXT SCHEDULED VISIT  Please go for fasting lab work 1-2 weeks prior to your appointment      6.  General non medical recommendations:  AVOID SALT BUT NOT ADDING AN READING LABELS TO MAKE SURE THERE IS LOW-SALT IN THE FOOD THAT YOU ARE EATING  Goal is less than 2 g of sodium intake or less than 5 g of sodium chloride intake per day    Avoid nonsteroidal anti-inflammatory drugs such as Naprosyn, ibuprofen, Aleve, Advil, Celebrex, Meloxicam (Mobic) etc.  You can use Tylenol as needed if you do not have any liver condition to be concerned about    Avoid medications such as Sudafed or decongestants and antihistamines that contained the D  component which is the decongestant.  You can take antihistamines without the decongestant or D component.    Try to avoid medications such as pantoprazole or  Protonix/Nexium or Esomeprazole)/Prilosec or omeprazole/Prevacid or lansoprazole/AcipHex or Rabeprazole.  If you are able to, use Pepcid as this is safer for your kidneys.    Try to exercise at least 30 minutes 3 days a week to begin with with an ultimate goal of 5 days a week for at least 30 minutes    Please do not drink more than 2 glasses of alcohol/wine on a daily basis as this may contribute to your high blood pressure.            Subjective:   There has been no hospitalizations or acute illnesses since last visit.  The patient overall is feeling well.  No fevers, chills, or cough or colds.  Good appetite and good energy  No hematuria, dysuria, voiding symptoms or foamy urine  No gastrointestinal symptoms (did have some diarrhea resolved)  No cardiovascular symptoms including swelling of the legs  No headaches, dizziness or lightheadedness  Blood pressure medications:  Amlodipine 5 mg daily in the a.m.    Renal pertinent medications:  Flomax 0.4 mg twice a day with meals    ROS:  See HPI, otherwise review of systems as completely reviewed with the patient are negative    Past Medical History:   Diagnosis Date    Hypertension     Prostate cancer (HCC)      Past Surgical History:   Procedure Laterality Date    COLONOSCOPY      HERNIA REPAIR Left     INSERTION PROSTATE RADIATION SEED  03/19/2024    MD BX PROSTATE STRTCTC SATURATION SAMPLING IMG GID N/A 09/26/2023    Procedure: TRANSPERINEAL MRI FUSION BIOPSY PROSTATE;  Surgeon: Sushil Thurston MD;  Location: [facility];  Service: Urology    MD PROSTATE NEEDLE BIOPSY ANY APPROACH N/A 03/02/2021    Procedure: Transperineal MRI Fusion prostate biopsy and gold seed marker insertion;  Surgeon: Farhan Jacobo MD;  Location: [facility];  Service: Urology    PROSTATE BIOPSY      1/26/2015, 2/10/2017    PROSTATE  BIOPSY      VASECTOMY  ?     Family History   Problem Relation Age of Onset    Hypertension Mother     Prostate cancer Brother     No Known Problems Brother     No Known Problems Sister     No Known Problems Son     No Known Problems Son     No Known Problems Daughter       reports that he has never smoked. He has been exposed to tobacco smoke. He has never used smokeless tobacco. He reports current alcohol use of about 2.0 standard drinks of alcohol per week. He reports that he does not use drugs.    I COMPLETELY REVIEWED THE PAST MEDICAL HISTORY/PAST SURGICAL HISTORY/SOCIAL HISTORY/FAMILY HISTORY/AND MEDICATIONS  AND UPDATED ALL    Objective:     Vitals:   BP sitting on left: 128/70 with a heart rate of 64 and regular  BP standing on left: 132/70 with a heart rate of 72 and regular    Weight (last 2 days)       None          Wt Readings from Last 3 Encounters:   09/11/24 62.6 kg (138 lb)   08/14/24 61.2 kg (135 lb)   06/05/24 62.1 kg (137 lb)       Body mass index is 24.45 kg/m².    Physical Exam: General:  No acute distress  Skin:  No acute rash  Eyes:  No scleral icterus, noninjected, no discharge from eyes  ENT:  Moist mucous membranes  Neck:  Supple, no jugular venous distention, trachea is midline, no lymphadenopathy and no thyromegaly  Back   No CVAT  Chest:  Clear to auscultation and percussion, good respiratory effort  CVS:  Regular rate and rhythm without a rub, or gallops or murmurs  Abdomen:  Soft and nontender with normal bowel sounds  Extremities:  No cyanosis and no edema, no arthritic changes, normal range of motion  Neuro:  Grossly intact  Psych:  Alert, oriented x3 and appropriate      Medications:    Current Outpatient Medications:     amLODIPine (NORVASC) 5 mg tablet, take 1 tablet by mouth once daily IN THE EARLY MORNING, Disp: 90 tablet, Rfl: 1    Multiple Vitamins-Minerals (CENTRUM SILVER 50+MEN PO), Take by mouth as needed, Disp: , Rfl:     sildenafil (VIAGRA) 50 MG tablet, Take 1 tablet (50  mg total) by mouth as needed for erectile dysfunction, Disp: 30 tablet, Rfl: 2    Apoaequorin (Prevagen Extra Strength) 20 MG CAPS, Take 20 mg by mouth in the morning Once daily (Patient not taking: Reported on 9/11/2024), Disp: , Rfl:     atovaquone-proguanil (MALARONE) 250-100 mg, Take 1 tablet by mouth daily with breakfast for 18 days Do not start before September 27, 2024. (Patient not taking: Reported on 9/11/2024), Disp: 18 tablet, Rfl: 1    finasteride (PROSCAR) 5 mg tablet, take 1 tablet by mouth once daily (Patient not taking: Reported on 9/11/2024), Disp: 90 tablet, Rfl: 1    sulfamethoxazole-trimethoprim (BACTRIM DS) 800-160 mg per tablet, , Disp: , Rfl:     tamsulosin (FLOMAX) 0.4 mg, Take 1 capsule (0.4 mg total) by mouth 2 (two) times a day with meals (Patient not taking: Reported on 10/16/2024), Disp: 180 capsule, Rfl: 0    Lab, Imaging and other studies: I have personally reviewed pertinent labs.  Laboratory Results:  Results for orders placed or performed in visit on 09/03/24   IgG, IgA, IgM    Collection Time: 09/03/24  7:59 AM   Result Value Ref Range     66 - 433 mg/dL    IGG 1,532 635 - 1,741 mg/dL     45 - 281 mg/dL   Immunoglobulin free LT chains blood    Collection Time: 09/03/24  7:59 AM   Result Value Ref Range    Ig Kappa Free Light Chain 60.7 (H) 3.3 - 19.4 mg/L    Ig Lambda Free Light Chain 122.0 (H) 5.7 - 26.3 mg/L    Kappa/Lambda FluidC Ratio 0.50 0.26 - 1.65   PSA Total, Diagnostic    Collection Time: 09/03/24  7:59 AM   Result Value Ref Range    PSA, Diagnostic <0.008 0.000 - 4.000 ng/mL   Protein, total    Collection Time: 09/03/24  7:59 AM   Result Value Ref Range    Total Protein 6.9 6.4 - 8.4 g/dL   Protein electrophoresis, serum    Collection Time: 09/03/24  7:59 AM   Result Value Ref Range    A/G Ratio 1.11 1.10 - 1.80    Albumin Electrophoresis 52.6 48.0 - 70.0 %    Albumin CONC 3.47 3.20 - 5.10 g/dl    Alpha 1 3.9 1.8 - 7.0 %    ALPHA 1 CONC 0.26 0.15 - 0.47  "g/dL    Alpha 2 8.8 5.9 - 14.9 %    ALPHA 2 CONC 0.58 0.42 - 1.04 g/dL    Beta-1 5.7 4.7 - 7.7 %    BETA 1 CONC 0.38 0.31 - 0.57 g/dL    Beta-2 5.0 3.1 - 7.9 %    BETA 2 CONC 0.33 0.20 - 0.58 g/dL    Gamma Globulin 24.0 (H) 6.9 - 22.3 %    GAMMA CONC 1.58 0.40 - 1.66 g/dL    Total Protein 6.6 6.4 - 8.2 g/dL    SPEP Interpretation See Comment    Immunofixation, Serum(Reflex Only-Do Not Order)    Collection Time: 09/03/24  7:59 AM   Result Value Ref Range    Immunofixation Interpretation See Comment    Protein, urine, 24 hour    Collection Time: 09/04/24  8:22 AM   Result Value Ref Range    24H Urine Volume 1,200 mL    Protein, 24H Urine 250.8 (H) 40.0 - 150.0 mg/24 hrs             Invalid input(s): \"ALBUMIN\"      Radiology review:   chest X-ray    Ultrasound      Portions of the record may have been created with voice recognition software.  Occasional wrong word or \"sound a like\" substitutions may have occurred due to the inherent limitations of voice recognition software.  Read the chart carefully and recognize, using context, where substitutions have occurred.                    "

## 2024-10-16 ENCOUNTER — OFFICE VISIT (OUTPATIENT)
Dept: NEPHROLOGY | Facility: CLINIC | Age: 79
End: 2024-10-16
Payer: MEDICARE

## 2024-10-16 VITALS — HEIGHT: 63 IN | BODY MASS INDEX: 24.45 KG/M2

## 2024-10-16 DIAGNOSIS — E78.5 DYSLIPIDEMIA: ICD-10-CM

## 2024-10-16 DIAGNOSIS — N18.4 CHRONIC RENAL DISEASE, STAGE IV (HCC): ICD-10-CM

## 2024-10-16 DIAGNOSIS — D63.1 ANEMIA IN STAGE 4 CHRONIC KIDNEY DISEASE  (HCC): ICD-10-CM

## 2024-10-16 DIAGNOSIS — N18.4 ANEMIA IN STAGE 4 CHRONIC KIDNEY DISEASE  (HCC): ICD-10-CM

## 2024-10-16 DIAGNOSIS — I12.9 PARENCHYMAL RENAL HYPERTENSION, STAGE 1 THROUGH STAGE 4 OR UNSPECIFIED CHRONIC KIDNEY DISEASE: Primary | ICD-10-CM

## 2024-10-16 PROCEDURE — G2211 COMPLEX E/M VISIT ADD ON: HCPCS | Performed by: INTERNAL MEDICINE

## 2024-10-16 PROCEDURE — 99214 OFFICE O/P EST MOD 30 MIN: CPT | Performed by: INTERNAL MEDICINE

## 2024-10-16 NOTE — PATIENT INSTRUCTIONS
Visit summary:  - Overall labs from April look good were going to get labs now including a cholesterol.  As you and I have discussed if your LDL bad cholesterol is above 100 I would definitely recommend even just a small dose of a statin such as rosuvastatin or Crestor 5 mg 3 days a week may be enough to get you to goal.  I will certainly let you know once we get your results back!  - I believe your blood pressure is excellent I will just have you complete the full week of blood pressure readings and send those into the office        1. Medication changes today:  None today pending above labs and blood pressures    2.  General instructions:  Continue to avoid salt  Continue your excellent health measures: 5 days a week of at least 30 minutes of aerobic exercise is recommended    3.  Please go for fasting lab work at this time    4.  Please take 1 week a blood pressure readings at this time and just sitting    AS FOLLOWS  MORNING AND EVENING, SITTING AND STANDING as follows:  TAKE THE MORNING READINGS BEFORE ANY MEDICATIONS AND WHEN YOU ARE RELAXED FOR SEVERAL MINUTES  TAKE THE EVENING READINGS:  BETWEEN 7-10 P.M.; PRIOR TO ANY MEDICATIONS; AT LEAST IN OUR  FROM DINNER; AND CERTAINLY AFTER RELAXING FOR A FEW MINUTES  PLEASE INCLUDE HEART RATE WITH YOUR BLOOD PRESSURE READINGS  When taking standing readings, keep your arm supported at heart level and not dangling  Make sure you are sitting with your back supported and feet on the ground and do not cross your legs or feet  Make sure you have not taken any coffee or caffeine products or exercised or smoke cigarettes at least 30 minutes before taking your blood pressure  Then please mail these readings into the office            5.  Follow-up in 6 months  Please bring in 1 week a blood pressure readings morning evening, sitting and standing is outlined above  PLEASE BRING AN YOUR BLOOD PRESSURE MACHINE TO CORRELATE WITH THE OFFICE MACHINE AT THIS NEXT SCHEDULED  VISIT  Please go for fasting lab work 1-2 weeks prior to your appointment      6.  General non medical recommendations:  AVOID SALT BUT NOT ADDING AN READING LABELS TO MAKE SURE THERE IS LOW-SALT IN THE FOOD THAT YOU ARE EATING  Goal is less than 2 g of sodium intake or less than 5 g of sodium chloride intake per day    Avoid nonsteroidal anti-inflammatory drugs such as Naprosyn, ibuprofen, Aleve, Advil, Celebrex, Meloxicam (Mobic) etc.  You can use Tylenol as needed if you do not have any liver condition to be concerned about    Avoid medications such as Sudafed or decongestants and antihistamines that contained the D component which is the decongestant.  You can take antihistamines without the decongestant or D component.    Try to avoid medications such as pantoprazole or  Protonix/Nexium or Esomeprazole)/Prilosec or omeprazole/Prevacid or lansoprazole/AcipHex or Rabeprazole.  If you are able to, use Pepcid as this is safer for your kidneys.    Try to exercise at least 30 minutes 3 days a week to begin with with an ultimate goal of 5 days a week for at least 30 minutes    Please do not drink more than 2 glasses of alcohol/wine on a daily basis as this may contribute to your high blood pressure.

## 2024-10-16 NOTE — LETTER
October 16, 2024     Alberto Graff MD  [address] [facility] [place] [facility]  [facility] [place] [address]    Patient: Sharif Johansen   YOB: 1945   Date of Visit: 10/16/2024       Dear Dr. Graff:    Thank you for referring Sharif Johansen to me for evaluation. Below are my notes for this consultation.    If you have questions, please do not hesitate to call me. I look forward to following your patient along with you.         Sincerely,        Duc Yuan MD        CC: No Recipients    Duc Yuan MD  10/16/2024 11:16 AM  Sign when Signing Visit  RENAL FOLLOW UP NOTE:.td    ASSESSMENT AND PLAN:  79-year-old male with a history of prostate CA who we are seeing for CKD stage 3:     1.  CKD stage 4  Etiology: Arteriolar nephrosclerosis,?  Hypertensive nephrosclerosis;  no evidence of primary glomerular process with a bland urinalysis without proteinuria or hematuria; no evidence of obstructive uropathy   Baseline creatinine: 1.8-2.2  Current creatinine: 2.18 at baseline from 4/26/2024  Urine protein creatinine ratio:  0.10 g at goal   UA:  No proteinuria and no hematuria or pyuria   PVR with bladder scan:   renal artery duplex: negative   renal ultrasound demonstrated cysts which are benign and simple no further evaluation required per the [facility] radiologist Dr. Jay Dia  Recommendations:  Treat hypertension-please see below  Treat dyslipidemia-please see below  Maintain proteinuria less than 1 g or as low as possible  Avoid nephrotoxic agents such as NSAIDs, and proton pump inhibitors if possible; patient counseled as such  Follow-up renal ultrasound 4/27/2023 was negative with bilateral renal cysts most simple  Kidney smart completed        2.  Volume:  Euvolemic     3.  Hypertension:       Current blood pressure averages:   Blood pressures based on just a few readings  AM: 116/69  P.m.: 128/71  Heart rate 50-60     Goal blood pressure: less than 130/80 given CKD     Recommendations:  Push nonmedical regimen  including weight loss, isotonic exercise and a low sodium diet.  Patient has been counseled the such.  MedicationChanges today:    No changes he will complete a full week of readings    4.  Electrolytes:  All acceptable        5.  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  Dyslipidemia:  Goal LDL: less than 100 given CKD  Current lipid profile:  /HDL 56/triglycerides 83  Recommendations:   Low-cholesterol/low-fat diet / weight loss as appropriate and isotonic exercise   Medication changes today: I am recommending treatment with a statin he will consider it he is hesitant to take medications but we discussed the benefits would outweigh the risk as his risk at this juncture given CKD is higher for a stroke/heart attack/heart disease given his chronic kidney disease.  He will again consider it and contact me with his decision: Will order lipid profile at this time as of 10/16/2024: Potentially rosuvastatin 5 mg 3 days a week     7.  Anemia:  Current hemoglobin: 11.2 from 5/3/2024 followed by hematology oncology     8.  Other problems:  Prostate CA followed by Urology with negative biopsies of the last several years.  Last biopsy was 03/02/2021.:  Status post recent biopsy by Dr. Thurston 9/26/2023: Positive for prostate cancer potential radiation therapy  Needs follow-up renal ultrasound 12 months after 4/27/2023 for some mildly complex cysts: Discussed with patient to follow-up in April 2024 to follow the cysts  Avascular necrosis of the left knee    MGUS followed by Hematology      GI health maintenance:  11/04/2020:  By Dr. Mcclelland no polyps normal colonoscopy no further procedure require          PATIENT INSTRUCTIONS:    Patient Instructions   Visit summary:  - Overall labs from April look good were going to get labs now  including a cholesterol.  As you and I have discussed if your LDL bad cholesterol is above 100 I would definitely recommend even just a small dose of a statin such as rosuvastatin or Crestor 5 mg 3 days a week may be enough to get you to goal.  I will certainly let you know once we get your results back!  - I believe your blood pressure is excellent I will just have you complete the full week of blood pressure readings and send those into the office        1. Medication changes today:  None today pending above labs and blood pressures    2.  General instructions:  Continue to avoid salt  Continue your excellent health measures: 5 days a week of at least 30 minutes of aerobic exercise is recommended    3.  Please go for fasting lab work at this time    4.  Please take 1 week a blood pressure readings at this time and just sitting    AS FOLLOWS  MORNING AND EVENING, SITTING AND STANDING as follows:  TAKE THE MORNING READINGS BEFORE ANY MEDICATIONS AND WHEN YOU ARE RELAXED FOR SEVERAL MINUTES  TAKE THE EVENING READINGS:  BETWEEN 7-10 P.M.; PRIOR TO ANY MEDICATIONS; AT LEAST IN OUR  FROM DINNER; AND CERTAINLY AFTER RELAXING FOR A FEW MINUTES  PLEASE INCLUDE HEART RATE WITH YOUR BLOOD PRESSURE READINGS  When taking standing readings, keep your arm supported at heart level and not dangling  Make sure you are sitting with your back supported and feet on the ground and do not cross your legs or feet  Make sure you have not taken any coffee or caffeine products or exercised or smoke cigarettes at least 30 minutes before taking your blood pressure  Then please mail these readings into the office            5.  Follow-up in 6 months  Please bring in 1 week a blood pressure readings morning evening, sitting and standing is outlined above  PLEASE BRING AN YOUR BLOOD PRESSURE MACHINE TO CORRELATE WITH THE OFFICE MACHINE AT THIS NEXT SCHEDULED VISIT  Please go for fasting lab work 1-2 weeks prior to your  appointment      6.  General non medical recommendations:  AVOID SALT BUT NOT ADDING AN READING LABELS TO MAKE SURE THERE IS LOW-SALT IN THE FOOD THAT YOU ARE EATING  Goal is less than 2 g of sodium intake or less than 5 g of sodium chloride intake per day    Avoid nonsteroidal anti-inflammatory drugs such as Naprosyn, ibuprofen, Aleve, Advil, Celebrex, Meloxicam (Mobic) etc.  You can use Tylenol as needed if you do not have any liver condition to be concerned about    Avoid medications such as Sudafed or decongestants and antihistamines that contained the D component which is the decongestant.  You can take antihistamines without the decongestant or D component.    Try to avoid medications such as pantoprazole or  Protonix/Nexium or Esomeprazole)/Prilosec or omeprazole/Prevacid or lansoprazole/AcipHex or Rabeprazole.  If you are able to, use Pepcid as this is safer for your kidneys.    Try to exercise at least 30 minutes 3 days a week to begin with with an ultimate goal of 5 days a week for at least 30 minutes    Please do not drink more than 2 glasses of alcohol/wine on a daily basis as this may contribute to your high blood pressure.            Subjective:   There has been no hospitalizations or acute illnesses since last visit.  The patient overall is feeling well.  No fevers, chills, or cough or colds.  Good appetite and good energy  No hematuria, dysuria, voiding symptoms or foamy urine  No gastrointestinal symptoms (did have some diarrhea resolved)  No cardiovascular symptoms including swelling of the legs  No headaches, dizziness or lightheadedness  Blood pressure medications:  Amlodipine 5 mg daily in the a.m.    Renal pertinent medications:  Flomax 0.4 mg twice a day with meals    ROS:  See HPI, otherwise review of systems as completely reviewed with the patient are negative    Past Medical History:   Diagnosis Date   • Hypertension    • Prostate cancer (HCC)      Past Surgical History:   Procedure  Laterality Date   • COLONOSCOPY     • HERNIA REPAIR Left    • INSERTION PROSTATE RADIATION SEED  03/19/2024   • MS BX PROSTATE STRTCTC SATURATION SAMPLING IMG GID N/A 09/26/2023    Procedure: TRANSPERINEAL MRI FUSION BIOPSY PROSTATE;  Surgeon: Sushil Thurston MD;  Location: [facility];  Service: Urology   • MS PROSTATE NEEDLE BIOPSY ANY APPROACH N/A 03/02/2021    Procedure: Transperineal MRI Fusion prostate biopsy and gold seed marker insertion;  Surgeon: Farhan Jacobo MD;  Location: [facility];  Service: Urology   • PROSTATE BIOPSY      1/26/2015, 2/10/2017   • PROSTATE BIOPSY     • VASECTOMY  ?     Family History   Problem Relation Age of Onset   • Hypertension Mother    • Prostate cancer Brother    • No Known Problems Brother    • No Known Problems Sister    • No Known Problems Son    • No Known Problems Son    • No Known Problems Daughter       reports that he has never smoked. He has been exposed to tobacco smoke. He has never used smokeless tobacco. He reports current alcohol use of about 2.0 standard drinks of alcohol per week. He reports that he does not use drugs.    I COMPLETELY REVIEWED THE PAST MEDICAL HISTORY/PAST SURGICAL HISTORY/SOCIAL HISTORY/FAMILY HISTORY/AND MEDICATIONS  AND UPDATED ALL    Objective:     Vitals:   BP sitting on left: 128/70 with a heart rate of 64 and regular  BP standing on left: 132/70 with a heart rate of 72 and regular    Weight (last 2 days)       None          Wt Readings from Last 3 Encounters:   09/11/24 62.6 kg (138 lb)   08/14/24 61.2 kg (135 lb)   06/05/24 62.1 kg (137 lb)       Body mass index is 24.45 kg/m².    Physical Exam: General:  No acute distress  Skin:  No acute rash  Eyes:  No scleral icterus, noninjected, no discharge from eyes  ENT:  Moist mucous membranes  Neck:  Supple, no jugular venous distention, trachea is midline, no lymphadenopathy and no thyromegaly  Back   No CVAT  Chest:  Clear to auscultation and percussion, good respiratory effort  CVS:   Regular rate and rhythm without a rub, or gallops or murmurs  Abdomen:  Soft and nontender with normal bowel sounds  Extremities:  No cyanosis and no edema, no arthritic changes, normal range of motion  Neuro:  Grossly intact  Psych:  Alert, oriented x3 and appropriate      Medications:    Current Outpatient Medications:   •  amLODIPine (NORVASC) 5 mg tablet, take 1 tablet by mouth once daily IN THE EARLY MORNING, Disp: 90 tablet, Rfl: 1  •  Multiple Vitamins-Minerals (CENTRUM SILVER 50+MEN PO), Take by mouth as needed, Disp: , Rfl:   •  sildenafil (VIAGRA) 50 MG tablet, Take 1 tablet (50 mg total) by mouth as needed for erectile dysfunction, Disp: 30 tablet, Rfl: 2  •  Apoaequorin (Prevagen Extra Strength) 20 MG CAPS, Take 20 mg by mouth in the morning Once daily (Patient not taking: Reported on 9/11/2024), Disp: , Rfl:   •  atovaquone-proguanil (MALARONE) 250-100 mg, Take 1 tablet by mouth daily with breakfast for 18 days Do not start before September 27, 2024. (Patient not taking: Reported on 9/11/2024), Disp: 18 tablet, Rfl: 1  •  finasteride (PROSCAR) 5 mg tablet, take 1 tablet by mouth once daily (Patient not taking: Reported on 9/11/2024), Disp: 90 tablet, Rfl: 1  •  sulfamethoxazole-trimethoprim (BACTRIM DS) 800-160 mg per tablet, , Disp: , Rfl:   •  tamsulosin (FLOMAX) 0.4 mg, Take 1 capsule (0.4 mg total) by mouth 2 (two) times a day with meals (Patient not taking: Reported on 10/16/2024), Disp: 180 capsule, Rfl: 0    Lab, Imaging and other studies: I have personally reviewed pertinent labs.  Laboratory Results:  Results for orders placed or performed in visit on 09/03/24   IgG, IgA, IgM    Collection Time: 09/03/24  7:59 AM   Result Value Ref Range     66 - 433 mg/dL    IGG 1,532 635 - 1,741 mg/dL     45 - 281 mg/dL   Immunoglobulin free LT chains blood    Collection Time: 09/03/24  7:59 AM   Result Value Ref Range    Ig Kappa Free Light Chain 60.7 (H) 3.3 - 19.4 mg/L    Ig Lambda Free Light  "Chain 122.0 (H) 5.7 - 26.3 mg/L    Kappa/Lambda FluidC Ratio 0.50 0.26 - 1.65   PSA Total, Diagnostic    Collection Time: 09/03/24  7:59 AM   Result Value Ref Range    PSA, Diagnostic <0.008 0.000 - 4.000 ng/mL   Protein, total    Collection Time: 09/03/24  7:59 AM   Result Value Ref Range    Total Protein 6.9 6.4 - 8.4 g/dL   Protein electrophoresis, serum    Collection Time: 09/03/24  7:59 AM   Result Value Ref Range    A/G Ratio 1.11 1.10 - 1.80    Albumin Electrophoresis 52.6 48.0 - 70.0 %    Albumin CONC 3.47 3.20 - 5.10 g/dl    Alpha 1 3.9 1.8 - 7.0 %    ALPHA 1 CONC 0.26 0.15 - 0.47 g/dL    Alpha 2 8.8 5.9 - 14.9 %    ALPHA 2 CONC 0.58 0.42 - 1.04 g/dL    Beta-1 5.7 4.7 - 7.7 %    BETA 1 CONC 0.38 0.31 - 0.57 g/dL    Beta-2 5.0 3.1 - 7.9 %    BETA 2 CONC 0.33 0.20 - 0.58 g/dL    Gamma Globulin 24.0 (H) 6.9 - 22.3 %    GAMMA CONC 1.58 0.40 - 1.66 g/dL    Total Protein 6.6 6.4 - 8.2 g/dL    SPEP Interpretation See Comment    Immunofixation, Serum(Reflex Only-Do Not Order)    Collection Time: 09/03/24  7:59 AM   Result Value Ref Range    Immunofixation Interpretation See Comment    Protein, urine, 24 hour    Collection Time: 09/04/24  8:22 AM   Result Value Ref Range    24H Urine Volume 1,200 mL    Protein, 24H Urine 250.8 (H) 40.0 - 150.0 mg/24 hrs             Invalid input(s): \"ALBUMIN\"      Radiology review:   chest X-ray    Ultrasound      Portions of the record may have been created with voice recognition software.  Occasional wrong word or \"sound a like\" substitutions may have occurred due to the inherent limitations of voice recognition software.  Read the chart carefully and recognize, using context, where substitutions have occurred.                    "

## 2024-10-24 ENCOUNTER — APPOINTMENT (OUTPATIENT)
Dept: LAB | Facility: CLINIC | Age: 79
End: 2024-10-24
Payer: MEDICARE

## 2024-10-24 DIAGNOSIS — N18.4 ANEMIA IN STAGE 4 CHRONIC KIDNEY DISEASE  (HCC): ICD-10-CM

## 2024-10-24 DIAGNOSIS — D63.1 ANEMIA IN STAGE 4 CHRONIC KIDNEY DISEASE  (HCC): ICD-10-CM

## 2024-10-24 DIAGNOSIS — N18.4 CHRONIC RENAL DISEASE, STAGE IV (HCC): ICD-10-CM

## 2024-10-24 DIAGNOSIS — I12.9 PARENCHYMAL RENAL HYPERTENSION, STAGE 1 THROUGH STAGE 4 OR UNSPECIFIED CHRONIC KIDNEY DISEASE: ICD-10-CM

## 2024-10-24 DIAGNOSIS — E78.5 DYSLIPIDEMIA: ICD-10-CM

## 2024-10-24 DIAGNOSIS — N28.1 BILATERAL RENAL CYSTS: ICD-10-CM

## 2024-10-24 LAB
25(OH)D3 SERPL-MCNC: 79.7 NG/ML (ref 30–100)
ALBUMIN SERPL BCG-MCNC: 3.5 G/DL (ref 3.5–5)
ALP SERPL-CCNC: 71 U/L (ref 34–104)
ALT SERPL W P-5'-P-CCNC: 12 U/L (ref 7–52)
ANION GAP SERPL CALCULATED.3IONS-SCNC: 5 MMOL/L (ref 4–13)
AST SERPL W P-5'-P-CCNC: 18 U/L (ref 13–39)
BILIRUB SERPL-MCNC: 0.48 MG/DL (ref 0.2–1)
BUN SERPL-MCNC: 42 MG/DL (ref 5–25)
CALCIUM SERPL-MCNC: 8.9 MG/DL (ref 8.4–10.2)
CHLORIDE SERPL-SCNC: 106 MMOL/L (ref 96–108)
CHOLEST SERPL-MCNC: 156 MG/DL
CO2 SERPL-SCNC: 29 MMOL/L (ref 21–32)
CREAT SERPL-MCNC: 2.62 MG/DL (ref 0.6–1.3)
CREAT UR-MCNC: 122.8 MG/DL
ERYTHROCYTE [DISTWIDTH] IN BLOOD BY AUTOMATED COUNT: 12.5 % (ref 11.6–15.1)
GFR SERPL CREATININE-BSD FRML MDRD: 22 ML/MIN/1.73SQ M
GLUCOSE SERPL-MCNC: 85 MG/DL (ref 65–140)
HCT VFR BLD AUTO: 33.4 % (ref 36.5–49.3)
HDLC SERPL-MCNC: 41 MG/DL
HGB BLD-MCNC: 10.2 G/DL (ref 12–17)
LDLC SERPL CALC-MCNC: 99 MG/DL (ref 0–100)
MAGNESIUM SERPL-MCNC: 2.3 MG/DL (ref 1.9–2.7)
MCH RBC QN AUTO: 28.7 PG (ref 26.8–34.3)
MCHC RBC AUTO-ENTMCNC: 30.5 G/DL (ref 31.4–37.4)
MCV RBC AUTO: 94 FL (ref 82–98)
NONHDLC SERPL-MCNC: 115 MG/DL
PHOSPHATE SERPL-MCNC: 4.2 MG/DL (ref 2.3–4.1)
PLATELET # BLD AUTO: 224 THOUSANDS/UL (ref 149–390)
PMV BLD AUTO: 9.9 FL (ref 8.9–12.7)
POTASSIUM SERPL-SCNC: 4.9 MMOL/L (ref 3.5–5.3)
PROT SERPL-MCNC: 6.8 G/DL (ref 6.4–8.4)
PROT UR-MCNC: 24.2 MG/DL
PROT/CREAT UR: 0.2 MG/G{CREAT} (ref 0–0.1)
PTH-INTACT SERPL-MCNC: 59.6 PG/ML (ref 12–88)
RBC # BLD AUTO: 3.56 MILLION/UL (ref 3.88–5.62)
SODIUM SERPL-SCNC: 140 MMOL/L (ref 135–147)
TRIGL SERPL-MCNC: 78 MG/DL
WBC # BLD AUTO: 6.55 THOUSAND/UL (ref 4.31–10.16)

## 2024-10-24 PROCEDURE — 36415 COLL VENOUS BLD VENIPUNCTURE: CPT

## 2024-10-24 PROCEDURE — 84100 ASSAY OF PHOSPHORUS: CPT

## 2024-10-24 PROCEDURE — 82306 VITAMIN D 25 HYDROXY: CPT

## 2024-10-24 PROCEDURE — 80061 LIPID PANEL: CPT

## 2024-10-24 PROCEDURE — 83735 ASSAY OF MAGNESIUM: CPT

## 2024-10-24 PROCEDURE — 83970 ASSAY OF PARATHORMONE: CPT

## 2024-10-24 PROCEDURE — 82570 ASSAY OF URINE CREATININE: CPT

## 2024-10-24 PROCEDURE — 80053 COMPREHEN METABOLIC PANEL: CPT

## 2024-10-24 PROCEDURE — 85027 COMPLETE CBC AUTOMATED: CPT

## 2024-10-24 PROCEDURE — 84156 ASSAY OF PROTEIN URINE: CPT

## 2024-10-25 ENCOUNTER — TELEPHONE (OUTPATIENT)
Dept: NEPHROLOGY | Facility: CLINIC | Age: 79
End: 2024-10-25

## 2024-10-25 DIAGNOSIS — N18.4 CHRONIC RENAL DISEASE, STAGE IV (HCC): ICD-10-CM

## 2024-10-25 DIAGNOSIS — N18.4 ANEMIA IN STAGE 4 CHRONIC KIDNEY DISEASE  (HCC): Primary | ICD-10-CM

## 2024-10-25 DIAGNOSIS — D63.1 ANEMIA IN STAGE 4 CHRONIC KIDNEY DISEASE  (HCC): Primary | ICD-10-CM

## 2024-10-25 NOTE — TELEPHONE ENCOUNTER
Also: Creatinine higher than typical     Recommend  1.  Make sure patient is feeling well and not taking NSAIDs  2.  Please have him send in a week of blood pressures now  3.  Repeat a BMP/UA with microscopic in about 1 week or so with good hydration!  Can you please order iron studies and fecal immunochemical testing and explained to the patient hemoglobin/blood count just slightly lower than it has been I did message the hematology oncologist as noted above

## 2024-10-25 NOTE — TELEPHONE ENCOUNTER
----- Message from Duc Yuan MD sent at 10/25/2024  1:40 PM EDT -----  Also: Creatinine higher than typical    Recommend  1.  Make sure patient is feeling well and not taking NSAIDs  2.  Please have him send in a week of blood pressures now  3.  Repeat a BMP/UA with microscopic in about 1 week or so with good hydration!

## 2024-10-25 NOTE — TELEPHONE ENCOUNTER
----- Message from Duc Yuan MD sent at 10/25/2024  1:38 PM EDT -----  Good afternoon!  This patient's hemoglobin is drifting down if you could assess and recommend that would be great    In the interim with your permission I am going to order iron studies and fecal immunochemical testing  Thanks so much looking forward to your thoughts  [place]      From my staff: Can you please order iron studies and fecal immunochemical testing and explained to the patient hemoglobin/blood count just slightly lower than it has been I did message the hematology oncologist as noted above  ----- Message -----  From: Malini Olivo PA-C  Sent: 10/25/2024  12:52 PM EDT  To: Duc Yuan MD    Hi.  Forwarding these labs for your review because it looks like you just saw this patient last week and requested the labs.  Looks like creatinine is above his normal baseline.  Thanks  ----- Message -----  From: Lab, Background User  Sent: 10/24/2024   9:14 PM EDT  To: Malini Olivo PA-C

## 2024-10-28 DIAGNOSIS — N40.1 NOCTURIA ASSOCIATED WITH BENIGN PROSTATIC HYPERPLASIA: ICD-10-CM

## 2024-10-28 DIAGNOSIS — R35.1 NOCTURIA ASSOCIATED WITH BENIGN PROSTATIC HYPERPLASIA: ICD-10-CM

## 2024-10-28 RX ORDER — FINASTERIDE 5 MG/1
5 TABLET, FILM COATED ORAL DAILY
Qty: 90 TABLET | Refills: 1 | Status: SHIPPED | OUTPATIENT
Start: 2024-10-28

## 2025-01-03 ENCOUNTER — OFFICE VISIT (OUTPATIENT)
Dept: RADIATION ONCOLOGY | Facility: CLINIC | Age: 80
End: 2025-01-03
Attending: RADIOLOGY
Payer: MEDICARE

## 2025-01-03 VITALS
BODY MASS INDEX: 25.15 KG/M2 | WEIGHT: 142 LBS | TEMPERATURE: 97.3 F | DIASTOLIC BLOOD PRESSURE: 82 MMHG | RESPIRATION RATE: 16 BRPM | HEART RATE: 66 BPM | SYSTOLIC BLOOD PRESSURE: 144 MMHG | OXYGEN SATURATION: 96 %

## 2025-01-03 DIAGNOSIS — C61 PROSTATE CANCER (HCC): Primary | ICD-10-CM

## 2025-01-03 PROCEDURE — G2211 COMPLEX E/M VISIT ADD ON: HCPCS | Performed by: RADIOLOGY

## 2025-01-03 PROCEDURE — 99211 OFF/OP EST MAY X REQ PHY/QHP: CPT | Performed by: RADIOLOGY

## 2025-01-03 PROCEDURE — 99213 OFFICE O/P EST LOW 20 MIN: CPT | Performed by: RADIOLOGY

## 2025-01-03 NOTE — PROGRESS NOTES
Sharif Johansen 1945 is a 79 y.o. male  with a history of low risk prostate cancer diagnosed in  on active surveillance noted with progression to high risk disease with gross SHELDON on MRI, GS 9 (4+5) pathology and PSA from elevated at 4.76ng/mL on 23 on finasteride, correction is 9.52ng/mL.  He underwent ADT long course, brachytherapy boost, and pelvic radiation to prostate, sv, and lymph nodes completed on 2024.  He presents today for follow up.        24 Urology, Chadd   completed definitive radiation in May and was recommended for long-term ADT(18 months).   Lupron given today  Follow-up in 6 months  -He can get a PSA in September and prior to visit      24 Dr. Geraldo guillermo IgG MGUS   prostate cancer  MGUS labs including SPEP, CBC, CMP q1-2 years   F/u Rad Onc for RT  F/u Urology for ADT planned       PSA   Latest Ref Rng 0.000 - 4.000 ng/mL   2023 8.9 (H)    2023 4.76 (H)    9/3/2024 <0.008           [facility]  25 Dr. Cho    Follow up visit     Oncology History   Prostate cancer (HCC)   3/20/2013 Initial Diagnosis    Prostate cancer (HCC)     3/2/2021 Biopsy    A. Prostate, L lat base, Biopsy:  - Benign prostatic tissue.      B. Prostate, L base, Biopsy:  - Benign prostatic tissue.       C. Prostate, L lat med, Biopsy:  - Benign prostatic tissue.       D. Prostate, L med, Biopsy:  - Benign prostatic tissue.       E. Prostate, L lat apex, Biopsy:  - Benign prostatic tissue.       F. Prostate, L apex, Biopsy:  - Benign prostatic tissue.      G. Prostate, R lat base, Biopsy:  - Benign prostatic tissue.       H. Prostate, R base, Biopsy:  - Prostatic adenocarcinoma, Melany score 3+3=6, Prognostic Grade Group 1, involving 1 of 1 core (5% involvement).  - Focal high-grade prostatic intraepithelial neoplasia (HGPIN).  - Perineural invasion is not identified.     I. Prostate, R lat med, Biopsy:  - Benign prostatic tissue.       J. Prostate, R med,  Biopsy:  - Benign prostatic tissue.       K. Prostate, R lat apex, Biopsy:  - No tissue present.     L. Prostate, R apex, Biopsy:  - Benign prostatic tissue.       M. Prostate, Target L lat med, Biopsy:  - Benign prostatic tissue.       9/6/2023 Biopsy    A. Prostate, FABIANA #1 L base x 5:  Acinar adenocarcinoma  - Grade group 3 (Melany score: 4+3 =7)  - Involving 70%, 70% and 13% of 3 out of 5 core fragments, tumor lengths are 11 mm, 11 mm and 2 mm  - Percentage of pattern 4: approx. 92%  - Perineural invasion identified     B. Prostate, L post lat x 2:  Benign prostate tissue with basal cell hyperplasia and chronic prostatitis     C. Prostate, L post med x 2:  Benign prostatic hyperplasia and chronic prostatitis     D. Prostate, L base x 2:  Benign prostatic hyperplasia and chronic prostatitis     E. Prostate, L ant lat x 2:  Acinar adenocarcinoma  - Grade group 5 (Gray score: 4+5 =9)  - Involving 30% of 1 out of 2 core fragments, tumor length is 5 mm  - Percentage of pattern 5: approx. 5%     F. Prostate, L ant med x 2:  Benign prostate tissue with basal cell hyperplasia and chronic prostatitis     G. Prostate, FABIANA # 2R apex x 5:  Acinar adenocarcinoma  - Grade group 2 (Gray score: 3+4 =7)  - Involving 50%, 45%, 35%, 15% and 11% of 5 out of 5 core fragments, tumor lengths are 4.5 mm, 4.5 mm, 2 mm, 3 mm and 1 mm  - Percentage of pattern 4: approx. 45%  - High grade prostatic intraepithelial neoplasia     H. Prostate, R post lat x 2:  Atypical glands, suspicious for malignancy  High grade prostatic intraepithelial neoplasia     I. Prostate, R post med x2:  Rare atypical glands  Background of benign prostate tissue with basal cell hyperplasia and chronic prostatitis     J. Prostate, R base x2:  Benign prostate tissue     K. Prostate, R ant latx2:  Benign prostatic hyperplasia and chronic prostatitis     L. Prostate, R ant med x 2:  Benign prostate tissue with basal cell hyperplasia and chronic prostatitis         9/6/2023 -  Cancer Staged    Staging form: Prostate, AJCC 7th Edition  - Clinical stage from 9/6/2023: Stage III (T3a, N0, M0, PSA: Less than 10, Melany 8-10) - Signed by Ifeoma Lozano MD on 11/27/2023       1/3/2024 -  Hormone Therapy    Firmagon 240 mg     2/12/2024 -  Hormone Therapy    Lupron 45 mg     3/19/2024 -  Radiation    [facility]- HDR brachytherapy    TRANSPERINEAL PLACEMENT OF BIODEGRADABLE MATERIAL, CED-PROSTATIC, SINGLE OR MULTIPLE INJECTION(S), INCLUDING IMAGE GUIDANCE, WHEN PERFORMED      4/23/2024 - 5/29/2024 Radiation    Treatments:  Course: C1  Plan ID Energy Fractions Dose per Fraction (cGy) Dose Correction (cGy) Total Dose Delivered (cGy) Elapsed Days   Whole Pelvis 10X 25 / 25 180 0 4,500 36    Treatment Dates:  4/23/2024 - 5/29/2024.      8/14/2024 -  Hormone Therapy    Lupron 45 mg         Review of Systems:  Review of Systems    Clinical Trial: no    IPSS Questionnaire (AUA-7):  Over the past month…    1)  How often have you had a sensation of not emptying your bladder completely after you finish urinating?  0 - Not at all   2)  How often have you had to urinate again less than two hours after you finished urinating? 1 - Less than 1 time in 5   3)  How often have you found you stopped and started again several times when you urinated?  0 - Not at all   4) How difficult have you found it to postpone urination?  0 - Not at all   5) How often have you had a weak urinary stream?  0 - Not at all   6) How often have you had to push or strain to begin urination?  0 - Not at all   7) How many times did you most typically get up to urinate from the time you went to bed until the time you got up in the morning?  2 - 2 times   Total Score:  3           Health Maintenance   Topic Date Due    Hepatitis C Screening  Never done    Medicare Annual Wellness Visit (AWV)  Never done    Fall Risk  Never done    RSV Vaccine Age 60+ Years (1 - 1-dose 75+ series) Never done    COVID-19 Vaccine  (4 - 2024-25 season) 09/01/2024    Depression Screening  04/10/2025    BMI: Adult  09/11/2025    Zoster Vaccine  Completed    Pneumococcal Vaccine: 65+ Years  Completed    Influenza Vaccine  Completed    Meningococcal B Vaccine  Aged Out    RSV Vaccine age 0-20 Months  Aged Out    HIB Vaccine  Aged Out    IPV Vaccine  Aged Out    Hepatitis A Vaccine  Aged Out    Meningococcal ACWY Vaccine  Aged Out    HPV Vaccine  Aged Out    Colorectal Cancer Screening  Discontinued     Patient Active Problem List   Diagnosis    Organic impotence    Prostate cancer (HCC)    Benign localized prostatic hyperplasia with lower urinary tract symptoms (LUTS)    Dyslipidemia    Parenchymal renal hypertension    Nocturia associated with benign prostatic hyperplasia    Anemia in stage 4 chronic kidney disease  (HCC)    Bilateral renal cysts    Chronic renal disease, stage IV (HCC)    Meibomian gland dysfunction (MGD)    Deposits (accretions) on teeth    Onychomycosis    Partial loss of teeth due to other specified cause, unspecified class    Posterior subcapsular cataract    Presbyopia    Encounter to discuss test results     Past Medical History:   Diagnosis Date    Hypertension     Prostate cancer (HCC)      Past Surgical History:   Procedure Laterality Date    COLONOSCOPY      HERNIA REPAIR Left     INSERTION PROSTATE RADIATION SEED  03/19/2024    HI BX PROSTATE STRTCTC SATURATION SAMPLING IMG GID N/A 09/26/2023    Procedure: TRANSPERINEAL MRI FUSION BIOPSY PROSTATE;  Surgeon: Sushil Thurston MD;  Location: [facility];  Service: Urology    HI PROSTATE NEEDLE BIOPSY ANY APPROACH N/A 03/02/2021    Procedure: Transperineal MRI Fusion prostate biopsy and gold seed marker insertion;  Surgeon: Farhan Jacobo MD;  Location: [facility];  Service: Urology    PROSTATE BIOPSY      1/26/2015, 2/10/2017    PROSTATE BIOPSY      VASECTOMY  ?     Family History   Problem Relation Age of Onset    Hypertension Mother     Prostate cancer Brother     No  Known Problems Brother     No Known Problems Sister     No Known Problems Son     No Known Problems Son     No Known Problems Daughter      Social History     Socioeconomic History    Marital status: /Civil Union     Spouse name: Not on file    Number of children: Not on file    Years of education: Not on file    Highest education level: Not on file   Occupational History    Not on file   Tobacco Use    Smoking status: Never     Passive exposure: Past    Smokeless tobacco: Never    Tobacco comments:     tried it once    Vaping Use    Vaping status: Never Used   Substance and Sexual Activity    Alcohol use: Yes     Alcohol/week: 2.0 standard drinks of alcohol     Types: 1 Glasses of wine, 1 Cans of beer per week     Comment: social    Drug use: No    Sexual activity: Yes   Other Topics Concern    Not on file   Social History Narrative    Not on file     Social Drivers of Health     Financial Resource Strain: Not on file   Food Insecurity: Not on file   Transportation Needs: Not on file   Physical Activity: Not on file   Stress: Not on file   Social Connections: Not on file   Intimate Partner Violence: Not on file   Housing Stability: Not on file       Current Outpatient Medications:     amLODIPine (NORVASC) 5 mg tablet, take 1 tablet by mouth once daily IN THE EARLY MORNING, Disp: 90 tablet, Rfl: 1    Apoaequorin (Prevagen Extra Strength) 20 MG CAPS, Take 20 mg by mouth in the morning Once daily (Patient not taking: Reported on 9/11/2024), Disp: , Rfl:     atovaquone-proguanil (MALARONE) 250-100 mg, Take 1 tablet by mouth daily with breakfast for 18 days Do not start before September 27, 2024. (Patient not taking: Reported on 9/11/2024), Disp: 18 tablet, Rfl: 1    finasteride (PROSCAR) 5 mg tablet, take 1 tablet by mouth once daily, Disp: 90 tablet, Rfl: 1    Multiple Vitamins-Minerals (CENTRUM SILVER 50+MEN PO), Take by mouth as needed, Disp: , Rfl:     sildenafil (VIAGRA) 50 MG tablet, Take 1 tablet (50 mg  total) by mouth as needed for erectile dysfunction, Disp: 30 tablet, Rfl: 2    sulfamethoxazole-trimethoprim (BACTRIM DS) 800-160 mg per tablet, , Disp: , Rfl:     tamsulosin (FLOMAX) 0.4 mg, Take 1 capsule (0.4 mg total) by mouth 2 (two) times a day with meals (Patient not taking: Reported on 10/16/2024), Disp: 180 capsule, Rfl: 0  No Known Allergies  There were no vitals filed for this visit.

## 2025-01-03 NOTE — ASSESSMENT & PLAN NOTE
Sharif Johansen 1945 is a 79 y.o. male  with a history of low risk prostate cancer diagnosed in 2011 on active surveillance noted with progression to high risk disease with gross SHELDON on MRI, GS 9 (4+5) pathology and PSA from elevated at 4.76ng/mL on 7/21/23 on finasteride, correction is 9.52ng/mL.  He underwent ADT long course, brachytherapy boost, and pelvic radiation to prostate, sv, and lymph nodes completed on 5/29/2024.      -Clinically YANDY.  -We reviewed his PSA, which is basically undetectable.  -He is recovering well from treatment and is maintained on ADT.    Planned for 18 months.  Injection scheduled in February.  Appears to be tolerating ADT well.    Discussed antispasmodic medications for urinary urgency if he felt symptoms were problematic.  He deferred at this time.  Will consider discussion with Urology if changes his mind or worsens.  -Follow-up with PSA prior to Urology visit.  Scheduled for him today.  -Follow-up in 6 months.    Orders:    PSA, ultrasensitive; Future

## 2025-01-03 NOTE — PROGRESS NOTES
Follow-up Visit   Name: Sharif Johansen      : 1945      MRN: [id number]  Encounter Provider: Ifeoma Lozano MD  Encounter Date: 1/3/2025   Encounter department: [facility] RADIATION ONCOLOGY  :  Assessment & Plan  Prostate cancer (HCC)  Sharif Johansen 1945 is a 79 y.o. male  with a history of low risk prostate cancer diagnosed in  on active surveillance noted with progression to high risk disease with gross SHELDON on MRI, GS 9 (4+5) pathology and PSA from elevated at 4.76ng/mL on 23 on finasteride, correction is 9.52ng/mL.  He underwent ADT long course, brachytherapy boost, and pelvic radiation to prostate, sv, and lymph nodes completed on 2024.      -Clinically YANDY.  -We reviewed his PSA, which is basically undetectable.  -He is recovering well from treatment and is maintained on ADT.    Planned for 18 months.  Injection scheduled in February.  Appears to be tolerating ADT well.    Discussed antispasmodic medications for urinary urgency if he felt symptoms were problematic.  He deferred at this time.  Will consider discussion with Urology if changes his mind or worsens.  -Follow-up with PSA prior to Urology visit.  Scheduled for him today.  -Follow-up in 6 months.    Orders:    PSA, ultrasensitive; Future        History of Present Illness   Chief Complaint   Patient presents with    Prostate Cancer    Follow-up   Pertinent Medical History   Sharif Johansen 1945 is a 79 y.o. male  with a history of low risk prostate cancer diagnosed in  on active surveillance noted with progression to high risk disease with gross SHELDON on MRI, GS 9 (4+5) pathology and PSA from elevated at 4.76ng/mL on 23 on finasteride, correction is 9.52ng/mL.  He underwent ADT long course, brachytherapy boost, and pelvic radiation to prostate, sv, and lymph nodes completed on 2024.  He presents today for follow up.     24 UrologyChadd  Completed definitive radiation in May and was recommended for  long-term ADT(18 months).   Lupron given today  Follow-up in 6 months  -He can get a PSA in September and prior to visit     24 Dr. Geraldo guillermo IgG MGUS   prostate cancer  MGUS labs including SPEP, CBC, CMP q1-2 years   F/u Rad Onc for RT  F/u Urology for ADT planned       PSA   Latest Ref Rng 0.000 - 4.000 ng/mL   2023 8.9 (H)    2023 4.76 (H)    9/3/2024 <0.008       The patient generally feels well with improvement in energy.  He denies significant urinary symptoms including hematuria, dysuria, or urinary incontinence.  His IPPS score is good today at 3.  His main issue is continued urgency and nocturia, but this is not very bothersome.  He empties his bladder fully.  He has stopped finasteride and tamsulosin.  He denies diarrhea or rectal bleeding.     [facility]  25 Dr. Cho    Oncology History   Cancer Staging   Prostate cancer ([facility])  Staging form: Prostate, AJCC 7th Edition  - Clinical stage from 2023: Stage III (T3a, N0, M0, PSA: Less than 10, [place] 8-10) - Signed by Ifeoma Lozano MD on 2023  Oncology History   Prostate cancer ([facility])   3/20/2013 Initial Diagnosis    Prostate cancer ([facility])     3/2/2021 Biopsy    A. Prostate, L lat base, Biopsy:  - Benign prostatic tissue.      B. Prostate, L base, Biopsy:  - Benign prostatic tissue.       C. Prostate, L lat med, Biopsy:  - Benign prostatic tissue.       D. Prostate, L med, Biopsy:  - Benign prostatic tissue.       E. Prostate, L lat apex, Biopsy:  - Benign prostatic tissue.       F. Prostate, L apex, Biopsy:  - Benign prostatic tissue.      G. Prostate, R lat base, Biopsy:  - Benign prostatic tissue.       H. Prostate, R base, Biopsy:  - Prostatic adenocarcinoma, Melany score 3+3=6, Prognostic Grade Group 1, involving 1 of 1 core (5% involvement).  - Focal high-grade prostatic intraepithelial neoplasia (HGPIN).  - Perineural invasion is not identified.     I. Prostate, R lat med, Biopsy:  - Benign  prostatic tissue.       J. Prostate, R med, Biopsy:  - Benign prostatic tissue.       K. Prostate, R lat apex, Biopsy:  - No tissue present.     L. Prostate, R apex, Biopsy:  - Benign prostatic tissue.       M. Prostate, Target L lat med, Biopsy:  - Benign prostatic tissue.       9/6/2023 Biopsy    A. Prostate, FABIANA #1 L base x 5:  Acinar adenocarcinoma  - Grade group 3 (Melany score: 4+3 =7)  - Involving 70%, 70% and 13% of 3 out of 5 core fragments, tumor lengths are 11 mm, 11 mm and 2 mm  - Percentage of pattern 4: approx. 92%  - Perineural invasion identified     B. Prostate, L post lat x 2:  Benign prostate tissue with basal cell hyperplasia and chronic prostatitis     C. Prostate, L post med x 2:  Benign prostatic hyperplasia and chronic prostatitis     D. Prostate, L base x 2:  Benign prostatic hyperplasia and chronic prostatitis     E. Prostate, L ant lat x 2:  Acinar adenocarcinoma  - Grade group 5 ([place] score: 4+5 =9)  - Involving 30% of 1 out of 2 core fragments, tumor length is 5 mm  - Percentage of pattern 5: approx. 5%     F. Prostate, L ant med x 2:  Benign prostate tissue with basal cell hyperplasia and chronic prostatitis     G. Prostate, FABIANA # 2R apex x 5:  Acinar adenocarcinoma  - Grade group 2 ([place] score: 3+4 =7)  - Involving 50%, 45%, 35%, 15% and 11% of 5 out of 5 core fragments, tumor lengths are 4.5 mm, 4.5 mm, 2 mm, 3 mm and 1 mm  - Percentage of pattern 4: approx. 45%  - High grade prostatic intraepithelial neoplasia     H. Prostate, R post lat x 2:  Atypical glands, suspicious for malignancy  High grade prostatic intraepithelial neoplasia     I. Prostate, R post med x2:  Rare atypical glands  Background of benign prostate tissue with basal cell hyperplasia and chronic prostatitis     J. Prostate, R base x2:  Benign prostate tissue     K. Prostate, R ant latx2:  Benign prostatic hyperplasia and chronic prostatitis     L. Prostate, R ant med x 2:  Benign prostate tissue with basal  cell hyperplasia and chronic prostatitis        9/6/2023 -  Cancer Staged    Staging form: Prostate, AJCC 7th Edition  - Clinical stage from 9/6/2023: Stage III (T3a, N0, M0, PSA: Less than 10, Melany 8-10) - Signed by Ifeoma Lozano MD on 11/27/2023       1/3/2024 -  Hormone Therapy    Firmagon 240 mg     2/12/2024 -  Hormone Therapy    Lupron 45 mg     3/19/2024 -  Radiation    [facility]- HDR brachytherapy    TRANSPERINEAL PLACEMENT OF BIODEGRADABLE MATERIAL, CED-PROSTATIC, SINGLE OR MULTIPLE INJECTION(S), INCLUDING IMAGE GUIDANCE, WHEN PERFORMED      4/23/2024 - 5/29/2024 Radiation    Treatments:  Course: C1  Plan ID Energy Fractions Dose per Fraction (cGy) Dose Correction (cGy) Total Dose Delivered (cGy) Elapsed Days   Whole Pelvis 10X 25 / 25 180 0 4,500 36    Treatment Dates:  4/23/2024 - 5/29/2024.      8/14/2024 -  Hormone Therapy    Lupron 45 mg        Review of Systems Refer to nursing note.    Current Outpatient Medications on File Prior to Visit   Medication Sig Dispense Refill    amLODIPine (NORVASC) 5 mg tablet take 1 tablet by mouth once daily IN THE EARLY MORNING 90 tablet 1    Apoaequorin (Prevagen Extra Strength) 20 MG CAPS Take 20 mg by mouth in the morning Once daily      Multiple Vitamins-Minerals (CENTRUM SILVER 50+MEN PO) Take by mouth as needed      sildenafil (VIAGRA) 50 MG tablet Take 1 tablet (50 mg total) by mouth as needed for erectile dysfunction 30 tablet 2    atovaquone-proguanil (MALARONE) 250-100 mg Take 1 tablet by mouth daily with breakfast for 18 days Do not start before September 27, 2024. (Patient not taking: Reported on 9/11/2024) 18 tablet 1    finasteride (PROSCAR) 5 mg tablet take 1 tablet by mouth once daily (Patient not taking: Reported on 1/3/2025) 90 tablet 1    sulfamethoxazole-trimethoprim (BACTRIM DS) 800-160 mg per tablet  (Patient not taking: Reported on 1/3/2025)      tamsulosin (FLOMAX) 0.4 mg Take 1 capsule (0.4 mg total) by mouth 2 (two) times a  "day with meals (Patient not taking: Reported on 10/16/2024) 180 capsule 0     No current facility-administered medications on file prior to visit.      Social History     Tobacco Use    Smoking status: Never     Passive exposure: Past    Smokeless tobacco: Never    Tobacco comments:     tried it once    Vaping Use    Vaping status: Never Used   Substance and Sexual Activity    Alcohol use: Yes     Alcohol/week: 2.0 standard drinks of alcohol     Types: 1 Glasses of wine, 1 Cans of beer per week     Comment: social    Drug use: No    Sexual activity: Yes         Objective   /82   Pulse 66   Temp (!) 97.3 °F (36.3 °C)   Resp 16   Wt 64.4 kg (142 lb)   SpO2 96%   BMI 25.15 kg/m²     Pain Screening:  Pain Score: 0-No pain  ECOG  0  Physical Exam  Vitals and nursing note reviewed.   Constitutional:       General: He is not in acute distress.     Appearance: He is well-developed.   Cardiovascular:      Rate and Rhythm: Normal rate and regular rhythm.   Pulmonary:      Breath sounds: No wheezing, rhonchi or rales.   Abdominal:      General: There is no distension.      Palpations: Abdomen is soft.      Tenderness: There is no abdominal tenderness. There is no right CVA tenderness or left CVA tenderness.   Musculoskeletal:      Right lower leg: No edema.      Left lower leg: No edema.      Comments: No spinal tenderness to percussion   Lymphadenopathy:      Cervical: No cervical adenopathy.      Upper Body:      Right upper body: No supraclavicular adenopathy.      Left upper body: No supraclavicular adenopathy.   Neurological:      Mental Status: He is alert and oriented to person, place, and time.      Gait: Gait normal.          Administrative Statements   I have spent a total time of 25 minutes in caring for this patient on the day of the visit/encounter including   Portions of the record may have been created with voice recognition software.  Occasional wrong word or \"sound a like\" substitutions may have " occurred due to the inherent limitations of voice recognition software.  Read the chart carefully and recognize, using context, where substitutions have occurred.

## 2025-02-20 NOTE — PROGRESS NOTES
UROLOGY PROGRESS NOTE   Patient Identifiers: Sharif Johansen (MRN [id number])  Date of Service: 2/20/2025    Subjective:   80-year-old man history of Melany 9 prostate cancer.  He completed definitive radiation last year and was recommended for long-term ADT.  PSA in September was undetectable <0.008.  Lupron 45 mg given today.  He is having some slow urinary flow since he has been off of tamsulosin.  His weight is rebounding.  He has no other complaints.    Reason for visit: Prostate cancer follow-up    Objective:     VITALS:    There were no vitals filed for this visit.        LABS:  Lab Results   Component Value Date    HGB 10.2 (L) 10/24/2024    HCT 33.4 (L) 10/24/2024    WBC 6.55 10/24/2024     10/24/2024   ]    Lab Results   Component Value Date    K 4.9 10/24/2024     10/24/2024    CO2 29 10/24/2024    BUN 42 (H) 10/24/2024    CREATININE 2.62 (H) 10/24/2024    CALCIUM 8.9 10/24/2024   ]        INPATIENT MEDS:    Current Outpatient Medications:     amLODIPine (NORVASC) 5 mg tablet, take 1 tablet by mouth once daily IN THE EARLY MORNING, Disp: 90 tablet, Rfl: 1    Apoaequorin (Prevagen Extra Strength) 20 MG CAPS, Take 20 mg by mouth in the morning Once daily, Disp: , Rfl:     atovaquone-proguanil (MALARONE) 250-100 mg, Take 1 tablet by mouth daily with breakfast for 18 days Do not start before September 27, 2024. (Patient not taking: Reported on 9/11/2024), Disp: 18 tablet, Rfl: 1    finasteride (PROSCAR) 5 mg tablet, take 1 tablet by mouth once daily (Patient not taking: Reported on 1/3/2025), Disp: 90 tablet, Rfl: 1    Multiple Vitamins-Minerals (CENTRUM SILVER 50+MEN PO), Take by mouth as needed, Disp: , Rfl:     sildenafil (VIAGRA) 50 MG tablet, Take 1 tablet (50 mg total) by mouth as needed for erectile dysfunction, Disp: 30 tablet, Rfl: 2    sulfamethoxazole-trimethoprim (BACTRIM DS) 800-160 mg per tablet, , Disp: , Rfl:     tamsulosin (FLOMAX) 0.4 mg, Take 1 capsule (0.4 mg total) by mouth 2  (two) times a day with meals (Patient not taking: Reported on 10/16/2024), Disp: 180 capsule, Rfl: 0      Physical Exam:   There were no vitals taken for this visit.  GEN: no acute distress    RESP: breathing comfortably with no accessory muscle use    ABD: soft, non-tender, non-distended   INCISION:    EXT: no significant peripheral edema       RADIOLOGY:   None    Assessment:   #1.  Prostate cancer  #2.  Chronic kidney disease    Plan:   -Lupron 45 mg given today  -Will restart his tamsulosin  -Follow-up in 6 months for his last Lupron shot  -

## 2025-02-21 ENCOUNTER — OFFICE VISIT (OUTPATIENT)
Age: 80
End: 2025-02-21
Payer: MEDICARE

## 2025-02-21 ENCOUNTER — TELEPHONE (OUTPATIENT)
Age: 80
End: 2025-02-21

## 2025-02-21 VITALS
WEIGHT: 143 LBS | HEART RATE: 75 BPM | OXYGEN SATURATION: 100 % | DIASTOLIC BLOOD PRESSURE: 70 MMHG | HEIGHT: 63 IN | SYSTOLIC BLOOD PRESSURE: 138 MMHG | BODY MASS INDEX: 25.34 KG/M2

## 2025-02-21 DIAGNOSIS — R35.0 BENIGN PROSTATIC HYPERPLASIA WITH URINARY FREQUENCY: ICD-10-CM

## 2025-02-21 DIAGNOSIS — C61 PROSTATE CANCER (HCC): Primary | ICD-10-CM

## 2025-02-21 DIAGNOSIS — N40.1 BENIGN PROSTATIC HYPERPLASIA WITH URINARY FREQUENCY: ICD-10-CM

## 2025-02-21 PROCEDURE — 99213 OFFICE O/P EST LOW 20 MIN: CPT | Performed by: PHYSICIAN ASSISTANT

## 2025-02-21 RX ORDER — TAMSULOSIN HYDROCHLORIDE 0.4 MG/1
0.4 CAPSULE ORAL
Qty: 90 CAPSULE | Refills: 3 | Status: SHIPPED | OUTPATIENT
Start: 2025-02-21

## 2025-02-21 NOTE — TELEPHONE ENCOUNTER
Last Visit: 12/10/2024  Recommended follow up: Around 06/10/2025  Next Visit: Not yet scheduled    Medication: Metadate 40 mg CR capsules  Last Rx: Written 01/29/2025, #30 with 0 refills     Medication Management Agreement: Last signed 12/10/2024  Last Drug Screen: 12/10/2024        Not due for refill until 02/28/2025. Prescription was pended with this start date.    Request was forwarded to prescriber for review and signature, if agreeable.        PATIENT ARRIVED AND IS WAITING TO BE SEEN.

## 2025-02-21 NOTE — TELEPHONE ENCOUNTER
Inbound call received from Patient seeking assistance with directions as he is lost. Neuro CTS could not locate where he was in relation to urology office.  Patient is trying to get to his appointment at 10:45 am. CTS called [phone] listed on appointment but it did not lead to the appropriate area. CTS called  [phone] and warm transferred to [facility] for assistance.

## 2025-03-10 ENCOUNTER — TELEPHONE (OUTPATIENT)
Dept: NEPHROLOGY | Facility: CLINIC | Age: 80
End: 2025-03-10

## 2025-04-19 DIAGNOSIS — E78.5 DYSLIPIDEMIA: ICD-10-CM

## 2025-04-19 DIAGNOSIS — D63.1 ANEMIA IN STAGE 3B CHRONIC KIDNEY DISEASE  (HCC): ICD-10-CM

## 2025-04-19 DIAGNOSIS — I12.9 PARENCHYMAL RENAL HYPERTENSION, STAGE 1 THROUGH STAGE 4 OR UNSPECIFIED CHRONIC KIDNEY DISEASE: ICD-10-CM

## 2025-04-19 DIAGNOSIS — N28.1 BILATERAL RENAL CYSTS: ICD-10-CM

## 2025-04-19 DIAGNOSIS — N18.32 STAGE 3B CHRONIC KIDNEY DISEASE (HCC): ICD-10-CM

## 2025-04-19 DIAGNOSIS — N18.32 ANEMIA IN STAGE 3B CHRONIC KIDNEY DISEASE  (HCC): ICD-10-CM

## 2025-04-21 RX ORDER — AMLODIPINE BESYLATE 5 MG/1
5 TABLET ORAL EVERY MORNING
Qty: 90 TABLET | Refills: 1 | Status: SHIPPED | OUTPATIENT
Start: 2025-04-21

## 2025-04-22 ENCOUNTER — TELEPHONE (OUTPATIENT)
Dept: NEPHROLOGY | Facility: CLINIC | Age: 80
End: 2025-04-22

## 2025-04-22 NOTE — TELEPHONE ENCOUNTER
Left a message about patient bringing  BP readings to appointment 5/2.          ----- Message from Duc Yuan MD sent at 4/22/2025  9:56 AM EDT -----  Blood pressures please prior to appointment

## 2025-04-22 NOTE — ASSESSMENT & PLAN NOTE
Overall close to goal he will send in another week readings in 3 months  Continue excellent non medical regimen  If needed potential addition of very tiny dose of a diuretic given borderline hyperkalemia    Orders:    Basic metabolic panel; Future    Magnesium; Future    Comprehensive metabolic panel; Future    CBC; Future    Lipid Panel with Direct LDL reflex; Future    Magnesium; Future    Phosphorus; Future    Protein / creatinine ratio, urine; Future    PTH, intact; Future

## 2025-04-22 NOTE — ASSESSMENT & PLAN NOTE
Begin rosuvastatin 5 mg twice a week  Orders:    rosuvastatin (CRESTOR) 5 mg tablet; Take 1 tablet (5 mg total) by mouth 2 (two) times a week    Basic metabolic panel; Future    Magnesium; Future    Comprehensive metabolic panel; Future    CBC; Future    Lipid Panel with Direct LDL reflex; Future    Magnesium; Future    Phosphorus; Future    Protein / creatinine ratio, urine; Future    PTH, intact; Future

## 2025-04-22 NOTE — PROGRESS NOTES
Name: Sharif Johansen      : 1945      MRN: [id number]  Encounter Provider: Duc Yuan MD  Encounter Date: 2025   Encounter department: [facility]  :  Assessment & Plan  Chronic renal disease, stage IV (HCC)  At baseline  Orders:    Basic metabolic panel; Future    Magnesium; Future    Comprehensive metabolic panel; Future    CBC; Future    Lipid Panel with Direct LDL reflex; Future    Magnesium; Future    Phosphorus; Future    Protein / creatinine ratio, urine; Future    PTH, intact; Future    Parenchymal renal hypertension, stage 1 through stage 4 or unspecified chronic kidney disease  Overall close to goal he will send in another week readings in 3 months  Continue excellent non medical regimen  If needed potential addition of very tiny dose of a diuretic given borderline hyperkalemia    Orders:    Basic metabolic panel; Future    Magnesium; Future    Comprehensive metabolic panel; Future    CBC; Future    Lipid Panel with Direct LDL reflex; Future    Magnesium; Future    Phosphorus; Future    Protein / creatinine ratio, urine; Future    PTH, intact; Future    Anemia in stage 4 chronic kidney disease  (HCC)  Stable would recommend iron every other day  Orders:    Basic metabolic panel; Future    Magnesium; Future    Comprehensive metabolic panel; Future    CBC; Future    Lipid Panel with Direct LDL reflex; Future    Magnesium; Future    Phosphorus; Future    Protein / creatinine ratio, urine; Future    PTH, intact; Future    Dyslipidemia  Begin rosuvastatin 5 mg twice a week  Orders:    rosuvastatin (CRESTOR) 5 mg tablet; Take 1 tablet (5 mg total) by mouth 2 (two) times a week    Basic metabolic panel; Future    Magnesium; Future    Comprehensive metabolic panel; Future    CBC; Future    Lipid Panel with Direct LDL reflex; Future    Magnesium; Future    Phosphorus; Future    Protein / creatinine ratio, urine; Future    PTH, intact; Future        History of Present Illness    HPI  Sharif Johansen is a 80 y.o. male who presents with follow-up regarding CKD 4  There has been no hospitalizations or acute illnesses since last visit.  The patient overall is feeling well.  Good appetite and good energy  No fevers, chills, or cough or colds.  No hematuria, dysuria, voiding symptoms or foamy urine  No gastrointestinal symptoms  No cardiovascular symptoms including swelling of the legs: Did have a Baker's cyst of the left knee status post arthrocentesis  No headaches, dizziness or lightheadedness  Blood pressure medications:  Amlodipine 5 mg daily in the morning    Renal pertinent medications:  Flomax 0.4 mg twice a day      Review of Systems  Please see HPI, otherwise the review of systems as completely reviewed with the patient are negative    Pertinent Medical History   80-year-old male with a history of prostate CA who we are seeing for CKD stage 3:     1.  CKD stage 4  Etiology: Arteriolar nephrosclerosis,?  Hypertensive nephrosclerosis;  no evidence of primary glomerular process with a bland urinalysis without proteinuria or hematuria; no evidence of obstructive uropathy   Baseline creatinine: Most recently 2.2-2.6  Current creatinine: 2.28 from 4/23/2025 at baseline  Urine protein creatinine ratio:  0.20 g at goal   UA:  No proteinuria and no hematuria or pyuria   PVR with bladder scan:   renal artery duplex: negative   renal ultrasound demonstrated cysts which are benign and simple no further evaluation required per the [facility] radiologist Dr. Jay Dia  Recommendations:  Treat hypertension-please see below  Treat dyslipidemia-please see below  Maintain proteinuria less than 1 g or as low as possible  Avoid nephrotoxic agents such as NSAIDs, and proton pump inhibitors if possible; patient counseled as such  Follow-up renal ultrasound 4/27/2023 was negative with bilateral renal cysts most simple  Kidney smart completed        2.  Volume:  Euvolemic     3.  Hypertension:        Current blood pressure averages:   Blood pressures based on just a few readings  AM: 135/72  P.m.: 133/73  Heart rate 60-70 range     Goal blood pressure: less than 130/80 given CKD     Recommendations:  Push nonmedical regimen including weight loss, isotonic exercise and a low sodium diet.  Patient has been counseled the such.  MedicationChanges today:    He is very close to goal with only slight elevation in systolic, given low diastolic I am going to simply have him recheck it in 3 months     4.  Electrolytes:  All acceptable including potassium 4.8        5.  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  Dyslipidemia:  Goal LDL: less than 100 given CKD  Current lipid profile:  /HDL 58/triglycerides 53: From 4/23/2025  Recommendations:   Low-cholesterol/low-fat diet / weight loss as appropriate and isotonic exercise   Medication changes today: He is agreeable to rosuvastatin 5 mg twice a week which should be enough to get him to goal     7.  Anemia:  Current hemoglobin: 10.3 stable: Followed by hematology oncology  Ferritin slightly low at 91 otherwise saturation good would recommend iron over-the-counter every other day     8.  Other problems:  Prostate CA followed by Urology with negative biopsies of the last several years.  Last biopsy was 03/02/2021.:  Status post recent biopsy by Dr. Thurston 9/26/2023: Positive for prostate cancer potential radiation therapy  Needs follow-up renal ultrasound 12 months after 4/27/2023 for some mildly complex cysts: Discussed with patient to follow-up in April 2024 to follow the cysts  Avascular necrosis of the left knee    MGUS followed by Hematology      GI health maintenance:  11/04/2020:  By Dr. Mcclelland no polyps normal colonoscopy no further procedure required            Medical History Reviewed by provider this encounter:     .  Past  Medical History   Past Medical History:   Diagnosis Date    Hypertension     Prostate cancer (HCC)      Past Surgical History:   Procedure Laterality Date    COLONOSCOPY      HERNIA REPAIR Left     INSERTION PROSTATE RADIATION SEED  03/19/2024    MN BX PROSTATE STRTCTC SATURATION SAMPLING IMG GID N/A 09/26/2023    Procedure: TRANSPERINEAL MRI FUSION BIOPSY PROSTATE;  Surgeon: Sushil Thurston MD;  [facility];  Service: Urology    MN PROSTATE NEEDLE BIOPSY ANY APPROACH N/A 03/02/2021    Procedure: Transperineal MRI Fusion prostate biopsy and gold seed marker insertion;  Surgeon: Farhan Jacobo MD;  Location: [facility];  Service: Urology    PROSTATE BIOPSY      1/26/2015, 2/10/2017    PROSTATE BIOPSY      VASECTOMY  ?     Family History   Problem Relation Age of Onset    Hypertension Mother     Prostate cancer Brother     No Known Problems Brother     No Known Problems Sister     No Known Problems Son     No Known Problems Son     No Known Problems Daughter       reports that he has never smoked. He has been exposed to tobacco smoke. He has never used smokeless tobacco. He reports current alcohol use of about 2.0 standard drinks of alcohol per week. He reports that he does not use drugs.  Current Outpatient Medications   Medication Instructions    amLODIPine (NORVASC) 5 mg, Oral, Every morning    atovaquone-proguanil (MALARONE) 250-100 mg 1 tablet, Oral, Daily with breakfast    finasteride (PROSCAR) 5 mg, Oral, Daily    Multiple Vitamins-Minerals (CENTRUM SILVER 50+MEN PO) As needed    Prevagen Extra Strength 20 mg, Daily    [START ON 5/5/2025] rosuvastatin (CRESTOR) 5 mg, Oral, 2 times weekly    sildenafil (VIAGRA) 50 mg, Oral, As needed    sulfamethoxazole-trimethoprim (BACTRIM DS) 800-160 mg per tablet     tamsulosin (FLOMAX) 0.4 mg, Oral, Daily with dinner   No Known Allergies   Current Outpatient Medications on File Prior to Visit   Medication Sig Dispense Refill    amLODIPine (NORVASC) 5 mg tablet TAKE  "1 TABLET BY MOUTH IN THE MORNING 90 tablet 1    Multiple Vitamins-Minerals (CENTRUM SILVER 50+MEN PO) Take by mouth as needed      sildenafil (VIAGRA) 50 MG tablet Take 1 tablet (50 mg total) by mouth as needed for erectile dysfunction 30 tablet 2    tamsulosin (FLOMAX) 0.4 mg Take 1 capsule (0.4 mg total) by mouth daily with dinner (Patient taking differently: Take 0.4 mg by mouth 2 (two) times a day) 90 capsule 3    Apoaequorin (Prevagen Extra Strength) 20 MG CAPS Take 20 mg by mouth in the morning Once daily (Patient not taking: Reported on 5/2/2025)      atovaquone-proguanil (MALARONE) 250-100 mg Take 1 tablet by mouth daily with breakfast for 18 days Do not start before September 27, 2024. (Patient not taking: Reported on 9/11/2024) 18 tablet 1    finasteride (PROSCAR) 5 mg tablet take 1 tablet by mouth once daily (Patient not taking: Reported on 1/3/2025) 90 tablet 1    sulfamethoxazole-trimethoprim (BACTRIM DS) 800-160 mg per tablet  (Patient not taking: Reported on 5/2/2025)       No current facility-administered medications on file prior to visit.      Social History     Tobacco Use    Smoking status: Never     Passive exposure: Past    Smokeless tobacco: Never    Tobacco comments:     tried it once    Vaping Use    Vaping status: Never Used   Substance and Sexual Activity    Alcohol use: Yes     Alcohol/week: 2.0 standard drinks of alcohol     Types: 1 Glasses of wine, 1 Cans of beer per week     Comment: social    Drug use: No    Sexual activity: Yes        Objective   Ht 5' 3\" (1.6 m)   Wt 65.3 kg (144 lb)   BMI 25.51 kg/m²      Physical Exam  BP sitting on left: 110/60 with a heart rate of 68 and regular  BP standing on left: 120/60 with a heart rate of 72 and regular  .Physical Exam: General:  No acute distress  Skin:  No acute rash  Eyes:  No scleral icterus and noninjected  ENT:  Moist mucous membranes  Neck:  Supple, no jugular venous distention, trachea midline, overall appearance is " normal  Chest:  Clear to auscultation  CVS:  Regular rate and rhythm, without a rub or gallops  Abdomen:  Normal bowel sounds, soft and nontender and nondistended  Extremities:  No edema, and no cyanosis, no significant arthritic changes  Neuro:  No gross focality  Psych:  Alert and oriented and appropriate

## 2025-04-22 NOTE — ASSESSMENT & PLAN NOTE
At baseline  Orders:    Basic metabolic panel; Future    Magnesium; Future    Comprehensive metabolic panel; Future    CBC; Future    Lipid Panel with Direct LDL reflex; Future    Magnesium; Future    Phosphorus; Future    Protein / creatinine ratio, urine; Future    PTH, intact; Future

## 2025-04-22 NOTE — ASSESSMENT & PLAN NOTE
Stable would recommend iron every other day  Orders:    Basic metabolic panel; Future    Magnesium; Future    Comprehensive metabolic panel; Future    CBC; Future    Lipid Panel with Direct LDL reflex; Future    Magnesium; Future    Phosphorus; Future    Protein / creatinine ratio, urine; Future    PTH, intact; Future

## 2025-04-23 ENCOUNTER — APPOINTMENT (OUTPATIENT)
Dept: LAB | Facility: CLINIC | Age: 80
End: 2025-04-23
Attending: INTERNAL MEDICINE
Payer: MEDICARE

## 2025-04-23 DIAGNOSIS — E78.5 DYSLIPIDEMIA: ICD-10-CM

## 2025-04-23 DIAGNOSIS — N18.4 CHRONIC RENAL DISEASE, STAGE IV (HCC): ICD-10-CM

## 2025-04-23 DIAGNOSIS — I12.9 PARENCHYMAL RENAL HYPERTENSION, STAGE 1 THROUGH STAGE 4 OR UNSPECIFIED CHRONIC KIDNEY DISEASE: ICD-10-CM

## 2025-04-23 DIAGNOSIS — D63.1 ANEMIA IN STAGE 4 CHRONIC KIDNEY DISEASE  (HCC): ICD-10-CM

## 2025-04-23 DIAGNOSIS — N18.4 ANEMIA IN STAGE 4 CHRONIC KIDNEY DISEASE  (HCC): ICD-10-CM

## 2025-04-23 LAB
ALBUMIN SERPL BCG-MCNC: 3.7 G/DL (ref 3.5–5)
ALP SERPL-CCNC: 91 U/L (ref 34–104)
ALT SERPL W P-5'-P-CCNC: 16 U/L (ref 7–52)
ANION GAP SERPL CALCULATED.3IONS-SCNC: 5 MMOL/L (ref 4–13)
AST SERPL W P-5'-P-CCNC: 20 U/L (ref 13–39)
BACTERIA UR QL AUTO: NORMAL /HPF
BILIRUB SERPL-MCNC: 0.34 MG/DL (ref 0.2–1)
BILIRUB UR QL STRIP: NEGATIVE
BUN SERPL-MCNC: 41 MG/DL (ref 5–25)
CALCIUM SERPL-MCNC: 9.1 MG/DL (ref 8.4–10.2)
CHLORIDE SERPL-SCNC: 108 MMOL/L (ref 96–108)
CHOLEST SERPL-MCNC: 173 MG/DL (ref ?–200)
CLARITY UR: CLEAR
CO2 SERPL-SCNC: 28 MMOL/L (ref 21–32)
COLOR UR: NORMAL
CREAT SERPL-MCNC: 2.28 MG/DL (ref 0.6–1.3)
CREAT UR-MCNC: 81.9 MG/DL
ERYTHROCYTE [DISTWIDTH] IN BLOOD BY AUTOMATED COUNT: 14 % (ref 11.6–15.1)
FERRITIN SERPL-MCNC: 91 NG/ML (ref 30–336)
GFR SERPL CREATININE-BSD FRML MDRD: 26 ML/MIN/1.73SQ M
GLUCOSE P FAST SERPL-MCNC: 84 MG/DL (ref 65–99)
GLUCOSE UR STRIP-MCNC: NEGATIVE MG/DL
HCT VFR BLD AUTO: 33.6 % (ref 36.5–49.3)
HDLC SERPL-MCNC: 58 MG/DL
HGB BLD-MCNC: 10.3 G/DL (ref 12–17)
HGB UR QL STRIP.AUTO: NEGATIVE
IRON SATN MFR SERPL: 22 % (ref 15–50)
IRON SERPL-MCNC: 54 UG/DL (ref 50–212)
KETONES UR STRIP-MCNC: NEGATIVE MG/DL
LDLC SERPL CALC-MCNC: 104 MG/DL (ref 0–100)
LEUKOCYTE ESTERASE UR QL STRIP: NEGATIVE
MAGNESIUM SERPL-MCNC: 2.2 MG/DL (ref 1.9–2.7)
MCH RBC QN AUTO: 28.9 PG (ref 26.8–34.3)
MCHC RBC AUTO-ENTMCNC: 30.7 G/DL (ref 31.4–37.4)
MCV RBC AUTO: 94 FL (ref 82–98)
NITRITE UR QL STRIP: NEGATIVE
NON-SQ EPI CELLS URNS QL MICRO: NORMAL /HPF
PH UR STRIP.AUTO: 6 [PH]
PHOSPHATE SERPL-MCNC: 3.4 MG/DL (ref 2.3–4.1)
PLATELET # BLD AUTO: 229 THOUSANDS/UL (ref 149–390)
PMV BLD AUTO: 9.6 FL (ref 8.9–12.7)
POTASSIUM SERPL-SCNC: 4.8 MMOL/L (ref 3.5–5.3)
PROT SERPL-MCNC: 7.2 G/DL (ref 6.4–8.4)
PROT UR STRIP-MCNC: NEGATIVE MG/DL
PROT UR-MCNC: 17.9 MG/DL
PROT/CREAT UR: 0.2 MG/G{CREAT}
PTH-INTACT SERPL-MCNC: 60.5 PG/ML (ref 12–88)
RBC # BLD AUTO: 3.56 MILLION/UL (ref 3.88–5.62)
RBC #/AREA URNS AUTO: NORMAL /HPF
SODIUM SERPL-SCNC: 141 MMOL/L (ref 135–147)
SP GR UR STRIP.AUTO: 1.01 (ref 1–1.03)
TIBC SERPL-MCNC: 250.6 UG/DL (ref 250–450)
TRANSFERRIN SERPL-MCNC: 179 MG/DL (ref 203–362)
TRIGL SERPL-MCNC: 53 MG/DL (ref ?–150)
UIBC SERPL-MCNC: 197 UG/DL (ref 155–355)
UROBILINOGEN UR STRIP-ACNC: <2 MG/DL
WBC # BLD AUTO: 6.94 THOUSAND/UL (ref 4.31–10.16)
WBC #/AREA URNS AUTO: NORMAL /HPF

## 2025-04-23 PROCEDURE — 82728 ASSAY OF FERRITIN: CPT

## 2025-04-23 PROCEDURE — 36415 COLL VENOUS BLD VENIPUNCTURE: CPT

## 2025-04-23 PROCEDURE — 85027 COMPLETE CBC AUTOMATED: CPT

## 2025-04-23 PROCEDURE — 84100 ASSAY OF PHOSPHORUS: CPT

## 2025-04-23 PROCEDURE — 83540 ASSAY OF IRON: CPT

## 2025-04-23 PROCEDURE — 84156 ASSAY OF PROTEIN URINE: CPT

## 2025-04-23 PROCEDURE — 83735 ASSAY OF MAGNESIUM: CPT

## 2025-04-23 PROCEDURE — 81001 URINALYSIS AUTO W/SCOPE: CPT

## 2025-04-23 PROCEDURE — 80061 LIPID PANEL: CPT

## 2025-04-23 PROCEDURE — 83970 ASSAY OF PARATHORMONE: CPT

## 2025-04-23 PROCEDURE — 82570 ASSAY OF URINE CREATININE: CPT

## 2025-04-23 PROCEDURE — 80053 COMPREHEN METABOLIC PANEL: CPT

## 2025-04-23 PROCEDURE — 83550 IRON BINDING TEST: CPT

## 2025-04-25 ENCOUNTER — RESULTS FOLLOW-UP (OUTPATIENT)
Dept: NEPHROLOGY | Facility: CLINIC | Age: 80
End: 2025-04-25

## 2025-04-25 NOTE — RESULT ENCOUNTER NOTE
Labs reviewed and stable.  Covering Dr. Yuan in basket.  Labs to be reviewed and discussed at scheduled follow-up appointment in 2 weeks.

## 2025-05-02 ENCOUNTER — OFFICE VISIT (OUTPATIENT)
Dept: NEPHROLOGY | Facility: CLINIC | Age: 80
End: 2025-05-02
Payer: MEDICARE

## 2025-05-02 VITALS — HEIGHT: 63 IN | BODY MASS INDEX: 25.52 KG/M2 | WEIGHT: 144 LBS

## 2025-05-02 DIAGNOSIS — D63.1 ANEMIA IN STAGE 4 CHRONIC KIDNEY DISEASE  (HCC): ICD-10-CM

## 2025-05-02 DIAGNOSIS — N18.4 ANEMIA IN STAGE 4 CHRONIC KIDNEY DISEASE  (HCC): ICD-10-CM

## 2025-05-02 DIAGNOSIS — E78.5 DYSLIPIDEMIA: ICD-10-CM

## 2025-05-02 DIAGNOSIS — N18.4 CHRONIC RENAL DISEASE, STAGE IV (HCC): ICD-10-CM

## 2025-05-02 DIAGNOSIS — I12.9 PARENCHYMAL RENAL HYPERTENSION, STAGE 1 THROUGH STAGE 4 OR UNSPECIFIED CHRONIC KIDNEY DISEASE: Primary | ICD-10-CM

## 2025-05-02 PROCEDURE — G2211 COMPLEX E/M VISIT ADD ON: HCPCS | Performed by: INTERNAL MEDICINE

## 2025-05-02 PROCEDURE — 99214 OFFICE O/P EST MOD 30 MIN: CPT | Performed by: INTERNAL MEDICINE

## 2025-05-02 RX ORDER — ROSUVASTATIN CALCIUM 5 MG/1
5 TABLET, COATED ORAL 2 TIMES WEEKLY
Qty: 45 TABLET | Refills: 3 | Status: SHIPPED | OUTPATIENT
Start: 2025-05-05

## 2025-05-02 NOTE — LETTER
May 2, 2025     Alberto Graff MD  [address] [facility] [place]  [facility] [place] [address]    Patient: Sharif Johansen   YOB: 1945   Date of Visit: 2025       Dear Dr. Alberto Graff MD:    Thank you for referring Sharif Johansen to me for evaluation. Below are my notes for this consultation.    If you have questions, please do not hesitate to call me. I look forward to following your patient along with you.         Sincerely,        Duc Yuan MD        CC: No Recipients    Duc Yuan MD  2025  2:23 PM  Sign when Signing Visit  Name: Sharif Johansen      : 1945      MRN: [id number]  Encounter Provider: Duc Yuan MD  Encounter Date: 2025   Encounter department: [facility] [place]  :  Assessment & Plan  Chronic renal disease, stage IV (HCC)  At baseline  Orders:  •  Basic metabolic panel; Future  •  Magnesium; Future  •  Comprehensive metabolic panel; Future  •  CBC; Future  •  Lipid Panel with Direct LDL reflex; Future  •  Magnesium; Future  •  Phosphorus; Future  •  Protein / creatinine ratio, urine; Future  •  PTH, intact; Future    Parenchymal renal hypertension, stage 1 through stage 4 or unspecified chronic kidney disease  Overall close to goal he will send in another week readings in 3 months  Continue excellent non medical regimen  If needed potential addition of very tiny dose of a diuretic given borderline hyperkalemia    Orders:  •  Basic metabolic panel; Future  •  Magnesium; Future  •  Comprehensive metabolic panel; Future  •  CBC; Future  •  Lipid Panel with Direct LDL reflex; Future  •  Magnesium; Future  •  Phosphorus; Future  •  Protein / creatinine ratio, urine; Future  •  PTH, intact; Future    Anemia in stage 4 chronic kidney disease  (HCC)  Stable would recommend iron every other day  Orders:  •  Basic metabolic panel; Future  •  Magnesium; Future  •  Comprehensive metabolic panel; Future  •  CBC; Future  •  Lipid Panel with Direct LDL reflex; Future  •   Magnesium; Future  •  Phosphorus; Future  •  Protein / creatinine ratio, urine; Future  •  PTH, intact; Future    Dyslipidemia  Begin rosuvastatin 5 mg twice a week  Orders:  •  rosuvastatin (CRESTOR) 5 mg tablet; Take 1 tablet (5 mg total) by mouth 2 (two) times a week  •  Basic metabolic panel; Future  •  Magnesium; Future  •  Comprehensive metabolic panel; Future  •  CBC; Future  •  Lipid Panel with Direct LDL reflex; Future  •  Magnesium; Future  •  Phosphorus; Future  •  Protein / creatinine ratio, urine; Future  •  PTH, intact; Future        History of Present Illness  HPI  Sharif Johansen is a 80 y.o. male who presents with follow-up regarding CKD 4  There has been no hospitalizations or acute illnesses since last visit.  The patient overall is feeling well.  Good appetite and good energy  No fevers, chills, or cough or colds.  No hematuria, dysuria, voiding symptoms or foamy urine  No gastrointestinal symptoms  No cardiovascular symptoms including swelling of the legs: Did have a Baker's cyst of the left knee status post arthrocentesis  No headaches, dizziness or lightheadedness  Blood pressure medications:  Amlodipine 5 mg daily in the morning    Renal pertinent medications:  Flomax 0.4 mg twice a day      Review of Systems  Please see HPI, otherwise the review of systems as completely reviewed with the patient are negative    Pertinent Medical History  80-year-old male with a history of prostate CA who we are seeing for CKD stage 3:     1.  CKD stage 4  Etiology: Arteriolar nephrosclerosis,?  Hypertensive nephrosclerosis;  no evidence of primary glomerular process with a bland urinalysis without proteinuria or hematuria; no evidence of obstructive uropathy   Baseline creatinine: Most recently 2.2-2.6  Current creatinine: 2.28 from 4/23/2025 at baseline  Urine protein creatinine ratio:  0.20 g at goal   UA:  No proteinuria and no hematuria or pyuria   PVR with bladder scan:   renal artery duplex: negative    renal ultrasound demonstrated cysts which are benign and simple no further evaluation required per the [facility] radiologist Dr. Jay Dia  Recommendations:  Treat hypertension-please see below  Treat dyslipidemia-please see below  Maintain proteinuria less than 1 g or as low as possible  Avoid nephrotoxic agents such as NSAIDs, and proton pump inhibitors if possible; patient counseled as such  Follow-up renal ultrasound 4/27/2023 was negative with bilateral renal cysts most simple  Kidney smart completed        2.  Volume:  Euvolemic     3.  Hypertension:       Current blood pressure averages:   Blood pressures based on just a few readings  AM: 135/72  P.m.: 133/73  Heart rate 60-70 range     Goal blood pressure: less than 130/80 given CKD     Recommendations:  Push nonmedical regimen including weight loss, isotonic exercise and a low sodium diet.  Patient has been counseled the such.  MedicationChanges today:    He is very close to goal with only slight elevation in systolic, given low diastolic I am going to simply have him recheck it in 3 months     4.  Electrolytes:  All acceptable including potassium 4.8        5.  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  Dyslipidemia:  Goal LDL: less than 100 given CKD  Current lipid profile:  /HDL 58/triglycerides 53: From 4/23/2025  Recommendations:   Low-cholesterol/low-fat diet / weight loss as appropriate and isotonic exercise   Medication changes today: He is agreeable to rosuvastatin 5 mg twice a week which should be enough to get him to goal     7.  Anemia:  Current hemoglobin: 10.3 stable: Followed by hematology oncology  Ferritin slightly low at 91 otherwise saturation good would recommend iron over-the-counter every other day     8.  Other problems:  Prostate CA followed by Urology with negative biopsies of the last  several years.  Last biopsy was 03/02/2021.:  Status post recent biopsy by Dr. Thurston 9/26/2023: Positive for prostate cancer potential radiation therapy  Needs follow-up renal ultrasound 12 months after 4/27/2023 for some mildly complex cysts: Discussed with patient to follow-up in April 2024 to follow the cysts  Avascular necrosis of the left knee    MGUS followed by Hematology      GI health maintenance:  11/04/2020:  By Dr. Mcclelland no polyps normal colonoscopy no further procedure required            Medical History Reviewed by provider this encounter:     .  Past Medical History  Past Medical History:   Diagnosis Date   • Hypertension    • Prostate cancer (HCC)      Past Surgical History:   Procedure Laterality Date   • COLONOSCOPY     • HERNIA REPAIR Left    • INSERTION PROSTATE RADIATION SEED  03/19/2024   • MA BX PROSTATE STRTCTC SATURATION SAMPLING IMG GID N/A 09/26/2023    Procedure: TRANSPERINEAL MRI FUSION BIOPSY PROSTATE;  Surgeon: Sushil Thurston MD;  Location[facility];  Service: Urology   • MA PROSTATE NEEDLE BIOPSY ANY APPROACH N/A 03/02/2021    Procedure: Transperineal MRI Fusion prostate biopsy and gold seed marker insertion;  Surgeon: Farhan Jacobo MD;  Location[facility];  Service: Urology   • PROSTATE BIOPSY      1/26/2015, 2/10/2017   • PROSTATE BIOPSY     • VASECTOMY  ?     Family History   Problem Relation Age of Onset   • Hypertension Mother    • Prostate cancer Brother    • No Known Problems Brother    • No Known Problems Sister    • No Known Problems Son    • No Known Problems Son    • No Known Problems Daughter       reports that he has never smoked. He has been exposed to tobacco smoke. He has never used smokeless tobacco. He reports current alcohol use of about 2.0 standard drinks of alcohol per week. He reports that he does not use drugs.  Current Outpatient Medications   Medication Instructions   • amLODIPine (NORVASC) 5 mg, Oral, Every morning   • atovaquone-proguanil  (MALARONE) 250-100 mg 1 tablet, Oral, Daily with breakfast   • finasteride (PROSCAR) 5 mg, Oral, Daily   • Multiple Vitamins-Minerals (CENTRUM SILVER 50+MEN PO) As needed   • Prevagen Extra Strength 20 mg, Daily   • [START ON 5/5/2025] rosuvastatin (CRESTOR) 5 mg, Oral, 2 times weekly   • sildenafil (VIAGRA) 50 mg, Oral, As needed   • sulfamethoxazole-trimethoprim (BACTRIM DS) 800-160 mg per tablet    • tamsulosin (FLOMAX) 0.4 mg, Oral, Daily with dinner   No Known Allergies   Current Outpatient Medications on File Prior to Visit   Medication Sig Dispense Refill   • amLODIPine (NORVASC) 5 mg tablet TAKE 1 TABLET BY MOUTH IN THE MORNING 90 tablet 1   • Multiple Vitamins-Minerals (CENTRUM SILVER 50+MEN PO) Take by mouth as needed     • sildenafil (VIAGRA) 50 MG tablet Take 1 tablet (50 mg total) by mouth as needed for erectile dysfunction 30 tablet 2   • tamsulosin (FLOMAX) 0.4 mg Take 1 capsule (0.4 mg total) by mouth daily with dinner (Patient taking differently: Take 0.4 mg by mouth 2 (two) times a day) 90 capsule 3   • Apoaequorin (Prevagen Extra Strength) 20 MG CAPS Take 20 mg by mouth in the morning Once daily (Patient not taking: Reported on 5/2/2025)     • atovaquone-proguanil (MALARONE) 250-100 mg Take 1 tablet by mouth daily with breakfast for 18 days Do not start before September 27, 2024. (Patient not taking: Reported on 9/11/2024) 18 tablet 1   • finasteride (PROSCAR) 5 mg tablet take 1 tablet by mouth once daily (Patient not taking: Reported on 1/3/2025) 90 tablet 1   • sulfamethoxazole-trimethoprim (BACTRIM DS) 800-160 mg per tablet  (Patient not taking: Reported on 5/2/2025)       No current facility-administered medications on file prior to visit.      Social History     Tobacco Use   • Smoking status: Never     Passive exposure: Past   • Smokeless tobacco: Never   • Tobacco comments:     tried it once    Vaping Use   • Vaping status: Never Used   Substance and Sexual Activity   • Alcohol use: Yes      "Alcohol/week: 2.0 standard drinks of alcohol     Types: 1 Glasses of wine, 1 Cans of beer per week     Comment: social   • Drug use: No   • Sexual activity: Yes        Objective  Ht 5' 3\" (1.6 m)   Wt 65.3 kg (144 lb)   BMI 25.51 kg/m²      Physical Exam  BP sitting on left: 110/60 with a heart rate of 68 and regular  BP standing on left: 120/60 with a heart rate of 72 and regular  .Physical Exam: General:  No acute distress  Skin:  No acute rash  Eyes:  No scleral icterus and noninjected  ENT:  Moist mucous membranes  Neck:  Supple, no jugular venous distention, trachea midline, overall appearance is normal  Chest:  Clear to auscultation  CVS:  Regular rate and rhythm, without a rub or gallops  Abdomen:  Normal bowel sounds, soft and nontender and nondistended  Extremities:  No edema, and no cyanosis, no significant arthritic changes  Neuro:  No gross focality  Psych:  Alert and oriented and appropriate      "

## 2025-05-02 NOTE — PATIENT INSTRUCTIONS
Visit summary:  - Overall kidney labs are very stable!  - Your cholesterol is borderline so I am going to start you on rosuvastatin or Crestor just twice a week please see below  - Your blood pressure is very borderline in the top number your bottom number is excellent so I would just encourage you to avoid salt and recheck blood pressures in 3 months and then again in 6 months prior to next appointment    - Your iron studies are borderline low so I recommend iron over-the-counter every other day or at least every Monday Wednesday and Friday        1. Medication changes today:  Please begin rosuvastatin 5 mg every Monday and Friday.  Watch for body aches or joint pains more than usual please call if you develop any.  Please begin iron over-the-counter every Monday Wednesday and Friday    2.  General instructions:  Avoid salt  When your knee feels better go back to working out ideally 5 days a week for 30 minutes but at least 3 days a week for 30 minutes, and mix in some toning 2 times a week        3.  Please take 1 week a blood pressure readings in 3 months    AS FOLLOWS  MORNING AND EVENING, SITTING AND STANDING as follows:  TAKE THE MORNING READINGS BEFORE ANY MEDICATIONS AND WHEN YOU ARE RELAXED FOR SEVERAL MINUTES  TAKE THE EVENING READINGS:  BETWEEN 7-10 P.M.; PRIOR TO ANY MEDICATIONS; AT LEAST IN OUR  FROM DINNER; AND CERTAINLY AFTER RELAXING FOR A FEW MINUTES  PLEASE INCLUDE HEART RATE WITH YOUR BLOOD PRESSURE READINGS  When taking standing readings, keep your arm supported at heart level and not dangling  Make sure you are sitting with your back supported and feet on the ground and do not cross your legs or feet  Make sure you have not taken any coffee or caffeine products or exercised or smoke cigarettes at least 30 minutes before taking your blood pressure  Then please mail these readings into the office      4.  In 3 months:  Please go for nonfasting lab work but in the morning  Please take 1  week a blood pressure readings as outlined above and mail those into the office      5.  Follow-up in 6 months  Please bring in 1 week a blood pressure readings morning evening, sitting and standing is outlined above  PLEASE BRING AN YOUR BLOOD PRESSURE MACHINE TO CORRELATE WITH THE OFFICE MACHINE AT THIS NEXT SCHEDULED VISIT  Please go for fasting lab work 1-2 weeks prior to your appointment      6.  General non medical recommendations:  AVOID SALT BUT NOT ADDING AN READING LABELS TO MAKE SURE THERE IS LOW-SALT IN THE FOOD THAT YOU ARE EATING  Goal is less than 2 g of sodium intake or less than 5 g of sodium chloride intake per day    Avoid nonsteroidal anti-inflammatory drugs such as Naprosyn, ibuprofen, Aleve, Advil, Celebrex, Meloxicam (Mobic) etc.  You can use Tylenol as needed if you do not have any liver condition to be concerned about    Avoid medications such as Sudafed or decongestants and antihistamines that contained the D component which is the decongestant.  You can take antihistamines without the decongestant or D component.    Try to avoid medications such as pantoprazole or  Protonix/Nexium or Esomeprazole)/Prilosec or omeprazole/Prevacid or lansoprazole/AcipHex or Rabeprazole.  If you are able to, use Pepcid as this is safer for your kidneys.    Try to exercise at least 30 minutes 3 days a week to begin with with an ultimate goal of 5 days a week for at least 30 minutes    Please do not drink more than 2 glasses of alcohol/wine on a daily basis as this may contribute to your high blood pressure.

## 2025-07-15 ENCOUNTER — OFFICE VISIT (OUTPATIENT)
Dept: RADIATION ONCOLOGY | Facility: CLINIC | Age: 80
End: 2025-07-15
Attending: RADIOLOGY
Payer: MEDICARE

## 2025-07-15 VITALS
HEART RATE: 60 BPM | TEMPERATURE: 97 F | OXYGEN SATURATION: 99 % | WEIGHT: 143 LBS | SYSTOLIC BLOOD PRESSURE: 122 MMHG | DIASTOLIC BLOOD PRESSURE: 70 MMHG | RESPIRATION RATE: 16 BRPM | BODY MASS INDEX: 25.33 KG/M2

## 2025-07-15 DIAGNOSIS — Z08 ENCOUNTER FOR FOLLOW-UP SURVEILLANCE OF PROSTATE CANCER: Primary | ICD-10-CM

## 2025-07-15 DIAGNOSIS — Z85.46 ENCOUNTER FOR FOLLOW-UP SURVEILLANCE OF PROSTATE CANCER: Primary | ICD-10-CM

## 2025-07-15 PROCEDURE — 99213 OFFICE O/P EST LOW 20 MIN: CPT | Performed by: RADIOLOGY

## 2025-07-15 PROCEDURE — G2211 COMPLEX E/M VISIT ADD ON: HCPCS | Performed by: RADIOLOGY

## 2025-07-15 PROCEDURE — 99211 OFF/OP EST MAY X REQ PHY/QHP: CPT | Performed by: RADIOLOGY

## 2025-07-16 ENCOUNTER — APPOINTMENT (OUTPATIENT)
Dept: LAB | Facility: CLINIC | Age: 80
End: 2025-07-16
Attending: INTERNAL MEDICINE
Payer: MEDICARE

## 2025-07-16 ENCOUNTER — TELEPHONE (OUTPATIENT)
Age: 80
End: 2025-07-16

## 2025-07-16 DIAGNOSIS — Z85.46 ENCOUNTER FOR FOLLOW-UP SURVEILLANCE OF PROSTATE CANCER: ICD-10-CM

## 2025-07-16 DIAGNOSIS — Z08 ENCOUNTER FOR FOLLOW-UP SURVEILLANCE OF PROSTATE CANCER: ICD-10-CM

## 2025-07-16 DIAGNOSIS — D63.1 ANEMIA IN STAGE 4 CHRONIC KIDNEY DISEASE  (HCC): ICD-10-CM

## 2025-07-16 DIAGNOSIS — N18.4 CHRONIC RENAL DISEASE, STAGE IV (HCC): ICD-10-CM

## 2025-07-16 DIAGNOSIS — I12.9 PARENCHYMAL RENAL HYPERTENSION, STAGE 1 THROUGH STAGE 4 OR UNSPECIFIED CHRONIC KIDNEY DISEASE: ICD-10-CM

## 2025-07-16 DIAGNOSIS — E78.5 DYSLIPIDEMIA: ICD-10-CM

## 2025-07-16 DIAGNOSIS — C61 PROSTATE CANCER (HCC): Primary | ICD-10-CM

## 2025-07-16 DIAGNOSIS — N18.4 ANEMIA IN STAGE 4 CHRONIC KIDNEY DISEASE  (HCC): ICD-10-CM

## 2025-07-16 LAB
ANION GAP SERPL CALCULATED.3IONS-SCNC: 5 MMOL/L (ref 4–13)
BUN SERPL-MCNC: 42 MG/DL (ref 5–25)
CALCIUM SERPL-MCNC: 9.1 MG/DL (ref 8.4–10.2)
CHLORIDE SERPL-SCNC: 107 MMOL/L (ref 96–108)
CO2 SERPL-SCNC: 26 MMOL/L (ref 21–32)
CREAT SERPL-MCNC: 2.43 MG/DL (ref 0.6–1.3)
ERYTHROCYTE [DISTWIDTH] IN BLOOD BY AUTOMATED COUNT: 13.4 % (ref 11.6–15.1)
GFR SERPL CREATININE-BSD FRML MDRD: 24 ML/MIN/1.73SQ M
GLUCOSE SERPL-MCNC: 111 MG/DL (ref 65–140)
HCT VFR BLD AUTO: 34.9 % (ref 36.5–49.3)
HGB BLD-MCNC: 10.9 G/DL (ref 12–17)
MAGNESIUM SERPL-MCNC: 2.4 MG/DL (ref 1.9–2.7)
MCH RBC QN AUTO: 29 PG (ref 26.8–34.3)
MCHC RBC AUTO-ENTMCNC: 31.2 G/DL (ref 31.4–37.4)
MCV RBC AUTO: 93 FL (ref 82–98)
PLATELET # BLD AUTO: 238 THOUSANDS/UL (ref 149–390)
PMV BLD AUTO: 9.8 FL (ref 8.9–12.7)
POTASSIUM SERPL-SCNC: 4.7 MMOL/L (ref 3.5–5.3)
RBC # BLD AUTO: 3.76 MILLION/UL (ref 3.88–5.62)
SODIUM SERPL-SCNC: 138 MMOL/L (ref 135–147)
WBC # BLD AUTO: 7.63 THOUSAND/UL (ref 4.31–10.16)

## 2025-07-16 PROCEDURE — 36415 COLL VENOUS BLD VENIPUNCTURE: CPT

## 2025-07-16 PROCEDURE — 80048 BASIC METABOLIC PNL TOTAL CA: CPT

## 2025-07-16 PROCEDURE — 83735 ASSAY OF MAGNESIUM: CPT

## 2025-07-16 PROCEDURE — 85027 COMPLETE CBC AUTOMATED: CPT

## 2025-07-16 PROCEDURE — 84153 ASSAY OF PSA TOTAL: CPT

## 2025-07-17 ENCOUNTER — RESULTS FOLLOW-UP (OUTPATIENT)
Dept: OTHER | Facility: HOSPITAL | Age: 80
End: 2025-07-17

## 2025-07-17 NOTE — RESULT ENCOUNTER NOTE
Please let Ross know that labs are reviewed.  Creatinine is 2.4 which is near recent levels.  No changes at this time.  Please let me know if he has any questions or concerns.  Thank you.

## 2025-07-18 LAB — PSA SERPL DL<=0.01 NG/ML-MCNC: <0.006 NG/ML (ref 0–4)

## 2025-07-18 NOTE — TELEPHONE ENCOUNTER
Left a message about the following . If any question to call the office.            ----- Message from JAE Carrillo sent at 7/17/2025  5:25 PM EDT -----  Please let Ross know that labs are reviewed.  Creatinine is 2.4 which is near recent levels.  No changes at this time.  Please let me know if he has any questions or concerns.  Thank you.  ----- Message -----  From: Lab, Background User  Sent: 7/16/2025   7:00 PM EDT  To: Duc Yuan MD

## 2025-08-18 ENCOUNTER — TELEPHONE (OUTPATIENT)
Age: 80
End: 2025-08-18

## (undated) DEVICE — TELFA NON-ADHERENT ABSORBENT DRESSING: Brand: TELFA

## (undated) DEVICE — SYSTEM TRANSPERINEAL ACCESS PRECISIONPOINT

## (undated) DEVICE — MAX-CORE® DISPOSABLE CORE BIOPSY INSTRUMENT, 18G X 25CM: Brand: MAX-CORE